# Patient Record
Sex: MALE | Race: WHITE | HISPANIC OR LATINO | URBAN - METROPOLITAN AREA
[De-identification: names, ages, dates, MRNs, and addresses within clinical notes are randomized per-mention and may not be internally consistent; named-entity substitution may affect disease eponyms.]

---

## 2019-08-08 ENCOUNTER — INPATIENT (INPATIENT)
Facility: HOSPITAL | Age: 69
LOS: 4 days | Discharge: HOME | DRG: 304 | End: 2019-08-13
Attending: INTERNAL MEDICINE | Admitting: INTERNAL MEDICINE
Payer: MEDICARE

## 2019-08-08 VITALS
SYSTOLIC BLOOD PRESSURE: 227 MMHG | RESPIRATION RATE: 16 BRPM | TEMPERATURE: 99 F | OXYGEN SATURATION: 96 % | HEART RATE: 67 BPM | DIASTOLIC BLOOD PRESSURE: 70 MMHG

## 2019-08-08 DIAGNOSIS — E78.5 HYPERLIPIDEMIA, UNSPECIFIED: ICD-10-CM

## 2019-08-08 DIAGNOSIS — M54.5 LOW BACK PAIN: ICD-10-CM

## 2019-08-08 DIAGNOSIS — I25.10 ATHEROSCLEROTIC HEART DISEASE OF NATIVE CORONARY ARTERY WITHOUT ANGINA PECTORIS: ICD-10-CM

## 2019-08-08 DIAGNOSIS — N18.9 CHRONIC KIDNEY DISEASE, UNSPECIFIED: ICD-10-CM

## 2019-08-08 DIAGNOSIS — I87.2 VENOUS INSUFFICIENCY (CHRONIC) (PERIPHERAL): ICD-10-CM

## 2019-08-08 DIAGNOSIS — I16.0 HYPERTENSIVE URGENCY: ICD-10-CM

## 2019-08-08 DIAGNOSIS — F03.90 UNSPECIFIED DEMENTIA WITHOUT BEHAVIORAL DISTURBANCE: ICD-10-CM

## 2019-08-08 DIAGNOSIS — N18.6 END STAGE RENAL DISEASE: ICD-10-CM

## 2019-08-08 DIAGNOSIS — E11.9 TYPE 2 DIABETES MELLITUS WITHOUT COMPLICATIONS: ICD-10-CM

## 2019-08-08 DIAGNOSIS — Z91.89 OTHER SPECIFIED PERSONAL RISK FACTORS, NOT ELSEWHERE CLASSIFIED: ICD-10-CM

## 2019-08-08 DIAGNOSIS — M54.9 DORSALGIA, UNSPECIFIED: ICD-10-CM

## 2019-08-08 DIAGNOSIS — I16.1 HYPERTENSIVE EMERGENCY: ICD-10-CM

## 2019-08-08 DIAGNOSIS — G62.9 POLYNEUROPATHY, UNSPECIFIED: ICD-10-CM

## 2019-08-08 LAB
ALBUMIN SERPL ELPH-MCNC: 5 G/DL — SIGNIFICANT CHANGE UP (ref 3.3–5)
ALP SERPL-CCNC: 164 U/L — HIGH (ref 40–120)
ALT FLD-CCNC: 24 U/L — SIGNIFICANT CHANGE UP (ref 10–45)
ANION GAP SERPL CALC-SCNC: 15 MMOL/L — SIGNIFICANT CHANGE UP (ref 5–17)
APTT BLD: 37.3 SEC — HIGH (ref 27.5–36.3)
AST SERPL-CCNC: 33 U/L — SIGNIFICANT CHANGE UP (ref 10–40)
BASOPHILS # BLD AUTO: 0.03 K/UL — SIGNIFICANT CHANGE UP (ref 0–0.2)
BASOPHILS NFR BLD AUTO: 0.6 % — SIGNIFICANT CHANGE UP (ref 0–2)
BILIRUB SERPL-MCNC: 0.4 MG/DL — SIGNIFICANT CHANGE UP (ref 0.2–1.2)
BUN SERPL-MCNC: 31 MG/DL — HIGH (ref 7–23)
CALCIUM SERPL-MCNC: 10.3 MG/DL — SIGNIFICANT CHANGE UP (ref 8.4–10.5)
CHLORIDE SERPL-SCNC: 97 MMOL/L — SIGNIFICANT CHANGE UP (ref 96–108)
CK MB CFR SERPL CALC: 4.7 NG/ML — SIGNIFICANT CHANGE UP (ref 0–6.7)
CK SERPL-CCNC: 67 U/L — SIGNIFICANT CHANGE UP (ref 30–200)
CO2 SERPL-SCNC: 31 MMOL/L — SIGNIFICANT CHANGE UP (ref 22–31)
CREAT SERPL-MCNC: 3.61 MG/DL — HIGH (ref 0.5–1.3)
EOSINOPHIL # BLD AUTO: 0.12 K/UL — SIGNIFICANT CHANGE UP (ref 0–0.5)
EOSINOPHIL NFR BLD AUTO: 2.4 % — SIGNIFICANT CHANGE UP (ref 0–6)
GLUCOSE SERPL-MCNC: 362 MG/DL — HIGH (ref 70–99)
HBV SURFACE AB SER-ACNC: REACTIVE — SIGNIFICANT CHANGE UP
HBV SURFACE AG SER-ACNC: SIGNIFICANT CHANGE UP
HCT VFR BLD CALC: 35.4 % — LOW (ref 39–50)
HCV AB S/CO SERPL IA: 2.83 S/CO — SIGNIFICANT CHANGE UP
HCV AB SERPL-IMP: ABNORMAL
HGB BLD-MCNC: 11.4 G/DL — LOW (ref 13–17)
IMM GRANULOCYTES NFR BLD AUTO: 0.4 % — SIGNIFICANT CHANGE UP (ref 0–1.5)
INR BLD: 1.04 — SIGNIFICANT CHANGE UP (ref 0.88–1.16)
LYMPHOCYTES # BLD AUTO: 0.79 K/UL — LOW (ref 1–3.3)
LYMPHOCYTES # BLD AUTO: 15.8 % — SIGNIFICANT CHANGE UP (ref 13–44)
MCHC RBC-ENTMCNC: 28.9 PG — SIGNIFICANT CHANGE UP (ref 27–34)
MCHC RBC-ENTMCNC: 32.2 GM/DL — SIGNIFICANT CHANGE UP (ref 32–36)
MCV RBC AUTO: 89.8 FL — SIGNIFICANT CHANGE UP (ref 80–100)
MONOCYTES # BLD AUTO: 0.59 K/UL — SIGNIFICANT CHANGE UP (ref 0–0.9)
MONOCYTES NFR BLD AUTO: 11.8 % — SIGNIFICANT CHANGE UP (ref 2–14)
NEUTROPHILS # BLD AUTO: 3.45 K/UL — SIGNIFICANT CHANGE UP (ref 1.8–7.4)
NEUTROPHILS NFR BLD AUTO: 69 % — SIGNIFICANT CHANGE UP (ref 43–77)
NRBC # BLD: 0 /100 WBCS — SIGNIFICANT CHANGE UP (ref 0–0)
PLATELET # BLD AUTO: 141 K/UL — LOW (ref 150–400)
POTASSIUM SERPL-MCNC: 4 MMOL/L — SIGNIFICANT CHANGE UP (ref 3.5–5.3)
POTASSIUM SERPL-SCNC: 4 MMOL/L — SIGNIFICANT CHANGE UP (ref 3.5–5.3)
PROT SERPL-MCNC: 8.1 G/DL — SIGNIFICANT CHANGE UP (ref 6–8.3)
PROTHROM AB SERPL-ACNC: 11.8 SEC — SIGNIFICANT CHANGE UP (ref 10–12.9)
RBC # BLD: 3.94 M/UL — LOW (ref 4.2–5.8)
RBC # FLD: 14.4 % — SIGNIFICANT CHANGE UP (ref 10.3–14.5)
SODIUM SERPL-SCNC: 143 MMOL/L — SIGNIFICANT CHANGE UP (ref 135–145)
TROPONIN T SERPL-MCNC: 0.18 NG/ML — CRITICAL HIGH (ref 0–0.01)
TROPONIN T SERPL-MCNC: 0.18 NG/ML — CRITICAL HIGH (ref 0–0.01)
WBC # BLD: 5 K/UL — SIGNIFICANT CHANGE UP (ref 3.8–10.5)
WBC # FLD AUTO: 5 K/UL — SIGNIFICANT CHANGE UP (ref 3.8–10.5)

## 2019-08-08 PROCEDURE — 71045 X-RAY EXAM CHEST 1 VIEW: CPT | Mod: 26

## 2019-08-08 PROCEDURE — 99291 CRITICAL CARE FIRST HOUR: CPT

## 2019-08-08 PROCEDURE — 74174 CTA ABD&PLVS W/CONTRAST: CPT | Mod: 26

## 2019-08-08 PROCEDURE — 93010 ELECTROCARDIOGRAM REPORT: CPT

## 2019-08-08 PROCEDURE — 71275 CT ANGIOGRAPHY CHEST: CPT | Mod: 26

## 2019-08-08 PROCEDURE — 99222 1ST HOSP IP/OBS MODERATE 55: CPT | Mod: GC

## 2019-08-08 PROCEDURE — 99223 1ST HOSP IP/OBS HIGH 75: CPT | Mod: GC

## 2019-08-08 PROCEDURE — 70450 CT HEAD/BRAIN W/O DYE: CPT | Mod: 26

## 2019-08-08 RX ORDER — LABETALOL HCL 100 MG
300 TABLET ORAL ONCE
Refills: 0 | Status: COMPLETED | OUTPATIENT
Start: 2019-08-08 | End: 2019-08-08

## 2019-08-08 RX ORDER — DEXTROSE 50 % IN WATER 50 %
25 SYRINGE (ML) INTRAVENOUS ONCE
Refills: 0 | Status: DISCONTINUED | OUTPATIENT
Start: 2019-08-08 | End: 2019-08-13

## 2019-08-08 RX ORDER — NITROGLYCERIN 6.5 MG
5 CAPSULE, EXTENDED RELEASE ORAL
Qty: 50 | Refills: 0 | Status: DISCONTINUED | OUTPATIENT
Start: 2019-08-08 | End: 2019-08-08

## 2019-08-08 RX ORDER — INSULIN LISPRO 100/ML
VIAL (ML) SUBCUTANEOUS
Refills: 0 | Status: DISCONTINUED | OUTPATIENT
Start: 2019-08-08 | End: 2019-08-09

## 2019-08-08 RX ORDER — HYDRALAZINE HCL 50 MG
10 TABLET ORAL ONCE
Refills: 0 | Status: DISCONTINUED | OUTPATIENT
Start: 2019-08-08 | End: 2019-08-08

## 2019-08-08 RX ORDER — HYDRALAZINE HCL 50 MG
10 TABLET ORAL ONCE
Refills: 0 | Status: COMPLETED | OUTPATIENT
Start: 2019-08-08 | End: 2019-08-08

## 2019-08-08 RX ORDER — CLOPIDOGREL BISULFATE 75 MG/1
75 TABLET, FILM COATED ORAL DAILY
Refills: 0 | Status: DISCONTINUED | OUTPATIENT
Start: 2019-08-08 | End: 2019-08-13

## 2019-08-08 RX ORDER — HYDRALAZINE HCL 50 MG
10 TABLET ORAL
Refills: 0 | Status: DISCONTINUED | OUTPATIENT
Start: 2019-08-08 | End: 2019-08-08

## 2019-08-08 RX ORDER — MORPHINE SULFATE 50 MG/1
4 CAPSULE, EXTENDED RELEASE ORAL ONCE
Refills: 0 | Status: DISCONTINUED | OUTPATIENT
Start: 2019-08-08 | End: 2019-08-08

## 2019-08-08 RX ORDER — LABETALOL HCL 100 MG
200 TABLET ORAL ONCE
Refills: 0 | Status: COMPLETED | OUTPATIENT
Start: 2019-08-08 | End: 2019-08-08

## 2019-08-08 RX ORDER — AMLODIPINE BESYLATE 2.5 MG/1
10 TABLET ORAL ONCE
Refills: 0 | Status: COMPLETED | OUTPATIENT
Start: 2019-08-08 | End: 2019-08-08

## 2019-08-08 RX ORDER — MORPHINE SULFATE 50 MG/1
2 CAPSULE, EXTENDED RELEASE ORAL ONCE
Refills: 0 | Status: DISCONTINUED | OUTPATIENT
Start: 2019-08-08 | End: 2019-08-08

## 2019-08-08 RX ORDER — HYDRALAZINE HCL 50 MG
75 TABLET ORAL THREE TIMES A DAY
Refills: 0 | Status: DISCONTINUED | OUTPATIENT
Start: 2019-08-08 | End: 2019-08-09

## 2019-08-08 RX ORDER — GLUCAGON INJECTION, SOLUTION 0.5 MG/.1ML
1 INJECTION, SOLUTION SUBCUTANEOUS ONCE
Refills: 0 | Status: DISCONTINUED | OUTPATIENT
Start: 2019-08-08 | End: 2019-08-13

## 2019-08-08 RX ORDER — DEXTROSE 50 % IN WATER 50 %
12.5 SYRINGE (ML) INTRAVENOUS ONCE
Refills: 0 | Status: DISCONTINUED | OUTPATIENT
Start: 2019-08-08 | End: 2019-08-13

## 2019-08-08 RX ORDER — ASPIRIN/CALCIUM CARB/MAGNESIUM 324 MG
81 TABLET ORAL DAILY
Refills: 0 | Status: DISCONTINUED | OUTPATIENT
Start: 2019-08-08 | End: 2019-08-13

## 2019-08-08 RX ORDER — GABAPENTIN 400 MG/1
100 CAPSULE ORAL DAILY
Refills: 0 | Status: DISCONTINUED | OUTPATIENT
Start: 2019-08-08 | End: 2019-08-12

## 2019-08-08 RX ORDER — ISOSORBIDE MONONITRATE 60 MG/1
30 TABLET, EXTENDED RELEASE ORAL ONCE
Refills: 0 | Status: COMPLETED | OUTPATIENT
Start: 2019-08-08 | End: 2019-08-08

## 2019-08-08 RX ORDER — ENOXAPARIN SODIUM 100 MG/ML
40 INJECTION SUBCUTANEOUS AT BEDTIME
Refills: 0 | Status: DISCONTINUED | OUTPATIENT
Start: 2019-08-08 | End: 2019-08-09

## 2019-08-08 RX ORDER — LEVOTHYROXINE SODIUM 125 MCG
50 TABLET ORAL DAILY
Refills: 0 | Status: DISCONTINUED | OUTPATIENT
Start: 2019-08-08 | End: 2019-08-13

## 2019-08-08 RX ORDER — ISOSORBIDE MONONITRATE 60 MG/1
30 TABLET, EXTENDED RELEASE ORAL DAILY
Refills: 0 | Status: DISCONTINUED | OUTPATIENT
Start: 2019-08-09 | End: 2019-08-10

## 2019-08-08 RX ORDER — HYDRALAZINE HCL 50 MG
100 TABLET ORAL ONCE
Refills: 0 | Status: COMPLETED | OUTPATIENT
Start: 2019-08-08 | End: 2019-08-08

## 2019-08-08 RX ORDER — LABETALOL HCL 100 MG
500 TABLET ORAL THREE TIMES A DAY
Refills: 0 | Status: DISCONTINUED | OUTPATIENT
Start: 2019-08-08 | End: 2019-08-08

## 2019-08-08 RX ORDER — LOSARTAN POTASSIUM 100 MG/1
100 TABLET, FILM COATED ORAL EVERY 24 HOURS
Refills: 0 | Status: DISCONTINUED | OUTPATIENT
Start: 2019-08-09 | End: 2019-08-13

## 2019-08-08 RX ORDER — DEXTROSE 50 % IN WATER 50 %
15 SYRINGE (ML) INTRAVENOUS ONCE
Refills: 0 | Status: DISCONTINUED | OUTPATIENT
Start: 2019-08-08 | End: 2019-08-13

## 2019-08-08 RX ORDER — HYDROMORPHONE HYDROCHLORIDE 2 MG/ML
2 INJECTION INTRAMUSCULAR; INTRAVENOUS; SUBCUTANEOUS ONCE
Refills: 0 | Status: DISCONTINUED | OUTPATIENT
Start: 2019-08-08 | End: 2019-08-08

## 2019-08-08 RX ORDER — SODIUM CHLORIDE 9 MG/ML
1000 INJECTION, SOLUTION INTRAVENOUS
Refills: 0 | Status: DISCONTINUED | OUTPATIENT
Start: 2019-08-08 | End: 2019-08-13

## 2019-08-08 RX ORDER — LOSARTAN POTASSIUM 100 MG/1
100 TABLET, FILM COATED ORAL DAILY
Refills: 0 | Status: DISCONTINUED | OUTPATIENT
Start: 2019-08-08 | End: 2019-08-08

## 2019-08-08 RX ORDER — NIFEDIPINE 30 MG
30 TABLET, EXTENDED RELEASE 24 HR ORAL ONCE
Refills: 0 | Status: COMPLETED | OUTPATIENT
Start: 2019-08-08 | End: 2019-08-08

## 2019-08-08 RX ORDER — SEVELAMER CARBONATE 2400 MG/1
800 POWDER, FOR SUSPENSION ORAL
Refills: 0 | Status: DISCONTINUED | OUTPATIENT
Start: 2019-08-08 | End: 2019-08-13

## 2019-08-08 RX ADMIN — Medication 300 MILLIGRAM(S): at 17:42

## 2019-08-08 RX ADMIN — MORPHINE SULFATE 4 MILLIGRAM(S): 50 CAPSULE, EXTENDED RELEASE ORAL at 10:51

## 2019-08-08 RX ADMIN — MORPHINE SULFATE 2 MILLIGRAM(S): 50 CAPSULE, EXTENDED RELEASE ORAL at 21:57

## 2019-08-08 RX ADMIN — Medication 1.5 MICROGRAM(S)/MIN: at 19:57

## 2019-08-08 RX ADMIN — Medication 10 MILLIGRAM(S): at 11:45

## 2019-08-08 RX ADMIN — Medication 10 MILLIGRAM(S): at 11:30

## 2019-08-08 RX ADMIN — Medication 0.1 MILLIGRAM(S): at 17:42

## 2019-08-08 RX ADMIN — AMLODIPINE BESYLATE 10 MILLIGRAM(S): 2.5 TABLET ORAL at 19:17

## 2019-08-08 RX ADMIN — HYDROMORPHONE HYDROCHLORIDE 2 MILLIGRAM(S): 2 INJECTION INTRAMUSCULAR; INTRAVENOUS; SUBCUTANEOUS at 14:08

## 2019-08-08 RX ADMIN — MORPHINE SULFATE 4 MILLIGRAM(S): 50 CAPSULE, EXTENDED RELEASE ORAL at 16:16

## 2019-08-08 RX ADMIN — MORPHINE SULFATE 4 MILLIGRAM(S): 50 CAPSULE, EXTENDED RELEASE ORAL at 10:36

## 2019-08-08 RX ADMIN — Medication 200 MILLIGRAM(S): at 17:41

## 2019-08-08 RX ADMIN — MORPHINE SULFATE 4 MILLIGRAM(S): 50 CAPSULE, EXTENDED RELEASE ORAL at 10:33

## 2019-08-08 RX ADMIN — ISOSORBIDE MONONITRATE 30 MILLIGRAM(S): 60 TABLET, EXTENDED RELEASE ORAL at 19:17

## 2019-08-08 RX ADMIN — Medication 10 MILLIGRAM(S): at 11:20

## 2019-08-08 RX ADMIN — MORPHINE SULFATE 4 MILLIGRAM(S): 50 CAPSULE, EXTENDED RELEASE ORAL at 10:18

## 2019-08-08 RX ADMIN — Medication 30 MILLIGRAM(S): at 12:51

## 2019-08-08 RX ADMIN — ISOSORBIDE MONONITRATE 30 MILLIGRAM(S): 60 TABLET, EXTENDED RELEASE ORAL at 17:42

## 2019-08-08 RX ADMIN — Medication 10 MILLIGRAM(S): at 10:51

## 2019-08-08 RX ADMIN — Medication 2: at 21:17

## 2019-08-08 RX ADMIN — MORPHINE SULFATE 2 MILLIGRAM(S): 50 CAPSULE, EXTENDED RELEASE ORAL at 21:20

## 2019-08-08 RX ADMIN — Medication 100 MILLIGRAM(S): at 17:42

## 2019-08-08 RX ADMIN — LOSARTAN POTASSIUM 100 MILLIGRAM(S): 100 TABLET, FILM COATED ORAL at 19:18

## 2019-08-08 NOTE — H&P ADULT - PROBLEM SELECTOR PLAN 1
Home medications given (labetalol 500 mg, hydralazine 100 mg, Imdur 60 mg, and clonidine 0.1 mg, amlodipine 10 mg, and losartan 100 mg).   -220's currently, per patient this is his baseline  Nitro drip started, to be titrated up in CCU Home medications given (labetalol 500 mg, hydralazine 100 mg, Imdur 60 mg, and clonidine 0.1 mg, amlodipine 10 mg, and losartan 100 mg).   -220's currently, per patient this is his baseline  Patient chest pain free, trops trending down, likely elevated d/t ESRD   Nitro drip started, to be titrated up in CCU

## 2019-08-08 NOTE — ED PROVIDER NOTE - CARE PLAN
Principal Discharge DX:	Hypertensive emergency  Secondary Diagnosis:	NSTEMI (non-ST elevated myocardial infarction)  Secondary Diagnosis:	ESRD (end stage renal disease) on dialysis  Secondary Diagnosis:	Back pain, unspecified back location, unspecified back pain laterality, unspecified chronicity

## 2019-08-08 NOTE — H&P ADULT - ASSESSMENT
69 y/o M  POOR/UNRELIABLE HISTORIAN with PMHx of HTN, HLD, CAD, BRYNN (s/p stent), IDDM (with peripheral nueropathy), ESRD on dialysis (tues/thurs/sat), hypothryoidism, ?CVA (legally blind in left eye, small vision in R eye - question of stroke or retinal artery occlusion - patient is s/p carotid artery stent), PAD (s/p bilateral stents) who presented to [facility] c/o 8/10 lower back pain, posterior neck pain, and BL worsening lower extremity numbness/pain following 3/4ths of his dialysis, for which he called an ambulance. Patient now being accepted to CCU.

## 2019-08-08 NOTE — H&P ADULT - PROBLEM SELECTOR PLAN 7
Patient only oriented x 1  Per assisted living facility, this is normal for him, he is occasionally confused   CT 7/31 shows chronic CVA   CT head without contrast ordered

## 2019-08-08 NOTE — PROGRESS NOTE ADULT - PROBLEM SELECTOR PLAN 5
Pt has chronic venous wounds in bilateral legs  - Wound care consult ordered Pt with history of type II DM on home lantus 100u at home.   - Continue to monitor FSG  - ISS  - F/u AM A1C

## 2019-08-08 NOTE — ED PROVIDER NOTE - PHYSICAL EXAMINATION
CONST: nontoxic NAD speaking in full sentences  HEAD: atraumatic  EYES: conjunctivae clear  ENT: mmm  NECK: supple/FROM, nttp, no jvd  CARD: rrr no murmurs  CHEST: ctab no r/r/w, no stridor/retractions/tripoding  ABD: soft, nd, nttp, no rebound/gaurding  BACK: no midline ttp, no cvat, atraumatic, no skin chnages  EXT: FROM, symmetric distal pulses intact  SKIN: warm, dry, no rash, no pedal edema, cap refill <2sec  NEURO: a+ox3, no facial asymmetry, no aphasia/dysphasia, no dysarthria, PERRL, EOMI, CN II-XII intact, ftn wnl, neg pronator, 5/5 strength x4, gross sensation intact x4, baseline gait w/ cane

## 2019-08-08 NOTE — PROGRESS NOTE ADULT - SUBJECTIVE AND OBJECTIVE BOX
Patient was seen and evaluated on dialysis.   HR: 56   BP: 119/58  Wt(kg): --  08-08    143  |  97  |  31<H>  ----------------------------<  362<H>  4.0   |  31  |  3.61<H>    Ca    10.3      08 Aug 2019 08:58    TPro  8.1  /  Alb  5.0  /  TBili  0.4  /  DBili  x   /  AST  33  /  ALT  24  /  AlkPhos  164<H>  08-08    Continue dialysis:   Dialyzer:   180    QB: 400  K bath: 3 k  Goal UF:    2   L   over      210         min  Patient is tolerating the procedure well.

## 2019-08-08 NOTE — H&P ADULT - PROBLEM SELECTOR PLAN 3
Immediate hemodialysis on arrival to unit  Positive troponin, trending down, patient chest pain free, likely secondary to ESRD

## 2019-08-08 NOTE — H&P ADULT - NSHPLABSRESULTS_GEN_ALL_CORE
11.4   5.00  )-----------( 141      ( 08 Aug 2019 08:58 )             35.4   08-08    143  |  97  |  31<H>  ----------------------------<  362<H>  4.0   |  31  |  3.61<H>    Ca    10.3      08 Aug 2019 08:58    TPro  8.1  /  Alb  5.0  /  TBili  0.4  /  DBili  x   /  AST  33  /  ALT  24  /  AlkPhos  164<H>  08-08    CARDIAC MARKERS ( 08 Aug 2019 12:12 )  x     / 0.18 ng/mL / x     / x     / x      CARDIAC MARKERS ( 08 Aug 2019 08:58 )  x     / 0.22 ng/mL / 94 U/L / x     / 6.6 ng/mL

## 2019-08-08 NOTE — PROGRESS NOTE ADULT - PROBLEM SELECTOR PLAN 7
1) PCP Contacted on Admission: (Y/N) --> Name & Phone #:  2) Date of Contact with PCP:  3) PCP Contacted at Discharge: (Y/N)  4) Summary of Handoff Given to PCP:   5) Post-Discharge Appointment Date and Location: Continue atorvastatin 40mg daily    #PMH Hypothyroidism   C/w home synthroid 50micrograms qd

## 2019-08-08 NOTE — PROGRESS NOTE ADULT - PROBLEM SELECTOR PROBLEM 3
Low back pain, unspecified back pain laterality, unspecified chronicity, unspecified whether sciatica present ESRD (end stage renal disease) on dialysis

## 2019-08-08 NOTE — CONSULT NOTE ADULT - ATTENDING COMMENTS
Case reviewed with Chief Resident    Agree with the findings and statements as documented above    No intervention needed    Recommend aspirin and statin    Also obtain carotid duplex to rule out carotid stenosis
will do further HD today to help BP control  to get head CT  to restart clonidine as may component rebound   enalaprilat PRN

## 2019-08-08 NOTE — H&P ADULT - MENTAL STATUS
Patient orientated to himself. Thinks it is 8/19/19 (today is 8/8/19) and that we are in [facility].

## 2019-08-08 NOTE — ED PROVIDER NOTE - PROGRESS NOTE DETAILS
given severe acute upper back pain radiating to legs, extremity paresthesia x4, and uncontrolled elevated bp, high suspicion for aortic dissection. pt s/p 3 of 4h HD this AM. will arrange for post-HD. currently unable to get a hold of dr pires. s/p morphine total 8 and hydral 10 prn for bp control. per , unable to get a hold of pcp at this time. reportedly no renal doctor. htn emergency w/ nstemi (no ekg changes, no active cp, trop 0.2) and bnp 13165d. no e/o pulm edema, dissection, pe. unable to control bp after hydralazine 40 total. will start esmolol gtt. ccu paged. htn emergency w/ nstemi (no ekg changes, no active cp, trop 0.2) and bnp [id number]. no e/o pulm edema, dissection, pe. minimally able to control bp after hydralazine 40 total. ccu paged. renal consulted. will do HD. per ccu fellow, no indication for ccu at this time. tera for tele. per tera maddox to admit per ccu fellow, no indication for ccu at this time. no gtt. reccs for dc hydral prn and instead do nifedipine 30 po. [place] for tele. per tera maddox to admit vascular consulted. will see pt

## 2019-08-08 NOTE — PROGRESS NOTE ADULT - PROBLEM SELECTOR PLAN 1
Pt presented to ED with /70 with headache, palpitations, SOB  ED BNP 18k, troponin 0.22 -> 0.18 -> 0.18  CXR with pulmonary vascular congestion, mild pulmonary edema  CT head negative for acute intracranial hemorrhage  Received hydralazine 10 IVP x4, hydralazine 100 PO x1, norvasc 10 x1, clonidine 0.1 x1, imdur 30 x2, labetalol 600, losartan 100, procardia 30 x1, and nitro drip started at 8 pm  BP since admission to CCU 150s/50s  Started HD  - Titrate nitro drip to goal systolic BP >160s (max 25% reduction in BP in first 24 hrs)  - Holding PO BP meds  - Continue to monitor BP Pt presented to ED with /70 symptomatic with headache, palpitations, SOB which were found to be elevated during HD session. ED BNP 18k, troponin 0.22 -> 0.18 -> 0.18 likely from heart strain.   - Received hydralazine 10 IVP x4, hydralazine 100 PO x1, norvasc 10 x1, clonidine 0.1 x1, imdur 30 x2, labetalol 600, losartan 100, procardia 30 x1, and nitro drip started at 8 pm  - CXR with pulmonary vascular congestion, mild pulmonary edema  - CT head negative for acute intracranial hemorrhage  - Titrate nitro drip to goal systolic BP >160s (max 25% reduction in BP in first 24 hrs)  - Holding PO BP meds to be slowly added in the AM (Clonidine 0.1TID, Hydralazine 75 TID, Labetalol 300TID, Losartan 100qd, Norvasc 10qd, Imdur 90qd )   - Continue to monitor BP and telemetry monitoring

## 2019-08-08 NOTE — PROGRESS NOTE ADULT - PROBLEM SELECTOR PLAN 3
8/10 low back and neck pain that started at 6 am on 8/08  Associated numbness in saddle region and b/l legs  Received morphine 4mg IVP x3, dilaudid 2mg IVP x1 in ED, additional morphine 2mg IVP after arrival to CCU  Negative straight leg raise  Denies incontinence of urine or stool  Numbness and pain likely 2/2 diabetic neuropathy  - Consider MRI and neurosurgery consult if symptoms worsen acutely  - Continue gabapentin 100mg PO daily Pt has ESRD on dialysis (Tues/Thurs/Sat) w/ [facility].  Presented to [facility] ED via ambulance from dialysis session receiving 3/4.   - S/p bedside HD  - f/u AM BMPqd   - Avoid nephrotoxic medications  - Renally dose medications  - Nephrology on board   - C/w Sevalemer 800 TID home dose

## 2019-08-08 NOTE — PROGRESS NOTE ADULT - PROBLEM SELECTOR PROBLEM 4
Type II diabetes mellitus Low back pain, unspecified back pain laterality, unspecified chronicity, unspecified whether sciatica present

## 2019-08-08 NOTE — CONSULT NOTE ADULT - SUBJECTIVE AND OBJECTIVE BOX
Patient is a 68y old  Male who presents with a chief complaint of     HPI:  67 y/o M legally blind, PMHx of HTN, HLD, CAD, BRYNN s/p stent, IDDM, ESRD on dialysis who presented to [facility] c/o lower back pain and BL lower extremity numbness following 3/4ths of his dialysis.     - has always had some numbness from peripheral neuropathy however, it was worse today   - normal walks with a walker   - has lower extremity weakness as well   - denied any chest pain but has some shortness of breath   - No palpitations or other cardiac symptomatology   - normally BP at home is around 220s systolic  - never misses dialysis  - does not remember his BP meds (says he takes a lot of meds)     In the ED he was hypertensive. Systolics in 220s. He received a total of 40 IV hydralazine which reduced his BP to 170s/70s. His EKG showed normal sinus rhythm and LVH. He is going to be ordered for an ECHO. CXR without any widened mediastinum and no congestion. 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 He has severe calcific and non-calcific CAD as well.     Allergies    No Known Allergies    Intolerances        MEDICATIONS  (STANDING):    MEDICATIONS  (PRN):  hydrALAZINE Injectable 10 milliGRAM(s) IV Push every 2 hours PRN sbp >140      REVIEW OF SYSTEMS:  12 point ROS negative except for that stated in the HPI    PHYSICAL EXAM:  Vitals past 24 Hours: T(C): 36.8 (08-08-19 @ 10:17), Max: 37.2 (08-08-19 @ 08:19)  HR: 64 (08-08-19 @ 12:52) (63 - 70)  BP: 184/93 (08-08-19 @ 12:52) (158/71 - 227/70)  RR: 18 (08-08-19 @ 12:52) (16 - 18)  SpO2: 95% (08-08-19 @ 12:52) (94% - 97%)	    Daily     Daily     GEN: Alert and awake, no acute distress  HEENT: Moist mucous membranes  Neck: No JVD  Cardiovascular: Regular rate and rhythm, +S1 S2. No murmurs, rubs, or gallops appreciated  Respiratory: Lungs clear to auscultation bilaterally  Skin: Dry ulcerative appearing wound on lower extremities bilaterally along BL shin   Neurologic: Non-focal, alert and oriented x3. disconjugate gaze (right eye blind 2/2 to previous stroke)   Extremities: No clubbing, cyanosis or edema. Warm, well-perfused extremities. warm lower extrem. erythematous skin, some weeping wounds, no edema     I&O's Detail        LABS:                        11.4   5.00  )-----------( 141      ( 08 Aug 2019 08:58 )             35.4     08-08    143  |  97  |  31<H>  ----------------------------<  362<H>  4.0   |  31  |  3.61<H>    Ca    10.3      08 Aug 2019 08:58    TPro  8.1  /  Alb  5.0  /  TBili  0.4  /  DBili  x   /  AST  33  /  ALT  24  /  AlkPhos  164<H>  08-08    CARDIAC MARKERS ( 08 Aug 2019 12:12 )  x     / 0.18 ng/mL / x     / x     / x      CARDIAC MARKERS ( 08 Aug 2019 08:58 )  x     / 0.22 ng/mL / 94 U/L / x     / 6.6 ng/mL      PT/INR - ( 08 Aug 2019 08:58 )   PT: 11.8 sec;   INR: 1.04          PTT - ( 08 Aug 2019 08:58 )  PTT:37.3 sec    I&O's Summary      CARDIAC DIAGNOSTIC TESTING:    ECG: sinus rhythm, meets LVH criteria     TELEMETRY: normal sinus     ECHO: pending     RADIOLOGY & ADDITIONAL STUDIES:   CXR - normal   CT - see above     ASSESSMENT/PLAN:  67 y/o M legally blind, PMHx of HTN, HLD, CAD, BRYNN s/p stent, IDDM, ESRD on dialysis who presented to [facility] c/o lower back pain and BL lower extremity numbness following 3/4ths of his dialysis. Found to be hypertensive in the ED after 3/4ths of dialysis.     Hypertensive urgency:   - please contact his pharamacy to get a list of his home medications, resume home meds   - s/p nifedipine XL 30 PO, to continue uptitrating as needed   - does not require esmolol drip, requested to discontinue   - needs urgent dialysis for better BP control, renal consulted     CAD   - does not have active signs of ischemia based on symptoms and EKG   - trops are positive 2/2 to ESRD (have downtrended)     Back pain and lower extremity edema   - likely 2/2 to diabetic peripheral neurpathy or neuropathy 2/2 to severe vascular disease   - recommend vascular consult for further input on infra renal aortic ectasia   - if vascular findings non-concerning also recommend considering MRI to rule out any neurologic emergencies to explain sudden worsening of lower extrem parasthesia vs. NS consult.     Discussed with CCU attending Dr. Marshall telephonically.   Please re-consult if BP is uptrending or if there is any other clinical changes in his status.     Rhonda Porras MD  CCU fellow

## 2019-08-08 NOTE — ED ADULT NURSE NOTE - OBJECTIVE STATEMENT
Pt reports dialysis T/TH/Sat, has fistula to the left upper arm. Pt started to feel lower back pain, bilateral leg pain, bilateral arm pain. Pt also reports numbness tingling to bilateral legs. Pt has redness, no swelling to bilateral LE. Pt reports he fell 3 days ago, denies loc, denies taking blood thinners. Pt denies chest pain, sob, dizziness.

## 2019-08-08 NOTE — H&P ADULT - HISTORY OF PRESENT ILLNESS
67 y/o M  POOR/UNRELIABLE HISTORIAN with PMHx of HTN, HLD, CAD, BRYNN (s/p stent), IDDM (with peripheral nueropathy), ESRD on dialysis (tues/thurs/sat), hypothryoidism, ?CVA (legally blind in left eye, small vision in R eye - question of stroke or retinal artery occlusion - patient is s/p carotid artery stent), PAD (s/p bilateral stents) who presented to [facility] c/o 8/10 lower back pain, posterior neck pain, and BL worsening lower extremity numbness/pain following 3/4ths of his dialysis, for which he called an ambulance. He has associated h/a, SOB, diaphoresis, palpitations, orthopnea, and vertigo. His weakness is at baseline. He denies chest pain, syncope, lower extremity edema (has several open wounds of lower extremities, likely secondary to venous insufficiency), and nausea/vomitting. In the ED he was hypertensive to systolics in 220s. He received a total of 40 IV hydralazine which reduced his BP to 170s/70s. His EKG showed normal sinus rhythm and LVH. Pertinent labs include troponin 0.22 then 0.15 and BMP 18,338. He received 8 mg of IV morphine, followed by 2 mg of IV dilaudid, which helped his headache and back pain. 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 He has severe calcific and non-calcific CAD as well. Patient states that his blood pressure is usually 220-240 systolic at home. On provider arrival to ED, /88, patient in pain but able to hold conversation, only oriented to person. He does not know his medication list - pharmacy was called, which informed us that he lives at [facility] who informed us that he was discharged from [facility] last night (8/7/19). Paperwork requested. 69 y/o M  POOR/UNRELIABLE HISTORIAN with PMHx of HTN, HLD, CAD, BRYNN (s/p stent), IDDM (with peripheral nueropathy), ESRD on dialysis (tues/thurs/sat), hypothryoidism, ?CVA (legally blind in left eye, small vision in R eye - question of stroke or retinal artery occlusion - patient is s/p carotid artery stent), PAD (s/p bilateral stents) who presented to [facility] c/o 8/10 lower back pain, posterior neck pain, and BL worsening lower extremity numbness/pain following 3/4ths of his dialysis, for which he called an ambulance. He has associated h/a, SOB, diaphoresis, palpitations, orthopnea, and vertigo. His weakness is at baseline. He denies chest pain, syncope, lower extremity edema (has several open wounds of lower extremities, likely secondary to venous insufficiency), and nausea/vomitting. In the ED he was hypertensive to systolics in 220s. He received a total of 40 IV hydralazine which reduced his BP to 170s/70s. His EKG showed normal sinus rhythm and LVH. Pertinent labs include troponin 0.22 then 0.18 and BMP 18,338. He received 8 mg of IV morphine, followed by 2 mg of IV dilaudid, which helped his headache and back pain. 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 He has severe calcific and non-calcific CAD as well. Patient states that his blood pressure is usually 220-240 systolic at home. On provider arrival to ED, /88, patient in pain but able to hold conversation, only oriented to person. He does not know his medication list - pharmacy was called, which informed us that he lives at [facility] who informed us that he was discharged from [facility] last night (8/7/19). Paperwork requested. [place] informed us that he was given a packet of medications to take at dialysis, which included amlodipine 10 mg, hydralizine 75 mg, Imdur 30 mg, labetalol 500 mg, plavix 75 mg, levothyroxine 50 mcg, aspirin 81 mg, and gabapentin 100 mg. It is unclear if he took these medications, so TID medications and those not given to patient were ordered, as before admission to [facility] patient was on Imdur 60 mg daily and hydralizine 100 mg TID (labetalol 500 mg, hydralazine 100 mg, Imdur 30 mg, and clonidine 0.1 mg) without much effect. The rest of his home regimen was then ordered, amlodipine 10 mg, Imdur 30 mg, and losartan 100 mg. Patient now being accepted to CCU.     OF NOTE: prior to [facility] admission, patient was on Sevelamer 800 mg TID, lyrica 200 mg BID, colace, torsemide 100 mg qd, nifedipine 60 mg PO qd, and tizanidine 2 mg TID.    CT head 7/30/19: No acute changes. Mild generalized parenchymal volume loss and mild to moderate chronic microvascular ischemic white matter disease. Chronic lacunar infarcts of bilateral basal ganglia.     CT cervical spine 7/31/19: Multilevel degenerative changes with associated central canal stenosis and neuroforaminal encroachment Effacement of the cored at c2-3, c4-5, c5-6, and c6-7. 69 y/o M  POOR/UNRELIABLE HISTORIAN with PMHx of HTN, HLD, CAD, BRYNN (s/p stent), IDDM (with peripheral nueropathy), ESRD on dialysis (tues/thurs/sat), hypothryoidism, ?CVA (legally blind in left eye, small vision in R eye - question of stroke or retinal artery occlusion - patient is s/p carotid artery stent), PAD (s/p bilateral stents) who presented to [facility] c/o 8/10 lower back pain, posterior neck pain, and BL worsening lower extremity numbness/pain following 3/4ths of his dialysis, for which he called an ambulance. He has associated h/a, SOB, diaphoresis, palpitations, orthopnea, and vertigo. His weakness is at baseline. He denies chest pain, syncope, lower extremity edema (has several open wounds of lower extremities, likely secondary to venous insufficiency), and nausea/vomitting. In the ED he was hypertensive to systolics in 220s. He received a total of 40 IV hydralazine which reduced his BP to 170s/70s. His EKG showed normal sinus rhythm and LVH. Pertinent labs include troponin 0.22 then 0.18 and BMP 18,338. He received 8 mg of IV morphine, followed by 2 mg of IV dilaudid, which helped his headache and back pain. 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 He has severe calcific and non-calcific CAD as well. Patient states that his blood pressure is usually 220-240 systolic at home. On provider arrival to ED, /88, patient in pain but able to hold conversation, only oriented to person. He does not know his medication list - pharmacy was called, which informed us that he lives at [facility] who informed us that he was discharged from [facility] last night (8/7/19). Paperwork requested. [place] informed us that he was given a packet of medications to take at dialysis, which included amlodipine 10 mg, hydralizine 75 mg, Imdur 30 mg, labetalol 500 mg, plavix 75 mg, levothyroxine 50 mcg, aspirin 81 mg, and gabapentin 100 mg. It is unclear if he took these medications, so TID medications and those not given to patient were ordered, as before admission to [facility] patient was on Imdur 60 mg daily and hydralizine 100 mg TID (labetalol 500 mg, hydralazine 100 mg, Imdur 30 mg, and clonidine 0.1 mg) without much effect. The rest of his home regimen was then ordered, amlodipine 10 mg, Imdur 30 mg, and losartan 100 mg. Patient now being accepted to CCU.     OF NOTE: prior to [facility] admission, patient was on Sevelamer 800 mg TID, lyrica 200 mg BID, colace, torsemide 100 mg qd, nifedipine 60 mg PO qd, and tizanidine 2 mg TID.    OF NOTE: per [facility] discharge paperwork, patient was given levaquin for ?PNA v Atelectasis. Discharge paperwork shows he was discharged on cefpodoxime 200 mg qd. Assisted living did not endorse this.      CT head 7/30/19: No acute changes. Mild generalized parenchymal volume loss and mild to moderate chronic microvascular ischemic white matter disease. Chronic lacunar infarcts of bilateral basal ganglia.     CT cervical spine 7/31/19: Multilevel degenerative changes with associated central canal stenosis and neuroforaminal encroachment Effacement of the cored at c2-3, c4-5, c5-6, and c6-7.

## 2019-08-08 NOTE — PROGRESS NOTE ADULT - ASSESSMENT
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 Now admitted to CCU on nitro drip for management of hypertensive emergency and HD. 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 Now admitted to CCU on nitro drip for management of hypertensive emergency and HD. 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 Now admitted to CCU on nitro drip for management of hypertensive Urgency and HD. 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 Now admitted to CCU on nitro drip for management of hypertensive Urgency and HD.    Neuro  # Low back pain  Pt c/o 8/10 low back and neck pain that started at 6 am on 8/8 , w/ associated numbness in saddle region and b/l legs (usually chronic but endorses worsening today.)   - Received morphine 4mg IVP x3, dilaudid 2mg IVP x1 in ED, additional morphine 2mg IVP after arrival to CCU  - Negative straight leg raise  - Denies incontinence of urine or stool  - Numbness and pain likely 2/2 diabetic neuropathy less likely cauda equina in the setting of no bowel, urine retention or saddle anesthesia  - Consider MRI and neurosurgery consult if symptoms worsen acutely  - Continue gabapentin 100mg PO daily    Cardiovascular  # Hypertensive urgency  Pt presented to ED with /70 symptomatic with headache, palpitations, SOB which were found to be elevated during HD session. ED BNP 18k, troponin 0.22 -> 0.18 -> 0.18 likely from heart strain.   - Received hydralazine 10 IVP x4, hydralazine 100 PO x1, norvasc 10 x1, clonidine 0.1 x1, imdur 30 x2, labetalol 600, losartan 100, procardia 30 x1, and nitro drip started at 8 pm  - CXR with pulmonary vascular congestion, mild pulmonary edema  - CT head negative for acute intracranial hemorrhage  - Titrate nitro drip to goal systolic BP >160s (max 25% reduction in BP in first 24 hrs)  - Holding PO BP meds to be slowly added in the AM (Clonidine 0.1TID, Hydralazine 75 TID, Labetalol 300TID, Losartan 100qd, Norvasc 10qd, Imdur 90qd )   - Continue to monitor BP and telemetry monitoring    # CAD  PMH of CAD, on home ASA, Plavix 75qd, likely severe vasculopath in the setting of known PAD (s/p b/l stents) as well.   - Obtain collaterals about prior CAD hx (endorses no cardiac surgical history)   - C/w home ASA, Plavix 75qd, and lipitor 40qd    # Chronic venous insufficiency  Pt has chronic venous wounds in bilateral legs likely in the setting of both arterial and venous insufficiency.   - Wound care consult ordered    # Hyperlipidemia  - Continue atorvastatin 40mg daily    Renal  # ESRD   Pt has ESRD on dialysis (Tues/Thurs/Sat) w/ LUE AVF.  Presented to [facility] ED via ambulance from dialysis session receiving 3/4.   - S/p bedside HD  - f/u AM BMPqd   - Avoid nephrotoxic medications  - Renally dose medications  - Nephrology on board   - C/w Sevalemer 800 TID home dose    Pulmonary  # Pulmonary edema  Pt reports some mild shortness of breath  CXR shows pulmonary vascular congestion and pulmonary edema  - S/p HD with 1.8L removed  - Continue to monitor respiratory status    GI  No active issues.    Endocrine  # Type II Diabetes Mellitus  Pt with history of type II DM on home lantus 100u at home.   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 Now admitted to CCU on nitro drip for management of hypertensive Urgency and HD.    Neuro  # Low back pain  Pt c/o 8/10 low back and neck pain that started at 6 am on 8/8 , w/ associated numbness in saddle region and b/l legs (usually chronic but endorses worsening today.)   - Received morphine 4mg IVP x3, dilaudid 2mg IVP x1 in ED, additional morphine 2mg IVP after arrival to CCU  - Negative straight leg raise  - Denies incontinence of urine or stool  - Numbness and pain likely 2/2 diabetic neuropathy less likely cauda equina in the setting of no bowel, urine retention or saddle anesthesia  - Consider MRI and neurosurgery consult if symptoms worsen acutely  - Continue gabapentin 100mg PO daily    Cardiovascular  # Hypertensive urgency  Pt presented to ED with /70 symptomatic with headache, palpitations, SOB which were found to be elevated during HD session. ED BNP 18k, troponin 0.22 -> 0.18 -> 0.18 likely from heart strain. (EKG NSR, NL Axis, LVH w/ strain, no ST changes )  - Received hydralazine 10 IVP x4, hydralazine 100 PO x1, norvasc 10 x1, clonidine 0.1 x1, imdur 30 x2, labetalol 600, losartan 100, procardia 30 x1, and nitro drip started at 8 pm  - CXR with pulmonary vascular congestion, mild pulmonary edema  - CT head negative for acute intracranial hemorrhage  - Titrate nitro drip to goal systolic BP >160s (max 25% reduction in BP in first 24 hrs)  - Holding PO BP meds to be slowly added in the AM (Clonidine 0.1TID, Hydralazine 75 TID, Labetalol 300TID, Losartan 100qd, Norvasc 10qd, Imdur 90qd )   - Continue to monitor BP and telemetry monitoring    # CAD  PMH of CAD, on home ASA, Plavix 75qd, likely severe vasculopath in the setting of known PAD (s/p b/l stents) as well.   - EKG NSR, NL Axis, LVH w/ strain, no ST changes   - Obtain collaterals about prior CAD hx (endorses no cardiac surgical history)   - C/w home ASA, Plavix 75qd, and lipitor 40qd    # Chronic venous insufficiency  Pt has chronic venous wounds in bilateral legs likely in the setting of both arterial and venous insufficiency.   - Wound care consult ordered    # Hyperlipidemia  - Continue atorvastatin 40mg daily    Renal  # ESRD   Pt has ESRD on dialysis (Tues/Thurs/Sat) w/ LUE AVF.  Presented to [facility] ED via ambulance from dialysis session receiving 3/4.   - S/p bedside HD  - f/u AM BMPqd   - Avoid nephrotoxic medications  - Renally dose medications  - Nephrology on board   - C/w Sevalemer 800 TID home dose    Pulmonary  # Pulmonary edema  Pt reports some mild shortness of breath  CXR shows pulmonary vascular congestion and pulmonary edema  - S/p HD with 1.8L removed  - Continue to monitor respiratory status    GI  No active issues.    Endocrine  # Type II Diabetes Mellitus  Pt with history of type II DM on home lantus 100u at home.   - Continue to monitor FSG  - ISS  - F/u AM A1C    #Hypothyroidism  - C/w home synthroid 50micrograms qd    Heme/onc  # Anemia  Hgb 11.4  - f/u AM CBC    F: None  E: Replete PRN  N: DASH/TLC diet    DVT Prophylaxis: pt refused lovenox    Dispo: CCU

## 2019-08-08 NOTE — PROGRESS NOTE ADULT - PROBLEM SELECTOR PLAN 2
Pt has ESRD on dialysis (Tues/Thurs/Sat)  Presented to [facility] ED via ambulance from dialysis session  Currently receiving bedside HD  - f/u AM BMP PMH of CAD, on home ASA, Plavix 75qd, likely severe vasculopath in the setting of known PAD (s/p b/l stents) as well.   - Obtain collaterals about prior CAD hx (endorses no cardiac surgical history)   - C/w home ASA, Plavix 75qd, and lipitor 40qd

## 2019-08-08 NOTE — ED PROVIDER NOTE - CLINICAL SUMMARY MEDICAL DECISION MAKING FREE TEXT BOX
afebrile. hypertensive. no hypoxia. initially high suspicion for aortic dissection given risk factors and sx complaint. no e/o dissection vs pe vs aaa on ct c/a/p. likely htn emergency w/ elevated trop/bnp although no e/o stemi or acute pulm edema on exam/cxr. cardiology consulted. no indication for ccu admission at this time. incidentally found to have vascular ectasias and occluded KJ on ct. vascular c/f poss etiology of back pain. post-HD arranged. will admit.

## 2019-08-08 NOTE — CONSULT NOTE ADULT - SUBJECTIVE AND OBJECTIVE BOX
Vascular Attending:  Dr. Reardon      HPI: 67 y/o M legally blind, PMHx of HTN, HLD, CAD, BRYNN s/p stent, IDDM, ESRD on dialysis who presented to [facility] c/o lower back pain and BL lower extremity numbness following 3/4ths of his dialysis.   In the ED he was hypertensive. Systolics in 220s. He received a total of 40 IV hydralazine which reduced his BP to 170s/70s. His EKG showed normal sinus rhythm and LVH. He is going to be ordered for an ECHO. CXR without any widened mediastinum and no congestion. 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 He has severe calcific and non-calcific CAD as well.     Vascular surgery consulted for the CTA findings and LE pain.  Patient reports pain that is shooting and tension like all over his back radiating down both legs along with some numbness in his feet. Also c/o pain behind the eyes, head and neck. CT showed no dissection but diffuse arteriosclerotic disease.   Allergic/Immunologic:	      Allergies  No Known Allergies      T(C): 36.8 (08 Aug 2019 16:16), Max: 37.2 (08 Aug 2019 08:19)  T(F): 98.2 (08 Aug 2019 16:16), Max: 99 (08 Aug 2019 08:19)  HR: 66 (08 Aug 2019 16:16) (63 - 70)  BP: 193/77 (08 Aug 2019 16:16) (158/71 - 227/70)  BP(mean): --  RR: 18 (08 Aug 2019 16:16) (16 - 18)  SpO2: 90% (08 Aug 2019 16:16) (90% - 97%)      PHYSICAL EXAM:    Constitutional: alert and awake, NAD    Respiratory: no respiratory distress    Cardiovascular: RRR    Extremities: warm, well perfused, some hyperpigmentation bilat, left shin ulceration clean with good granulation tissue. Calfs soft and non tender, no edema. 2+DP/PT pulses.      LABS:                        11.4   5.00  )-----------( 141      ( 08 Aug 2019 08:58 )             35.4     08-08    143  |  97  |  31<H>  ----------------------------<  362<H>  4.0   |  31  |  3.61<H>    Ca    10.3      08 Aug 2019 08:58    TPro  8.1  /  Alb  5.0  /  TBili  0.4  /  DBili  x   /  AST  33  /  ALT  24  /  AlkPhos  164<H>  08-08    PT/INR - ( 08 Aug 2019 08:58 )   PT: 11.8 sec;   INR: 1.04          PTT - ( 08 Aug 2019 08:58 )  PTT:37.3 sec      < from: CT Angio Abdomen and Pelvis w/ IV Cont (08.08.19 @ 11:23) >  EXAM:  CT ANGIO ABD PELV (W)AW IC                          PROCEDURE DATE:  08/08/2019        INTERPRETATION:  CTA (CT ANGIOGRAPHY) of the CHEST, ABDOMEN and PELVIS     INDICATION: Chest pain. Concern for aortic dissection.    TECHNIQUE: CT scan of the chest was performed prior to the administration   of intravenous contrast. CTA (CT ANGIOGRAPHY) of the chest, abdomen and   pelvis was then performed using intravenous contrast. Multiplanar images   of the chest, abdomen and pelvis were reconstructed on an independent   work-station. Image postprocessing including production of axial, coronal   and sagittal multiplanar reformatted images and coronal and sagittal   maximum intensity projections (MIPs).     PRIOR STUDIES: None.    FINDINGS:  No aortic dissection is seen.  No aortic aneurysm is evident.   Ectasia of the infrarenal aorta. Severe calcified and noncalcified plaque   throughout the aorta, greater in the infrarenal aorta. Additional areas   of soft plaque are seen in the iliac vessels. Occlusion of the proximal   KJ. 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    Dilated pulmonary arteries with the main pulmonary artery measuring 3.6   cm which can be seen with pulmonary hypertension. No pulmonary embolism   is visualized.    The heart is enlarged. No pericardial effusion is seen. Severe coronary   artery disease. Mild aortic valve calcifications.    No mediastinal, hilar or axillary lymphadenopathy is seen.    Evaluation of pulmonary parenchyma demonstrates no consolidation or mass.   Small bleb/bullae in the posterior right lower lobe.  No pleural   effusions are seen.    The liver is mildly enlarged. No radiopaquestones are seen in the   gallbladder.  There is mild diffuse dilation of the main pancreatic duct   measuring 5 mm. 1.2 cm cystic lesion within the pancreatic body.   Borderline splenomegaly measuring up to 14 cm in craniocaudal diameter.   Nodular thickening in the right adrenal gland. No hydronephrosis or   nephrolithiasis is demonstrated.     No lymphadenopathy in abdomen or pelvis. No ascites is seen.     Evaluation of the bowel is limited without oral contrast. Within that   limitation, these images demonstrate no significant abnormalities.     Images of the pelvic organs are within normal limits.    Evaluation of the osseous structures demonstrates severe degenerative   changes of the spine. Dextrocurvature of the lumbar spine.    Evaluation of the soft tissues shows mild bilateral gynecomastia. There   is a small fat-containing right inguinal hernia.    IMPRESSION:  1. No dissection.    2. Severe calcified and noncalcified atherosclerotic aortic plaques.    3. 1.2 cm cystic lesion in the pancreas. Follow-up pancreas protocol CT   or MRI in 2 years is advised.

## 2019-08-08 NOTE — H&P ADULT - ATTENDING COMMENTS
See PA note written above, for details. I reviewed the PA documentation.  I reviewed vitals, labs, medications, cardiac studies and additional imaging.  I agree with the PA's findings and plans as written above with the following additions/amendments:  Hypertensive Emergency  Patient failed initial attempts at blood pressure control with oral medication  Patient initiated in nitro gtt and stepped up to CCU for management of BP, HD, and vascular evaluation  ZAHIRA Billy.  Cardiology Attending

## 2019-08-08 NOTE — ED ADULT NURSE REASSESSMENT NOTE - NS ED NURSE REASSESS COMMENT FT1
CCU cx at bedside, per card and MD Cevallos to administer procardia XR 30 PO, pt to be admitted to card and for HD today. Pt still c/o pain, ccu and MD Sullivan aware. Pt noted to have multiple dried ulcers to bilateral LEs, brady aware. Pt pending admission, Will continue to monitor.

## 2019-08-08 NOTE — CONSULT NOTE ADULT - SUBJECTIVE AND OBJECTIVE BOX
Patient is a 67 yo Male who presents with a chief complaint of Back pain and lower extremity numbness (08 Aug 2019 13:09)    Nephrology consult placed in regards to ESRD on HD.    HPI:  69 y/o M legally blind, PMHx of HTN, HLD, CAD, BRYNN s/p stent, IDDM, ESRD on dialysis who presented to [facility] c/o lower back pain and BL lower extremity numbness following 3/4ths of his dialysis.   In the ED he was hypertensive. Systolics in 220s. He received a total of 40 IV hydralazine which reduced his BP to 170s/70s. His EKG showed normal sinus rhythm and LVH. He is going to be ordered for an ECHO. CXR without any widened mediastinum and no congestion. 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 He has severe calcific and non-calcific CAD as well.      Allergies    No Known Allergies    Intolerances      SOCIAL HISTORY:  denies    MEDICATIONS  (STANDING):  cloNIDine 0.1 milliGRAM(s) Oral once  hydrALAZINE 100 milliGRAM(s) Oral once  isosorbide   mononitrate ER Tablet (IMDUR) 30 milliGRAM(s) Oral once  labetalol 200 milliGRAM(s) Oral once  labetalol 300 milliGRAM(s) Oral once    MEDICATIONS  (PRN):      Vital Signs Last 24 Hrs  T(C): 36.8 (08 Aug 2019 16:16), Max: 37.2 (08 Aug 2019 08:19)  T(F): 98.2 (08 Aug 2019 16:16), Max: 99 (08 Aug 2019 08:19)  HR: 66 (08 Aug 2019 16:16) (63 - 70)  BP: 193/77 (08 Aug 2019 16:16) (158/71 - 227/70)  BP(mean): --  RR: 18 (08 Aug 2019 16:16) (16 - 18)  SpO2: 90% (08 Aug 2019 16:16) (90% - 97%)    REVIEW OF SYSTEMS:  Gen: No fever  CVS: No chest pain  Resp: No shortness of breath  Abd: No nausea/ No vomiting/No abdominal pain  CNS: +headache      PHYSICAL EXAM:  GENERAL: well-developed, well nourished, alert, no acute distress at present  NECK: supple, + JVD  CHEST/LUNG: Clear to auscultation bilaterally  HEART: normal S1S2, RRR  ABDOMEN: Soft, Nontender, +BS, No flank tenderness  EXTREMITIES: LE bilateral chronic skin changes   Neurology: AAOx3, no focal neurological deficit  SKIN: No rashes  ACCESS: LUE AVF, good thrill and bruit appreciated      CAPILLARY BLOOD GLUCOSE      POCT Blood Glucose.: 228 mg/dL (08 Aug 2019 17:23)      I&O's Summary        LABS:                                                   08-08-19 @ 08:58    143  |  97  |  31<H>  ----------------------------<  362<H>  4.0   |  31  |  3.61<H>    Ca    10.3      08 Aug 2019 08:58    TPro  8.1  /  Alb  5.0  /  TBili  0.4  /  DBili  x   /  AST  33  /  ALT  24  /  AlkPhos  164<H>  08-08                          11.4   5.00  )-----------( 141      ( 08 Aug 2019 08:58 )             35.4     CBC Full  -  ( 08 Aug 2019 08:58 )  WBC Count : 5.00 K/uL  Hemoglobin : 11.4 g/dL  Hematocrit : 35.4 %  Platelet Count - Automated : 141 K/uL  Mean Cell Volume : 89.8 fl        PT/INR - ( 08 Aug 2019 08:58 )   PT: 11.8 sec;   INR: 1.04          PTT - ( 08 Aug 2019 08:58 )  PTT:37.3 sec  CARDIAC MARKERS ( 08 Aug 2019 12:12 )  x     / 0.18 ng/mL / x     / x     / x      CARDIAC MARKERS ( 08 Aug 2019 08:58 )  x     / 0.22 ng/mL / 94 U/L / x     / 6.6 ng/mL          RADIOLOGY & ADDITIONAL TESTS:      < from: CT Angio Abdomen and Pelvis w/ IV Cont (08.08.19 @ 11:23) >  FINDINGS:  No aortic dissection is seen.  No aortic aneurysm is evident.   Ectasia of the infrarenal aorta. Severe calcified and noncalcified plaque   throughout the aorta, greater in the infrarenal aorta. Additional areas   of soft plaque are seen in the iliac vessels. Occlusion of the proximal   KJ. 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    Dilated pulmonary arteries with the main pulmonary artery measuring 3.6   cm which can be seen with pulmonary hypertension. No pulmonary embolism   is visualized.    The heart is enlarged. No pericardial effusion is seen. Severe coronary   artery disease. Mild aortic valve calcifications.    No mediastinal, hilar or axillary lymphadenopathy is seen.    Evaluation of pulmonary parenchyma demonstrates no consolidation or mass.   Small bleb/bullae in the posterior right lower lobe.  No pleural   effusions are seen.    The liver is mildly enlarged. No radiopaquestones are seen in the   gallbladder.  There is mild diffuse dilation of the main pancreatic duct   measuring 5 mm. 1.2 cm cystic lesion within the pancreatic body.   Borderline splenomegaly measuring up to 14 cm in craniocaudal diameter.   Nodular thickening in the right adrenal gland. No hydronephrosis or   nephrolithiasis is demonstrated.     No lymphadenopathy in abdomen or pelvis. No ascites is seen.     Evaluation of the bowel is limited without oral contrast. Within that   limitation, these images demonstrate no significant abnormalities.     Images of the pelvic organs are within normal limits.    Evaluation of the osseous structures demonstrates severe degenerative   changes of the spine. Dextrocurvature of the lumbar spine.    Evaluation of the soft tissues shows mild bilateral gynecomastia. There   is a small fat-containing right inguinal hernia.    IMPRESSION:  1. No dissection.    2. Severe calcified and noncalcified atherosclerotic aortic plaques.    3. 1.2 cm cystic lesion in the pancreas. Follow-up pancreas protocol CT   or MRI in 2 years is advised.      < end of copied text >        < from: Xray Chest 1 View- PORTABLE-Urgent (08.08.19 @ 10:25) >  FINDINGS AND   IMPRESSION:    Support Devices: None.  Lungs/Airways: No consolidation or pleural effusion. No pneumothorax.  Cardomediastinal: Cardiomediastinal silluette. Within normal limits.   Atherosclerotic calcifications in the aortic knob.  Bones and soft tissues: Normal    < end of copied text > Patient is a 69 yo Male who presents with a chief complaint of Back pain and lower extremity numbness (08 Aug 2019 13:09)    Nephrology consult placed in regards to ESRD on HD.    HPI:  69 y/o M legally blind, PMHx of HTN, HLD, CAD, BRYNN s/p stent, IDDM, ESRD on dialysis who presented to [facility] c/o lower back pain and BL lower extremity numbness following 3/4ths of his dialysis.   In the ED he was hypertensive. Systolics in 220s. He received a total of 40 IV hydralazine which reduced his BP to 170s/70s. His EKG showed normal sinus rhythm and LVH. He is going to be ordered for an ECHO. CXR without any widened mediastinum and no congestion. 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 He has severe calcific and non-calcific CAD as well.  c.o headaches, neck pain, back and leg pain still     Allergies    No Known Allergies    Intolerances      SOCIAL HISTORY:  denies     MEDICATIONS  (STANDING):  cloNIDine 0.1 milliGRAM(s) Oral once  hydrALAZINE 100 milliGRAM(s) Oral once  isosorbide   mononitrate ER Tablet (IMDUR) 30 milliGRAM(s) Oral once  labetalol 200 milliGRAM(s) Oral once  labetalol 300 milliGRAM(s) Oral once    MEDICATIONS  (PRN):      Vital Signs Last 24 Hrs  T(C): 36.8 (08 Aug 2019 16:16), Max: 37.2 (08 Aug 2019 08:19)  T(F): 98.2 (08 Aug 2019 16:16), Max: 99 (08 Aug 2019 08:19)  HR: 66 (08 Aug 2019 16:16) (63 - 70)  BP: 193/77 (08 Aug 2019 16:16) (158/71 - 227/70)  BP(mean): --  RR: 18 (08 Aug 2019 16:16) (16 - 18)  SpO2: 90% (08 Aug 2019 16:16) (90% - 97%)    REVIEW OF SYSTEMS:  Gen: No fever  CVS: No chest pain  Resp: No shortness of breath  Abd: No nausea/ No vomiting/No abdominal pain  CNS: +headache  ext- leg pains  back pain       PHYSICAL EXAM:  GENERAL: well-developed, well nourished, alert, no acute distress at present  NECK: supple, + JVD  CHEST/LUNG: Clear to auscultation bilaterally  HEART: normal S1S2, RRR  ABDOMEN: Soft, Nontender, +BS, No flank tenderness  EXTREMITIES: LE bilateral chronic skin changes   Neurology: AAOx3, no focal neurological deficit  SKIN: No rashes  ACCESS: LUE AVF, good thrill and bruit appreciated      CAPILLARY BLOOD GLUCOSE      POCT Blood Glucose.: 228 mg/dL (08 Aug 2019 17:23)      I&O's Summary        LABS:                                                   08-08-19 @ 08:58    143  |  97  |  31<H>  ----------------------------<  362<H>  4.0   |  31  |  3.61<H>    Ca    10.3      08 Aug 2019 08:58    TPro  8.1  /  Alb  5.0  /  TBili  0.4  /  DBili  x   /  AST  33  /  ALT  24  /  AlkPhos  164<H>  08-08                          11.4   5.00  )-----------( 141      ( 08 Aug 2019 08:58 )             35.4     CBC Full  -  ( 08 Aug 2019 08:58 )  WBC Count : 5.00 K/uL  Hemoglobin : 11.4 g/dL  Hematocrit : 35.4 %  Platelet Count - Automated : 141 K/uL  Mean Cell Volume : 89.8 fl        PT/INR - ( 08 Aug 2019 08:58 )   PT: 11.8 sec;   INR: 1.04          PTT - ( 08 Aug 2019 08:58 )  PTT:37.3 sec  CARDIAC MARKERS ( 08 Aug 2019 12:12 )  x     / 0.18 ng/mL / x     / x     / x      CARDIAC MARKERS ( 08 Aug 2019 08:58 )  x     / 0.22 ng/mL / 94 U/L / x     / 6.6 ng/mL          RADIOLOGY & ADDITIONAL TESTS:      < from: CT Angio Abdomen and Pelvis w/ IV Cont (08.08.19 @ 11:23) >  FINDINGS:  No aortic dissection is seen.  No aortic aneurysm is evident.   Ectasia of the infrarenal aorta. Severe calcified and noncalcified plaque   throughout the aorta, greater in the infrarenal aorta. Additional areas   of soft plaque are seen in the iliac vessels. Occlusion of the proximal   KJ. 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    Dilated pulmonary arteries with the main pulmonary artery measuring 3.6   cm which can be seen with pulmonary hypertension. No pulmonary embolism   is visualized.    The heart is enlarged. No pericardial effusion is seen. Severe coronary   artery disease. Mild aortic valve calcifications.    No mediastinal, hilar or axillary lymphadenopathy is seen.    Evaluation of pulmonary parenchyma demonstrates no consolidation or mass.   Small bleb/bullae in the posterior right lower lobe.  No pleural   effusions are seen.    The liver is mildly enlarged. No radiopaquestones are seen in the   gallbladder.  There is mild diffuse dilation of the main pancreatic duct   measuring 5 mm. 1.2 cm cystic lesion within the pancreatic body.   Borderline splenomegaly measuring up to 14 cm in craniocaudal diameter.   Nodular thickening in the right adrenal gland. No hydronephrosis or   nephrolithiasis is demonstrated.     No lymphadenopathy in abdomen or pelvis. No ascites is seen.     Evaluation of the bowel is limited without oral contrast. Within that   limitation, these images demonstrate no significant abnormalities.     Images of the pelvic organs are within normal limits.    Evaluation of the osseous structures demonstrates severe degenerative   changes of the spine. Dextrocurvature of the lumbar spine.    Evaluation of the soft tissues shows mild bilateral gynecomastia. There   is a small fat-containing right inguinal hernia.    IMPRESSION:  1. No dissection.    2. Severe calcified and noncalcified atherosclerotic aortic plaques.    3. 1.2 cm cystic lesion in the pancreas. Follow-up pancreas protocol CT   or MRI in 2 years is advised.      < end of copied text >        < from: Xray Chest 1 View- PORTABLE-Urgent (08.08.19 @ 10:25) >  FINDINGS AND   IMPRESSION:    Support Devices: None.  Lungs/Airways: No consolidation or pleural effusion. No pneumothorax.  Cardomediastinal: Cardiomediastinal silluette. Within normal limits.   Atherosclerotic calcifications in the aortic knob.  Bones and soft tissues: Normal    < end of copied text >

## 2019-08-08 NOTE — PROGRESS NOTE ADULT - SUBJECTIVE AND OBJECTIVE BOX
HPI:      In ED:  - Vitals T 99F, HR 67, /70, RR 16, O2 sat 96%   - Labs significant for hgb 11.4, trop 0.22 -> 0.18 -> 0.18, BNP 18k, BUN/Cr 31/3.61, D dimer 377, PTT 37, INR 1.04  - Imaging: CXR pulmonary vascular congestion and mild pulmonary edema, CT head with chronic ischemic change but no acute intracranial hemorrhage, CT C/A/P no aortic dissection, 1.2cm cystic lesion seen in pancreas  - Pt given hydralazine 10 IVP x4, hydralazine 100 PO x1, norvasc 10 x1, clonidine 0.1 x1, imdur 30 x2, labetalol 600, losartan 100, procardia 30 x1, nitro drip started at 8 pm, morphine 4 IVP x3, dilaudid 2 IVP x1.    VITAL SIGNS:  Vital Signs Last 24 Hrs  T(C): 36.8 (08 Aug 2019 16:16), Max: 37.2 (08 Aug 2019 08:19)  T(F): 98.2 (08 Aug 2019 16:16), Max: 99 (08 Aug 2019 08:19)  HR: 66 (08 Aug 2019 20:00) (63 - 70)  BP: 189/83 (08 Aug 2019 20:00) (158/71 - 227/70)  BP(mean): --  RR: 18 (08 Aug 2019 20:00) (16 - 18)  SpO2: 99% (08 Aug 2019 20:00) (90% - 99%)    PHYSICAL EXAM:    General: WDWN  HEENT: NC/AT; PERRL, anicteric sclera; MMM  Neck: supple  Cardiovascular: +S1/S2, RRR  Respiratory: CTA B/L; no W/R/R  Gastrointestinal: soft, NT/ND; +BSx4  Extremities: WWP; no edema, clubbing or cyanosis  Vascular: 2+ radial, DP/PT pulses B/L  Neurological: AAOx3; no focal deficits    MEDICATIONS:  MEDICATIONS  (STANDING):  dextrose 5%. 1000 milliLiter(s) (50 mL/Hr) IV Continuous <Continuous>  dextrose 50% Injectable 12.5 Gram(s) IV Push once  dextrose 50% Injectable 25 Gram(s) IV Push once  dextrose 50% Injectable 25 Gram(s) IV Push once  insulin lispro (HumaLOG) corrective regimen sliding scale   SubCutaneous Before meals and at bedtime  losartan 100 milliGRAM(s) Oral daily  nitroglycerin  Infusion 5 MICROgram(s)/Min (1.5 mL/Hr) IV Continuous <Continuous>    MEDICATIONS  (PRN):  dextrose 40% Gel 15 Gram(s) Oral once PRN Blood Glucose LESS THAN 70 milliGRAM(s)/deciliter  glucagon  Injectable 1 milliGRAM(s) IntraMuscular once PRN Glucose LESS THAN 70 milligrams/deciliter      ALLERGIES:  Allergies    No Known Allergies    Intolerances        LABS:                        11.4   5.00  )-----------( 141      ( 08 Aug 2019 08:58 )             35.4     08-08    143  |  97  |  31<H>  ----------------------------<  362<H>  4.0   |  31  |  3.61<H>    Ca    10.3      08 Aug 2019 08:58    TPro  8.1  /  Alb  5.0  /  TBili  0.4  /  DBili  x   /  AST  33  /  ALT  24  /  AlkPhos  164<H>  08-08    PT/INR - ( 08 Aug 2019 08:58 )   PT: 11.8 sec;   INR: 1.04          PTT - ( 08 Aug 2019 08:58 )  PTT:37.3 sec    CAPILLARY BLOOD GLUCOSE      POCT Blood Glucose.: 228 mg/dL (08 Aug 2019 17:23)      RADIOLOGY & ADDITIONAL TESTS: Reviewed. 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 Pt went to dialysis this AM and approximately 30 minutes in, had elevation in BP with headache and palpitations, mild SOB. Pt denied chest pain. Pt also developed 8/10 pain in his low back and posterior neck with numbness in his pelvis and bilateral legs, present at baseline but worse acutely. Pt called an ambulance and was brought to [facility] ED before hemodialysis session was complete. Of note, pt was recently admitted at [facility] from 7/30-8/02 for lower extremity pain and weakness from diabetic neuropathy with possible PNA on CXR (treated with levaquin and discharged on cefpodoxime 200mg), subsequently discharged to [facility] in the [place].    Currently patient endorses continued 8/10 sharp and burning pain in his lower back and neck, mild shortness of breath, and symmetric numbness in his pelvis and bilateral legs.    In ED:  - Vitals T 99F, HR 67, /70, RR 16, O2 sat 96%   - Labs significant for hgb 11.4, trop 0.22 -> 0.18 -> 0.18, BNP 18k, BUN/Cr 31/3.61, D dimer 377, PTT 37, INR 1.04  - Imaging: CXR pulmonary vascular congestion and mild pulmonary edema, CT head with chronic ischemic change but no acute intracranial hemorrhage, CT C/A/P no aortic dissection, 1.2cm cystic lesion seen in pancreas  - Pt given hydralazine 10 IVP x4, hydralazine 100 PO x1, norvasc 10 x1, clonidine 0.1 x1, imdur 30 x2, labetalol 600, losartan 100, procardia 30 x1, nitro drip started at 8 pm, morphine 4 IVP x3, dilaudid 2 IVP x1.    VITAL SIGNS:  Vital Signs Last 24 Hrs  T(C): 36.8 (08 Aug 2019 16:16), Max: 37.2 (08 Aug 2019 08:19)  T(F): 98.2 (08 Aug 2019 16:16), Max: 99 (08 Aug 2019 08:19)  HR: 66 (08 Aug 2019 20:00) (63 - 70)  BP: 189/83 (08 Aug 2019 20:00) (158/71 - 227/70)  BP(mean): --  RR: 18 (08 Aug 2019 20:00) (16 - 18)  SpO2: 99% (08 Aug 2019 20:00) (90% - 99%)    PHYSICAL EXAM:    General: NAD, lying in bed  HEENT: NC/AT; PERRL, anicteric sclera; MMM  Cardiovascular: +S1/S2, RRR  Respiratory: Scattered rhonchi, left basilar crackles  Gastrointestinal: distended, BSx4  Extremities: WWP; no edema, chronic venous changes with open wounds  Vascular: 2+ DP/PT pulses B/L  Neurological: AAOx3; no focal deficits    MEDICATIONS:  MEDICATIONS  (STANDING):  dextrose 5%. 1000 milliLiter(s) (50 mL/Hr) IV Continuous <Continuous>  dextrose 50% Injectable 12.5 Gram(s) IV Push once  dextrose 50% Injectable 25 Gram(s) IV Push once  dextrose 50% Injectable 25 Gram(s) IV Push once  insulin lispro (HumaLOG) corrective regimen sliding scale   SubCutaneous Before meals and at bedtime  losartan 100 milliGRAM(s) Oral daily  nitroglycerin  Infusion 5 MICROgram(s)/Min (1.5 mL/Hr) IV Continuous <Continuous>    MEDICATIONS  (PRN):  dextrose 40% Gel 15 Gram(s) Oral once PRN Blood Glucose LESS THAN 70 milliGRAM(s)/deciliter  glucagon  Injectable 1 milliGRAM(s) IntraMuscular once PRN Glucose LESS THAN 70 milligrams/deciliter      ALLERGIES:  Allergies    No Known Allergies    Intolerances        LABS:                        11.4   5.00  )-----------( 141      ( 08 Aug 2019 08:58 )             35.4     08-08    143  |  97  |  31<H>  ----------------------------<  362<H>  4.0   |  31  |  3.61<H>    Ca    10.3      08 Aug 2019 08:58    TPro  8.1  /  Alb  5.0  /  TBili  0.4  /  DBili  x   /  AST  33  /  ALT  24  /  AlkPhos  164<H>  08-08    PT/INR - ( 08 Aug 2019 08:58 )   PT: 11.8 sec;   INR: 1.04          PTT - ( 08 Aug 2019 08:58 )  PTT:37.3 sec    CAPILLARY BLOOD GLUCOSE      POCT Blood Glucose.: 228 mg/dL (08 Aug 2019 17:23)      RADIOLOGY & ADDITIONAL TESTS: Reviewed. 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 Pt went to dialysis this AM and approximately 30 minutes in, had elevation in [id number] with associated headache and palpitations, mild SOB. Pt denied chest pain. Pt also developed 8/10 pain in his low back and posterior neck with numbness in his pelvis and bilateral legs, present at baseline but worse acutely. Pt called an ambulance and was brought to [facility] ED before hemodialysis session was complete. Of note, pt was recently admitted at [facility] from 7/30-8/02 for lower extremity pain and weakness from diabetic neuropathy with possible PNA on CXR (treated with levaquin and discharged on cefpodoxime 200mg), subsequently discharged to [facility] in the [place].    Currently patient endorses continued 8/10 sharp and burning pain in his lower back and neck, mild shortness of breath, and symmetric numbness in his pelvis and bilateral legs.    In ED:  - Vitals T 99F, HR 67, /70, RR 16, O2 sat 96% RA   - Labs significant for hgb 11.4, trop 0.22 -> 0.18 -> 0.18, BNP 18k, BUN/Cr 31/3.61, D dimer 377, PTT 37, INR 1.04  - Imaging: CXR pulmonary vascular congestion and mild pulmonary edema, CT head with chronic ischemic change but no acute intracranial hemorrhage, CT C/A/P no aortic dissection, 1.2cm cystic lesion seen in pancreas  - Pt given hydralazine 10 IVP x4, hydralazine 100 PO x1, norvasc 10 x1, clonidine 0.1 x1, imdur 30 x2, labetalol 600, losartan 100, procardia 30 x1, nitro drip started at 8 pm, morphine 4 IVP x3, dilaudid 2 IVP x1.    VITAL SIGNS:  Vital Signs Last 24 Hrs  T(C): 36.8 (08 Aug 2019 16:16), Max: 37.2 (08 Aug 2019 08:19)  T(F): 98.2 (08 Aug 2019 16:16), Max: 99 (08 Aug 2019 08:19)  HR: 66 (08 Aug 2019 20:00) (63 - 70)  BP: 189/83 (08 Aug 2019 20:00) (158/71 - 227/70)  BP(mean): --  RR: 18 (08 Aug 2019 20:00) (16 - 18)  SpO2: 99% (08 Aug 2019 20:00) (90% - 99%)    PHYSICAL EXAM:  General:  male NAD, lying in bed  HEENT: NC/AT; PERRL, anicteric sclera; MMM  Cardiovascular: +S1/S2, RRR systolic murmur II/VI at LSB/[place]   Respiratory: Scattered rhonchi, left basilar crackles  Gastrointestinal: distended, NT, BSx4  Extremities: WWP; no edema, chronic venous changes/erythema with open wounds b/l ; LUE fistula c/d/i  Vascular: 1+ DP/PT pulses B/L  Neurological: AAOx3; no focal deficits, endorsed baseline symmetrical decreased weakness from groin down to toes     MEDICATIONS:  MEDICATIONS  (STANDING):  dextrose 5%. 1000 milliLiter(s) (50 mL/Hr) IV Continuous <Continuous>  dextrose 50% Injectable 12.5 Gram(s) IV Push once  dextrose 50% Injectable 25 Gram(s) IV Push once  dextrose 50% Injectable 25 Gram(s) IV Push once  insulin lispro (HumaLOG) corrective regimen sliding scale   SubCutaneous Before meals and at bedtime  losartan 100 milliGRAM(s) Oral daily  nitroglycerin  Infusion 5 MICROgram(s)/Min (1.5 mL/Hr) IV Continuous <Continuous>    MEDICATIONS  (PRN):  dextrose 40% Gel 15 Gram(s) Oral once PRN Blood Glucose LESS THAN 70 milliGRAM(s)/deciliter  glucagon  Injectable 1 milliGRAM(s) IntraMuscular once PRN Glucose LESS THAN 70 milligrams/deciliter      ALLERGIES:  Allergies    No Known Allergies    Intolerances        LABS:                        11.4   5.00  )-----------( 141      ( 08 Aug 2019 08:58 )             35.4     08-08    143  |  97  |  31<H>  ----------------------------<  362<H>  4.0   |  31  |  3.61<H>    Ca    10.3      08 Aug 2019 08:58    TPro  8.1  /  Alb  5.0  /  TBili  0.4  /  DBili  x   /  AST  33  /  ALT  24  /  AlkPhos  164<H>  08-08    PT/INR - ( 08 Aug 2019 08:58 )   PT: 11.8 sec;   INR: 1.04          PTT - ( 08 Aug 2019 08:58 )  PTT:37.3 sec    CAPILLARY BLOOD GLUCOSE      POCT Blood Glucose.: 228 mg/dL (08 Aug 2019 17:23)      RADIOLOGY & ADDITIONAL TESTS: Reviewed.

## 2019-08-08 NOTE — ED PROVIDER NOTE - PMH
Anemia of renal disease    CAD (coronary artery disease)    End-stage renal disease (ESRD)    H/O renal artery stenosis  s/p L renal stent  Hyperlipidemia    Hypertension    Hypothyroidism    Low back pain    Peripheral artery disease  s/p bilateral stents  Peripheral neuropathy    Retinal artery occlusion    Type II diabetes mellitus

## 2019-08-08 NOTE — ED ADULT NURSE REASSESSMENT NOTE - NS ED NURSE REASSESS COMMENT FT1
Took over care of pt from Octavia WHITING. Pt transported to and from CTA on continuous cardiac monitor with RN at bedside. On return to 20 monitor pt reconnected to continuous cardiac monitor, pulse ox, and q5min BP readings. Pt endorses improvement of pain, states "it's getting better little by little." Pt still HTN, MD Sullivan aware, speaking with pharmacy at this time for drip for BP control. Pt pending CCU cx, vs otherwise stable. Will continue to monitor.

## 2019-08-08 NOTE — ED ADULT NURSE NOTE - CHPI ED NUR SYMPTOMS NEG
no neck tenderness/no fatigue/no bladder dysfunction/no bowel dysfunction/no constipation/no motor function loss/no anorexia

## 2019-08-08 NOTE — H&P ADULT - EXTREMITIES COMMENTS
No pedal edema, bilateral legs discolored (ecchymosis/stasis) with several open wounds and dry, flanking skin.

## 2019-08-08 NOTE — ED ADULT NURSE REASSESSMENT NOTE - NS ED NURSE REASSESS COMMENT FT1
Re-assumed care of pt from [facility] for step up to CCU. Per [facility] JOHANNE Mesa, inpt team states "start him on a nitro drip and I guess we'll titrate in the CCU." NG drip hung as ordered, no parameters in order to titrate at this time. Pt with clean ccu bed, unable to give report at this time due to change of shift. Pt now on 4L NC with O2 sat 100%, BP continuous q15min.

## 2019-08-08 NOTE — ED ADULT TRIAGE NOTE - CHIEF COMPLAINT QUOTE
"I was at dialysis and my back and legs started to hurt."  Pt denies CP.  Pt A&Ox3.  ECG in progress.

## 2019-08-08 NOTE — ED PROVIDER NOTE - SECONDARY DIAGNOSIS.
NSTEMI (non-ST elevated myocardial infarction) ESRD (end stage renal disease) on dialysis Back pain, unspecified back location, unspecified back pain laterality, unspecified chronicity

## 2019-08-08 NOTE — ED PROVIDER NOTE - OBJECTIVE STATEMENT
68M esrd on hd (TRSa) via LUE fistula, cad, htn, niddm, hypothyroid, hld, c/o acute mid thoracic back pain radiating to legs w/ new extremity paresthesia while sitting for HD, hour 3 of 4. no fever/chills, no cp/sob, no n/v, no trauma.     confirmed w/ per  (11:14am)

## 2019-08-08 NOTE — H&P ADULT - NSICDXPASTMEDICALHX_GEN_ALL_CORE_FT
PAST MEDICAL HISTORY:  Anemia of renal disease     CAD (coronary artery disease)     End-stage renal disease (ESRD)     H/O renal artery stenosis s/p L renal stent    Hyperlipidemia     Hypertension     Hypothyroidism     Low back pain     Peripheral artery disease s/p bilateral stents    Peripheral neuropathy     Retinal artery occlusion     Type II diabetes mellitus

## 2019-08-08 NOTE — PATIENT PROFILE ADULT - VISION (WITH CORRECTIVE LENSES IF THE PATIENT USUALLY WEARS THEM):
Partially impaired: cannot see medication labels or newsprint, but can see obstacles in path, and the surrounding layout; can count fingers at arm's length/R eye legally blind

## 2019-08-08 NOTE — PROGRESS NOTE ADULT - PROBLEM SELECTOR PLAN 6
Continue atorvastatin 40mg daily Pt has chronic venous wounds in bilateral legs likely in the setting of both arterial and venous insufficiency.   - Wound care consult ordered

## 2019-08-09 DIAGNOSIS — Z29.9 ENCOUNTER FOR PROPHYLACTIC MEASURES, UNSPECIFIED: ICD-10-CM

## 2019-08-09 DIAGNOSIS — E03.9 HYPOTHYROIDISM, UNSPECIFIED: ICD-10-CM

## 2019-08-09 DIAGNOSIS — I25.10 ATHEROSCLEROTIC HEART DISEASE OF NATIVE CORONARY ARTERY WITHOUT ANGINA PECTORIS: ICD-10-CM

## 2019-08-09 DIAGNOSIS — E11.69 TYPE 2 DIABETES MELLITUS WITH OTHER SPECIFIED COMPLICATION: ICD-10-CM

## 2019-08-09 LAB
ANION GAP SERPL CALC-SCNC: 15 MMOL/L — SIGNIFICANT CHANGE UP (ref 5–17)
APTT BLD: 33.9 SEC — SIGNIFICANT CHANGE UP (ref 27.5–36.3)
BUN SERPL-MCNC: 24 MG/DL — HIGH (ref 7–23)
CALCIUM SERPL-MCNC: 9.9 MG/DL — SIGNIFICANT CHANGE UP (ref 8.4–10.5)
CHLORIDE SERPL-SCNC: 100 MMOL/L — SIGNIFICANT CHANGE UP (ref 96–108)
CO2 SERPL-SCNC: 28 MMOL/L — SIGNIFICANT CHANGE UP (ref 22–31)
CREAT SERPL-MCNC: 3.4 MG/DL — HIGH (ref 0.5–1.3)
GLUCOSE SERPL-MCNC: 135 MG/DL — HIGH (ref 70–99)
HBA1C BLD-MCNC: 7.2 % — HIGH (ref 4–5.6)
HBV CORE AB SER-ACNC: REACTIVE
HCT VFR BLD CALC: 31.4 % — LOW (ref 39–50)
HGB BLD-MCNC: 9.9 G/DL — LOW (ref 13–17)
INR BLD: 1.13 — SIGNIFICANT CHANGE UP (ref 0.88–1.16)
MCHC RBC-ENTMCNC: 28.5 PG — SIGNIFICANT CHANGE UP (ref 27–34)
MCHC RBC-ENTMCNC: 31.5 GM/DL — LOW (ref 32–36)
MCV RBC AUTO: 90.5 FL — SIGNIFICANT CHANGE UP (ref 80–100)
NRBC # BLD: 0 /100 WBCS — SIGNIFICANT CHANGE UP (ref 0–0)
PLATELET # BLD AUTO: 128 K/UL — LOW (ref 150–400)
POTASSIUM SERPL-MCNC: 4.3 MMOL/L — SIGNIFICANT CHANGE UP (ref 3.5–5.3)
POTASSIUM SERPL-SCNC: 4.3 MMOL/L — SIGNIFICANT CHANGE UP (ref 3.5–5.3)
PROTHROM AB SERPL-ACNC: 12.8 SEC — SIGNIFICANT CHANGE UP (ref 10–12.9)
RBC # BLD: 3.47 M/UL — LOW (ref 4.2–5.8)
RBC # FLD: 14.3 % — SIGNIFICANT CHANGE UP (ref 10.3–14.5)
SODIUM SERPL-SCNC: 143 MMOL/L — SIGNIFICANT CHANGE UP (ref 135–145)
WBC # BLD: 5.09 K/UL — SIGNIFICANT CHANGE UP (ref 3.8–10.5)
WBC # FLD AUTO: 5.09 K/UL — SIGNIFICANT CHANGE UP (ref 3.8–10.5)

## 2019-08-09 PROCEDURE — 93010 ELECTROCARDIOGRAM REPORT: CPT

## 2019-08-09 PROCEDURE — 93976 VASCULAR STUDY: CPT | Mod: 26

## 2019-08-09 PROCEDURE — 71045 X-RAY EXAM CHEST 1 VIEW: CPT | Mod: 26

## 2019-08-09 PROCEDURE — 93880 EXTRACRANIAL BILAT STUDY: CPT | Mod: 26

## 2019-08-09 PROCEDURE — 76775 US EXAM ABDO BACK WALL LIM: CPT | Mod: 26,59

## 2019-08-09 PROCEDURE — 99232 SBSQ HOSP IP/OBS MODERATE 35: CPT | Mod: GC

## 2019-08-09 PROCEDURE — 93306 TTE W/DOPPLER COMPLETE: CPT | Mod: 26

## 2019-08-09 PROCEDURE — 99291 CRITICAL CARE FIRST HOUR: CPT

## 2019-08-09 RX ORDER — CHLORHEXIDINE GLUCONATE 213 G/1000ML
1 SOLUTION TOPICAL
Refills: 0 | Status: DISCONTINUED | OUTPATIENT
Start: 2019-08-09 | End: 2019-08-13

## 2019-08-09 RX ORDER — AMLODIPINE BESYLATE 2.5 MG/1
10 TABLET ORAL DAILY
Refills: 0 | Status: DISCONTINUED | OUTPATIENT
Start: 2019-08-10 | End: 2019-08-13

## 2019-08-09 RX ORDER — INSULIN LISPRO 100/ML
VIAL (ML) SUBCUTANEOUS
Refills: 0 | Status: DISCONTINUED | OUTPATIENT
Start: 2019-08-09 | End: 2019-08-13

## 2019-08-09 RX ORDER — HYDRALAZINE HCL 50 MG
100 TABLET ORAL EVERY 8 HOURS
Refills: 0 | Status: DISCONTINUED | OUTPATIENT
Start: 2019-08-09 | End: 2019-08-13

## 2019-08-09 RX ORDER — MORPHINE SULFATE 50 MG/1
2 CAPSULE, EXTENDED RELEASE ORAL EVERY 6 HOURS
Refills: 0 | Status: DISCONTINUED | OUTPATIENT
Start: 2019-08-09 | End: 2019-08-13

## 2019-08-09 RX ORDER — CARVEDILOL PHOSPHATE 80 MG/1
6.25 CAPSULE, EXTENDED RELEASE ORAL EVERY 12 HOURS
Refills: 0 | Status: DISCONTINUED | OUTPATIENT
Start: 2019-08-09 | End: 2019-08-09

## 2019-08-09 RX ORDER — ATORVASTATIN CALCIUM 80 MG/1
40 TABLET, FILM COATED ORAL AT BEDTIME
Refills: 0 | Status: DISCONTINUED | OUTPATIENT
Start: 2019-08-09 | End: 2019-08-13

## 2019-08-09 RX ORDER — NIFEDIPINE 30 MG
30 TABLET, EXTENDED RELEASE 24 HR ORAL DAILY
Refills: 0 | Status: DISCONTINUED | OUTPATIENT
Start: 2019-08-09 | End: 2019-08-09

## 2019-08-09 RX ORDER — CARVEDILOL PHOSPHATE 80 MG/1
12.5 CAPSULE, EXTENDED RELEASE ORAL EVERY 12 HOURS
Refills: 0 | Status: DISCONTINUED | OUTPATIENT
Start: 2019-08-09 | End: 2019-08-11

## 2019-08-09 RX ORDER — CARVEDILOL PHOSPHATE 80 MG/1
12.5 CAPSULE, EXTENDED RELEASE ORAL EVERY 12 HOURS
Refills: 0 | Status: DISCONTINUED | OUTPATIENT
Start: 2019-08-09 | End: 2019-08-09

## 2019-08-09 RX ORDER — OXYCODONE AND ACETAMINOPHEN 5; 325 MG/1; MG/1
1 TABLET ORAL EVERY 6 HOURS
Refills: 0 | Status: DISCONTINUED | OUTPATIENT
Start: 2019-08-09 | End: 2019-08-13

## 2019-08-09 RX ORDER — CARVEDILOL PHOSPHATE 80 MG/1
6.25 CAPSULE, EXTENDED RELEASE ORAL ONCE
Refills: 0 | Status: COMPLETED | OUTPATIENT
Start: 2019-08-09 | End: 2019-08-09

## 2019-08-09 RX ADMIN — Medication 0.1 MILLIGRAM(S): at 07:25

## 2019-08-09 RX ADMIN — OXYCODONE AND ACETAMINOPHEN 1 TABLET(S): 5; 325 TABLET ORAL at 18:22

## 2019-08-09 RX ADMIN — Medication 0.1 MILLIGRAM(S): at 21:50

## 2019-08-09 RX ADMIN — LOSARTAN POTASSIUM 100 MILLIGRAM(S): 100 TABLET, FILM COATED ORAL at 09:16

## 2019-08-09 RX ADMIN — MORPHINE SULFATE 2 MILLIGRAM(S): 50 CAPSULE, EXTENDED RELEASE ORAL at 17:03

## 2019-08-09 RX ADMIN — CLOPIDOGREL BISULFATE 75 MILLIGRAM(S): 75 TABLET, FILM COATED ORAL at 11:46

## 2019-08-09 RX ADMIN — CARVEDILOL PHOSPHATE 6.25 MILLIGRAM(S): 80 CAPSULE, EXTENDED RELEASE ORAL at 11:46

## 2019-08-09 RX ADMIN — CARVEDILOL PHOSPHATE 12.5 MILLIGRAM(S): 80 CAPSULE, EXTENDED RELEASE ORAL at 21:50

## 2019-08-09 RX ADMIN — OXYCODONE AND ACETAMINOPHEN 1 TABLET(S): 5; 325 TABLET ORAL at 19:20

## 2019-08-09 RX ADMIN — Medication 50 MICROGRAM(S): at 05:43

## 2019-08-09 RX ADMIN — Medication 2: at 16:38

## 2019-08-09 RX ADMIN — SEVELAMER CARBONATE 800 MILLIGRAM(S): 2400 POWDER, FOR SUSPENSION ORAL at 07:20

## 2019-08-09 RX ADMIN — MORPHINE SULFATE 2 MILLIGRAM(S): 50 CAPSULE, EXTENDED RELEASE ORAL at 10:17

## 2019-08-09 RX ADMIN — MORPHINE SULFATE 2 MILLIGRAM(S): 50 CAPSULE, EXTENDED RELEASE ORAL at 22:05

## 2019-08-09 RX ADMIN — CARVEDILOL PHOSPHATE 6.25 MILLIGRAM(S): 80 CAPSULE, EXTENDED RELEASE ORAL at 14:02

## 2019-08-09 RX ADMIN — ATORVASTATIN CALCIUM 40 MILLIGRAM(S): 80 TABLET, FILM COATED ORAL at 21:50

## 2019-08-09 RX ADMIN — Medication 100 MILLIGRAM(S): at 14:03

## 2019-08-09 RX ADMIN — OXYCODONE AND ACETAMINOPHEN 1 TABLET(S): 5; 325 TABLET ORAL at 11:58

## 2019-08-09 RX ADMIN — Medication 30 MILLIGRAM(S): at 09:13

## 2019-08-09 RX ADMIN — Medication 81 MILLIGRAM(S): at 11:46

## 2019-08-09 RX ADMIN — Medication 4: at 21:49

## 2019-08-09 RX ADMIN — OXYCODONE AND ACETAMINOPHEN 1 TABLET(S): 5; 325 TABLET ORAL at 12:44

## 2019-08-09 RX ADMIN — GABAPENTIN 100 MILLIGRAM(S): 400 CAPSULE ORAL at 11:46

## 2019-08-09 RX ADMIN — Medication 0.1 MILLIGRAM(S): at 14:03

## 2019-08-09 RX ADMIN — MORPHINE SULFATE 2 MILLIGRAM(S): 50 CAPSULE, EXTENDED RELEASE ORAL at 16:34

## 2019-08-09 RX ADMIN — Medication 6: at 11:45

## 2019-08-09 RX ADMIN — SEVELAMER CARBONATE 800 MILLIGRAM(S): 2400 POWDER, FOR SUSPENSION ORAL at 11:47

## 2019-08-09 RX ADMIN — SEVELAMER CARBONATE 800 MILLIGRAM(S): 2400 POWDER, FOR SUSPENSION ORAL at 16:41

## 2019-08-09 RX ADMIN — Medication 100 MILLIGRAM(S): at 21:50

## 2019-08-09 RX ADMIN — ISOSORBIDE MONONITRATE 30 MILLIGRAM(S): 60 TABLET, EXTENDED RELEASE ORAL at 12:43

## 2019-08-09 RX ADMIN — MORPHINE SULFATE 2 MILLIGRAM(S): 50 CAPSULE, EXTENDED RELEASE ORAL at 10:35

## 2019-08-09 RX ADMIN — CHLORHEXIDINE GLUCONATE 1 APPLICATION(S): 213 SOLUTION TOPICAL at 09:17

## 2019-08-09 RX ADMIN — MORPHINE SULFATE 2 MILLIGRAM(S): 50 CAPSULE, EXTENDED RELEASE ORAL at 21:49

## 2019-08-09 NOTE — PROGRESS NOTE ADULT - SUBJECTIVE AND OBJECTIVE BOX
SUBJECTIVE:     Pending carotid duplex.    aspirin enteric coated 81 milliGRAM(s) Oral daily  carvedilol 12.5 milliGRAM(s) Oral every 12 hours  cloNIDine 0.1 milliGRAM(s) Oral three times a day  clopidogrel Tablet 75 milliGRAM(s) Oral daily  hydrALAZINE 100 milliGRAM(s) Oral every 8 hours  isosorbide   mononitrate ER Tablet (IMDUR) 30 milliGRAM(s) Oral daily  losartan 100 milliGRAM(s) Oral every 24 hours      Vital Signs Last 24 Hrs  T(C): 37.2 (09 Aug 2019 16:39), Max: 37.2 (09 Aug 2019 16:39)  T(F): 98.9 (09 Aug 2019 16:39), Max: 98.9 (09 Aug 2019 16:39)  HR: 72 (09 Aug 2019 16:00) (56 - 80)  BP: 157/56 (09 Aug 2019 16:00) (108/52 - 226/88)  BP(mean): 88 (09 Aug 2019 16:00) (68 - 158)  RR: 17 (09 Aug 2019 16:00) (9 - 32)  SpO2: 95% (09 Aug 2019 16:00) (92% - 100%)  I&O's Detail    08 Aug 2019 07:01  -  09 Aug 2019 07:00  --------------------------------------------------------  IN:    nitroglycerin  Infusion: 22.5 mL  Total IN: 22.5 mL    OUT:    Other: 1800 mL    Voided: 200 mL  Total OUT: 2000 mL    Total NET: -1977.5 mL      09 Aug 2019 07:01  -  09 Aug 2019 16:59  --------------------------------------------------------  IN:    Oral Fluid: 380 mL  Total IN: 380 mL    OUT:    Voided: 150 mL  Total OUT: 150 mL    Total NET: 230 mL          General: NAD, resting comfortably in bed  C/V: NSR  Pulm: Nonlabored breathing, no respiratory distress  Abd: nondistended, soft, nontender.  Extrem: warm, well-perfused, 2+ DP/PT pulses.    s      LABS:                        9.9    5.09  )-----------( 128      ( 09 Aug 2019 05:05 )             31.4     08-09    143  |  100  |  24<H>  ----------------------------<  135<H>  4.3   |  28  |  3.40<H>    Ca    9.9      09 Aug 2019 05:05    TPro  8.1  /  Alb  5.0  /  TBili  0.4  /  DBili  x   /  AST  33  /  ALT  24  /  AlkPhos  164<H>  08-08    PT/INR - ( 09 Aug 2019 05:05 )   PT: 12.8 sec;   INR: 1.13          PTT - ( 09 Aug 2019 05:05 )  PTT:33.9 sec

## 2019-08-09 NOTE — PHYSICAL THERAPY INITIAL EVALUATION ADULT - IMPAIRMENTS FOUND, PT EVAL
gait, locomotion, and balance/integumentary integrity/muscle strength/sensory integrity/aerobic capacity/endurance

## 2019-08-09 NOTE — PROGRESS NOTE ADULT - PROBLEM SELECTOR PLAN 3
Pt has ESRD on dialysis (Tues/Thurs/Sat) w/ LUE AVF.  Presented to [facility] ED via ambulance from dialysis session 3/4s complete.   - S/p bedside HD 8/8 with removal of additional 1.8L fluid, nephro consulted and recommending against further HD today  - To go for HD tomorrow 8/10  - Avoid nephrotoxic medications  - Renally dose medications  - Nephrology continuing to follow, recs appreciated  - C/w Sevalemer 800 TID home dose

## 2019-08-09 NOTE — PHYSICAL THERAPY INITIAL EVALUATION ADULT - ADDITIONAL COMMENTS
ambulating with rollator, no home health aid, has assist into transport vehicle and transport for dialysis

## 2019-08-09 NOTE — PROGRESS NOTE ADULT - PROBLEM SELECTOR PLAN 2
Patient with PMH chronic low back pain, acutely worse over the past two days. 8/10 in severity with associated saddle paresthesia, radiating to b/l LE with weakness. Per patient, he had MRI spine done as outpatient 3 months ago which demonstrated cervical and lumbar stenosis with nerve impingement.   - Received morphine 4mg IVP x3, dilaudid 2mg IVP x1 in ED, additional morphine 2mg IVP after arrival to CCU  - Switched to home medication Percocet q6 PRN PO  - Negative straight leg raise  - Denies incontinence of urine or stool  - Continue home Gabapentin 100mg PO daily  - Neuro consulted, recs appreciated

## 2019-08-09 NOTE — PROGRESS NOTE ADULT - ASSESSMENT
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 Now admitted to CCU on nitro drip for management of hypertensive Urgency and HD.    Neuro  # Low back pain  Pt c/o 8/10 low back and neck pain that started at 6 am on 8/8 , w/ associated numbness in saddle region and b/l legs (usually chronic but endorses worsening today.)   - Received morphine 4mg IVP x3, dilaudid 2mg IVP x1 in ED, additional morphine 2mg IVP after arrival to CCU  - Negative straight leg raise  - Denies incontinence of urine or stool  - Numbness and pain likely 2/2 diabetic neuropathy less likely cauda equina in the setting of no bowel, urine retention or saddle anesthesia  - Consider MRI and neurosurgery consult if symptoms worsen acutely  - Continue gabapentin 100mg PO daily    Cardiovascular  # Hypertensive urgency  Pt presented to ED with /70 symptomatic with headache, palpitations, SOB which were found to be elevated during HD session. ED BNP 18k, troponin 0.22 -> 0.18 -> 0.18 likely from heart strain. (EKG NSR, NL Axis, LVH w/ strain, no ST changes )  - Received hydralazine 10 IVP x4, hydralazine 100 PO x1, norvasc 10 x1, clonidine 0.1 x1, imdur 30 x2, labetalol 600, losartan 100, procardia 30 x1, and nitro drip started at 8 pm  - CXR with pulmonary vascular congestion, mild pulmonary edema  - CT head negative for acute intracranial hemorrhage  - Titrate nitro drip to goal systolic BP >160s (max 25% reduction in BP in first 24 hrs)  - Holding PO BP meds to be slowly added in the AM (Clonidine 0.1TID, Hydralazine 75 TID, Labetalol 300TID, Losartan 100qd, Norvasc 10qd, Imdur 90qd )   - Continue to monitor BP and telemetry monitoring    # CAD  PMH of CAD, on home ASA, Plavix 75qd, likely severe vasculopath in the setting of known PAD (s/p b/l stents) as well.   - EKG NSR, NL Axis, LVH w/ strain, no ST changes   - Obtain collaterals about prior CAD hx (endorses no cardiac surgical history)   - C/w home ASA, Plavix 75qd, and lipitor 40qd    # Chronic venous insufficiency  Pt has chronic venous wounds in bilateral legs likely in the setting of both arterial and venous insufficiency.   - Wound care consult ordered    # Hyperlipidemia  - Continue atorvastatin 40mg daily    Renal  # ESRD   Pt has ESRD on dialysis (Tues/Thurs/Sat) w/ LUE AVF.  Presented to [facility] ED via ambulance from dialysis session receiving 3/4.   - S/p bedside HD  - f/u AM BMPqd   - Avoid nephrotoxic medications  - Renally dose medications  - Nephrology on board   - C/w Sevalemer 800 TID home dose    Pulmonary  # Pulmonary edema  Pt reports some mild shortness of breath  CXR shows pulmonary vascular congestion and pulmonary edema  - S/p HD with 1.8L removed  - Continue to monitor respiratory status    GI  No active issues.    Endocrine  # Type II Diabetes Mellitus  Pt with history of type II DM on home lantus 100u at home.   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 Admitted to CCU for management of hypertensive Urgency and HD.    CARDIO  # Hypertensive urgency  Pt presented with /70, w/ associated HA, palpitations, SOB which were found to be elevated during HD session. BNP elevated to18k, troponin 0.22 -> 0.18 -> 0.18 likely from heart strain. At presentation, EKG NSR, NL Axis, LVH w/ strain, no ST changes. CT head negative for acute intracranial hemorrhage.  - Received Hydralazine, Norvasc, Clonidine, Imdur, Labetalol, Losartan, Procardia in the ED   - Nitro drip in combination w/ HD overnight, SBP drop to 100s, Nitro drip stopped 11PM  - CXR with pulmonary vascular congestion, mild pulmonary edema  - Repeat EKG this morning demonstrating HR 72, NSR, LVH, nonspecific T wave changes  - Denying HA, vision changes, N/V at the present time  - C/w Amlodipine 10mg PO, Coreg 12.5mg q12 PO, Hydralazine 100mg q8 PO, Imdur 20mg PO, Cozaar 100mg PO, Clonidine 0.1mg TID PO  - Continue to monitor BP  - -180s today    # CAD  PMH of CAD, on home ASA, Plavix 75qd, likely severe vasculopath in the setting of known PAD (s/p b/l stents) as well.   -  EKG this morning demonstrating HR 72, NSR, LVH, nonspecific T wave changes  - Obtain collaterals about prior CAD hx (PCP at [facility])   - C/w home ASA 81mg, Plavix 75qd PO, and Lipitor 40qd    # Chronic venous insufficiency  Patient with PMH chronic venous stasis in b/l LEs, with ulcerations of b/l shins.   - Wound care consult ordered    # Hyperlipidemia  - Continue Atorvastatin 40mg daily    NEURO  # Low back pain  Patient with PMH chronic low back pain, acutely worse over the past two days. 8/10 in severity with associated saddle paresthesia, radiating to b/l LE with weakness. Per patient, he had MRI spine done as outpatient 3 months ago which demonstrated cervical and lumbar stenosis with nerve impingement.   - Received morphine 4mg IVP x3, dilaudid 2mg IVP x1 in ED, additional morphine 2mg IVP after arrival to CCU  - Switched to home medication Percocet q6 PRN PO  - Negative straight leg raise  - Denies incontinence of urine or stool  - Continue home Gabapentin 100mg PO daily  - Neuro consulted, recs appreciated      RENAL  # ESRD   Pt has ESRD on dialysis (Tues/Thurs/Sat) w/ LUE AVF.  Presented to [facility] ED via ambulance from dialysis session receiving 3/4. HD on admission with removal of additional 1.8L fluid.  - S/p bedside HD  - f/u AM BMPqd   - Avoid nephrotoxic medications  - Renally dose medications  - Nephrology on board   - C/w Sevalemer 800 TID home dose    PULMONARY  # Pulmonary edema  Pt reports some mild shortness of breath  CXR shows pulmonary vascular congestion and pulmonary edema  - S/p HD with 1.8L removed  - Continue to monitor respiratory status    GI  No active issues.    ENDOCRINE  # Type II Diabetes Mellitus  Pt with history of type II DM on home lantus 100u at home.   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 Admitted to CCU for management of hypertensive Urgency and HD.    CARDIO  # Hypertensive urgency  Pt presented with /70, w/ associated HA, palpitations, SOB which were found to be elevated during HD session. BNP elevated to18k, troponin 0.22 -> 0.18 -> 0.18 likely from heart strain. At presentation, EKG NSR, NL Axis, LVH w/ strain, no ST changes. CT head negative for acute intracranial hemorrhage.  - Received Hydralazine, Norvasc, Clonidine, Imdur, Labetalol, Losartan, Procardia in the ED   - Nitro drip in combination w/ HD overnight, SBP drop to 100s, Nitro drip stopped 11PM  - CXR with pulmonary vascular congestion, mild pulmonary edema  - Repeat EKG this morning demonstrating HR 72, NSR, LVH, nonspecific T wave changes  - Denying HA, vision changes, N/V at the present time  - C/w Amlodipine 10mg PO, Coreg 12.5mg q12 PO, Hydralazine 100mg q8 PO, Imdur 20mg PO, Cozaar 100mg PO, Clonidine 0.1mg TID PO  - Continue to monitor BP  - -180s today    # CAD  PMH of CAD, on home ASA, Plavix 75qd, likely severe vasculopath in the setting of known PAD (s/p b/l stents) as well.   -  EKG this morning demonstrating HR 72, NSR, LVH, nonspecific T wave changes  - Obtain collaterals about prior CAD hx (PCP at [facility])   - C/w home ASA 81mg, Plavix 75qd PO, and Lipitor 40qd    # Chronic venous insufficiency  Patient with PMH chronic venous stasis in b/l LEs, with ulcerations of b/l shins.   - Wound care consult ordered    # Hyperlipidemia  - Continue Atorvastatin 40mg daily    NEURO  # Low back pain  Patient with PMH chronic low back pain, acutely worse over the past two days. 8/10 in severity with associated saddle paresthesia, radiating to b/l LE with weakness. Per patient, he had MRI spine done as outpatient 3 months ago which demonstrated cervical and lumbar stenosis with nerve impingement.   - Received morphine 4mg IVP x3, dilaudid 2mg IVP x1 in ED, additional morphine 2mg IVP after arrival to CCU  - Switched to home medication Percocet q6 PRN PO  - Negative straight leg raise  - Denies incontinence of urine or stool  - Continue home Gabapentin 100mg PO daily  - Neuro consulted, recs appreciated      RENAL  # ESRD   Pt has ESRD on dialysis (Tues/Thurs/Sat) w/ LUE AVF.  Presented to [facility] ED via ambulance from dialysis session receiving 3/4.   - S/p bedside HD 8/8 with removal of additional 1.8L fluid, nephro consulted and recommending against further HD today  - To go for HD tomorrow  - Avoid nephrotoxic medications  - Renally dose medications  - Nephrology continuing to follow, recs appreciated  - C/w Sevalemer 800 TID home dose    PULMONARY  # Pulmonary edema  Patient endorsing SOB on admission, with CXR demonstrating pulmonary vascular congestion and mild pulmonary edema.  - S/p HD with 1.8L removed  - Reporting complete resolution of SOB today, denies cough; Lungs CTA b/l   - Continue to monitor respiratory status    GI  No active issues.    ENDOCRINE  # Type II Diabetes Mellitus  Patient with PMH DM2 on home Lantus 100U.  - Continue to monitor FSG, finger sticks 136, 227, 228  - ISS  - HA1C 7.2 on admission    #Hypothyroidism  - C/w home Synthroid 50mcg qd    HEME/ONC  # Anemia  Hgb 11.4 on presentation, down to 9.9 today. No signs of bleeding; patient denying melena, hematochezia, lightheadedness, palpitations.  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 Admitted to CCU for management of hypertensive Urgency and HD.    CARDIO  # Hypertensive urgency  Patient with PMH of HTN, HLD, CAD, BRYNN s/p stent, ESRD on dialysis presented with /70, w/ associated HA, palpitations, SOB which were found to be elevated during HD session. BNP elevated to18k, troponin 0.22 -> 0.18 -> 0.18 likely from heart strain. At presentation, EKG NSR, NL Axis, LVH w/ strain, no ST changes. CT head negative for acute intracranial hemorrhage.  - Received Hydralazine, Norvasc, Clonidine, Imdur, Labetalol, Losartan, Procardia in the ED   - Nitro drip in combination w/ HD overnight, SBP drop to 100s, Nitro drip stopped 11PM  - CXR with pulmonary vascular congestion, mild pulmonary edema  - CT Angio Abd/Pelvis demonstrated duplicated right renal arteries with patent stent in the superior R renal artery  - F/u abd/pelvis US for r/o renal artery stent occlusion  - Repeat EKG this morning demonstrating HR 72, NSR, LVH, nonspecific T wave changes  - Denying HA, vision changes, N/V at the present time  - C/w Amlodipine 10mg PO, Coreg 12.5mg q12 PO, Hydralazine 100mg q8 PO, Imdur 20mg PO, Cozaar 100mg PO, Clonidine 0.1mg TID PO  - Continue to monitor BP  - -180s today  - F/u doppler b/l carotid arteries for r/o carotid stenosis    # CAD  PMH of CAD, on home ASA, Plavix 75qd, likely severe vasculopath in the setting of known PAD (s/p b/l stents) as well.   -  EKG this morning demonstrating HR 72, NSR, LVH, nonspecific T wave changes  - Obtain collaterals about prior CAD hx (PCP at [facility])   - C/w home ASA 81mg, Plavix 75qd PO, and Lipitor 40qd    # Chronic venous insufficiency  Patient with PMH chronic venous stasis in b/l LEs, with ulcerations of b/l shins.   - Wound care consult ordered    # Hyperlipidemia  - Continue Atorvastatin 40mg daily    NEURO  # Low back pain  Patient with PMH chronic low back pain, acutely worse over the past two days. 8/10 in severity with associated saddle paresthesia, radiating to b/l LE with weakness. Per patient, he had MRI spine done as outpatient 3 months ago which demonstrated cervical and lumbar stenosis with nerve impingement.   - Received morphine 4mg IVP x3, dilaudid 2mg IVP x1 in ED, additional morphine 2mg IVP after arrival to CCU  - Switched to home medication Percocet q6 PRN PO  - Negative straight leg raise  - Denies incontinence of urine or stool  - Continue home Gabapentin 100mg PO daily  - Neuro consulted, recs appreciated      RENAL  # ESRD   Pt has ESRD on dialysis (Tues/Thurs/Sat) w/ LUE AVF.  Presented to [facility] ED via ambulance from dialysis session receiving 3/4.   - S/p bedside HD 8/8 with removal of additional 1.8L fluid, nephro consulted and recommending against further HD today  - To go for HD tomorrow  - Avoid nephrotoxic medications  - Renally dose medications  - Nephrology continuing to follow, recs appreciated  - C/w Sevalemer 800 TID home dose    PULMONARY  # Pulmonary edema  Patient endorsing SOB on admission, with CXR demonstrating pulmonary vascular congestion and mild pulmonary edema.  - S/p HD with 1.8L removed  - Reporting complete resolution of SOB today, denies cough; Lungs CTA b/l   - Continue to monitor respiratory status    GI  No active issues.    ENDOCRINE  # Type II Diabetes Mellitus  Patient with PMH DM2 on home Lantus 100U.  - Continue to monitor FSG, finger sticks 136, 227, 228  - ISS  - HA1C 7.2 on admission    #Hypothyroidism  - C/w home Synthroid 50mcg qd    HEME/ONC  # Anemia  Hgb 11.4 on presentation, down to 9.9 today. No signs of bleeding; patient denying melena, hematochezia, lightheadedness, palpitations.  - Daily CBC    F: None  E: Replete PRN  N: DASH/TLC diet    DVT Prophylaxis: pt refused Lovenox this morning 8/9    Dispo: CCU

## 2019-08-09 NOTE — PROGRESS NOTE ADULT - PROBLEM SELECTOR PLAN 4
PMH of CAD, on home ASA, Plavix 75qd, likely severe vasculopath in the setting of known PAD (s/p b/l stents) as well.   -  EKG this morning demonstrating HR 72, NSR, LVH, nonspecific T wave changes  - Obtain collaterals about prior CAD hx (PCP at [facility])   - C/w home ASA 81mg, Plavix 75qd PO, and Lipitor 40qd    # Hyperlipidemia  - Continue Atorvastatin 40mg daily

## 2019-08-09 NOTE — PROGRESS NOTE ADULT - ASSESSMENT
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 Admitted to CCU for management of hypertensive Urgency and HD.

## 2019-08-09 NOTE — PROGRESS NOTE ADULT - SUBJECTIVE AND OBJECTIVE BOX
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 Also complaining of acute worsening of posterior neck pain, low back pain radiating to b/l LE extremities, with saddle paresthesia and symmetric weakness of b/l LEs. In the ED, /70, BNP 18k, troponin 0.22 -> 0.18 -> 0.18 likely from heart strain. (EKG NSR, LVH w/ strain, no ST changes). In the ED, patient received Hydralazine, Norvasc, Clonidine, Imdur, Labetalol, Losartan, Procardia, and Nitro drip started at 8 pm (stopped 11pm for SBP to 100s). CXR with pulmonary vascular congestion, mild pulmonary edema, patient continuing to deny SOB, cough. HD on admission with removal of additional 1.8L fluid.  Received Morphine and Dilaudid, with improvement in LE pain. Continuing home Gabapentin, ASA, Plavix, and Lipitor. TTE demonstrating moderate LVH, moderate aortic stenosis, severe pulmonary hypertension, PASP is 72.1 mmHg. Wound care consult placed for b/l LE ulcerations 2/2 chronic venous insufficiency. Neuro consulted for saddle paresthesia and b/l LE pain/weakness, awaiting recs.      OVERNIGHT EVENTS: SBP elevated to 200s overnight. Patient was dialyzed 8/8 with removal of additional 1.8L and Nitro drip continued with improvement of SBP to 150s--decreased Nitro gtt to maintain SBP goal >160. Patient continued to complain of back and b/l LE pain, received 2g morphine IVP. HD stopped 15 min early due to SBP to 100s and d/c'd Nitro gtt at 11pm. Increase in SBP to 150-160s.     SUBJECTIVE / INTERVAL HPI: Patient seen and examined at bedside.       Sitting comfortably bedside in NAD. Continuing to endorse posterior neck and lower back pain radiating to b/l LEs worse today than at presentation. Requesting additional Morphine for pain. Denies CP, SOB, cough, vision changes, peripheral edema, N/V/F/C. Hemodynamically stable for stepdown today to 5Lach.     MEDICATIONS  (STANDING):  aspirin enteric coated 81 milliGRAM(s) Oral daily  atorvastatin 40 milliGRAM(s) Oral at bedtime  carvedilol 12.5 milliGRAM(s) Oral every 12 hours  chlorhexidine 2% Cloths 1 Application(s) Topical <User Schedule>  cloNIDine 0.1 milliGRAM(s) Oral three times a day  clopidogrel Tablet 75 milliGRAM(s) Oral daily  dextrose 5%. 1000 milliLiter(s) (50 mL/Hr) IV Continuous <Continuous>  dextrose 50% Injectable 12.5 Gram(s) IV Push once  dextrose 50% Injectable 25 Gram(s) IV Push once  dextrose 50% Injectable 25 Gram(s) IV Push once  gabapentin 100 milliGRAM(s) Oral daily  hydrALAZINE 100 milliGRAM(s) Oral every 8 hours  insulin lispro (HumaLOG) corrective regimen sliding scale   SubCutaneous Before meals and at bedtime  isosorbide   mononitrate ER Tablet (IMDUR) 30 milliGRAM(s) Oral daily  levothyroxine 50 MICROGram(s) Oral daily  losartan 100 milliGRAM(s) Oral every 24 hours  sevelamer carbonate 800 milliGRAM(s) Oral three times a day with meals    MEDICATIONS  (PRN):  dextrose 40% Gel 15 Gram(s) Oral once PRN Blood Glucose LESS THAN 70 milliGRAM(s)/deciliter  glucagon  Injectable 1 milliGRAM(s) IntraMuscular once PRN Glucose LESS THAN 70 milligrams/deciliter  morphine  - Injectable 2 milliGRAM(s) IV Push every 6 hours PRN Severe Pain (7 - 10)  oxyCODONE    5 mG/acetaminophen 325 mG 1 Tablet(s) Oral every 6 hours PRN Moderate Pain (4 - 6)    Allergies  No Known Allergies or Intolerances      Vital Signs Last 24 Hrs  T(C): 37.2 (09 Aug 2019 16:39), Max: 37.2 (09 Aug 2019 16:39)  T(F): 98.9 (09 Aug 2019 16:39), Max: 98.9 (09 Aug 2019 16:39)  HR: 68 (09 Aug 2019 18:09) (56 - 80)  BP: 167/59 (09 Aug 2019 18:09) (108/52 - 226/88)  BP(mean): 91 (09 Aug 2019 18:09) (68 - 158)  RR: 16 (09 Aug 2019 18:09) (9 - 32)  SpO2: 97% (09 Aug 2019 18:09) (92% - 100%)    PHYSICAL EXAM:  General: WDWN; sitting comfortably bedside, primary complaint of pain and generally non-compliant with physical exam  HEENT: NC/AT; PERRL, anicteric sclera; MMM  Neck: supple  Cardiovascular: +S1/S2; RRR; blowing systolic murmur II/VI best heard at LSB  Respiratory: CTA B/L; no W/R/R  Gastrointestinal: soft, distended, diffuse TTP; +BSx4  Extremities: WWP; no edema, clubbing or cyanosis; ulcerations of b/l LEs 2/2 chronic venous insufficiency, L wound dressed, R wound overlying shin is non-purulent with minimal erythema  Vascular: 2+ radial, DP/PT pulses B/L  Neurological: AAOx3; strength 2-3/5 in b/l LEs, strength 4/5 in b/l UEs, diminished sensation in b/l LEs--difficult to assess pattern and symmetry due to patient non-compliance with exam      LABS:             9.9    5.09  )-----------( 128      ( 09 Aug 2019 05:05 )             31.4     08-09  143  |  100  |  24<H>  ----------------------------<  135<H>  4.3   |  28  |  3.40<H>    Ca    9.9      09 Aug 2019 05:05  TPro  8.1  /  Alb  5.0  /  TBili  0.4  /  DBili  x   /  AST  33  /  ALT  24  /  AlkPhos  164<H>  08-08    PT/INR - ( 09 Aug 2019 05:05 )   PT: 12.8 sec;   INR: 1.13     PTT - ( 09 Aug 2019 05:05 )  PTT:33.9 sec    CAPILLARY BLOOD GLUCOSE  POCT Blood Glucose.: 259 mg/dL (09 Aug 2019 11:36)      RADIOLOGY & ADDITIONAL TESTS: Reviewed.    < from: 12 Lead ECG (08.09.19 @ 03:50) >  Ventricular Rate 64 BPM  Atrial Rate 64 BPM  P-R Interval 156 ms  QRS Duration 100 ms  Q-T Interval 480 ms  QTC Calculation(Bezet) 495 ms  P Axis 52 degrees  R Axis -7 degrees  T Axis 86 degrees  Diagnosis Line Normal sinus rhythm  Left ventricular hypertrophy  T wave abnormality, consider lateral ischemia  < end of copied text >    < from: Xray Chest 1 View- PORTABLE-Routine (08.09.19 @ 05:08) >  FINDINGS AND   IMPRESSION:  Support Devices:  Unchanged  Lungs/Airways: No significant interval change of hazy opacity in both   lungs, with prominent vascular markings suggesting mild pulmonary edema.   No pleural effusion. No pneumothorax.  Cardomediastinal: Stable cardiomediastinal silluette.   Bones and soft tissues: Within expected range.  < end of copied text >    < from: Echocardiogram (08.09.19 @ 12:21) >  CONCLUSIONS:   1. Moderate symmetric left ventricular hypertrophy.   2. Normal right ventricular size and systolic function.   3. Normal atria.   4. Moderate aortic stenosis.   5. Severe pulmonary hypertension, PASP is 72.1 mmHg.   6. No pericardial effusion.  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 Also complaining of acute worsening of posterior neck pain, low back pain radiating to b/l LE extremities, with saddle paresthesia and symmetric weakness of b/l LEs. In the ED, /70, BNP 18k, troponin 0.22 -> 0.18 -> 0.18 likely from heart strain. (EKG NSR, LVH w/ strain, no ST changes). In the ED, patient received Hydralazine, Norvasc, Clonidine, Imdur, Labetalol, Losartan, Procardia, and Nitro drip started at 8 pm (stopped 11pm for SBP to 100s). CXR with pulmonary vascular congestion, mild pulmonary edema, patient continuing to deny SOB, cough. HD on admission with removal of additional 1.8L fluid.  Received Morphine and Dilaudid, with improvement in LE pain. Continuing home Gabapentin, ASA, Plavix, and Lipitor. TTE demonstrating moderate LVH, moderate aortic stenosis, severe pulmonary hypertension, PASP is 72.1 mmHg. Wound care consult placed for b/l LE ulcerations 2/2 chronic venous insufficiency. Neuro consulted for saddle paresthesia and b/l LE pain/weakness, awaiting recs.      OVERNIGHT EVENTS: SBP elevated to 200s overnight. Patient was dialyzed 8/8 with removal of additional 1.8L and Nitro drip continued with improvement of SBP to 150s--decreased Nitro gtt to maintain SBP goal >160. Patient continued to complain of back and b/l LE pain, received 2g morphine IVP. HD stopped 15 min early due to SBP to 100s and d/c'd Nitro gtt at 11pm. Increase in SBP to 150-160s.     SUBJECTIVE / INTERVAL HPI: Patient seen and examined at bedside. Continuing to endorse posterior neck and lower back pain radiating to b/l LEs worse today than at presentation, 10/10. Also c/o frontal bilateral headache, slight SOB, some abd pain. Denies fever, CP, constipation diarrhea, dysuria.     MEDICATIONS  (STANDING):  aspirin enteric coated 81 milliGRAM(s) Oral daily  atorvastatin 40 milliGRAM(s) Oral at bedtime  carvedilol 12.5 milliGRAM(s) Oral every 12 hours  chlorhexidine 2% Cloths 1 Application(s) Topical <User Schedule>  cloNIDine 0.1 milliGRAM(s) Oral three times a day  clopidogrel Tablet 75 milliGRAM(s) Oral daily  dextrose 5%. 1000 milliLiter(s) (50 mL/Hr) IV Continuous <Continuous>  dextrose 50% Injectable 12.5 Gram(s) IV Push once  dextrose 50% Injectable 25 Gram(s) IV Push once  dextrose 50% Injectable 25 Gram(s) IV Push once  gabapentin 100 milliGRAM(s) Oral daily  hydrALAZINE 100 milliGRAM(s) Oral every 8 hours  insulin lispro (HumaLOG) corrective regimen sliding scale   SubCutaneous Before meals and at bedtime  isosorbide   mononitrate ER Tablet (IMDUR) 30 milliGRAM(s) Oral daily  levothyroxine 50 MICROGram(s) Oral daily  losartan 100 milliGRAM(s) Oral every 24 hours  sevelamer carbonate 800 milliGRAM(s) Oral three times a day with meals    MEDICATIONS  (PRN):  dextrose 40% Gel 15 Gram(s) Oral once PRN Blood Glucose LESS THAN 70 milliGRAM(s)/deciliter  glucagon  Injectable 1 milliGRAM(s) IntraMuscular once PRN Glucose LESS THAN 70 milligrams/deciliter  morphine  - Injectable 2 milliGRAM(s) IV Push every 6 hours PRN Severe Pain (7 - 10)  oxyCODONE    5 mG/acetaminophen 325 mG 1 Tablet(s) Oral every 6 hours PRN Moderate Pain (4 - 6)    Allergies  No Known Allergies or Intolerances      Vital Signs Last 24 Hrs  T(C): 37.2 (09 Aug 2019 16:39), Max: 37.2 (09 Aug 2019 16:39)  T(F): 98.9 (09 Aug 2019 16:39), Max: 98.9 (09 Aug 2019 16:39)  HR: 68 (09 Aug 2019 18:09) (56 - 80)  BP: 167/59 (09 Aug 2019 18:09) (108/52 - 226/88)  BP(mean): 91 (09 Aug 2019 18:09) (68 - 158)  RR: 16 (09 Aug 2019 18:09) (9 - 32)  SpO2: 97% (09 Aug 2019 18:09) (92% - 100%)    PHYSICAL EXAM:  General: Older man, cachectic, laying in bed, NAD  HEENT: NC/AT; anicteric sclera; MMM  Neck: supple, no JVD  Cardiovascular: +S1/S2; RRR; no murmurs appreciated  Respiratory: CTA B/L; no W/R/R  Gastrointestinal: soft, mild/mod distention, diffuse mild TTP; +BSx4  Extremities: WWP; no edema, clubbing or cyanosis; ulcerations of b/l LEs 2/2 chronic venous insufficiency, L wound dressed, R wound overlying shin is non-purulent with minimal erythema  Vascular: 2+ radial, DP/PT pulses B/L  Neurological: AAOx3; strength 2/5 in b/l LEs, strength 4/5 in b/l UEs, sensation intact in b/l feet      LABS:             9.9    5.09  )-----------( 128      ( 09 Aug 2019 05:05 )             31.4     08-09  143  |  100  |  24<H>  ----------------------------<  135<H>  4.3   |  28  |  3.40<H>    Ca    9.9      09 Aug 2019 05:05  TPro  8.1  /  Alb  5.0  /  TBili  0.4  /  DBili  x   /  AST  33  /  ALT  24  /  AlkPhos  164<H>  08-08    PT/INR - ( 09 Aug 2019 05:05 )   PT: 12.8 sec;   INR: 1.13     PTT - ( 09 Aug 2019 05:05 )  PTT:33.9 sec    CAPILLARY BLOOD GLUCOSE  POCT Blood Glucose.: 259 mg/dL (09 Aug 2019 11:36)      RADIOLOGY & ADDITIONAL TESTS: Reviewed.    < from: 12 Lead ECG (08.09.19 @ 03:50) >  Ventricular Rate 64 BPM  Atrial Rate 64 BPM  P-R Interval 156 ms  QRS Duration 100 ms  Q-T Interval 480 ms  QTC Calculation(Bezet) 495 ms  P Axis 52 degrees  R Axis -7 degrees  T Axis 86 degrees  Diagnosis Line Normal sinus rhythm  Left ventricular hypertrophy  T wave abnormality, consider lateral ischemia  < end of copied text >    < from: Xray Chest 1 View- PORTABLE-Routine (08.09.19 @ 05:08) >  FINDINGS AND   IMPRESSION:  Support Devices:  Unchanged  Lungs/Airways: No significant interval change of hazy opacity in both   lungs, with prominent vascular markings suggesting mild pulmonary edema.   No pleural effusion. No pneumothorax.  Cardomediastinal: Stable cardiomediastinal silluette.   Bones and soft tissues: Within expected range.  < end of copied text >    < from: Echocardiogram (08.09.19 @ 12:21) >  CONCLUSIONS:   1. Moderate symmetric left ventricular hypertrophy.   2. Normal right ventricular size and systolic function.   3. Normal atria.   4. Moderate aortic stenosis.   5. Severe pulmonary hypertension, PASP is 72.1 mmHg.   6. No pericardial effusion.  < end of copied text >

## 2019-08-09 NOTE — PROGRESS NOTE ADULT - SUBJECTIVE AND OBJECTIVE BOX
Patient seen and evaluated at bedside.   pt is c/o neck and back pain.  denies headache, dizziness, blurry vision, n/v, sob, chest pain.  last HD on 8/8 with UF 1.8 L.    Meds:    aspirin enteric coated 81 daily  carvedilol 6.25 once  carvedilol 12.5 every 12 hours  chlorhexidine 2% Cloths 1 <User Schedule>  cloNIDine 0.1 three times a day  clopidogrel Tablet 75 daily  dextrose 40% Gel 15 once PRN  dextrose 5%. 1000 <Continuous>  dextrose 50% Injectable 12.5 once  dextrose 50% Injectable 25 once  dextrose 50% Injectable 25 once  gabapentin 100 daily  glucagon  Injectable 1 once PRN  hydrALAZINE 100 every 8 hours  insulin lispro (HumaLOG) corrective regimen sliding scale  Before meals and at bedtime  isosorbide   mononitrate ER Tablet (IMDUR) 30 daily  levothyroxine 50 daily  losartan 100 every 24 hours  morphine  - Injectable 2 every 6 hours PRN  oxyCODONE    5 mG/acetaminophen 325 mG 1 every 6 hours PRN  sevelamer carbonate 800 three times a day with meals      T(C): , Max: 36.8 (08-08-19 @ 16:16)  T(F): , Max: 98.2 (08-08-19 @ 16:16)  HR: 62 (08-09-19 @ 11:00)  BP: 175/60 (08-09-19 @ 11:00)  BP(mean): 90 (08-09-19 @ 11:00)  RR: 14 (08-09-19 @ 11:00)  SpO2: 95% (08-09-19 @ 11:00)  Wt(kg): --    08-08 @ 07:01  -  08-09 @ 07:00  --------------------------------------------------------  IN: 22.5 mL / OUT: 2000 mL / NET: -1977.5 mL    08-09 @ 07:01  -  08-09 @ 12:26  --------------------------------------------------------  IN: 200 mL / OUT: 100 mL / NET: 100 mL      Height (cm): 175.3 (08-09 @ 09:15)  Weight (kg): 68.039 (08-09 @ 09:15)  BMI (kg/m2): 22.1 (08-09 @ 09:15)  BSA (m2): 1.83 (08-09 @ 09:15)    Review of Systems:  CONSTITUTIONAL: No fever or chills, No fatigue or tiredness  RESPIRATORY: No shortness of breath, cough, hemoptysis  CARDIOVASCULAR: No chest pain or shortness of breath  GASTROINTESTINAL: No abdominal or flank pain, No nausea or vomiting, No diarrhea  GENITOURINARY: No dysuria or urinary burning, No difficulty passing urine, No hematuria  NEUROLOGICAL: No headaches or blurred vision  SKIN: No skin rashes   MUSCULOSKELETAL: neck and back pain      PHYSICAL EXAM:  GENERAL: well-developed, well nourished, alert, no acute distress at present  NECK: supple, No JVD  CHEST/LUNG: Clear to auscultation bilaterally  HEART: normal S1S2, RRR  ABDOMEN: Soft, Nontender, +BS, No flank tenderness bilateral  EXTREMITIES: No clubbing, cyanosis, or edema   NEUROLOGY: AAO x3, no focal neurological deficit  ACCESS: LUE AVF, good thrill and bruit appreciated      LABS:                        9.9    5.09  )-----------( 128      ( 09 Aug 2019 05:05 )             31.4     08-09    143  |  100  |  24<H>  ----------------------------<  135<H>  4.3   |  28  |  3.40<H>    Ca    9.9      09 Aug 2019 05:05    TPro  8.1  /  Alb  5.0  /  TBili  0.4  /  DBili  x   /  AST  33  /  ALT  24  /  AlkPhos  164<H>  08-08    Hemoglobin A1C, Whole Blood: 7.2 % <H> [4.0 - 5.6] (08-09 @ 05:05)  Hepatitis B Surface Antibody: Reactive (08-08 @ 21:40)    PT/INR - ( 09 Aug 2019 05:05 )   PT: 12.8 sec;   INR: 1.13          PTT - ( 09 Aug 2019 05:05 )  PTT:33.9 sec          RADIOLOGY & ADDITIONAL STUDIES:      < from: CT Head No Cont (08.08.19 @ 19:45) >    INTERPRETATION:  Axial images were obtained from the skull base to the   cranial vertex without intravenous contrast.  Soft tissue and bone   algorithm images were evaluated.    Clinical information: Headache. Hypertensive emergency.    The ventricles, sulci and cisterns are normal in caliber for patient's   age.  There is no evidence of intracranial hemorrhage, mass or acute   infarction. There are patchy areas of low attenuation in the cerebral   white matter, nonspecific but most commonly reflective of chronic   ischemic microangiopathy in a patient of this age. Remote appearing   lacunar infarcts are identified in the bilateral basal ganglia and   thalami. There is no lesion or fracture of the skull or skull base.    IMPRESSION:    Chronic appearing ischemic change without acute intracranial hemorrhage.    < end of copied text >

## 2019-08-09 NOTE — CONSULT NOTE ADULT - ASSESSMENT
67 y/o M legally blind, PMHx of HTN, HLD, CAD, BRYNN s/p stent, IDDM, ESRD on dialysis who presented to [facility] c/o lower back pain and BL lower extremity numbness following 3/4ths of his dialysis, admitted with hypertensive urgency.  Aortic dissection/ PE were ruled out.    # ESRD on HD TTS via LUE AVF  - volume status and electrolytes noted  - getting HD today with UF for better BP control  - obtain flow sheets from outpatient unit  - daily weights  - renal diet  - strict I/O    # HTN  - Resume home meds  - UF with HD    # Renal bone disease  - phos/pth/vit D as outpatient    # Anemia  - Hb 11.4 g/dl  - send iron panel  - transfusion as per primary team
67yo M with generalized arteriosclerotic disease and back/leg pain, palpable pulses no evidence of critical limb ischemia, foot numbness most likely due to diabetic neuropathy    -No vascular intervention indicated at this time  -Plan as per primary team      Patient seen with chief resident on call
68M, unreliable historian with PMH HTN, HLD, CAD, BRYNN (s/p stent), IDDM complicated by peripheral neuropathy, ESRD on HD, hypothyroidism, CVA (legally blind in right eye and partially in left eye), PAD with bilateral stents, presenting with worsening LBP and neck pain and worsening LE numbness during dialysis, currently admitted to CCU for hypertensive emergency.     Recommendations:

## 2019-08-09 NOTE — PHYSICAL THERAPY INITIAL EVALUATION ADULT - PERTINENT HX OF CURRENT PROBLEM, REHAB EVAL
68 year old male presented to [facility] c/o 8/10 lower back pain, posterior neck pain, and BL worsening lower extremity numbness/pain following 3/4ths of his dialysis, for which he called an ambulance.  His weakness is at baseline. He denies chest pain, syncope, lower extremity edema (has several open wounds of lower extremities, likely secondary to venous insufficiency), and nausea/vomitting. In the ED he was hypertensive to systolics in 220s.

## 2019-08-09 NOTE — PROGRESS NOTE ADULT - ASSESSMENT
67 yo M with generalized arteriosclerotic disease and back/leg pain, palpable pulses no evidence of critical limb ischemia, foot numbness most likely due to diabetic neuropathy.    - Lower extremities are well-perfused.  - Clinically, patient does not warrant any acute intervention.  - Pending carotid duplex.  - Plan discussed with chief resident. 67 yo M with generalized arteriosclerotic disease and back/leg pain, palpable pulses no evidence of critical limb ischemia, foot numbness most likely due to diabetic neuropathy.    - Lower extremities are well-perfused.  - Clinically, patient does not warrant any acute intervention.  - Pending carotid duplex.  - Plan discussed with chief resident.    ADDENDEUM:  - Carotid Duplex shows L carotid stent and normal velocities in R ICA  - No acute vascular surgery intervention  - Vascular surgery signing off at this time

## 2019-08-09 NOTE — PROGRESS NOTE ADULT - PROBLEM SELECTOR PROBLEM 6
Type 2 diabetes mellitus with other specified complication, unspecified whether long term insulin use

## 2019-08-09 NOTE — PROGRESS NOTE ADULT - PROBLEM SELECTOR PLAN 5
Hgb 11.4 on presentation, down to 9.9 today. No signs of bleeding; patient denying melena, hematochezia, lightheadedness, palpitations.  - Daily CBC

## 2019-08-09 NOTE — PROGRESS NOTE ADULT - ASSESSMENT
67 y/o M legally blind, PMHx of HTN, HLD, CAD, BRYNN s/p stent, IDDM, ESRD on dialysis who presented to [facility] c/o lower back pain and BL lower extremity numbness following 3/4ths of his dialysis, admitted with hypertensive urgency.  Aortic dissection/ PE were ruled out.    # ESRD on HD TTS via LUE AVF  - last HD on 8/8 with UF 1.8 L  - volume status and electrolytes noted, acceptable  - defer HD today  - anticipate HD on 8/10 as per schedule  - daily weights  - renal diet  - strict I/O    # HTN  - uncontrolled, could be due to pain  - provide adequate pain management  - resume home meds   - enalaprilat prn  - UF with HD    # Renal bone disease  - phos/pth/vit D as outpatient  - continue sevelamer    # Anemia, normocytic  - Hb 9.9 from 11.4 g/dl  - send iron panel, TSH  - transfusion as per primary team

## 2019-08-09 NOTE — PROGRESS NOTE ADULT - PROBLEM SELECTOR PLAN 6
Patient with PMH DM2 on home Lantus 100U.  - Continue to monitor FSG, finger sticks 136, 227, 228  - ISS  - HA1C 7.2 on admission

## 2019-08-09 NOTE — PHYSICAL THERAPY INITIAL EVALUATION ADULT - GENERAL OBSERVATIONS, REHAB EVAL
patient received seated at edge of bed with no acute distress. +EKG +heplock patient received seated at edge of bed with no acute distress. +EKG +heplock left LE dressing clean/dry/intact

## 2019-08-09 NOTE — CONSULT NOTE ADULT - SUBJECTIVE AND OBJECTIVE BOX
NEUROLOGY INITIAL CONSULT NOTE    CHIEF COMPLAINT:      HPI:  67 y/o M  POOR/UNRELIABLE HISTORIAN with PMHx of HTN, HLD, CAD, BRYNN (s/p stent), IDDM (with peripheral nueropathy), ESRD on dialysis (tues/thurs/sat), hypothryoidism, ?CVA (legally blind in left eye, small vision in R eye - question of stroke or retinal artery occlusion - patient is s/p carotid artery stent), PAD (s/p bilateral stents) who presented to [facility] c/o 8/10 lower back pain, posterior neck pain, and BL worsening lower extremity numbness/pain following 3/4ths of his dialysis, for which he called an ambulance. He has associated h/a, SOB, diaphoresis, palpitations, orthopnea, and vertigo. His weakness is at baseline. He denies chest pain, syncope, lower extremity edema (has several open wounds of lower extremities, likely secondary to venous insufficiency), and nausea/vomitting. In the ED he was hypertensive to systolics in 220s. He received a total of 40 IV hydralazine which reduced his BP to 170s/70s. His EKG showed normal sinus rhythm and LVH. Pertinent labs include troponin 0.22 then 0.18 and BMP 18,338. He received 8 mg of IV morphine, followed by 2 mg of IV dilaudid, which helped his headache and back pain. 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 He has severe calcific and non-calcific CAD as well. Patient states that his blood pressure is usually 220-240 systolic at home. On provider arrival to ED, /88, patient in pain but able to hold conversation, only oriented to person. He does not know his medication list - pharmacy was called, which informed us that he lives at [facility] living who informed us that he was discharged from [facility] last night (8/7/19). Paperwork requested. [place] informed us that he was given a packet of medications to take at dialysis, which included amlodipine 10 mg, hydralizine 75 mg, Imdur 30 mg, labetalol 500 mg, plavix 75 mg, levothyroxine 50 mcg, aspirin 81 mg, and gabapentin 100 mg. It is unclear if he took these medications, so TID medications and those not given to patient were ordered, as before admission to [facility] patient was on Imdur 60 mg daily and hydralizine 100 mg TID (labetalol 500 mg, hydralazine 100 mg, Imdur 30 mg, and clonidine 0.1 mg) without much effect. The rest of his home regimen was then ordered, amlodipine 10 mg, Imdur 30 mg, and losartan 100 mg. Patient now being accepted to CCU.     OF NOTE: prior to [facility] admission, patient was on Sevelamer 800 mg TID, lyrica 200 mg BID, colace, torsemide 100 mg qd, nifedipine 60 mg PO qd, and tizanidine 2 mg TID.  OF NOTE: per [facility] discharge paperwork, patient was given levaquin for ?PNA v Atelectasis. Discharge paperwork shows he was discharged on cefpodoxime 200 mg qd. Assisted living did not endorse this.   CT head 7/30/19: No acute changes. Mild generalized parenchymal volume loss and mild to moderate chronic microvascular ischemic white matter disease. Chronic lacunar infarcts of bilateral basal ganglia.  CT cervical spine 7/31/19: Multilevel degenerative changes with associated central canal stenosis and neuroforaminal encroachment Effacement of the cored at c2-3, c4-5, c5-6, and c6-7. (08 Aug 2019 17:56)    INTERVAL HISTORY: Neurology was consulted for worsening low back pain and BLE weakness. Patient states he has been having sharp pain in neck and lower back for weeks. States the back pain radiates to both legs. He has known "pinched nerves" in both cervical and lumbar spine. Reportedly saw a spine surgeon and was not offered surgery. He also reports numbness in legs and feet, as well as numbness in the inner thighs. Reported falling upon getting up out of bed a few days ago. Of note, he also notes having peripheral neuropathy from long standing diabetes. He has been receiving morphine and percocet PRN for his pain. Denies any bladder or bowel incontinence. Review of systems otherwise negative. He was seen by Physical therapy today.       PAST MEDICAL & SURGICAL HISTORY:  Peripheral neuropathy  Peripheral artery disease: s/p bilateral stents  Anemia of renal disease  End-stage renal disease (ESRD)  Type II diabetes mellitus  Retinal artery occlusion  Hypothyroidism  Low back pain  CAD (coronary artery disease)  H/O renal artery stenosis: s/p L renal stent  Hyperlipidemia  Hypertension  No significant past surgical history      REVIEW OF SYSTEMS:  As per HPI, otherwise negative for Constitutional, Eyes, Ears/Nose/Mouth/Throat, Neck, Cardiovascular, Respiratory, Gastrointestinal, Genitourinary, Skin, Endocrine, Musculoskeletal, Psychiatric, and Hematologic/Lymphatic.    MEDICATIONS  (STANDING):  aspirin enteric coated 81 milliGRAM(s) Oral daily  carvedilol 12.5 milliGRAM(s) Oral every 12 hours  chlorhexidine 2% Cloths 1 Application(s) Topical <User Schedule>  cloNIDine 0.1 milliGRAM(s) Oral three times a day  clopidogrel Tablet 75 milliGRAM(s) Oral daily  dextrose 5%. 1000 milliLiter(s) (50 mL/Hr) IV Continuous <Continuous>  dextrose 50% Injectable 12.5 Gram(s) IV Push once  dextrose 50% Injectable 25 Gram(s) IV Push once  dextrose 50% Injectable 25 Gram(s) IV Push once  gabapentin 100 milliGRAM(s) Oral daily  hydrALAZINE 100 milliGRAM(s) Oral every 8 hours  insulin lispro (HumaLOG) corrective regimen sliding scale   SubCutaneous Before meals and at bedtime  isosorbide   mononitrate ER Tablet (IMDUR) 30 milliGRAM(s) Oral daily  levothyroxine 50 MICROGram(s) Oral daily  losartan 100 milliGRAM(s) Oral every 24 hours  sevelamer carbonate 800 milliGRAM(s) Oral three times a day with meals    MEDICATIONS  (PRN):  dextrose 40% Gel 15 Gram(s) Oral once PRN Blood Glucose LESS THAN 70 milliGRAM(s)/deciliter  glucagon  Injectable 1 milliGRAM(s) IntraMuscular once PRN Glucose LESS THAN 70 milligrams/deciliter  morphine  - Injectable 2 milliGRAM(s) IV Push every 6 hours PRN Severe Pain (7 - 10)  oxyCODONE    5 mG/acetaminophen 325 mG 1 Tablet(s) Oral every 6 hours PRN Moderate Pain (4 - 6)      Allergies    No Known Allergies    Intolerances        FAMILY HISTORY:  FH: stroke: father at 66, mother at 85      SOCIAL HISTORY:  Living Situation:  Occupation:  Tobacco:  Alcohol:   Drug use:      VITAL SIGNS:  Vital Signs Last 24 Hrs  T(C): 36.7 (09 Aug 2019 10:02), Max: 36.8 (08 Aug 2019 16:16)  T(F): 98 (09 Aug 2019 10:02), Max: 98.2 (08 Aug 2019 16:16)  HR: 62 (09 Aug 2019 14:00) (56 - 80)  BP: 173/60 (09 Aug 2019 14:00) (108/52 - 226/88)  BP(mean): 104 (09 Aug 2019 14:00) (68 - 158)  RR: 19 (09 Aug 2019 14:00) (9 - 32)  SpO2: 94% (09 Aug 2019 14:00) (90% - 100%)    PHYSICAL EXAMINATION:  Constitutional: WDWN; NAD  Eyes: Conjunctiva and sclera clear.  Cardiovascular: Regular rate and rhythm; S1 and S2 Normal; No murmurs, gallops or rubs.  Neurologic:  - Mental Status:  Drowsy, but easily arousable, oriented x3, intermittently falling asleep  speech is fluent with intact naming, repetition, and comprehension;   - Cranial Nerves II-XII:    II:  Blind in R eye, partially blind in L eye. R eye not reactive to light.   III, IV, VI:  Extraocular movements are intact without nystagmus.  V:  Facial sensation is intact in the V1-V3 distribution bilaterally.  VII:  R sided facial droop.   VIII:  Hearing is intact to finger rub.  IX, X:  Uvula is midline and soft palate rises symmetrically  XI:  Head turning and shoulder shrug are intact. Effort limited  XII:  tongue midline  - Motor:    Shoulder abduction (4/5) bilaterally. Elbow flexion/extension (3/5) BLE  finger abduction (3+/5) bilaterally.  4/5 bilaterally  hip flexion (2/5) RLE, LLE, does not keep leg elevated against gravity  hip extension - does not consistently cooperate for neurologic exam  knee flexion/extension: limited secondary to pain  plantar flexion/dorsiflexion: 4/5 bilaterally     - Reflexes:  Unable to appreciate bicep/tricep or brachioradialis reflex. 3+ at patellar, absent reflexes at ankles. Downgoing Babinski's  - Sensory:  Intact to light touch to throughout, reported numbness in the inner thighs (L2/L3) distribution and in soles of foot, intact to pin prick in L4/L5/S1, intact to vibration, and joint-position sense throughout. Exam severely limited secondary to pain and effort.   - Coordination:  DId not assess Finger-nose-finger or heel-knee-shin intact without dysmetria.    - Gait: Deferred.     LABS:                        9.9    5.09  )-----------( 128      ( 09 Aug 2019 05:05 )             31.4     08-09    143  |  100  |  24<H>  ----------------------------<  135<H>  4.3   |  28  |  3.40<H>    Ca    9.9      09 Aug 2019 05:05    TPro  8.1  /  Alb  5.0  /  TBili  0.4  /  DBili  x   /  AST  33  /  ALT  24  /  AlkPhos  164<H>  08-08    PT/INR - ( 09 Aug 2019 05:05 )   PT: 12.8 sec;   INR: 1.13          PTT - ( 09 Aug 2019 05:05 )  PTT:33.9 sec      RADIOLOGY & ADDITIONAL STUDIES:      IMPRESSION & RECOMMENDATIONS: NEUROLOGY INITIAL CONSULT NOTE    CHIEF COMPLAINT:      HPI:  69 y/o M  POOR/UNRELIABLE HISTORIAN with PMHx of HTN, HLD, CAD, BRYNN (s/p stent), IDDM (with peripheral nueropathy), ESRD on dialysis (tues/thurs/sat), hypothryoidism, ?CVA (legally blind in left eye, small vision in R eye - question of stroke or retinal artery occlusion - patient is s/p carotid artery stent), PAD (s/p bilateral stents) who presented to [facility] c/o 8/10 lower back pain, posterior neck pain, and BL worsening lower extremity numbness/pain following 3/4ths of his dialysis, for which he called an ambulance. He has associated h/a, SOB, diaphoresis, palpitations, orthopnea, and vertigo. His weakness is at baseline. He denies chest pain, syncope, lower extremity edema (has several open wounds of lower extremities, likely secondary to venous insufficiency), and nausea/vomitting. In the ED he was hypertensive to systolics in 220s. He received a total of 40 IV hydralazine which reduced his BP to 170s/70s. His EKG showed normal sinus rhythm and LVH. Pertinent labs include troponin 0.22 then 0.18 and BMP 18,338. He received 8 mg of IV morphine, followed by 2 mg of IV dilaudid, which helped his headache and back pain. 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 He has severe calcific and non-calcific CAD as well. Patient states that his blood pressure is usually 220-240 systolic at home. On provider arrival to ED, /88, patient in pain but able to hold conversation, only oriented to person. He does not know his medication list - pharmacy was called, which informed us that he lives at [facility] living who informed us that he was discharged from [facility] last night (8/7/19). Paperwork requested. [place] informed us that he was given a packet of medications to take at dialysis, which included amlodipine 10 mg, hydralizine 75 mg, Imdur 30 mg, labetalol 500 mg, plavix 75 mg, levothyroxine 50 mcg, aspirin 81 mg, and gabapentin 100 mg. It is unclear if he took these medications, so TID medications and those not given to patient were ordered, as before admission to [facility] patient was on Imdur 60 mg daily and hydralizine 100 mg TID (labetalol 500 mg, hydralazine 100 mg, Imdur 30 mg, and clonidine 0.1 mg) without much effect. The rest of his home regimen was then ordered, amlodipine 10 mg, Imdur 30 mg, and losartan 100 mg. Patient now being accepted to CCU.     OF NOTE: prior to [facility] admission, patient was on Sevelamer 800 mg TID, lyrica 200 mg BID, colace, torsemide 100 mg qd, nifedipine 60 mg PO qd, and tizanidine 2 mg TID.  OF NOTE: per [facility] discharge paperwork, patient was given levaquin for ?PNA v Atelectasis. Discharge paperwork shows he was discharged on cefpodoxime 200 mg qd. Assisted living did not endorse this.   CT head 7/30/19: No acute changes. Mild generalized parenchymal volume loss and mild to moderate chronic microvascular ischemic white matter disease. Chronic lacunar infarcts of bilateral basal ganglia.  CT cervical spine 7/31/19: Multilevel degenerative changes with associated central canal stenosis and neuroforaminal encroachment Effacement of the cored at c2-3, c4-5, c5-6, and c6-7. (08 Aug 2019 17:56)    INTERVAL HISTORY: Neurology was consulted for worsening low back pain and BLE weakness. At baseline, he has numbness from longstanding peripheral neuropathy and walks with a walker. Patient states he has been having sharp pain in neck and lower back for weeks. Describes the pain as sharp and severe, and radiating down both legs. He has known "pinched nerves" in both cervical and lumbar spine. Reportedly saw a spine surgeon and was not offered surgery. He also reports numbness in legs and feet, as well as numbness in the inner thighs. Reported falling upon getting up out of bed a few days ago. Of note, he also notes having peripheral neuropathy from long standing diabetes. He has been receiving morphine and percocet PRN for his pain. Denies any bladder or bowel incontinence. Review of systems otherwise negative. He was seen by Physical therapy today, may be need home PT vs/ [facility]. Pt also seen by vascular surgery, patient without any evidence of limb ischemia and did not recommend any vascular intervention.     PAST MEDICAL & SURGICAL HISTORY:  Peripheral neuropathy  Peripheral artery disease: s/p bilateral stents  Anemia of renal disease  End-stage renal disease (ESRD)  Type II diabetes mellitus  Retinal artery occlusion  Hypothyroidism  Low back pain  CAD (coronary artery disease)  H/O renal artery stenosis: s/p L renal stent  Hyperlipidemia  Hypertension  No significant past surgical history      REVIEW OF SYSTEMS:  As per HPI, otherwise negative for Constitutional, Eyes, Ears/Nose/Mouth/Throat, Neck, Cardiovascular, Respiratory, Gastrointestinal, Genitourinary, Skin, Endocrine, Musculoskeletal, Psychiatric, and Hematologic/Lymphatic.    MEDICATIONS  (STANDING):  aspirin enteric coated 81 milliGRAM(s) Oral daily  carvedilol 12.5 milliGRAM(s) Oral every 12 hours  chlorhexidine 2% Cloths 1 Application(s) Topical <User Schedule>  cloNIDine 0.1 milliGRAM(s) Oral three times a day  clopidogrel Tablet 75 milliGRAM(s) Oral daily  dextrose 5%. 1000 milliLiter(s) (50 mL/Hr) IV Continuous <Continuous>  dextrose 50% Injectable 12.5 Gram(s) IV Push once  dextrose 50% Injectable 25 Gram(s) IV Push once  dextrose 50% Injectable 25 Gram(s) IV Push once  gabapentin 100 milliGRAM(s) Oral daily  hydrALAZINE 100 milliGRAM(s) Oral every 8 hours  insulin lispro (HumaLOG) corrective regimen sliding scale   SubCutaneous Before meals and at bedtime  isosorbide   mononitrate ER Tablet (IMDUR) 30 milliGRAM(s) Oral daily  levothyroxine 50 MICROGram(s) Oral daily  losartan 100 milliGRAM(s) Oral every 24 hours  sevelamer carbonate 800 milliGRAM(s) Oral three times a day with meals    MEDICATIONS  (PRN):  dextrose 40% Gel 15 Gram(s) Oral once PRN Blood Glucose LESS THAN 70 milliGRAM(s)/deciliter  glucagon  Injectable 1 milliGRAM(s) IntraMuscular once PRN Glucose LESS THAN 70 milligrams/deciliter  morphine  - Injectable 2 milliGRAM(s) IV Push every 6 hours PRN Severe Pain (7 - 10)  oxyCODONE    5 mG/acetaminophen 325 mG 1 Tablet(s) Oral every 6 hours PRN Moderate Pain (4 - 6)      Allergies  No Known Allergies  Intolerances    FAMILY HISTORY:  FH: stroke: father at 66, mother at 85      SOCIAL HISTORY:  Living Situation:  Occupation:  Tobacco:  Alcohol:   Drug use:      VITAL SIGNS:  Vital Signs Last 24 Hrs  T(C): 36.7 (09 Aug 2019 10:02), Max: 36.8 (08 Aug 2019 16:16)  T(F): 98 (09 Aug 2019 10:02), Max: 98.2 (08 Aug 2019 16:16)  HR: 62 (09 Aug 2019 14:00) (56 - 80)  BP: 173/60 (09 Aug 2019 14:00) (108/52 - 226/88)  BP(mean): 104 (09 Aug 2019 14:00) (68 - 158)  RR: 19 (09 Aug 2019 14:00) (9 - 32)  SpO2: 94% (09 Aug 2019 14:00) (90% - 100%)    PHYSICAL EXAMINATION:  Constitutional: WDWN; NAD  Eyes: Conjunctiva and sclera clear.  Cardiovascular: Regular rate and rhythm; S1 and S2 Normal; No murmurs, gallops or rubs.  Skin: warm, dry however noted to have weeping wounds, bandage in LLE with dry and intact.   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 R eye not reactive to light.   III, IV, VI:  Strabismus noted, no nystagmus with extraocular eye movements.    V:  Facial sensation is intact in the V1-V3 distribution bilaterally.  VII:  R sided facial droop.   VIII:  Hearing is intact to finger rub.  IX, X:  Uvula is midline and soft palate rises symmetrically  XI:  Head turning and shoulder shrug are intact. Effort limited  XII:  tongue midline  - Motor:    Shoulder abduction (4/5) bilaterally. Elbow flexion/extension (3/5) BLE  finger abduction (3+/5) bilaterally.  4/5 bilaterally  hip flexion (2/5) RLE, LLE, does not keep leg elevated against gravity  hip extension - does not consistently cooperate for neurologic exam  knee flexion/extension: limited secondary to pain  plantar flexion/dorsiflexion: 4/5 bilaterally     - Reflexes:  Unable to appreciate bicep/tricep or brachioradialis reflex. 3+ at patellar, absent reflexes at ankles. Downgoing Babinski's  - Sensory:  Intact to light touch to throughout, reported numbness in the inner thighs (L2/L3) distribution and in soles of foot, intact to pin prick in L4/L5/S1, intact to vibration, and joint-position sense throughout. Exam severely limited secondary to pain and effort.   - Coordination:  DId not assess Finger-nose-finger or heel-knee-shin intact without dysmetria.    - Gait: Deferred.   Vascular: 2+ DP pulses, LUE AV fistula with palpable thrill     LABS:                        9.9    5.09  )-----------( 128      ( 09 Aug 2019 05:05 )             31.4     08-09    143  |  100  |  24<H>  ----------------------------<  135<H>  4.3   |  28  |  3.40<H>    Ca    9.9      09 Aug 2019 05:05    TPro  8.1  /  Alb  5.0  /  TBili  0.4  /  DBili  x   /  AST  33  /  ALT  24  /  AlkPhos  164<H>  08-08    PT/INR - ( 09 Aug 2019 05:05 )   PT: 12.8 sec;   INR: 1.13          PTT - ( 09 Aug 2019 05:05 )  PTT:33.9 sec      RADIOLOGY & ADDITIONAL STUDIES:      IMPRESSION & RECOMMENDATIONS: NEUROLOGY INITIAL CONSULT NOTE    CHIEF COMPLAINT:      HPI:  67 y/o M  POOR/UNRELIABLE HISTORIAN with PMHx of HTN, HLD, CAD, BRYNN (s/p stent), IDDM (with peripheral nueropathy), ESRD on dialysis (tues/thurs/sat), hypothryoidism, ?CVA (legally blind in left eye, small vision in R eye - question of stroke or retinal artery occlusion - patient is s/p carotid artery stent), PAD (s/p bilateral stents) who presented to [facility] c/o 8/10 lower back pain, posterior neck pain, and BL worsening lower extremity numbness/pain following 3/4ths of his dialysis, for which he called an ambulance. He has associated h/a, SOB, diaphoresis, palpitations, orthopnea, and vertigo. His weakness is at baseline. He denies chest pain, syncope, lower extremity edema (has several open wounds of lower extremities, likely secondary to venous insufficiency), and nausea/vomitting. In the ED he was hypertensive to systolics in 220s. He received a total of 40 IV hydralazine which reduced his BP to 170s/70s. His EKG showed normal sinus rhythm and LVH. Pertinent labs include troponin 0.22 then 0.18 and BMP 18,338. He received 8 mg of IV morphine, followed by 2 mg of IV dilaudid, which helped his headache and back pain. 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 He has severe calcific and non-calcific CAD as well. Patient states that his blood pressure is usually 220-240 systolic at home. On provider arrival to ED, /88, patient in pain but able to hold conversation, only oriented to person. He does not know his medication list - pharmacy was called, which informed us that he lives at [facility] living who informed us that he was discharged from [facility] last night (8/7/19). Paperwork requested. [place] informed us that he was given a packet of medications to take at dialysis, which included amlodipine 10 mg, hydralizine 75 mg, Imdur 30 mg, labetalol 500 mg, plavix 75 mg, levothyroxine 50 mcg, aspirin 81 mg, and gabapentin 100 mg. It is unclear if he took these medications, so TID medications and those not given to patient were ordered, as before admission to [facility] patient was on Imdur 60 mg daily and hydralizine 100 mg TID (labetalol 500 mg, hydralazine 100 mg, Imdur 30 mg, and clonidine 0.1 mg) without much effect. The rest of his home regimen was then ordered, amlodipine 10 mg, Imdur 30 mg, and losartan 100 mg. Patient now being accepted to CCU.     OF NOTE: prior to [facility] admission, patient was on Sevelamer 800 mg TID, lyrica 200 mg BID, colace, torsemide 100 mg qd, nifedipine 60 mg PO qd, and tizanidine 2 mg TID.  OF NOTE: per [facility] discharge paperwork, patient was given levaquin for ?PNA v Atelectasis. Discharge paperwork shows he was discharged on cefpodoxime 200 mg qd. Assisted living did not endorse this.   CT head 7/30/19: No acute changes. Mild generalized parenchymal volume loss and mild to moderate chronic microvascular ischemic white matter disease. Chronic lacunar infarcts of bilateral basal ganglia.  CT cervical spine 7/31/19: Multilevel degenerative changes with associated central canal stenosis and neuroforaminal encroachment Effacement of the cored at c2-3, c4-5, c5-6, and c6-7. (08 Aug 2019 17:56)    INTERVAL HISTORY: Neurology was consulted for worsening low back pain and BLE weakness. At baseline, he has numbness from longstanding peripheral neuropathy and walks with a walker. Patient states he has been having sharp pain in neck and lower back for weeks. Describes the pain as sharp and severe, and radiating down both legs. He has known "pinched nerves" in both cervical and lumbar spine. Reportedly saw a spine surgeon and was not offered surgery. He also reports numbness in legs and feet, as well as numbness in the inner thighs. Reported falling upon getting up out of bed a few days ago. Of note, he also notes having peripheral neuropathy from long standing diabetes. He has been receiving morphine and percocet PRN for his pain. Denies any bladder or bowel incontinence. Review of systems otherwise negative. He was seen by Physical therapy today, may be need home PT vs/ [facility]. Pt also seen by vascular surgery, patient without any evidence of limb ischemia and did not recommend any vascular intervention.     PAST MEDICAL & SURGICAL HISTORY:  Peripheral neuropathy  Peripheral artery disease: s/p bilateral stents  Anemia of renal disease  End-stage renal disease (ESRD)  Type II diabetes mellitus  Retinal artery occlusion  Hypothyroidism  Low back pain  CAD (coronary artery disease)  H/O renal artery stenosis: s/p L renal stent  Hyperlipidemia  Hypertension  No significant past surgical history      REVIEW OF SYSTEMS:  As per HPI, otherwise negative for Constitutional, Eyes, Ears/Nose/Mouth/Throat, Neck, Cardiovascular, Respiratory, Gastrointestinal, Genitourinary, Skin, Endocrine, Musculoskeletal, Psychiatric, and Hematologic/Lymphatic.    MEDICATIONS  (STANDING):  aspirin enteric coated 81 milliGRAM(s) Oral daily  carvedilol 12.5 milliGRAM(s) Oral every 12 hours  chlorhexidine 2% Cloths 1 Application(s) Topical <User Schedule>  cloNIDine 0.1 milliGRAM(s) Oral three times a day  clopidogrel Tablet 75 milliGRAM(s) Oral daily  dextrose 5%. 1000 milliLiter(s) (50 mL/Hr) IV Continuous <Continuous>  dextrose 50% Injectable 12.5 Gram(s) IV Push once  dextrose 50% Injectable 25 Gram(s) IV Push once  dextrose 50% Injectable 25 Gram(s) IV Push once  gabapentin 100 milliGRAM(s) Oral daily  hydrALAZINE 100 milliGRAM(s) Oral every 8 hours  insulin lispro (HumaLOG) corrective regimen sliding scale   SubCutaneous Before meals and at bedtime  isosorbide   mononitrate ER Tablet (IMDUR) 30 milliGRAM(s) Oral daily  levothyroxine 50 MICROGram(s) Oral daily  losartan 100 milliGRAM(s) Oral every 24 hours  sevelamer carbonate 800 milliGRAM(s) Oral three times a day with meals    MEDICATIONS  (PRN):  dextrose 40% Gel 15 Gram(s) Oral once PRN Blood Glucose LESS THAN 70 milliGRAM(s)/deciliter  glucagon  Injectable 1 milliGRAM(s) IntraMuscular once PRN Glucose LESS THAN 70 milligrams/deciliter  morphine  - Injectable 2 milliGRAM(s) IV Push every 6 hours PRN Severe Pain (7 - 10)  oxyCODONE    5 mG/acetaminophen 325 mG 1 Tablet(s) Oral every 6 hours PRN Moderate Pain (4 - 6)      Allergies  No Known Allergies  Intolerances    FAMILY HISTORY:  FH: stroke: father at 66, mother at 85      SOCIAL HISTORY:  Living Situation: Lives in Assisted Living Facility     VITAL SIGNS:  Vital Signs Last 24 Hrs  T(C): 36.7 (09 Aug 2019 10:02), Max: 36.8 (08 Aug 2019 16:16)  T(F): 98 (09 Aug 2019 10:02), Max: 98.2 (08 Aug 2019 16:16)  HR: 62 (09 Aug 2019 14:00) (56 - 80)  BP: 173/60 (09 Aug 2019 14:00) (108/52 - 226/88)  BP(mean): 104 (09 Aug 2019 14:00) (68 - 158)  RR: 19 (09 Aug 2019 14:00) (9 - 32)  SpO2: 94% (09 Aug 2019 14:00) (90% - 100%)    PHYSICAL EXAMINATION:  Constitutional: WDWN; NAD  Eyes: Conjunctiva and sclera clear.  Cardiovascular: RRR; S1 and S2 Normal; SALAZAR II/VI at LUSB   Skin: warm, dry however noted to have weeping wounds, bandage in LLE with dry and intact.   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 R eye not reactive to light.   III, IV, VI:  Strabismus noted, no nystagmus with extraocular eye movements.    V:  Facial sensation is intact in the V1-V3 distribution bilaterally.  VII:  R sided facial droop.   VIII:  Hearing is intact to finger rub.  IX, X:  Uvula is midline and soft palate rises symmetrically  XI:  Head turning and shoulder shrug are intact. Effort limited  XII:  tongue midline  - Motor:    Shoulder abduction (4/5) bilaterally. Elbow flexion/extension (3/5) BLE  finger abduction (3+/5) bilaterally.  4/5 bilaterally  hip flexion (2/5) RLE, LLE, does not keep leg elevated against gravity  hip extension - does not consistently cooperate for neurologic exam  knee flexion/extension: limited secondary to pain  plantar flexion/dorsiflexion: 4/5 bilaterally     - Reflexes:  Unable to appreciate bicep/tricep or brachioradialis reflex. 3+ at patellar, absent reflexes at ankles. Downgoing Babinski's  - Sensory:  Intact to light touch to throughout, reported numbness in the inner thighs (L2/L3) distribution and in soles of foot, intact to pin prick in L4/L5/S1, intact to vibration, and joint-position sense throughout. Exam severely limited secondary to pain and effort.   - Coordination:  DId not assess Finger-nose-finger or heel-knee-shin intact without dysmetria.    - Gait: Deferred.   Vascular: 2+ DP pulses, LUE AV fistula with palpable thrill     LABS:                        9.9    5.09  )-----------( 128      ( 09 Aug 2019 05:05 )             31.4     08-09    143  |  100  |  24<H>  ----------------------------<  135<H>  4.3   |  28  |  3.40<H>    Ca    9.9      09 Aug 2019 05:05    TPro  8.1  /  Alb  5.0  /  TBili  0.4  /  DBili  x   /  AST  33  /  ALT  24  /  AlkPhos  164<H>  08-08    PT/INR - ( 09 Aug 2019 05:05 )   PT: 12.8 sec;   INR: 1.13          PTT - ( 09 Aug 2019 05:05 )  PTT:33.9 sec      RADIOLOGY & ADDITIONAL STUDIES:      IMPRESSION & RECOMMENDATIONS:

## 2019-08-09 NOTE — PROGRESS NOTE ADULT - SUBJECTIVE AND OBJECTIVE BOX
OVERNIGHT EVENTS:    SUBJECTIVE / INTERVAL HPI: Patient seen and examined at bedside. Sitting comfortably bedside in    VITAL SIGNS:  Vital Signs Last 24 Hrs  T(C): 36.7 (09 Aug 2019 10:02), Max: 36.8 (08 Aug 2019 16:16)  T(F): 98 (09 Aug 2019 10:02), Max: 98.2 (08 Aug 2019 16:16)  HR: 70 (09 Aug 2019 13:00) (56 - 80)  BP: 171/58 (09 Aug 2019 13:00) (108/52 - 226/88)  BP(mean): 97 (09 Aug 2019 13:00) (68 - 158)  RR: 18 (09 Aug 2019 13:00) (9 - 32)  SpO2: 95% (09 Aug 2019 13:00) (90% - 100%)    PHYSICAL EXAM:    General: WDWN  HEENT: NC/AT; PERRL, anicteric sclera; MMM  Neck: supple  Cardiovascular: +S1/S2; RRR  Respiratory: CTA B/L; no W/R/R  Gastrointestinal: soft, NT/ND; +BSx4  Extremities: WWP; no edema, clubbing or cyanosis  Vascular: 2+ radial, DP/PT pulses B/L  Neurological: AAOx3; no focal deficits    MEDICATIONS:  MEDICATIONS  (STANDING):  aspirin enteric coated 81 milliGRAM(s) Oral daily  carvedilol 6.25 milliGRAM(s) Oral once  carvedilol 12.5 milliGRAM(s) Oral every 12 hours  chlorhexidine 2% Cloths 1 Application(s) Topical <User Schedule>  cloNIDine 0.1 milliGRAM(s) Oral three times a day  clopidogrel Tablet 75 milliGRAM(s) Oral daily  dextrose 5%. 1000 milliLiter(s) (50 mL/Hr) IV Continuous <Continuous>  dextrose 50% Injectable 12.5 Gram(s) IV Push once  dextrose 50% Injectable 25 Gram(s) IV Push once  dextrose 50% Injectable 25 Gram(s) IV Push once  gabapentin 100 milliGRAM(s) Oral daily  hydrALAZINE 100 milliGRAM(s) Oral every 8 hours  insulin lispro (HumaLOG) corrective regimen sliding scale   SubCutaneous Before meals and at bedtime  isosorbide   mononitrate ER Tablet (IMDUR) 30 milliGRAM(s) Oral daily  levothyroxine 50 MICROGram(s) Oral daily  losartan 100 milliGRAM(s) Oral every 24 hours  sevelamer carbonate 800 milliGRAM(s) Oral three times a day with meals    MEDICATIONS  (PRN):  dextrose 40% Gel 15 Gram(s) Oral once PRN Blood Glucose LESS THAN 70 milliGRAM(s)/deciliter  glucagon  Injectable 1 milliGRAM(s) IntraMuscular once PRN Glucose LESS THAN 70 milligrams/deciliter  morphine  - Injectable 2 milliGRAM(s) IV Push every 6 hours PRN Severe Pain (7 - 10)  oxyCODONE    5 mG/acetaminophen 325 mG 1 Tablet(s) Oral every 6 hours PRN Moderate Pain (4 - 6)      ALLERGIES:  Allergies    No Known Allergies    Intolerances        LABS:                        9.9    5.09  )-----------( 128      ( 09 Aug 2019 05:05 )             31.4     08-09    143  |  100  |  24<H>  ----------------------------<  135<H>  4.3   |  28  |  3.40<H>    Ca    9.9      09 Aug 2019 05:05    TPro  8.1  /  Alb  5.0  /  TBili  0.4  /  DBili  x   /  AST  33  /  ALT  24  /  AlkPhos  164<H>  08-08    PT/INR - ( 09 Aug 2019 05:05 )   PT: 12.8 sec;   INR: 1.13          PTT - ( 09 Aug 2019 05:05 )  PTT:33.9 sec    CAPILLARY BLOOD GLUCOSE      POCT Blood Glucose.: 259 mg/dL (09 Aug 2019 11:36)      RADIOLOGY & ADDITIONAL TESTS: Reviewed. OVERNIGHT EVENTS:     SUBJECTIVE / INTERVAL HPI: Patient seen and examined at bedside. Sitting comfortably bedside in NAD. Continuing to endorse posterior neck and lower back pain radiating to b/l LEs worse today than at presentation. Requesting additional Morphine for pain. Denies CP, SOB, cough, vision changes, peripheral edema, N/V/F/C. Hemodynamically stable for stepdown today to 5Lach.     VITAL SIGNS:  Vital Signs Last 24 Hrs  T(C): 36.7 (09 Aug 2019 10:02), Max: 36.8 (08 Aug 2019 16:16)  T(F): 98 (09 Aug 2019 10:02), Max: 98.2 (08 Aug 2019 16:16)  HR: 70 (09 Aug 2019 13:00) (56 - 80)  BP: 171/58 (09 Aug 2019 13:00) (108/52 - 226/88)  BP(mean): 97 (09 Aug 2019 13:00) (68 - 158)  RR: 18 (09 Aug 2019 13:00) (9 - 32)  SpO2: 95% (09 Aug 2019 13:00) (90% - 100%)    PHYSICAL EXAM:    General: WDWN  HEENT: NC/AT; PERRL, anicteric sclera; MMM  Neck: supple  Cardiovascular: +S1/S2; RRR  Respiratory: CTA B/L; no W/R/R  Gastrointestinal: soft, NT/ND; +BSx4  Extremities: WWP; no edema, clubbing or cyanosis  Vascular: 2+ radial, DP/PT pulses B/L  Neurological: AAOx3; no focal deficits    MEDICATIONS:  MEDICATIONS  (STANDING):  aspirin enteric coated 81 milliGRAM(s) Oral daily  carvedilol 6.25 milliGRAM(s) Oral once  carvedilol 12.5 milliGRAM(s) Oral every 12 hours  chlorhexidine 2% Cloths 1 Application(s) Topical <User Schedule>  cloNIDine 0.1 milliGRAM(s) Oral three times a day  clopidogrel Tablet 75 milliGRAM(s) Oral daily  dextrose 5%. 1000 milliLiter(s) (50 mL/Hr) IV Continuous <Continuous>  dextrose 50% Injectable 12.5 Gram(s) IV Push once  dextrose 50% Injectable 25 Gram(s) IV Push once  dextrose 50% Injectable 25 Gram(s) IV Push once  gabapentin 100 milliGRAM(s) Oral daily  hydrALAZINE 100 milliGRAM(s) Oral every 8 hours  insulin lispro (HumaLOG) corrective regimen sliding scale   SubCutaneous Before meals and at bedtime  isosorbide   mononitrate ER Tablet (IMDUR) 30 milliGRAM(s) Oral daily  levothyroxine 50 MICROGram(s) Oral daily  losartan 100 milliGRAM(s) Oral every 24 hours  sevelamer carbonate 800 milliGRAM(s) Oral three times a day with meals    MEDICATIONS  (PRN):  dextrose 40% Gel 15 Gram(s) Oral once PRN Blood Glucose LESS THAN 70 milliGRAM(s)/deciliter  glucagon  Injectable 1 milliGRAM(s) IntraMuscular once PRN Glucose LESS THAN 70 milligrams/deciliter  morphine  - Injectable 2 milliGRAM(s) IV Push every 6 hours PRN Severe Pain (7 - 10)  oxyCODONE    5 mG/acetaminophen 325 mG 1 Tablet(s) Oral every 6 hours PRN Moderate Pain (4 - 6)      ALLERGIES:  Allergies    No Known Allergies    Intolerances        LABS:                        9.9    5.09  )-----------( 128      ( 09 Aug 2019 05:05 )             31.4     08-09    143  |  100  |  24<H>  ----------------------------<  135<H>  4.3   |  28  |  3.40<H>    Ca    9.9      09 Aug 2019 05:05    TPro  8.1  /  Alb  5.0  /  TBili  0.4  /  DBili  x   /  AST  33  /  ALT  24  /  AlkPhos  164<H>  08-08    PT/INR - ( 09 Aug 2019 05:05 )   PT: 12.8 sec;   INR: 1.13          PTT - ( 09 Aug 2019 05:05 )  PTT:33.9 sec    CAPILLARY BLOOD GLUCOSE      POCT Blood Glucose.: 259 mg/dL (09 Aug 2019 11:36)      RADIOLOGY & ADDITIONAL TESTS: Reviewed. HOSPITAL COURSE:   69 yo M with PMH HTN, HLD, CAD, BRYNN s/p stent, DM, ESRD on dialysis (Tues/Thurs/Sat), hypothyroidism, PAD s/p bilateral stents, CVA (legally blind in left eye, limited vision in R eye) who presented from outpatient HD 8/8 for SBP to 200s (completed 2/3 of HD) with associated SOB, diaphoresis, palpitations, orthopnea and vertigo. Also complaining of acute worsening of posterior neck pain, low back pain radiating to b/l LE extremities, with saddle paresthesia and symmetric weakness of b/l LEs. Received Morphine 4mg IVP x3, Dilaudid 2mg IVP x1 in ED, additional Morphine 2mg IVP after arrival to CCU with improvement in LE pain. Negative straight leg raise. Continuing home Gabapentin 100mg PO daily      OVERNIGHT EVENTS: SBP elevated to 200s overnight. Patient was dialyzed with removal of additional 1.8L and Nitro drip continued with improvement of SBP to 150s--decreased Nitro gtt to maintain SBP goal >160. Patient continued to complain of back and b/l LE pain, received 2g morphine IVP. HD stopped 15 min early due to SBP to 100s and d/c'd Nitro gtt at 11pm. Increase in SBP to 150-160s.     SUBJECTIVE / INTERVAL HPI: Patient seen and examined at bedside. Sitting comfortably bedside in NAD. Continuing to endorse posterior neck and lower back pain radiating to b/l LEs worse today than at presentation. Requesting additional Morphine for pain. Denies CP, SOB, cough, vision changes, peripheral edema, N/V/F/C. Hemodynamically stable for stepdown today to 5Lach.     VITAL SIGNS:  Vital Signs Last 24 Hrs  T(C): 36.7 (09 Aug 2019 10:02), Max: 36.8 (08 Aug 2019 16:16)  T(F): 98 (09 Aug 2019 10:02), Max: 98.2 (08 Aug 2019 16:16)  HR: 70 (09 Aug 2019 13:00) (56 - 80)  BP: 171/58 (09 Aug 2019 13:00) (108/52 - 226/88)  BP(mean): 97 (09 Aug 2019 13:00) (68 - 158)  RR: 18 (09 Aug 2019 13:00) (9 - 32)  SpO2: 95% (09 Aug 2019 13:00) (90% - 100%)    PHYSICAL EXAM:    General: WDWN  HEENT: NC/AT; PERRL, anicteric sclera; MMM  Neck: supple  Cardiovascular: +S1/S2; RRR  Respiratory: CTA B/L; no W/R/R  Gastrointestinal: soft, NT/ND; +BSx4  Extremities: WWP; no edema, clubbing or cyanosis  Vascular: 2+ radial, DP/PT pulses B/L  Neurological: AAOx3; no focal deficits    MEDICATIONS:  MEDICATIONS  (STANDING):  aspirin enteric coated 81 milliGRAM(s) Oral daily  carvedilol 6.25 milliGRAM(s) Oral once  carvedilol 12.5 milliGRAM(s) Oral every 12 hours  chlorhexidine 2% Cloths 1 Application(s) Topical <User Schedule>  cloNIDine 0.1 milliGRAM(s) Oral three times a day  clopidogrel Tablet 75 milliGRAM(s) Oral daily  dextrose 5%. 1000 milliLiter(s) (50 mL/Hr) IV Continuous <Continuous>  dextrose 50% Injectable 12.5 Gram(s) IV Push once  dextrose 50% Injectable 25 Gram(s) IV Push once  dextrose 50% Injectable 25 Gram(s) IV Push once  gabapentin 100 milliGRAM(s) Oral daily  hydrALAZINE 100 milliGRAM(s) Oral every 8 hours  insulin lispro (HumaLOG) corrective regimen sliding scale   SubCutaneous Before meals and at bedtime  isosorbide   mononitrate ER Tablet (IMDUR) 30 milliGRAM(s) Oral daily  levothyroxine 50 MICROGram(s) Oral daily  losartan 100 milliGRAM(s) Oral every 24 hours  sevelamer carbonate 800 milliGRAM(s) Oral three times a day with meals    MEDICATIONS  (PRN):  dextrose 40% Gel 15 Gram(s) Oral once PRN Blood Glucose LESS THAN 70 milliGRAM(s)/deciliter  glucagon  Injectable 1 milliGRAM(s) IntraMuscular once PRN Glucose LESS THAN 70 milligrams/deciliter  morphine  - Injectable 2 milliGRAM(s) IV Push every 6 hours PRN Severe Pain (7 - 10)  oxyCODONE    5 mG/acetaminophen 325 mG 1 Tablet(s) Oral every 6 hours PRN Moderate Pain (4 - 6)      ALLERGIES:  Allergies    No Known Allergies    Intolerances        LABS:                        9.9    5.09  )-----------( 128      ( 09 Aug 2019 05:05 )             31.4     08-09    143  |  100  |  24<H>  ----------------------------<  135<H>  4.3   |  28  |  3.40<H>    Ca    9.9      09 Aug 2019 05:05    TPro  8.1  /  Alb  5.0  /  TBili  0.4  /  DBili  x   /  AST  33  /  ALT  24  /  AlkPhos  164<H>  08-08    PT/INR - ( 09 Aug 2019 05:05 )   PT: 12.8 sec;   INR: 1.13          PTT - ( 09 Aug 2019 05:05 )  PTT:33.9 sec    CAPILLARY BLOOD GLUCOSE      POCT Blood Glucose.: 259 mg/dL (09 Aug 2019 11:36)      RADIOLOGY & ADDITIONAL TESTS: Reviewed. INCOMPLETE    HOSPITAL COURSE:   69 yo M with PMH HTN, HLD, CAD, BRYNN s/p stent, DM, ESRD on dialysis (Tues/Thurs/Sat), hypothyroidism, PAD s/p bilateral stents, CVA (legally blind in left eye, limited vision in R eye) who presented from outpatient HD 8/8 for SBP to 200s (completed 2/3 of HD) with associated SOB, diaphoresis, palpitations, orthopnea and vertigo. Also complaining of acute worsening of posterior neck pain, low back pain radiating to b/l LE extremities, with saddle paresthesia and symmetric weakness of b/l LEs. In the ED, /70, BNP 18k, troponin 0.22 -> 0.18 -> 0.18 likely from heart strain. (EKG NSR, LVH w/ strain, no ST changes). In the ED, patient received Hydralazine, Norvasc, Clonidine, Imdur, Labetalol, Losartan, Procardia, and Nitro drip started at 8 pm (stopped 11pm for SBP to 100s). CXR with pulmonary vascular congestion, mild pulmonary edema, patient continuing to deny SOB, cough. HD on admission with removal of additional 1.8L fluid.  Received Morphine and Dilaudid, with improvement in LE pain. Continuing home Gabapentin, ASA, Plavix , and Lipitor. Wound care consult placed for b/l LE ulcerations 2/2 chronic venous insufficiency. Neuro consulted for saddle paresthesia and b/l LE pain/weakness, awaiting recs.      OVERNIGHT EVENTS: SBP elevated to 200s overnight. Patient was dialyzed with removal of additional 1.8L and Nitro drip continued with improvement of SBP to 150s--decreased Nitro gtt to maintain SBP goal >160. Patient continued to complain of back and b/l LE pain, received 2g morphine IVP. HD stopped 15 min early due to SBP to 100s and d/c'd Nitro gtt at 11pm. Increase in SBP to 150-160s.     SUBJECTIVE / INTERVAL HPI: Patient seen and examined at bedside. Sitting comfortably bedside in NAD. Continuing to endorse posterior neck and lower back pain radiating to b/l LEs worse today than at presentation. Requesting additional Morphine for pain. Denies CP, SOB, cough, vision changes, peripheral edema, N/V/F/C. Hemodynamically stable for stepdown today to 5Lach.     VITAL SIGNS:  Vital Signs Last 24 Hrs  T(C): 36.7 (09 Aug 2019 10:02), Max: 36.8 (08 Aug 2019 16:16)  T(F): 98 (09 Aug 2019 10:02), Max: 98.2 (08 Aug 2019 16:16)  HR: 70 (09 Aug 2019 13:00) (56 - 80)  BP: 171/58 (09 Aug 2019 13:00) (108/52 - 226/88)  BP(mean): 97 (09 Aug 2019 13:00) (68 - 158)  RR: 18 (09 Aug 2019 13:00) (9 - 32)  SpO2: 95% (09 Aug 2019 13:00) (90% - 100%)    PHYSICAL EXAM:    General: WDWN  HEENT: NC/AT; PERRL, anicteric sclera; MMM  Neck: supple  Cardiovascular: +S1/S2; RRR  Respiratory: CTA B/L; no W/R/R  Gastrointestinal: soft, NT/ND; +BSx4  Extremities: WWP; no edema, clubbing or cyanosis  Vascular: 2+ radial, DP/PT pulses B/L  Neurological: AAOx3; no focal deficits    MEDICATIONS:  MEDICATIONS  (STANDING):  aspirin enteric coated 81 milliGRAM(s) Oral daily  carvedilol 6.25 milliGRAM(s) Oral once  carvedilol 12.5 milliGRAM(s) Oral every 12 hours  chlorhexidine 2% Cloths 1 Application(s) Topical <User Schedule>  cloNIDine 0.1 milliGRAM(s) Oral three times a day  clopidogrel Tablet 75 milliGRAM(s) Oral daily  dextrose 5%. 1000 milliLiter(s) (50 mL/Hr) IV Continuous <Continuous>  dextrose 50% Injectable 12.5 Gram(s) IV Push once  dextrose 50% Injectable 25 Gram(s) IV Push once  dextrose 50% Injectable 25 Gram(s) IV Push once  gabapentin 100 milliGRAM(s) Oral daily  hydrALAZINE 100 milliGRAM(s) Oral every 8 hours  insulin lispro (HumaLOG) corrective regimen sliding scale   SubCutaneous Before meals and at bedtime  isosorbide   mononitrate ER Tablet (IMDUR) 30 milliGRAM(s) Oral daily  levothyroxine 50 MICROGram(s) Oral daily  losartan 100 milliGRAM(s) Oral every 24 hours  sevelamer carbonate 800 milliGRAM(s) Oral three times a day with meals    MEDICATIONS  (PRN):  dextrose 40% Gel 15 Gram(s) Oral once PRN Blood Glucose LESS THAN 70 milliGRAM(s)/deciliter  glucagon  Injectable 1 milliGRAM(s) IntraMuscular once PRN Glucose LESS THAN 70 milligrams/deciliter  morphine  - Injectable 2 milliGRAM(s) IV Push every 6 hours PRN Severe Pain (7 - 10)  oxyCODONE    5 mG/acetaminophen 325 mG 1 Tablet(s) Oral every 6 hours PRN Moderate Pain (4 - 6)      ALLERGIES:  Allergies    No Known Allergies    Intolerances        LABS:                        9.9    5.09  )-----------( 128      ( 09 Aug 2019 05:05 )             31.4     08-09    143  |  100  |  24<H>  ----------------------------<  135<H>  4.3   |  28  |  3.40<H>    Ca    9.9      09 Aug 2019 05:05    TPro  8.1  /  Alb  5.0  /  TBili  0.4  /  DBili  x   /  AST  33  /  ALT  24  /  AlkPhos  164<H>  08-08    PT/INR - ( 09 Aug 2019 05:05 )   PT: 12.8 sec;   INR: 1.13          PTT - ( 09 Aug 2019 05:05 )  PTT:33.9 sec    CAPILLARY BLOOD GLUCOSE      POCT Blood Glucose.: 259 mg/dL (09 Aug 2019 11:36)      RADIOLOGY & ADDITIONAL TESTS: Reviewed. HOSPITAL COURSE:   67 yo M with PMH HTN, HLD, CAD, BRYNN s/p stent, DM, ESRD on dialysis (Tues/Thurs/Sat), hypothyroidism, PAD s/p bilateral stents, CVA (legally blind in left eye, limited vision in R eye) who presented from outpatient HD 8/8 for SBP to 200s (completed 2/3 of HD) with associated SOB, diaphoresis, palpitations, orthopnea and vertigo. Also complaining of acute worsening of posterior neck pain, low back pain radiating to b/l LE extremities, with saddle paresthesia and symmetric weakness of b/l LEs. In the ED, /70, BNP 18k, troponin 0.22 -> 0.18 -> 0.18 likely from heart strain. (EKG NSR, LVH w/ strain, no ST changes). In the ED, patient received Hydralazine, Norvasc, Clonidine, Imdur, Labetalol, Losartan, Procardia, and Nitro drip started at 8 pm (stopped 11pm for SBP to 100s). CXR with pulmonary vascular congestion, mild pulmonary edema, patient continuing to deny SOB, cough. HD on admission with removal of additional 1.8L fluid.  Received Morphine and Dilaudid, with improvement in LE pain. Continuing home Gabapentin, ASA, Plavix , and Lipitor. Wound care consult placed for b/l LE ulcerations 2/2 chronic venous insufficiency. Neuro consulted for saddle paresthesia and b/l LE pain/weakness, awaiting recs.      OVERNIGHT EVENTS: SBP elevated to 200s overnight. Patient was dialyzed with removal of additional 1.8L and Nitro drip continued with improvement of SBP to 150s--decreased Nitro gtt to maintain SBP goal >160. Patient continued to complain of back and b/l LE pain, received 2g morphine IVP. HD stopped 15 min early due to SBP to 100s and d/c'd Nitro gtt at 11pm. Increase in SBP to 150-160s.     SUBJECTIVE / INTERVAL HPI: Patient seen and examined at bedside. Sitting comfortably bedside in NAD. Continuing to endorse posterior neck and lower back pain radiating to b/l LEs worse today than at presentation. Requesting additional Morphine for pain. Denies CP, SOB, cough, vision changes, peripheral edema, N/V/F/C. Hemodynamically stable for stepdown today to 5Lach.     VITAL SIGNS:  Vital Signs Last 24 Hrs  T(C): 36.7 (09 Aug 2019 10:02), Max: 36.8 (08 Aug 2019 16:16)  T(F): 98 (09 Aug 2019 10:02), Max: 98.2 (08 Aug 2019 16:16)  HR: 70 (09 Aug 2019 13:00) (56 - 80)  BP: 171/58 (09 Aug 2019 13:00) (108/52 - 226/88)  BP(mean): 97 (09 Aug 2019 13:00) (68 - 158)  RR: 18 (09 Aug 2019 13:00) (9 - 32)  SpO2: 95% (09 Aug 2019 13:00) (90% - 100%)    PHYSICAL EXAM:    General: WDWN; sitting comfortably bedside, primary complaint of pain and generally non-compliant with physical exam  HEENT: NC/AT; PERRL, anicteric sclera; MMM  Neck: supple  Cardiovascular: +S1/S2; RRR; blowing systolic murmur II/VI best heard at LSB  Respiratory: CTA B/L; no W/R/R  Gastrointestinal: soft, distended, diffuse TTP; +BSx4  Extremities: WWP; no edema, clubbing or cyanosis; ulcerations of b/l LEs 2/2 chronic venous insufficiency, L wound dressed, R wound overlying shin is non-purulent with minimal erythema  Vascular: 2+ radial, DP/PT pulses B/L  Neurological: AAOx3; strength 2-3/5 in b/l LEs, strength 4/5 in b/l UEs, diminished sensation in b/l LEs--difficult to assess pattern and symmetry due to patient non-compliance with exam    MEDICATIONS:  MEDICATIONS  (STANDING):  aspirin enteric coated 81 milliGRAM(s) Oral daily  carvedilol 6.25 milliGRAM(s) Oral once  carvedilol 12.5 milliGRAM(s) Oral every 12 hours  chlorhexidine 2% Cloths 1 Application(s) Topical <User Schedule>  cloNIDine 0.1 milliGRAM(s) Oral three times a day  clopidogrel Tablet 75 milliGRAM(s) Oral daily  dextrose 5%. 1000 milliLiter(s) (50 mL/Hr) IV Continuous <Continuous>  dextrose 50% Injectable 12.5 Gram(s) IV Push once  dextrose 50% Injectable 25 Gram(s) IV Push once  dextrose 50% Injectable 25 Gram(s) IV Push once  gabapentin 100 milliGRAM(s) Oral daily  hydrALAZINE 100 milliGRAM(s) Oral every 8 hours  insulin lispro (HumaLOG) corrective regimen sliding scale   SubCutaneous Before meals and at bedtime  isosorbide   mononitrate ER Tablet (IMDUR) 30 milliGRAM(s) Oral daily  levothyroxine 50 MICROGram(s) Oral daily  losartan 100 milliGRAM(s) Oral every 24 hours  sevelamer carbonate 800 milliGRAM(s) Oral three times a day with meals    MEDICATIONS  (PRN):  dextrose 40% Gel 15 Gram(s) Oral once PRN Blood Glucose LESS THAN 70 milliGRAM(s)/deciliter  glucagon  Injectable 1 milliGRAM(s) IntraMuscular once PRN Glucose LESS THAN 70 milligrams/deciliter  morphine  - Injectable 2 milliGRAM(s) IV Push every 6 hours PRN Severe Pain (7 - 10)  oxyCODONE    5 mG/acetaminophen 325 mG 1 Tablet(s) Oral every 6 hours PRN Moderate Pain (4 - 6)      ALLERGIES:  Allergies    No Known Allergies    Intolerances        LABS:                        9.9    5.09  )-----------( 128      ( 09 Aug 2019 05:05 )             31.4     08-09    143  |  100  |  24<H>  ----------------------------<  135<H>  4.3   |  28  |  3.40<H>    Ca    9.9      09 Aug 2019 05:05    TPro  8.1  /  Alb  5.0  /  TBili  0.4  /  DBili  x   /  AST  33  /  ALT  24  /  AlkPhos  164<H>  08-08    PT/INR - ( 09 Aug 2019 05:05 )   PT: 12.8 sec;   INR: 1.13          PTT - ( 09 Aug 2019 05:05 )  PTT:33.9 sec    CAPILLARY BLOOD GLUCOSE      POCT Blood Glucose.: 259 mg/dL (09 Aug 2019 11:36)      RADIOLOGY & ADDITIONAL TESTS: Reviewed. HOSPITAL COURSE:   69 yo M with PMH HTN, HLD, CAD, BRYNN s/p stent, DM, ESRD on dialysis (Tues/Thurs/Sat), hypothyroidism, PAD s/p bilateral stents, CVA (legally blind in left eye, limited vision in R eye) who presented from outpatient HD 8/8 for SBP to 200s (completed 2/3 of HD) with associated SOB, diaphoresis, palpitations, orthopnea and vertigo. Also complaining of acute worsening of posterior neck pain, low back pain radiating to b/l LE extremities, with saddle paresthesia and symmetric weakness of b/l LEs. In the ED, /70, BNP 18k, troponin 0.22 -> 0.18 -> 0.18 likely from heart strain. (EKG NSR, LVH w/ strain, no ST changes). In the ED, patient received Hydralazine, Norvasc, Clonidine, Imdur, Labetalol, Losartan, Procardia, and Nitro drip started at 8 pm (stopped 11pm for SBP to 100s). CXR with pulmonary vascular congestion, mild pulmonary edema, patient continuing to deny SOB, cough. HD on admission with removal of additional 1.8L fluid.  Received Morphine and Dilaudid, with improvement in LE pain. Continuing home Gabapentin, ASA, Plavix, and Lipitor. TTE demonstrating moderate LVH, moderate aortic stenosis, severe pulmonary hypertension, PASP is 72.1 mmHg. Wound care consult placed for b/l LE ulcerations 2/2 chronic venous insufficiency. Neuro consulted for saddle paresthesia and b/l LE pain/weakness, awaiting recs.      OVERNIGHT EVENTS: SBP elevated to 200s overnight. Patient was dialyzed with removal of additional 1.8L and Nitro drip continued with improvement of SBP to 150s--decreased Nitro gtt to maintain SBP goal >160. Patient continued to complain of back and b/l LE pain, received 2g morphine IVP. HD stopped 15 min early due to SBP to 100s and d/c'd Nitro gtt at 11pm. Increase in SBP to 150-160s.     SUBJECTIVE / INTERVAL HPI: Patient seen and examined at bedside. Sitting comfortably bedside in NAD. Continuing to endorse posterior neck and lower back pain radiating to b/l LEs worse today than at presentation. Requesting additional Morphine for pain. Denies CP, SOB, cough, vision changes, peripheral edema, N/V/F/C. Hemodynamically stable for stepdown today to 5Lach.     VITAL SIGNS:  Vital Signs Last 24 Hrs  T(C): 36.7 (09 Aug 2019 10:02), Max: 36.8 (08 Aug 2019 16:16)  T(F): 98 (09 Aug 2019 10:02), Max: 98.2 (08 Aug 2019 16:16)  HR: 70 (09 Aug 2019 13:00) (56 - 80)  BP: 171/58 (09 Aug 2019 13:00) (108/52 - 226/88)  BP(mean): 97 (09 Aug 2019 13:00) (68 - 158)  RR: 18 (09 Aug 2019 13:00) (9 - 32)  SpO2: 95% (09 Aug 2019 13:00) (90% - 100%)    PHYSICAL EXAM:    General: WDWN; sitting comfortably bedside, primary complaint of pain and generally non-compliant with physical exam  HEENT: NC/AT; PERRL, anicteric sclera; MMM  Neck: supple  Cardiovascular: +S1/S2; RRR; blowing systolic murmur II/VI best heard at LSB  Respiratory: CTA B/L; no W/R/R  Gastrointestinal: soft, distended, diffuse TTP; +BSx4  Extremities: WWP; no edema, clubbing or cyanosis; ulcerations of b/l LEs 2/2 chronic venous insufficiency, L wound dressed, R wound overlying shin is non-purulent with minimal erythema  Vascular: 2+ radial, DP/PT pulses B/L  Neurological: AAOx3; strength 2-3/5 in b/l LEs, strength 4/5 in b/l UEs, diminished sensation in b/l LEs--difficult to assess pattern and symmetry due to patient non-compliance with exam    MEDICATIONS:  MEDICATIONS  (STANDING):  aspirin enteric coated 81 milliGRAM(s) Oral daily  carvedilol 6.25 milliGRAM(s) Oral once  carvedilol 12.5 milliGRAM(s) Oral every 12 hours  chlorhexidine 2% Cloths 1 Application(s) Topical <User Schedule>  cloNIDine 0.1 milliGRAM(s) Oral three times a day  clopidogrel Tablet 75 milliGRAM(s) Oral daily  dextrose 5%. 1000 milliLiter(s) (50 mL/Hr) IV Continuous <Continuous>  dextrose 50% Injectable 12.5 Gram(s) IV Push once  dextrose 50% Injectable 25 Gram(s) IV Push once  dextrose 50% Injectable 25 Gram(s) IV Push once  gabapentin 100 milliGRAM(s) Oral daily  hydrALAZINE 100 milliGRAM(s) Oral every 8 hours  insulin lispro (HumaLOG) corrective regimen sliding scale   SubCutaneous Before meals and at bedtime  isosorbide   mononitrate ER Tablet (IMDUR) 30 milliGRAM(s) Oral daily  levothyroxine 50 MICROGram(s) Oral daily  losartan 100 milliGRAM(s) Oral every 24 hours  sevelamer carbonate 800 milliGRAM(s) Oral three times a day with meals    MEDICATIONS  (PRN):  dextrose 40% Gel 15 Gram(s) Oral once PRN Blood Glucose LESS THAN 70 milliGRAM(s)/deciliter  glucagon  Injectable 1 milliGRAM(s) IntraMuscular once PRN Glucose LESS THAN 70 milligrams/deciliter  morphine  - Injectable 2 milliGRAM(s) IV Push every 6 hours PRN Severe Pain (7 - 10)  oxyCODONE    5 mG/acetaminophen 325 mG 1 Tablet(s) Oral every 6 hours PRN Moderate Pain (4 - 6)      ALLERGIES:  Allergies    No Known Allergies    Intolerances        LABS:                        9.9    5.09  )-----------( 128      ( 09 Aug 2019 05:05 )             31.4     08-09    143  |  100  |  24<H>  ----------------------------<  135<H>  4.3   |  28  |  3.40<H>    Ca    9.9      09 Aug 2019 05:05    TPro  8.1  /  Alb  5.0  /  TBili  0.4  /  DBili  x   /  AST  33  /  ALT  24  /  AlkPhos  164<H>  08-08    PT/INR - ( 09 Aug 2019 05:05 )   PT: 12.8 sec;   INR: 1.13          PTT - ( 09 Aug 2019 05:05 )  PTT:33.9 sec    CAPILLARY BLOOD GLUCOSE      POCT Blood Glucose.: 259 mg/dL (09 Aug 2019 11:36)      RADIOLOGY & ADDITIONAL TESTS: Reviewed.

## 2019-08-09 NOTE — PROGRESS NOTE ADULT - PROBLEM SELECTOR PLAN 9
F: None  E: No repletion, pt ESRD on HD  N: DASH/TLC, renal restricted diet  DVT Prophylaxis: pt refused Lovenox this morning 8/9  GI ppx: None  Dispo: 5La, from [facility]

## 2019-08-10 ENCOUNTER — TRANSCRIPTION ENCOUNTER (OUTPATIENT)
Age: 69
End: 2019-08-10

## 2019-08-10 LAB
ANION GAP SERPL CALC-SCNC: 21 MMOL/L — HIGH (ref 5–17)
BUN SERPL-MCNC: 42 MG/DL — HIGH (ref 7–23)
CALCIUM SERPL-MCNC: 10.5 MG/DL — SIGNIFICANT CHANGE UP (ref 8.4–10.5)
CHLORIDE SERPL-SCNC: 100 MMOL/L — SIGNIFICANT CHANGE UP (ref 96–108)
CO2 SERPL-SCNC: 23 MMOL/L — SIGNIFICANT CHANGE UP (ref 22–31)
CREAT SERPL-MCNC: 5.17 MG/DL — HIGH (ref 0.5–1.3)
GLUCOSE SERPL-MCNC: 146 MG/DL — HIGH (ref 70–99)
HCT VFR BLD CALC: 37.8 % — LOW (ref 39–50)
HGB BLD-MCNC: 11.7 G/DL — LOW (ref 13–17)
MAGNESIUM SERPL-MCNC: 2.2 MG/DL — SIGNIFICANT CHANGE UP (ref 1.6–2.6)
MCHC RBC-ENTMCNC: 28.5 PG — SIGNIFICANT CHANGE UP (ref 27–34)
MCHC RBC-ENTMCNC: 31 GM/DL — LOW (ref 32–36)
MCV RBC AUTO: 92 FL — SIGNIFICANT CHANGE UP (ref 80–100)
NRBC # BLD: 0 /100 WBCS — SIGNIFICANT CHANGE UP (ref 0–0)
PLATELET # BLD AUTO: 165 K/UL — SIGNIFICANT CHANGE UP (ref 150–400)
POTASSIUM SERPL-MCNC: 4.3 MMOL/L — SIGNIFICANT CHANGE UP (ref 3.5–5.3)
POTASSIUM SERPL-SCNC: 4.3 MMOL/L — SIGNIFICANT CHANGE UP (ref 3.5–5.3)
RBC # BLD: 4.11 M/UL — LOW (ref 4.2–5.8)
RBC # FLD: 14.1 % — SIGNIFICANT CHANGE UP (ref 10.3–14.5)
SODIUM SERPL-SCNC: 144 MMOL/L — SIGNIFICANT CHANGE UP (ref 135–145)
WBC # BLD: 5.55 K/UL — SIGNIFICANT CHANGE UP (ref 3.8–10.5)
WBC # FLD AUTO: 5.55 K/UL — SIGNIFICANT CHANGE UP (ref 3.8–10.5)

## 2019-08-10 PROCEDURE — 99232 SBSQ HOSP IP/OBS MODERATE 35: CPT

## 2019-08-10 PROCEDURE — 71045 X-RAY EXAM CHEST 1 VIEW: CPT | Mod: 26

## 2019-08-10 RX ORDER — HEPARIN SODIUM 5000 [USP'U]/ML
5000 INJECTION INTRAVENOUS; SUBCUTANEOUS EVERY 8 HOURS
Refills: 0 | Status: DISCONTINUED | OUTPATIENT
Start: 2019-08-10 | End: 2019-08-10

## 2019-08-10 RX ORDER — ISOSORBIDE MONONITRATE 60 MG/1
60 TABLET, EXTENDED RELEASE ORAL DAILY
Refills: 0 | Status: DISCONTINUED | OUTPATIENT
Start: 2019-08-11 | End: 2019-08-13

## 2019-08-10 RX ORDER — ISOSORBIDE MONONITRATE 60 MG/1
30 TABLET, EXTENDED RELEASE ORAL ONCE
Refills: 0 | Status: COMPLETED | OUTPATIENT
Start: 2019-08-10 | End: 2019-08-10

## 2019-08-10 RX ADMIN — Medication 50 MICROGRAM(S): at 05:35

## 2019-08-10 RX ADMIN — SEVELAMER CARBONATE 800 MILLIGRAM(S): 2400 POWDER, FOR SUSPENSION ORAL at 16:36

## 2019-08-10 RX ADMIN — ISOSORBIDE MONONITRATE 30 MILLIGRAM(S): 60 TABLET, EXTENDED RELEASE ORAL at 21:30

## 2019-08-10 RX ADMIN — CHLORHEXIDINE GLUCONATE 1 APPLICATION(S): 213 SOLUTION TOPICAL at 05:33

## 2019-08-10 RX ADMIN — OXYCODONE AND ACETAMINOPHEN 1 TABLET(S): 5; 325 TABLET ORAL at 07:24

## 2019-08-10 RX ADMIN — Medication 81 MILLIGRAM(S): at 14:25

## 2019-08-10 RX ADMIN — SEVELAMER CARBONATE 800 MILLIGRAM(S): 2400 POWDER, FOR SUSPENSION ORAL at 14:25

## 2019-08-10 RX ADMIN — AMLODIPINE BESYLATE 10 MILLIGRAM(S): 2.5 TABLET ORAL at 05:35

## 2019-08-10 RX ADMIN — CARVEDILOL PHOSPHATE 12.5 MILLIGRAM(S): 80 CAPSULE, EXTENDED RELEASE ORAL at 14:26

## 2019-08-10 RX ADMIN — OXYCODONE AND ACETAMINOPHEN 1 TABLET(S): 5; 325 TABLET ORAL at 21:32

## 2019-08-10 RX ADMIN — Medication 2: at 14:32

## 2019-08-10 RX ADMIN — Medication 100 MILLIGRAM(S): at 21:30

## 2019-08-10 RX ADMIN — Medication 100 MILLIGRAM(S): at 05:35

## 2019-08-10 RX ADMIN — Medication 0.1 MILLIGRAM(S): at 15:04

## 2019-08-10 RX ADMIN — ISOSORBIDE MONONITRATE 30 MILLIGRAM(S): 60 TABLET, EXTENDED RELEASE ORAL at 14:25

## 2019-08-10 RX ADMIN — OXYCODONE AND ACETAMINOPHEN 1 TABLET(S): 5; 325 TABLET ORAL at 06:06

## 2019-08-10 RX ADMIN — Medication 0.1 MILLIGRAM(S): at 20:53

## 2019-08-10 RX ADMIN — MORPHINE SULFATE 2 MILLIGRAM(S): 50 CAPSULE, EXTENDED RELEASE ORAL at 15:05

## 2019-08-10 RX ADMIN — SEVELAMER CARBONATE 800 MILLIGRAM(S): 2400 POWDER, FOR SUSPENSION ORAL at 08:25

## 2019-08-10 RX ADMIN — GABAPENTIN 100 MILLIGRAM(S): 400 CAPSULE ORAL at 14:25

## 2019-08-10 RX ADMIN — OXYCODONE AND ACETAMINOPHEN 1 TABLET(S): 5; 325 TABLET ORAL at 20:56

## 2019-08-10 RX ADMIN — Medication 100 MILLIGRAM(S): at 15:04

## 2019-08-10 RX ADMIN — Medication 4: at 16:36

## 2019-08-10 RX ADMIN — MORPHINE SULFATE 2 MILLIGRAM(S): 50 CAPSULE, EXTENDED RELEASE ORAL at 15:50

## 2019-08-10 RX ADMIN — CLOPIDOGREL BISULFATE 75 MILLIGRAM(S): 75 TABLET, FILM COATED ORAL at 14:25

## 2019-08-10 RX ADMIN — Medication 0.1 MILLIGRAM(S): at 05:35

## 2019-08-10 RX ADMIN — LOSARTAN POTASSIUM 100 MILLIGRAM(S): 100 TABLET, FILM COATED ORAL at 14:25

## 2019-08-10 RX ADMIN — ATORVASTATIN CALCIUM 40 MILLIGRAM(S): 80 TABLET, FILM COATED ORAL at 21:54

## 2019-08-10 NOTE — PROGRESS NOTE ADULT - SUBJECTIVE AND OBJECTIVE BOX
CC: HYPERTENSIVE EMERGENCY;NSTEMI      INTERVAL HISTORY:  currently on HD  tolerating well  resting comfortably      ROS: No chest pain, no sob, no abd pain. No n/v/d    PAST MEDICAL & SURGICAL HISTORY:  Peripheral neuropathy  Peripheral artery disease: s/p bilateral stents  Anemia of renal disease  End-stage renal disease (ESRD)  Type II diabetes mellitus  Retinal artery occlusion  Hypothyroidism  Low back pain  CAD (coronary artery disease)  H/O renal artery stenosis: s/p L renal stent  Hyperlipidemia  Hypertension  No significant past surgical history      PHYSICAL EXAM:  T(C): 37.1 (08-10-19 @ 05:05), Max: 37.2 (08-09-19 @ 16:39)  HR: 64 (08-10-19 @ 09:01)  BP: 163/58 (08-10-19 @ 09:01) (152/69 - 182/72)  RR: 17 (08-10-19 @ 09:01)  SpO2: 96% (08-10-19 @ 09:01)  Wt(kg): --  I&O's Summary    09 Aug 2019 07:01  -  10 Aug 2019 07:00  --------------------------------------------------------  IN: 380 mL / OUT: 250 mL / NET: 130 mL    10 Aug 2019 07:01  -  10 Aug 2019 09:39  --------------------------------------------------------  IN: 400 mL / OUT: 0 mL / NET: 400 mL      Weight 68.039 (08-09 @ 09:15)  General: AAO x 3,  NAD.  HEENT: moist mucous membranes, no pallor/cyanosis.  Neck: no JVD visible.  Cardiac: S1, S2. RRR. No murmurs   Respratory: CTA b/l, no access muscle use.   Abdomen: soft. nontender. nondistended  Skin: no rashes.  Extremities: no LE edema b/l  Access: L AVF with bruit      DATA:                        11.7<L>  5.55  )-----------( 165      ( 10 Aug 2019 06:51 )             37.8<L>        144    |  100    |  42<H>  ----------------------------<  146<H>  Ca:10.5  (10 Aug 2019 06:51)  4.3     |  23     |  5.17<H>      eGFR if Non : 10 <L>  eGFR if : 12 <L>    TPro  8.1    /  Alb  5.0    /  TBili  0.4    /  DBili  x      /  AST  33     /  ALT  24     /  AlkPhos  164<H>  08 Aug 2019 08:58                    MEDICATIONS  (STANDING):  amLODIPine   Tablet 10 milliGRAM(s) Oral daily  aspirin enteric coated 81 milliGRAM(s) Oral daily  atorvastatin 40 milliGRAM(s) Oral at bedtime  carvedilol 12.5 milliGRAM(s) Oral every 12 hours  chlorhexidine 2% Cloths 1 Application(s) Topical <User Schedule>  cloNIDine 0.1 milliGRAM(s) Oral three times a day  clopidogrel Tablet 75 milliGRAM(s) Oral daily  dextrose 5%. 1000 milliLiter(s) (50 mL/Hr) IV Continuous <Continuous>  dextrose 50% Injectable 12.5 Gram(s) IV Push once  dextrose 50% Injectable 25 Gram(s) IV Push once  dextrose 50% Injectable 25 Gram(s) IV Push once  gabapentin 100 milliGRAM(s) Oral daily  hydrALAZINE 100 milliGRAM(s) Oral every 8 hours  insulin lispro (HumaLOG) corrective regimen sliding scale   SubCutaneous Before meals and at bedtime  isosorbide   mononitrate ER Tablet (IMDUR) 30 milliGRAM(s) Oral daily  levothyroxine 50 MICROGram(s) Oral daily  losartan 100 milliGRAM(s) Oral every 24 hours  sevelamer carbonate 800 milliGRAM(s) Oral three times a day with meals    MEDICATIONS  (PRN):  dextrose 40% Gel 15 Gram(s) Oral once PRN Blood Glucose LESS THAN 70 milliGRAM(s)/deciliter  glucagon  Injectable 1 milliGRAM(s) IntraMuscular once PRN Glucose LESS THAN 70 milligrams/deciliter  morphine  - Injectable 2 milliGRAM(s) IV Push every 6 hours PRN Severe Pain (7 - 10)  oxyCODONE    5 mG/acetaminophen 325 mG 1 Tablet(s) Oral every 6 hours PRN Moderate Pain (4 - 6)

## 2019-08-10 NOTE — PROGRESS NOTE ADULT - PROBLEM SELECTOR PLAN 9
F: None  E: No repletion, pt ESRD on HD  N: DASH/TLC, renal restricted diet  DVT Prophylaxis: pt refused Lovenox this morning 8/9  GI ppx: None  Dispo: 5La, from [facility]

## 2019-08-10 NOTE — PROGRESS NOTE ADULT - PROBLEM SELECTOR PLAN 4
PMH of CAD, on home ASA, Plavix 75qd, likely severe vasculopath in the setting of known PAD (s/p b/l stents) as well.   -  EKG this morning demonstrating HR 72, NSR, LVH, nonspecific T wave changes  - Obtain collaterals about prior CAD hx (PCP at [facility])   - C/w home ASA 81mg, Plavix 75qd PO, and Lipitor 40qd    # Hyperlipidemia  - Continue Atorvastatin 40mg daily

## 2019-08-10 NOTE — DISCHARGE NOTE PROVIDER - CARE PROVIDER_API CALL
[facility] [place] [address]  Phone: [phone]  Fax: (   )    -  Follow Up Time: 1-3 days

## 2019-08-10 NOTE — PROGRESS NOTE ADULT - SUBJECTIVE AND OBJECTIVE BOX
Stopped by to consult on patient for acute on chronic leg weakness and pain. However, pt at  and I am unable to see the pt. Will try to see the pt again tomorrow.    Upon review of records, it seems that pt has known spinal stenosis, both cervical and thoracic, and this would be the most likely/pressing etiology in the differential. Therefore, I have recommended to the primary team that spine surgery should be consulted as well. Agree with cervical and lumbar mris for now. Consider pain team consult.      Will see pt tomorrow.

## 2019-08-10 NOTE — PROGRESS NOTE ADULT - SUBJECTIVE AND OBJECTIVE BOX
OVERNIGHT EVENTS: No significant events.    SUBJECTIVE / INTERVAL HPI: Patient seen and examined at bedside. Continuing to endorse posterior neck and lower back pain 8-9/10 today, frontal bilateral headache and slight SOB. Denies fever, CP, palpitations.    MEDICATIONS  (STANDING):  amLODIPine   Tablet 10 milliGRAM(s) Oral daily  aspirin enteric coated 81 milliGRAM(s) Oral daily  atorvastatin 40 milliGRAM(s) Oral at bedtime  carvedilol 12.5 milliGRAM(s) Oral every 12 hours  chlorhexidine 2% Cloths 1 Application(s) Topical <User Schedule>  cloNIDine 0.1 milliGRAM(s) Oral three times a day  clopidogrel Tablet 75 milliGRAM(s) Oral daily  dextrose 5%. 1000 milliLiter(s) (50 mL/Hr) IV Continuous <Continuous>  dextrose 50% Injectable 12.5 Gram(s) IV Push once  dextrose 50% Injectable 25 Gram(s) IV Push once  dextrose 50% Injectable 25 Gram(s) IV Push once  gabapentin 100 milliGRAM(s) Oral daily  hydrALAZINE 100 milliGRAM(s) Oral every 8 hours  insulin lispro (HumaLOG) corrective regimen sliding scale   SubCutaneous Before meals and at bedtime  isosorbide   mononitrate ER Tablet (IMDUR) 30 milliGRAM(s) Oral daily  levothyroxine 50 MICROGram(s) Oral daily  losartan 100 milliGRAM(s) Oral every 24 hours  sevelamer carbonate 800 milliGRAM(s) Oral three times a day with meals    MEDICATIONS  (PRN):  dextrose 40% Gel 15 Gram(s) Oral once PRN Blood Glucose LESS THAN 70 milliGRAM(s)/deciliter  glucagon  Injectable 1 milliGRAM(s) IntraMuscular once PRN Glucose LESS THAN 70 milligrams/deciliter  morphine  - Injectable 2 milliGRAM(s) IV Push every 6 hours PRN Severe Pain (7 - 10)  oxyCODONE    5 mG/acetaminophen 325 mG 1 Tablet(s) Oral every 6 hours PRN Moderate Pain (4 - 6)    Allergies  No Known Allergies or Intolerances      Vital Signs Last 24 Hrs  T(C): 36.8 (10 Aug 2019 09:35), Max: 37.2 (09 Aug 2019 16:39)  T(F): 98.3 (10 Aug 2019 09:35), Max: 98.9 (09 Aug 2019 16:39)  HR: 66 (10 Aug 2019 09:35) (60 - 76)  BP: 175/67 (10 Aug 2019 09:35) (152/69 - 175/72)  BP(mean): 119 (10 Aug 2019 09:35) (88 - 125)  RR: 17 (10 Aug 2019 09:35) (15 - 19)  SpO2: 95% (10 Aug 2019 09:35) (94% - 97%)    PHYSICAL EXAM:  General: Older man, cachectic, flat affect, sitting on side of bed, NAD  HEENT: NC/AT; anicteric sclera; MMM  Neck: supple, no JVD  Cardiovascular: +S1/S2; RRR; grade III blowing systolic murmur heard best in the left sternal border  Respiratory: CTA B/L; no W/R/R  Gastrointestinal: soft, mild/mod distention, diffuse mild TTP; +BSx4  Extremities: WWP; no edema, clubbing or cyanosis; ulcerations of b/l LEs 2/2 chronic venous insufficiency, L wound dressed, R wound overlying shin is non-purulent with minimal erythema  Vascular: 2+ radial, DP/PT pulses B/L  Neurological: AAOx3; strength 2/5 in b/l LEs, strength 4/5 in b/l UEs, sensation intact in b/l feet      LABS:             11.7   5.55  )-----------( 165      ( 10 Aug 2019 06:51 )             37.8     08-10  144  |  100  |  42<H>  ----------------------------<  146<H>  4.3   |  23  |  5.17<H>    Ca    10.5      10 Aug 2019 06:51  Mg     2.2     08-10      RADIOLOGY & ADDITIONAL TESTS: Reviewed.    Awaiting final reads for carotid and abdominal ultrasounds.

## 2019-08-10 NOTE — PROGRESS NOTE ADULT - ASSESSMENT
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 Admitted to CCU for management of hypertensive urgency. Stepped down to telemetry for further management.

## 2019-08-10 NOTE — PROGRESS NOTE ADULT - PROBLEM SELECTOR PLAN 1
Received Hydralazine, Norvasc, Clonidine, Imdur, Labetalol, Losartan, Procardia in the ED   - Nitro drip in combination w/ HD overnight, SBP drop to 100s, Nitro drip stopped 11PM  - CXR with pulmonary vascular congestion, mild pulmonary edema  C/w Amlodipine 10mg PO, Coreg 12.5mg q12 PO, Hydralazine 100mg q8 PO, Imdur 20mg PO, Cozaar 100mg PO, Clonidine 0.1mg TID PO

## 2019-08-10 NOTE — PROGRESS NOTE ADULT - PROBLEM SELECTOR PLAN 3
Pt has ESRD on dialysis (Tues/Thurs/Sat) w/ REEMAE AVF.  Presented to [facility] ED via ambulance from dialysis session 3/4s complete.   - S/p bedside HD 8/8 with removal of additional 1.8L fluid  - To go for HD 8/10  - Avoid nephrotoxic medications  - Renally dose medications  - Nephrology continuing to follow, recs appreciated  - C/w Sevalemer 800 TID home dose

## 2019-08-10 NOTE — PROGRESS NOTE ADULT - PROBLEM SELECTOR PLAN 1
Improving. Patient with PMH of HTN, HLD, CAD, BRYNN s/p stent, ESRD on dialysis presented with /70, w/ associated HA, palpitations, SOB which were found to be elevated during HD session. BNP elevated to18k, troponin 0.22 -> 0.18 -> 0.18 likely from heart strain. At presentation, EKG NSR, NL Axis, LVH w/ strain, no ST changes. CT head negative for acute intracranial hemorrhage.  - Received Hydralazine, Norvasc, Clonidine, Imdur, Labetalol, Losartan, Procardia in the ED   - Nitro drip in combination w/ HD overnight, SBP drop to 100s, Nitro drip stopped 11PM  - CXR with pulmonary vascular congestion, mild pulmonary edema  - CT Angio Abd/Pelvis demonstrated duplicated right renal arteries with patent stent in the superior R renal artery  - F/u abd/pelvis US for r/o renal artery stent occlusion  - C/w Amlodipine 10mg PO, Coreg 12.5mg q12 PO, Hydralazine 100mg q8 PO, Imdur 20mg PO, Cozaar 100mg PO, Clonidine 0.1mg TID PO  - Continue to monitor BP  - -180s today  - F/u doppler b/l carotid arteries for r/o carotid stenosis

## 2019-08-10 NOTE — DISCHARGE NOTE PROVIDER - NSDCCPCAREPLAN_GEN_ALL_CORE_FT
PRINCIPAL DISCHARGE DIAGNOSIS  Diagnosis: Hypertensive emergency  Assessment and Plan of Treatment: Continue taking your medications to control your blood pressure. Follow up with your PMD. If you experience any new or worsening symptoms, please seek medical attention.      SECONDARY DISCHARGE DIAGNOSES  Diagnosis: Back pain, unspecified back location, unspecified back pain laterality, unspecified chronicity  Assessment and Plan of Treatment:     Diagnosis: ESRD (end stage renal disease) on dialysis  Assessment and Plan of Treatment: PRINCIPAL DISCHARGE DIAGNOSIS  Diagnosis: Hypertensive urgency  Assessment and Plan of Treatment: Continue taking your medications to control your blood pressure. Follow up with your PMD. If you experience any new or worsening symptoms, please seek medical attention.      SECONDARY DISCHARGE DIAGNOSES  Diagnosis: Back pain, unspecified back location, unspecified back pain laterality, unspecified chronicity  Assessment and Plan of Treatment:     Diagnosis: ESRD (end stage renal disease) on dialysis  Assessment and Plan of Treatment:

## 2019-08-10 NOTE — PROGRESS NOTE ADULT - ASSESSMENT
67 yo M with PMH HTN, HLD, CAD, BRYNN s/p stent, DM, ESRD on dialysis (Tues/Thurs/Sat), hypothyroidism, PAD s/p bilateral stents, CVA (legally blind in left eye, limited vision in R eye) who presented from outpatient HD 8/8 for SBP to 200s (completed 2/3 of HD) with associated SOB, diaphoresis, palpitations, orthopnea and vertigo. Also complaining of acute worsening of posterior neck pain, low back pain radiating to b/l LE extremities, with saddle paresthesia and symmetric weakness of b/l LEs.

## 2019-08-10 NOTE — DISCHARGE NOTE PROVIDER - PROVIDER TOKENS
FREE:[LAST:[Brandon],FIRST:[Morris],PHONE:[phone]],FAX:[(   )    -],ADDRESS:[[facility] [place] [address]],FOLLOWUP:[1-3 days]]

## 2019-08-10 NOTE — DISCHARGE NOTE PROVIDER - HOSPITAL COURSE
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 He was also complaining of acute worsening of posterior neck pain, low back pain radiating to b/l LE extremities, with saddle paresthesia and symmetric weakness of b/l LEs. In the ED, /70, BNP 18k, troponin 0.22 -> 0.18 -> 0.18. (EKG NSR, LVH w/ strain, no ST changes). CXR with pulmonary vascular congestion. Patient received Hydralazine, Norvasc, Clonidine, Imdur, Labetalol, Losartan, Procardia, and Nitro drip. HD on admission with removal of additional 1.8L fluid.  Received Morphine and Dilaudid, with improvement in LE pain. TTE demonstrated moderate LVH, moderate aortic stenosis, severe pulmonary hypertension, PASP is 72.1 mmHg. Neuro consulted for saddle paresthesia and b/l LE pain/weakness. Neuro said _____. His BP remained stable and was stable for discharge. 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 He was also complaining of acute worsening of posterior neck pain, low back pain radiating to b/l LE extremities, with saddle paresthesia and symmetric weakness of b/l LEs. Pt was admitted for hypertensive urgency and back pain. Patient received Hydralazine, Norvasc, Clonidine, Imdur, Labetalol, Losartan, Procardia, and Nitro drip. Pt received additional hemodialysis on admission. Received Morphine and Dilaudid for LE pain. TTE demonstrated moderate LVH, moderate aortic stenosis, severe pulmonary hypertension, PASP is 72.1 mmHg. Neuro consulted for saddle paresthesia and b/l LE pain/weakness. Gabapentin was increased to 100mg 3 times daily per Neuro recommendations. Patient was medically optimized, stable and ready for discharge. Plan of care and return precautions were discussed with the patient who verbally stated understanding.

## 2019-08-10 NOTE — PROGRESS NOTE ADULT - PROBLEM SELECTOR PLAN 10
1) PCP Contacted on Admission: (Y/N) --> Name & Phone #: [facility]  2) Date of Contact with PCP: TBD  3) PCP Contacted at Discharge: TBD  4) Summary of Handoff Given to PCP: TBD   5) Post-Discharge Appointment Date: TBD 1) PCP Contacted on Admission: (Y/N) --> Name & Phone #: [facility], [phone]  2) Date of Contact with PCP: TBD  3) PCP Contacted at Discharge: TBD  4) Summary of Handoff Given to PCP: TBD   5) Post-Discharge Appointment Date: TBD

## 2019-08-11 LAB
ANION GAP SERPL CALC-SCNC: 17 MMOL/L — SIGNIFICANT CHANGE UP (ref 5–17)
BUN SERPL-MCNC: 28 MG/DL — HIGH (ref 7–23)
CALCIUM SERPL-MCNC: 10.6 MG/DL — HIGH (ref 8.4–10.5)
CHLORIDE SERPL-SCNC: 97 MMOL/L — SIGNIFICANT CHANGE UP (ref 96–108)
CO2 SERPL-SCNC: 25 MMOL/L — SIGNIFICANT CHANGE UP (ref 22–31)
CREAT SERPL-MCNC: 4.02 MG/DL — HIGH (ref 0.5–1.3)
GLUCOSE SERPL-MCNC: 106 MG/DL — HIGH (ref 70–99)
HCT VFR BLD CALC: 36.1 % — LOW (ref 39–50)
HGB BLD-MCNC: 11.6 G/DL — LOW (ref 13–17)
MAGNESIUM SERPL-MCNC: 2.2 MG/DL — SIGNIFICANT CHANGE UP (ref 1.6–2.6)
MCHC RBC-ENTMCNC: 29.1 PG — SIGNIFICANT CHANGE UP (ref 27–34)
MCHC RBC-ENTMCNC: 32.1 GM/DL — SIGNIFICANT CHANGE UP (ref 32–36)
MCV RBC AUTO: 90.5 FL — SIGNIFICANT CHANGE UP (ref 80–100)
NRBC # BLD: 0 /100 WBCS — SIGNIFICANT CHANGE UP (ref 0–0)
PLATELET # BLD AUTO: 156 K/UL — SIGNIFICANT CHANGE UP (ref 150–400)
POTASSIUM SERPL-MCNC: 4.3 MMOL/L — SIGNIFICANT CHANGE UP (ref 3.5–5.3)
POTASSIUM SERPL-SCNC: 4.3 MMOL/L — SIGNIFICANT CHANGE UP (ref 3.5–5.3)
RBC # BLD: 3.99 M/UL — LOW (ref 4.2–5.8)
RBC # FLD: 14.3 % — SIGNIFICANT CHANGE UP (ref 10.3–14.5)
SODIUM SERPL-SCNC: 139 MMOL/L — SIGNIFICANT CHANGE UP (ref 135–145)
WBC # BLD: 5.41 K/UL — SIGNIFICANT CHANGE UP (ref 3.8–10.5)
WBC # FLD AUTO: 5.41 K/UL — SIGNIFICANT CHANGE UP (ref 3.8–10.5)

## 2019-08-11 PROCEDURE — 99223 1ST HOSP IP/OBS HIGH 75: CPT

## 2019-08-11 PROCEDURE — 99232 SBSQ HOSP IP/OBS MODERATE 35: CPT

## 2019-08-11 RX ORDER — PETROLATUM,WHITE
1 JELLY (GRAM) TOPICAL ONCE
Refills: 0 | Status: DISCONTINUED | OUTPATIENT
Start: 2019-08-11 | End: 2019-08-11

## 2019-08-11 RX ORDER — BACITRACIN ZINC 500 UNIT/G
1 OINTMENT IN PACKET (EA) TOPICAL DAILY
Refills: 0 | Status: DISCONTINUED | OUTPATIENT
Start: 2019-08-11 | End: 2019-08-13

## 2019-08-11 RX ORDER — PETROLATUM,WHITE
1 JELLY (GRAM) TOPICAL
Refills: 0 | Status: DISCONTINUED | OUTPATIENT
Start: 2019-08-11 | End: 2019-08-13

## 2019-08-11 RX ORDER — CARVEDILOL PHOSPHATE 80 MG/1
25 CAPSULE, EXTENDED RELEASE ORAL EVERY 12 HOURS
Refills: 0 | Status: DISCONTINUED | OUTPATIENT
Start: 2019-08-11 | End: 2019-08-13

## 2019-08-11 RX ORDER — HYDROCORTISONE 1 %
1 OINTMENT (GRAM) TOPICAL ONCE
Refills: 0 | Status: COMPLETED | OUTPATIENT
Start: 2019-08-11 | End: 2019-08-11

## 2019-08-11 RX ADMIN — MORPHINE SULFATE 2 MILLIGRAM(S): 50 CAPSULE, EXTENDED RELEASE ORAL at 13:26

## 2019-08-11 RX ADMIN — LOSARTAN POTASSIUM 100 MILLIGRAM(S): 100 TABLET, FILM COATED ORAL at 10:15

## 2019-08-11 RX ADMIN — Medication 100 MILLIGRAM(S): at 05:42

## 2019-08-11 RX ADMIN — CHLORHEXIDINE GLUCONATE 1 APPLICATION(S): 213 SOLUTION TOPICAL at 05:44

## 2019-08-11 RX ADMIN — Medication 1 APPLICATION(S): at 11:11

## 2019-08-11 RX ADMIN — MORPHINE SULFATE 2 MILLIGRAM(S): 50 CAPSULE, EXTENDED RELEASE ORAL at 21:50

## 2019-08-11 RX ADMIN — Medication 1 APPLICATION(S): at 17:01

## 2019-08-11 RX ADMIN — Medication 100 MILLIGRAM(S): at 21:28

## 2019-08-11 RX ADMIN — Medication 50 MICROGRAM(S): at 05:41

## 2019-08-11 RX ADMIN — AMLODIPINE BESYLATE 10 MILLIGRAM(S): 2.5 TABLET ORAL at 05:41

## 2019-08-11 RX ADMIN — Medication 1 APPLICATION(S): at 10:15

## 2019-08-11 RX ADMIN — SEVELAMER CARBONATE 800 MILLIGRAM(S): 2400 POWDER, FOR SUSPENSION ORAL at 07:34

## 2019-08-11 RX ADMIN — Medication 1.25 MILLIGRAM(S): at 17:55

## 2019-08-11 RX ADMIN — SEVELAMER CARBONATE 800 MILLIGRAM(S): 2400 POWDER, FOR SUSPENSION ORAL at 17:00

## 2019-08-11 RX ADMIN — MORPHINE SULFATE 2 MILLIGRAM(S): 50 CAPSULE, EXTENDED RELEASE ORAL at 16:00

## 2019-08-11 RX ADMIN — Medication 0.1 MILLIGRAM(S): at 07:34

## 2019-08-11 RX ADMIN — CLOPIDOGREL BISULFATE 75 MILLIGRAM(S): 75 TABLET, FILM COATED ORAL at 10:15

## 2019-08-11 RX ADMIN — CARVEDILOL PHOSPHATE 12.5 MILLIGRAM(S): 80 CAPSULE, EXTENDED RELEASE ORAL at 00:10

## 2019-08-11 RX ADMIN — Medication 2: at 21:29

## 2019-08-11 RX ADMIN — ATORVASTATIN CALCIUM 40 MILLIGRAM(S): 80 TABLET, FILM COATED ORAL at 21:28

## 2019-08-11 RX ADMIN — Medication 81 MILLIGRAM(S): at 10:15

## 2019-08-11 RX ADMIN — MORPHINE SULFATE 2 MILLIGRAM(S): 50 CAPSULE, EXTENDED RELEASE ORAL at 21:34

## 2019-08-11 RX ADMIN — Medication 1 APPLICATION(S): at 11:32

## 2019-08-11 RX ADMIN — CARVEDILOL PHOSPHATE 25 MILLIGRAM(S): 80 CAPSULE, EXTENDED RELEASE ORAL at 21:28

## 2019-08-11 RX ADMIN — Medication 0.2 MILLIGRAM(S): at 21:28

## 2019-08-11 RX ADMIN — Medication 4: at 10:54

## 2019-08-11 RX ADMIN — Medication 0.1 MILLIGRAM(S): at 13:26

## 2019-08-11 RX ADMIN — GABAPENTIN 100 MILLIGRAM(S): 400 CAPSULE ORAL at 11:32

## 2019-08-11 RX ADMIN — SEVELAMER CARBONATE 800 MILLIGRAM(S): 2400 POWDER, FOR SUSPENSION ORAL at 11:32

## 2019-08-11 RX ADMIN — CARVEDILOL PHOSPHATE 25 MILLIGRAM(S): 80 CAPSULE, EXTENDED RELEASE ORAL at 10:16

## 2019-08-11 NOTE — PROGRESS NOTE ADULT - ASSESSMENT
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

## 2019-08-11 NOTE — PROGRESS NOTE ADULT - PROBLEM SELECTOR PLAN 9
F: None  E: No repletion, pt ESRD on HD  N: DASH/TLC, renal restricted diet  DVT Prophylaxis: pt refused Lovenox this morning 8/9  GI ppx: None  Dispo: 5La, from [facility]

## 2019-08-11 NOTE — PROGRESS NOTE ADULT - PROBLEM SELECTOR PLAN 4
PMH of CAD, on home ASA, Plavix 75qd, likely severe vasculopath in the setting of known PAD (s/p b/l stents) as well.   -  EKG this morning demonstrating HR 72, NSR, LVH, nonspecific T wave changes  - Obtain collaterals about prior CAD hx (PCP at [facility])   - C/w home ASA 81mg, Plavix 75qd PO, and Lipitor 40qd    # Hyperlipidemia  - Continue Atorvastatin 40mg daily

## 2019-08-11 NOTE — PROGRESS NOTE ADULT - ASSESSMENT
69 y/o M legally blind, PMHx of HTN, HLD, CAD, BRYNN s/p stent, IDDM, ESRD on dialysis who presented to [facility] c/o lower back pain and BL lower extremity numbness following 3/4ths of his dialysis, admitted with hypertensive urgency.  Aortic dissection/ PE were ruled out.    # ESRD on HD TTS via LUE AVF  - last HD on 8/10 with UF 2.8 L  - volume status and electrolytes noted, acceptable  next HD anticipated on 8/13  renal diet      # HTN  uncontrolled  on HD with UF +  coreg 25 mg po BID + imdur 60 mg daily+ amlodipine 10 mg po daily + hydralazine 100 mg po q 8hr and losartan 100 mg       # Renal bone disease  - phos/pth/vit D as outpatient  - continue sevelamer    # Anemia, normocytic  - Hb 9.9 from 11.4 g/dl  - send iron panel, TSH  - transfusion as per primary team 67 y/o M legally blind, PMHx of HTN, HLD, CAD, BRYNN s/p stent, IDDM, ESRD on dialysis who presented to [facility] c/o lower back pain and BL lower extremity numbness following 3/4ths of his dialysis, admitted with hypertensive urgency.  Aortic dissection/ PE were ruled out.    # ESRD on HD TTS via LUE AVF  - last HD on 8/10 with UF 2.8 L  - volume status and electrolytes noted, acceptable  next HD anticipated on 8/13  renal diet      # HTN  uncontrolled despite being on several medications  on HD with UF +  coreg 25 mg po BID + imdur 60 mg daily+ amlodipine 10 mg po daily + hydralazine 100 mg po q 8hr and losartan 100 mg   will discuss with attending about adding minoxidil vs clonidine   low sodium       # Renal bone disease  calcium @ 10.6  please check phos  on renvela 800 mg po tid  will review outpt labs for pth, vit D 1.25 and 25    # Anemia, normocytic  11.6/36.1  no need for procrit

## 2019-08-11 NOTE — PROGRESS NOTE ADULT - SUBJECTIVE AND OBJECTIVE BOX
Patient is a 68y Male seen and evaluated at bedside.   pt seen and examined  no complaints      amLODIPine   Tablet 10 daily  AQUAPHOR (petrolatum Ointment) 1 two times a day PRN  aspirin enteric coated 81 daily  atorvastatin 40 at bedtime  BACItracin   Ointment 1 daily  carvedilol 25 every 12 hours  chlorhexidine 2% Cloths 1 <User Schedule>  cloNIDine 0.1 three times a day  clopidogrel Tablet 75 daily  dextrose 40% Gel 15 once PRN  dextrose 5%. 1000 <Continuous>  dextrose 50% Injectable 12.5 once  dextrose 50% Injectable 25 once  dextrose 50% Injectable 25 once  gabapentin 100 daily  glucagon  Injectable 1 once PRN  hydrALAZINE 100 every 8 hours  insulin lispro (HumaLOG) corrective regimen sliding scale  Before meals and at bedtime  isosorbide   mononitrate ER Tablet (IMDUR) 60 daily  levothyroxine 50 daily  losartan 100 every 24 hours  morphine  - Injectable 2 every 6 hours PRN  oxyCODONE    5 mG/acetaminophen 325 mG 1 every 6 hours PRN  sevelamer carbonate 800 three times a day with meals      Allergies    No Known Allergies    Intolerances        T(C): , Max: 37.2 (08-11-19 @ 09:17)  T(F): , Max: 98.9 (08-11-19 @ 09:17)  HR: 66 (08-11-19 @ 10:02)  BP: 179/66 (08-11-19 @ 10:02)  BP(mean): 106 (08-11-19 @ 05:08)  RR: 18 (08-11-19 @ 10:02)  SpO2: 95% (08-11-19 @ 10:02)  Wt(kg): --    08-10 @ 07:01  -  08-11 @ 07:00  --------------------------------------------------------  IN: 800 mL / OUT: 2800 mL / NET: -2000 mL    08-11 @ 07:01  -  08-11 @ 10:26  --------------------------------------------------------  IN: 200 mL / OUT: 0 mL / NET: 200 mL          Review of Systems:  CONSTITUTIONAL: No fever or chills, No fatigue or tiredness.  EYES: No blurred or double vision.  RESPIRATORY: No shortness of breath, cough, hemoptysis  CARDIOVASCULAR: No Chest pain or shortness of breath  GASTROINTESTINAL: NO abdominal or flank pain, No nausea or vomiting, No diarrhea  GENITOURINARY: No dysuria or urinary burning, No difficulty passing urine, No hematuria  NEUROLOGICAL: No headaches or blurred vision  SKIN: No skin rashes   MUSCULOSKELETAL: No arthralgia, Joint pain, leg edema, No muscle pains      PHYSICAL EXAM:  GENERAL: well-developed,   NECK: supple, No JVD  CHEST/LUNG: Clear to auscultation bilaterally  HEART: normal S1S2, RRR  ABDOMEN: Soft, Nontender, +BS, No flank tenderness bilateral  EXTREMITIES: No clubbing, cyanosis, or edema, LUE AVF, good thrill and bruit appreciated  NEUROLOGY: AAO x3, no focal neurological deficit  SKIN; scattered dry ulcers in LE        LABS:                        11.6   5.41  )-----------( 156      ( 11 Aug 2019 06:19 )             36.1     08-11    139  |  97  |  28<H>  ----------------------------<  106<H>  4.3   |  25  |  4.02<H>    Ca    10.6<H>      11 Aug 2019 06:19  Mg     2.2     08-11                  RADIOLOGY & ADDITIONAL STUDIES:

## 2019-08-11 NOTE — PROGRESS NOTE ADULT - PROBLEM SELECTOR PLAN 10
1) PCP Contacted on Admission: (Y/N) --> Name & Phone #: [facility], [phone]  2) Date of Contact with PCP: TBD  3) PCP Contacted at Discharge: TBD  4) Summary of Handoff Given to PCP: TBD   5) Post-Discharge Appointment Date: TBD

## 2019-08-11 NOTE — PROGRESS NOTE ADULT - SUBJECTIVE AND OBJECTIVE BOX
HPI: 68y year old Male with pmhx of cervical and lumbar spinal stenosis, HTN, HLD, CAD, BRYNN (s/p stent), IDDM (with peripheral nueropathy), ESRD on dialysis (tues/thurs/sat), hypothryoidism, ?CVA (legally blind in left eye, small vision in R eye - question of stroke or retinal artery occlusion - patient is s/p carotid artery stent), PAD (s/p bilateral stents) who presented to [facility] c/o 8/10 lower back pain, posterior neck pain, and BL worsening lower extremity numbness/pain following 3/4ths of his dialysis, for which he called an ambulance. Exam by resident upon neuro consultation on 8/9 noted pt was drowsy and falling asleep during exam, with significant hip flexion weakness and BL quadriparesis, but with poor cooperation on exam 2/2 pain.    Today, on my initial exam, pt is awake and alert, denies BB dysfunction. Reports that pain in neck, lower back, and distal LEs is improved somewhat but persistent. Describes distal LE pain as burning sensation - reports he has been on gabapentin tid in the past but unclear dose.    PMHX:  HYPERTENSIVE EMERGENCY;NSTEMI  FH: stroke  Handoff  MEWS Score  Peripheral neuropathy  Peripheral artery disease  Anemia of renal disease  End-stage renal disease (ESRD)  Type II diabetes mellitus  Retinal artery occlusion  Hypothyroidism  Low back pain  CAD (coronary artery disease)  H/O renal artery stenosis  Hyperlipidemia  Hypertension  Hypertensive emergency  Prophylactic measure  Hypothyroidism, unspecified type  Type 2 diabetes mellitus with other specified complication, unspecified whether long term insulin use  Coronary artery disease involving native heart, angina presence unspecified, unspecified vessel or lesion type  CAD (coronary artery disease)  Transition of care performed with sharing of clinical summary  Chronic venous insufficiency  Low back pain, unspecified back pain laterality, unspecified chronicity, unspecified whether sciatica present  Hypertensive urgency  Peripheral neuropathy  Dementia  Anemia of renal disease  Hyperlipidemia  Type II diabetes mellitus  ESRD (end stage renal disease) on dialysis  Back pain, unspecified back location, unspecified back pain laterality, unspecified chronicity  Hypertensive emergency  No significant past surgical history  MED EVAL  Back pain, unspecified back location, unspecified back pain laterality, unspecified chronicity  ESRD (end stage renal disease) on dialysis  NSTEMI (non-ST elevated myocardial infarction)      MEDS:  amLODIPine   Tablet 10 milliGRAM(s) Oral daily  AQUAPHOR (petrolatum Ointment) 1 Application(s) Topical two times a day PRN  aspirin enteric coated 81 milliGRAM(s) Oral daily  atorvastatin 40 milliGRAM(s) Oral at bedtime  BACItracin   Ointment 1 Application(s) Topical daily  carvedilol 25 milliGRAM(s) Oral every 12 hours  chlorhexidine 2% Cloths 1 Application(s) Topical <User Schedule>  cloNIDine 0.1 milliGRAM(s) Oral three times a day  clopidogrel Tablet 75 milliGRAM(s) Oral daily  dextrose 40% Gel 15 Gram(s) Oral once PRN  dextrose 5%. 1000 milliLiter(s) IV Continuous <Continuous>  dextrose 50% Injectable 12.5 Gram(s) IV Push once  dextrose 50% Injectable 25 Gram(s) IV Push once  dextrose 50% Injectable 25 Gram(s) IV Push once  gabapentin 100 milliGRAM(s) Oral daily  glucagon  Injectable 1 milliGRAM(s) IntraMuscular once PRN  hydrALAZINE 100 milliGRAM(s) Oral every 8 hours  insulin lispro (HumaLOG) corrective regimen sliding scale   SubCutaneous Before meals and at bedtime  isosorbide   mononitrate ER Tablet (IMDUR) 60 milliGRAM(s) Oral daily  levothyroxine 50 MICROGram(s) Oral daily  losartan 100 milliGRAM(s) Oral every 24 hours  morphine  - Injectable 2 milliGRAM(s) IV Push every 6 hours PRN  oxyCODONE    5 mG/acetaminophen 325 mG 1 Tablet(s) Oral every 6 hours PRN  sevelamer carbonate 800 milliGRAM(s) Oral three times a day with meals    SHX: nc    No Known Allergies      FHX: nc    Vitals:  T(C): 37.1 (08-11-19 @ 13:37), Max: 37.2 (08-11-19 @ 09:17)  HR: 67 (08-11-19 @ 13:19) (62 - 70)  BP: 151/61 (08-11-19 @ 13:19) (148/61 - 202/77)  RR: 17 (08-11-19 @ 13:19) (16 - 18)  SpO2: 96% (08-11-19 @ 13:19) (94% - 97%)    Exam:  alert, oriented X3, normally conversant  perrl, eomi, no nystagmus, face symmetric, tup at midline.    5/5 throughout.  no pnd.  no abnormal movements  sensation decreased in distal LEs to VBS and PP and JPS.  reflexes brisk in UEs and patellars but absent AJs. symmetric reflexes. mute toes.  coordination intact to FTN, Omari  gait deferred.    AP: 68y year old Male with cervical and lumbar spinal stenosis, severe DM w neuropathy, c/o neck pain, low back pain, and distal LE pain. Initial leg weakness likely was pain limited as current exam with good strength.    pain - distal LE pain likely neuropathic - can inc gabapentin to 100tid. neck and low back pain likely related to known degenerative spine dz - pain meds as per primary team or consider pain team consult if sufficient pain control is difficult to achieve.    cervical and lumbar stenosis  - pt awaiting cervical and lumbar MRI. if these demonstrate significant stenosis, please call spine team to comment.

## 2019-08-11 NOTE — PROGRESS NOTE ADULT - SUBJECTIVE AND OBJECTIVE BOX
CC:     OVERNIGHT EVENTS:    SUBJECTIVE / INTERVAL HPI: Patient seen and examined at bedside.     VITAL SIGNS:  Vital Signs Last 24 Hrs  T(C): 37.2 (11 Aug 2019 09:17), Max: 37.2 (11 Aug 2019 09:17)  T(F): 98.9 (11 Aug 2019 09:17), Max: 98.9 (11 Aug 2019 09:17)  HR: 70 (11 Aug 2019 05:08) (62 - 71)  BP: 150/58 (11 Aug 2019 05:08) (148/61 - 210/68)  BP(mean): 106 (11 Aug 2019 05:08) (92 - 150)  RR: 16 (11 Aug 2019 05:08) (16 - 18)  SpO2: 95% (11 Aug 2019 05:08) (71% - 97%)    PHYSICAL EXAM:    General: WDWN  HEENT: NC/AT; PERRL, anicteric sclera; MMM  Neck: supple  Cardiovascular: +S1/S2; RRR  Respiratory: CTA B/L; no W/R/R  Gastrointestinal: soft, NT/ND; +BSx4  Extremities: WWP; no edema, clubbing or cyanosis  Vascular: 2+ radial, DP/PT pulses B/L  Neurological: alert awake oriented; no focal deficits    MEDICATIONS:  MEDICATIONS  (STANDING):  amLODIPine   Tablet 10 milliGRAM(s) Oral daily  aspirin enteric coated 81 milliGRAM(s) Oral daily  atorvastatin 40 milliGRAM(s) Oral at bedtime  carvedilol 25 milliGRAM(s) Oral every 12 hours  chlorhexidine 2% Cloths 1 Application(s) Topical <User Schedule>  cloNIDine 0.1 milliGRAM(s) Oral three times a day  clopidogrel Tablet 75 milliGRAM(s) Oral daily  dextrose 5%. 1000 milliLiter(s) (50 mL/Hr) IV Continuous <Continuous>  dextrose 50% Injectable 12.5 Gram(s) IV Push once  dextrose 50% Injectable 25 Gram(s) IV Push once  dextrose 50% Injectable 25 Gram(s) IV Push once  gabapentin 100 milliGRAM(s) Oral daily  hydrALAZINE 100 milliGRAM(s) Oral every 8 hours  insulin lispro (HumaLOG) corrective regimen sliding scale   SubCutaneous Before meals and at bedtime  isosorbide   mononitrate ER Tablet (IMDUR) 60 milliGRAM(s) Oral daily  levothyroxine 50 MICROGram(s) Oral daily  losartan 100 milliGRAM(s) Oral every 24 hours  sevelamer carbonate 800 milliGRAM(s) Oral three times a day with meals    MEDICATIONS  (PRN):  AQUAPHOR (petrolatum Ointment) 1 Application(s) Topical two times a day PRN chronic venous wound  dextrose 40% Gel 15 Gram(s) Oral once PRN Blood Glucose LESS THAN 70 milliGRAM(s)/deciliter  glucagon  Injectable 1 milliGRAM(s) IntraMuscular once PRN Glucose LESS THAN 70 milligrams/deciliter  morphine  - Injectable 2 milliGRAM(s) IV Push every 6 hours PRN Severe Pain (7 - 10)  oxyCODONE    5 mG/acetaminophen 325 mG 1 Tablet(s) Oral every 6 hours PRN Moderate Pain (4 - 6)      ALLERGIES:  Allergies    No Known Allergies    Intolerances        LABS:                        11.6   5.41  )-----------( 156      ( 11 Aug 2019 06:19 )             36.1     08-11    139  |  97  |  28<H>  ----------------------------<  106<H>  4.3   |  25  |  4.02<H>    Ca    10.6<H>      11 Aug 2019 06:19  Mg     2.2     08-11          CAPILLARY BLOOD GLUCOSE      POCT Blood Glucose.: 106 mg/dL (11 Aug 2019 07:07)      RADIOLOGY & ADDITIONAL TESTS: Reviewed. OVERNIGHT EVENTS: Pt continued to be hypertensive overnight. This AM, again in 170-180 range.    SUBJECTIVE / INTERVAL HPI: Patient seen and examined at bedside. States his headache started this morning. Denies any changes with his vision, n/v.     VITAL SIGNS:  Vital Signs Last 24 Hrs  T(C): 37.2 (11 Aug 2019 09:17), Max: 37.2 (11 Aug 2019 09:17)  T(F): 98.9 (11 Aug 2019 09:17), Max: 98.9 (11 Aug 2019 09:17)  HR: 70 (11 Aug 2019 05:08) (62 - 71)  BP: 150/58 (11 Aug 2019 05:08) (148/61 - 210/68)  BP(mean): 106 (11 Aug 2019 05:08) (92 - 150)  RR: 16 (11 Aug 2019 05:08) (16 - 18)  SpO2: 95% (11 Aug 2019 05:08) (71% - 97%)    PHYSICAL EXAM:    General: elderly male, flat affect, legally blind, sitting in bed in NAD  HEENT: NC/AT;  anicteric sclera; MMM  Neck: supple  Cardiovascular: +S1/S2; RRR  Respiratory: CTA B/L; no W/R/R  Gastrointestinal: soft, NT/ND; +BSx4  Extremities: WWP; vascular changes of b/l LE from peripheral arterial disease; erythema but no edema. noninfected appearing chronic ulcers/scarred. No clubbing or cyanosis  Vascular: 2+ radial, DP/PT pulses B/L  Neurological: alert awake oriented; no focal deficits    MEDICATIONS:  MEDICATIONS  (STANDING):  amLODIPine   Tablet 10 milliGRAM(s) Oral daily  aspirin enteric coated 81 milliGRAM(s) Oral daily  atorvastatin 40 milliGRAM(s) Oral at bedtime  carvedilol 25 milliGRAM(s) Oral every 12 hours  chlorhexidine 2% Cloths 1 Application(s) Topical <User Schedule>  cloNIDine 0.1 milliGRAM(s) Oral three times a day  clopidogrel Tablet 75 milliGRAM(s) Oral daily  dextrose 5%. 1000 milliLiter(s) (50 mL/Hr) IV Continuous <Continuous>  dextrose 50% Injectable 12.5 Gram(s) IV Push once  dextrose 50% Injectable 25 Gram(s) IV Push once  dextrose 50% Injectable 25 Gram(s) IV Push once  gabapentin 100 milliGRAM(s) Oral daily  hydrALAZINE 100 milliGRAM(s) Oral every 8 hours  insulin lispro (HumaLOG) corrective regimen sliding scale   SubCutaneous Before meals and at bedtime  isosorbide   mononitrate ER Tablet (IMDUR) 60 milliGRAM(s) Oral daily  levothyroxine 50 MICROGram(s) Oral daily  losartan 100 milliGRAM(s) Oral every 24 hours  sevelamer carbonate 800 milliGRAM(s) Oral three times a day with meals    MEDICATIONS  (PRN):  AQUAPHOR (petrolatum Ointment) 1 Application(s) Topical two times a day PRN chronic venous wound  dextrose 40% Gel 15 Gram(s) Oral once PRN Blood Glucose LESS THAN 70 milliGRAM(s)/deciliter  glucagon  Injectable 1 milliGRAM(s) IntraMuscular once PRN Glucose LESS THAN 70 milligrams/deciliter  morphine  - Injectable 2 milliGRAM(s) IV Push every 6 hours PRN Severe Pain (7 - 10)  oxyCODONE    5 mG/acetaminophen 325 mG 1 Tablet(s) Oral every 6 hours PRN Moderate Pain (4 - 6)      ALLERGIES:  Allergies    No Known Allergies    Intolerances        LABS:                        11.6   5.41  )-----------( 156      ( 11 Aug 2019 06:19 )             36.1     08-11    139  |  97  |  28<H>  ----------------------------<  106<H>  4.3   |  25  |  4.02<H>    Ca    10.6<H>      11 Aug 2019 06:19  Mg     2.2     08-11          CAPILLARY BLOOD GLUCOSE      POCT Blood Glucose.: 106 mg/dL (11 Aug 2019 07:07)      RADIOLOGY & ADDITIONAL TESTS: Reviewed.

## 2019-08-11 NOTE — PROGRESS NOTE ADULT - PROBLEM SELECTOR PLAN 1
Improving. Patient with PMH of HTN, HLD, CAD, BRYNN s/p stent, ESRD on dialysis presented with /70, w/ associated HA, palpitations, SOB which were found to be elevated during HD session. BNP elevated to18k, troponin 0.22 -> 0.18 -> 0.18 likely from heart strain. At presentation, EKG NSR, NL Axis, LVH w/ strain, no ST changes. CT head negative for acute intracranial hemorrhage.  - Received Hydralazine, Norvasc, Clonidine, Imdur, Labetalol, Losartan, Procardia in the ED   - s/p Nitro drip in combination w/ HD overnight with improvement of SBP to 100s (nitro gtt discontinued at 10pm on 8/10)  - CXR with pulmonary vascular congestion, mild pulmonary edema  - CT Angio Abd/Pelvis demonstrated duplicated right renal arteries with patent stent in the superior R renal artery  - F/u abd/pelvis US for r/o renal artery stent occlusion  - C/w Amlodipine 10mg PO, Hydralazine 100mg q8 PO, Imdur 20mg PO, Cozaar 100mg PO, Clonidine 0.1mg TID PO  - increased Coreg to 25 BID PO due to uncontrolled pressures overnight and this AM  - Continue to monitor BP  - doppler b/l carotid arteries without carotid stenosis

## 2019-08-11 NOTE — PROGRESS NOTE ADULT - PROBLEM SELECTOR PLAN 3
Pt has ESRD on dialysis (Tues/Thurs/Sat) w/ REEMAE AVF.  Presented to [facility] ED via ambulance from dialysis session 3/4s complete.   - S/p bedside HD 8/8 with removal of additional 1.8L fluid  - To go for HD 8/10  - Avoid nephrotoxic medications  - Renally dose medications  - Nephrology continuing to follow, recs appreciated  - C/w Sevalemer 800 TID home dose

## 2019-08-12 LAB
ANION GAP SERPL CALC-SCNC: 15 MMOL/L — SIGNIFICANT CHANGE UP (ref 5–17)
BUN SERPL-MCNC: 39 MG/DL — HIGH (ref 7–23)
CALCIUM SERPL-MCNC: 10.2 MG/DL — SIGNIFICANT CHANGE UP (ref 8.4–10.5)
CHLORIDE SERPL-SCNC: 99 MMOL/L — SIGNIFICANT CHANGE UP (ref 96–108)
CO2 SERPL-SCNC: 27 MMOL/L — SIGNIFICANT CHANGE UP (ref 22–31)
CREAT SERPL-MCNC: 5.17 MG/DL — HIGH (ref 0.5–1.3)
GLUCOSE SERPL-MCNC: 113 MG/DL — HIGH (ref 70–99)
HCT VFR BLD CALC: 36.2 % — LOW (ref 39–50)
HGB BLD-MCNC: 11.5 G/DL — LOW (ref 13–17)
MAGNESIUM SERPL-MCNC: 2.3 MG/DL — SIGNIFICANT CHANGE UP (ref 1.6–2.6)
MCHC RBC-ENTMCNC: 28.8 PG — SIGNIFICANT CHANGE UP (ref 27–34)
MCHC RBC-ENTMCNC: 31.8 GM/DL — LOW (ref 32–36)
MCV RBC AUTO: 90.5 FL — SIGNIFICANT CHANGE UP (ref 80–100)
NRBC # BLD: 0 /100 WBCS — SIGNIFICANT CHANGE UP (ref 0–0)
PLATELET # BLD AUTO: 163 K/UL — SIGNIFICANT CHANGE UP (ref 150–400)
POTASSIUM SERPL-MCNC: 4 MMOL/L — SIGNIFICANT CHANGE UP (ref 3.5–5.3)
POTASSIUM SERPL-SCNC: 4 MMOL/L — SIGNIFICANT CHANGE UP (ref 3.5–5.3)
RBC # BLD: 4 M/UL — LOW (ref 4.2–5.8)
RBC # FLD: 13.9 % — SIGNIFICANT CHANGE UP (ref 10.3–14.5)
SODIUM SERPL-SCNC: 141 MMOL/L — SIGNIFICANT CHANGE UP (ref 135–145)
WBC # BLD: 7.22 K/UL — SIGNIFICANT CHANGE UP (ref 3.8–10.5)
WBC # FLD AUTO: 7.22 K/UL — SIGNIFICANT CHANGE UP (ref 3.8–10.5)

## 2019-08-12 PROCEDURE — 99232 SBSQ HOSP IP/OBS MODERATE 35: CPT

## 2019-08-12 PROCEDURE — 99232 SBSQ HOSP IP/OBS MODERATE 35: CPT | Mod: GC

## 2019-08-12 RX ORDER — HYDRALAZINE HCL 50 MG
1 TABLET ORAL
Qty: 0 | Refills: 0 | DISCHARGE
Start: 2019-08-12

## 2019-08-12 RX ORDER — GABAPENTIN 400 MG/1
1 CAPSULE ORAL
Qty: 0 | Refills: 0 | DISCHARGE
Start: 2019-08-12

## 2019-08-12 RX ORDER — SEVELAMER CARBONATE 2400 MG/1
1 POWDER, FOR SUSPENSION ORAL
Qty: 0 | Refills: 0 | DISCHARGE
Start: 2019-08-12

## 2019-08-12 RX ORDER — ATORVASTATIN CALCIUM 80 MG/1
1 TABLET, FILM COATED ORAL
Qty: 0 | Refills: 0 | DISCHARGE
Start: 2019-08-12

## 2019-08-12 RX ORDER — LABETALOL HCL 100 MG
500 TABLET ORAL
Qty: 0 | Refills: 0 | DISCHARGE

## 2019-08-12 RX ORDER — CYCLOBENZAPRINE HYDROCHLORIDE 10 MG/1
1 TABLET, FILM COATED ORAL
Qty: 0 | Refills: 0 | DISCHARGE

## 2019-08-12 RX ORDER — CARVEDILOL PHOSPHATE 80 MG/1
1 CAPSULE, EXTENDED RELEASE ORAL
Qty: 0 | Refills: 0 | DISCHARGE
Start: 2019-08-12

## 2019-08-12 RX ORDER — MORPHINE SULFATE 50 MG/1
4 CAPSULE, EXTENDED RELEASE ORAL ONCE
Refills: 0 | Status: DISCONTINUED | OUTPATIENT
Start: 2019-08-12 | End: 2019-08-12

## 2019-08-12 RX ORDER — GABAPENTIN 400 MG/1
1 CAPSULE ORAL
Qty: 0 | Refills: 0 | DISCHARGE

## 2019-08-12 RX ORDER — HYDRALAZINE HCL 50 MG
3 TABLET ORAL
Qty: 0 | Refills: 0 | DISCHARGE

## 2019-08-12 RX ORDER — ISOSORBIDE MONONITRATE 60 MG/1
1 TABLET, EXTENDED RELEASE ORAL
Qty: 0 | Refills: 0 | DISCHARGE
Start: 2019-08-12

## 2019-08-12 RX ORDER — ISOSORBIDE MONONITRATE 60 MG/1
1 TABLET, EXTENDED RELEASE ORAL
Qty: 0 | Refills: 0 | DISCHARGE

## 2019-08-12 RX ORDER — GABAPENTIN 400 MG/1
100 CAPSULE ORAL THREE TIMES A DAY
Refills: 0 | Status: DISCONTINUED | OUTPATIENT
Start: 2019-08-12 | End: 2019-08-13

## 2019-08-12 RX ORDER — CARVEDILOL PHOSPHATE 80 MG/1
1 CAPSULE, EXTENDED RELEASE ORAL
Qty: 60 | Refills: 0
Start: 2019-08-12 | End: 2019-09-10

## 2019-08-12 RX ADMIN — LOSARTAN POTASSIUM 100 MILLIGRAM(S): 100 TABLET, FILM COATED ORAL at 11:50

## 2019-08-12 RX ADMIN — CHLORHEXIDINE GLUCONATE 1 APPLICATION(S): 213 SOLUTION TOPICAL at 07:14

## 2019-08-12 RX ADMIN — CARVEDILOL PHOSPHATE 25 MILLIGRAM(S): 80 CAPSULE, EXTENDED RELEASE ORAL at 11:49

## 2019-08-12 RX ADMIN — ISOSORBIDE MONONITRATE 60 MILLIGRAM(S): 60 TABLET, EXTENDED RELEASE ORAL at 11:50

## 2019-08-12 RX ADMIN — MORPHINE SULFATE 2 MILLIGRAM(S): 50 CAPSULE, EXTENDED RELEASE ORAL at 21:45

## 2019-08-12 RX ADMIN — MORPHINE SULFATE 4 MILLIGRAM(S): 50 CAPSULE, EXTENDED RELEASE ORAL at 09:22

## 2019-08-12 RX ADMIN — Medication 50 MICROGRAM(S): at 05:00

## 2019-08-12 RX ADMIN — CLOPIDOGREL BISULFATE 75 MILLIGRAM(S): 75 TABLET, FILM COATED ORAL at 11:49

## 2019-08-12 RX ADMIN — Medication 100 MILLIGRAM(S): at 21:29

## 2019-08-12 RX ADMIN — CARVEDILOL PHOSPHATE 25 MILLIGRAM(S): 80 CAPSULE, EXTENDED RELEASE ORAL at 21:29

## 2019-08-12 RX ADMIN — GABAPENTIN 100 MILLIGRAM(S): 400 CAPSULE ORAL at 21:31

## 2019-08-12 RX ADMIN — MORPHINE SULFATE 2 MILLIGRAM(S): 50 CAPSULE, EXTENDED RELEASE ORAL at 21:33

## 2019-08-12 RX ADMIN — MORPHINE SULFATE 2 MILLIGRAM(S): 50 CAPSULE, EXTENDED RELEASE ORAL at 04:59

## 2019-08-12 RX ADMIN — GABAPENTIN 100 MILLIGRAM(S): 400 CAPSULE ORAL at 14:45

## 2019-08-12 RX ADMIN — Medication 0.2 MILLIGRAM(S): at 21:28

## 2019-08-12 RX ADMIN — Medication 2: at 21:29

## 2019-08-12 RX ADMIN — SEVELAMER CARBONATE 800 MILLIGRAM(S): 2400 POWDER, FOR SUSPENSION ORAL at 16:38

## 2019-08-12 RX ADMIN — Medication 1 APPLICATION(S): at 11:51

## 2019-08-12 RX ADMIN — SEVELAMER CARBONATE 800 MILLIGRAM(S): 2400 POWDER, FOR SUSPENSION ORAL at 11:51

## 2019-08-12 RX ADMIN — OXYCODONE AND ACETAMINOPHEN 1 TABLET(S): 5; 325 TABLET ORAL at 14:44

## 2019-08-12 RX ADMIN — Medication 4: at 11:51

## 2019-08-12 RX ADMIN — Medication 0.2 MILLIGRAM(S): at 14:45

## 2019-08-12 RX ADMIN — AMLODIPINE BESYLATE 10 MILLIGRAM(S): 2.5 TABLET ORAL at 05:01

## 2019-08-12 RX ADMIN — Medication 100 MILLIGRAM(S): at 05:01

## 2019-08-12 RX ADMIN — Medication 81 MILLIGRAM(S): at 11:49

## 2019-08-12 RX ADMIN — Medication 4: at 16:39

## 2019-08-12 RX ADMIN — ATORVASTATIN CALCIUM 40 MILLIGRAM(S): 80 TABLET, FILM COATED ORAL at 21:29

## 2019-08-12 RX ADMIN — OXYCODONE AND ACETAMINOPHEN 1 TABLET(S): 5; 325 TABLET ORAL at 12:03

## 2019-08-12 RX ADMIN — SEVELAMER CARBONATE 800 MILLIGRAM(S): 2400 POWDER, FOR SUSPENSION ORAL at 07:53

## 2019-08-12 RX ADMIN — MORPHINE SULFATE 4 MILLIGRAM(S): 50 CAPSULE, EXTENDED RELEASE ORAL at 11:55

## 2019-08-12 RX ADMIN — Medication 100 MILLIGRAM(S): at 14:45

## 2019-08-12 RX ADMIN — Medication 0.2 MILLIGRAM(S): at 05:01

## 2019-08-12 RX ADMIN — MORPHINE SULFATE 2 MILLIGRAM(S): 50 CAPSULE, EXTENDED RELEASE ORAL at 15:16

## 2019-08-12 NOTE — PROGRESS NOTE ADULT - SUBJECTIVE AND OBJECTIVE BOX
Patient seen and evaluated at bedside.   Remains hypertensive, BP in 201/77 - 176/69 mmHg range.  He still c/o neck and back pain, not well controlled with meds.  Pt denies headache, dizziness, blurry vision, n/v, sob, chest pain.  Last HD on 8/10 with UF 2.7 L.          Meds:    amLODIPine   Tablet 10 milliGRAM(s) Oral daily  AQUAPHOR (petrolatum Ointment) 1 Application(s) Topical two times a day PRN  aspirin enteric coated 81 milliGRAM(s) Oral daily  atorvastatin 40 milliGRAM(s) Oral at bedtime  BACItracin   Ointment 1 Application(s) Topical daily  carvedilol 25 milliGRAM(s) Oral every 12 hours  chlorhexidine 2% Cloths 1 Application(s) Topical <User Schedule>  cloNIDine 0.2 milliGRAM(s) Oral every 8 hours  clopidogrel Tablet 75 milliGRAM(s) Oral daily  dextrose 40% Gel 15 Gram(s) Oral once PRN  dextrose 5%. 1000 milliLiter(s) IV Continuous <Continuous>  dextrose 50% Injectable 12.5 Gram(s) IV Push once  dextrose 50% Injectable 25 Gram(s) IV Push once  dextrose 50% Injectable 25 Gram(s) IV Push once  gabapentin 100 milliGRAM(s) Oral three times a day  glucagon  Injectable 1 milliGRAM(s) IntraMuscular once PRN  hydrALAZINE 100 milliGRAM(s) Oral every 8 hours  insulin lispro (HumaLOG) corrective regimen sliding scale   SubCutaneous Before meals and at bedtime  isosorbide   mononitrate ER Tablet (IMDUR) 60 milliGRAM(s) Oral daily  levothyroxine 50 MICROGram(s) Oral daily  losartan 100 milliGRAM(s) Oral every 24 hours  morphine  - Injectable 2 milliGRAM(s) IV Push every 6 hours PRN  oxyCODONE    5 mG/acetaminophen 325 mG 1 Tablet(s) Oral every 6 hours PRN  sevelamer carbonate 800 milliGRAM(s) Oral three times a day with meals      T(C): 37 (08-12-19 @ 10:00), Max: 37.3 (08-12-19 @ 05:32)  HR: 60 (08-12-19 @ 10:32) (58 - 68)  BP: 193/58 (08-12-19 @ 10:32) (151/61 - 204/68)  RR: 18 (08-12-19 @ 09:08) (17 - 18)  SpO2: 96% (08-12-19 @ 09:08) (95% - 96%)    Input/Output      08-11-19 @ 07:01  -  08-12-19 @ 07:00  --------------------------------------------------------  IN: 580 mL / OUT: 600 mL / NET: -20 mL    08-12-19 @ 07:01  -  08-12-19 @ 11:10  --------------------------------------------------------  IN: 180 mL / OUT: 0 mL / NET: 180 mL        ROS:   Gen: no fever  Cardiovascular: no chest pain  Respiratory: no cough, no sputum  Gastrointestinal: no nausea, no vomiting, no change in bowel habits  Neurologic: no headache  : no urinary symptoms        PHYSICAL EXAM:  GENERAL: well-developed, well nourished, alert, no acute distress at present  NECK: supple, No JVD  CHEST/LUNG: Clear to auscultation bilaterally  HEART: normal S1S2, RRR  ABDOMEN: Soft, Nontender, +BS, No flank tenderness bilateral  EXTREMITIES: No clubbing, cyanosis, or edema   NEUROLOGY: AAO x3, no focal neurological deficit  ACCESS: LUE AVF, good thrill and bruit appreciated          LABS:                                        11.5   7.22  )-----------( 163      ( 12 Aug 2019 06:07 )             36.2       08-12    141  |  99  |  39<H>  ----------------------------<  113<H>  4.0   |  27  |  5.17<H>    Ca    10.2      12 Aug 2019 06:07  Mg     2.3     08-12                                RADIOLOGY & ADDITIONAL STUDIES:    < from: Xray Chest 1 View- PORTABLE-Routine (08.10.19 @ 06:53) >  EXAM:  XR CHEST PORTABLE ROUTINE 1V                          PROCEDURE DATE:  08/10/2019          INTERPRETATION:  Clinical History: Hypertension    Portable examination of the chest demonstrates cardiomegaly. No acute   infiltrates. Visualized osseous structures are within normal limits.   Location aortic knob.    Impression: No acute infiltrates    < end of copied text >    < from: CT Head No Cont (08.08.19 @ 19:45) >    INTERPRETATION:  Axial images were obtained from the skull base to the   cranial vertex without intravenous contrast.  Soft tissue and bone   algorithm images were evaluated.    Clinical information: Headache. Hypertensive emergency.    The ventricles, sulci and cisterns are normal in caliber for patient's   age.  There is no evidence of intracranial hemorrhage, mass or acute   infarction. There are patchy areas of low attenuation in the cerebral   white matter, nonspecific but most commonly reflective of chronic   ischemic microangiopathy in a patient of this age. Remote appearing   lacunar infarcts are identified in the bilateral basal ganglia and   thalami. There is no lesion or fracture of the skull or skull base.    IMPRESSION:    Chronic appearing ischemic change without acute intracranial hemorrhage.    < end of copied text >

## 2019-08-12 NOTE — PROGRESS NOTE ADULT - PROBLEM SELECTOR PLAN 8
Patient with PMH chronic venous stasis in b/l LEs, with ulcerations of b/l shins.   - Wound care consult ordered
1) PCP Contacted on Admission: (Y/N) --> Name & Phone #:   at Assisted living, obtain collaterals in AM   2) Date of Contact with PCP:  3) PCP Contacted at Discharge: (Y/N)  4) Summary of Handoff Given to PCP:   5) Post-Discharge Appointment Date and Location:

## 2019-08-12 NOTE — PROGRESS NOTE ADULT - PROBLEM SELECTOR PLAN 10
1) PCP Contacted on Admission: (Y/N) --> Name & Phone #: [facility], [phone]  2) Date of Contact with PCP: TBD  3) PCP Contacted at Discharge: TBD  4) Summary of Handoff Given to PCP: TBD   5) Post-Discharge Appointment Date: TBD

## 2019-08-12 NOTE — PROGRESS NOTE ADULT - PROBLEM SELECTOR PLAN 2
Patient with PMH chronic low back pain, acutely worse over the past two days. 8/10 in severity with associated saddle paresthesia, radiating to b/l LE with weakness. Per patient, he had MRI spine done as outpatient 3 months ago which demonstrated cervical and lumbar stenosis with nerve impingement.   - Received morphine 4mg IVP x3, dilaudid 2mg IVP x1 in ED, additional morphine 2mg IVP after arrival to CCU  - Switched to home medication Percocet q6 PRN PO  - Negative straight leg raise  - Denies incontinence of urine or stool  - Continue home Gabapentin 100mg PO daily  - Neuro consulted, recs appreciated Controlled, pt able to ambulate. Patient with PMH chronic low back pain, acutely worse over the past two days. Reported 8-10/10 in severity with associated saddle paresthesia, radiating to b/l LE with weakness. Per patient, he had MRI spine done as outpatient 3 months ago which demonstrated cervical and lumbar stenosis with nerve impingement.   - Received morphine 4mg IVP x3, dilaudid 2mg IVP x1 in ED, additional morphine 2mg IVP after arrival to CCU  - Switched to home medication Percocet q6 PRN PO  - Negative straight leg raise  - Denies incontinence of urine or stool  - Neuro consulted, recs appreciated  - Increased Gabapentin to 100mg PO TID

## 2019-08-12 NOTE — PROGRESS NOTE ADULT - SUBJECTIVE AND OBJECTIVE BOX
OVERNIGHT EVENTS: Pt continued to be hypertensive overnight with sBP 200s at 10pm.  This AM, again in 170-180 range.    SUBJECTIVE / INTERVAL HPI: Patient seen and examined at bedside. States his headache started this morning. Denies any changes with his vision, n/v.     VITAL SIGNS:  Vital Signs Last 24 Hrs  T(C): 37.2 (11 Aug 2019 09:17), Max: 37.2 (11 Aug 2019 09:17)  T(F): 98.9 (11 Aug 2019 09:17), Max: 98.9 (11 Aug 2019 09:17)  HR: 70 (11 Aug 2019 05:08) (62 - 71)  BP: 150/58 (11 Aug 2019 05:08) (148/61 - 210/68)  BP(mean): 106 (11 Aug 2019 05:08) (92 - 150)  RR: 16 (11 Aug 2019 05:08) (16 - 18)  SpO2: 95% (11 Aug 2019 05:08) (71% - 97%)    PHYSICAL EXAM:    General: elderly male, flat affect, legally blind, sitting in bed in NAD  HEENT: NC/AT;  anicteric sclera; MMM  Neck: supple  Cardiovascular: +S1/S2; RRR  Respiratory: CTA B/L; no W/R/R  Gastrointestinal: soft, NT/ND; +BSx4  Extremities: WWP; vascular changes of b/l LE from peripheral arterial disease; erythema but no edema. noninfected appearing chronic ulcers/scarred. No clubbing or cyanosis  Vascular: 2+ radial, DP/PT pulses B/L  Neurological: alert awake oriented; no focal deficits    MEDICATIONS:  MEDICATIONS  (STANDING):  amLODIPine   Tablet 10 milliGRAM(s) Oral daily  aspirin enteric coated 81 milliGRAM(s) Oral daily  atorvastatin 40 milliGRAM(s) Oral at bedtime  carvedilol 25 milliGRAM(s) Oral every 12 hours  chlorhexidine 2% Cloths 1 Application(s) Topical <User Schedule>  cloNIDine 0.1 milliGRAM(s) Oral three times a day  clopidogrel Tablet 75 milliGRAM(s) Oral daily  dextrose 5%. 1000 milliLiter(s) (50 mL/Hr) IV Continuous <Continuous>  dextrose 50% Injectable 12.5 Gram(s) IV Push once  dextrose 50% Injectable 25 Gram(s) IV Push once  dextrose 50% Injectable 25 Gram(s) IV Push once  gabapentin 100 milliGRAM(s) Oral daily  hydrALAZINE 100 milliGRAM(s) Oral every 8 hours  insulin lispro (HumaLOG) corrective regimen sliding scale   SubCutaneous Before meals and at bedtime  isosorbide   mononitrate ER Tablet (IMDUR) 60 milliGRAM(s) Oral daily  levothyroxine 50 MICROGram(s) Oral daily  losartan 100 milliGRAM(s) Oral every 24 hours  sevelamer carbonate 800 milliGRAM(s) Oral three times a day with meals    MEDICATIONS  (PRN):  AQUAPHOR (petrolatum Ointment) 1 Application(s) Topical two times a day PRN chronic venous wound  dextrose 40% Gel 15 Gram(s) Oral once PRN Blood Glucose LESS THAN 70 milliGRAM(s)/deciliter  glucagon  Injectable 1 milliGRAM(s) IntraMuscular once PRN Glucose LESS THAN 70 milligrams/deciliter  morphine  - Injectable 2 milliGRAM(s) IV Push every 6 hours PRN Severe Pain (7 - 10)  oxyCODONE    5 mG/acetaminophen 325 mG 1 Tablet(s) Oral every 6 hours PRN Moderate Pain (4 - 6)      ALLERGIES:  Allergies    No Known Allergies    Intolerances        LABS:                        11.6   5.41  )-----------( 156      ( 11 Aug 2019 06:19 )             36.1     08-11    139  |  97  |  28<H>  ----------------------------<  106<H>  4.3   |  25  |  4.02<H>    Ca    10.6<H>      11 Aug 2019 06:19  Mg     2.2     08-11          CAPILLARY BLOOD GLUCOSE      POCT Blood Glucose.: 106 mg/dL (11 Aug 2019 07:07)      RADIOLOGY & ADDITIONAL TESTS: Reviewed. OVERNIGHT EVENTS: Pt continued to be hypertensive overnight with sBP 200s. Given enalaprilat 1.25mg IVP x1. Increased Imdur to 60mg, coreg to 25mg BID, clonidine to 0.2mg TID.    SUBJECTIVE / INTERVAL HPI: Patient seen and examined at bedside. Pt reports persistent neck and lower back pain, and headache. Denies CP, SOB, vision changes, nausea or vomiting.    MEDICATIONS  (STANDING):  amLODIPine   Tablet 10 milliGRAM(s) Oral daily  aspirin enteric coated 81 milliGRAM(s) Oral daily  atorvastatin 40 milliGRAM(s) Oral at bedtime  BACItracin   Ointment 1 Application(s) Topical daily  carvedilol 25 milliGRAM(s) Oral every 12 hours  chlorhexidine 2% Cloths 1 Application(s) Topical <User Schedule>  cloNIDine 0.2 milliGRAM(s) Oral every 8 hours  clopidogrel Tablet 75 milliGRAM(s) Oral daily  dextrose 5%. 1000 milliLiter(s) (50 mL/Hr) IV Continuous <Continuous>  dextrose 50% Injectable 12.5 Gram(s) IV Push once  dextrose 50% Injectable 25 Gram(s) IV Push once  dextrose 50% Injectable 25 Gram(s) IV Push once  gabapentin 100 milliGRAM(s) Oral three times a day  hydrALAZINE 100 milliGRAM(s) Oral every 8 hours  insulin lispro (HumaLOG) corrective regimen sliding scale   SubCutaneous Before meals and at bedtime  isosorbide   mononitrate ER Tablet (IMDUR) 60 milliGRAM(s) Oral daily  levothyroxine 50 MICROGram(s) Oral daily  losartan 100 milliGRAM(s) Oral every 24 hours  sevelamer carbonate 800 milliGRAM(s) Oral three times a day with meals    MEDICATIONS  (PRN):  AQUAPHOR (petrolatum Ointment) 1 Application(s) Topical two times a day PRN chronic venous wound  dextrose 40% Gel 15 Gram(s) Oral once PRN Blood Glucose LESS THAN 70 milliGRAM(s)/deciliter  glucagon  Injectable 1 milliGRAM(s) IntraMuscular once PRN Glucose LESS THAN 70 milligrams/deciliter  morphine  - Injectable 2 milliGRAM(s) IV Push every 6 hours PRN Severe Pain (7 - 10)  oxyCODONE    5 mG/acetaminophen 325 mG 1 Tablet(s) Oral every 6 hours PRN Moderate Pain (4 - 6)    Allergies  No Known Allergies or Intolerances      Vital Signs Last 24 Hrs  T(C): 37 (12 Aug 2019 10:00), Max: 37.3 (12 Aug 2019 05:32)  T(F): 98.6 (12 Aug 2019 10:00), Max: 99.2 (12 Aug 2019 05:32)  HR: 60 (12 Aug 2019 10:32) (58 - 68)  BP: 193/58 (12 Aug 2019 10:32) (151/61 - 204/68)  BP(mean): 119 (12 Aug 2019 10:32) (17 - 147)  RR: 18 (12 Aug 2019 09:08) (17 - 18)  SpO2: 96% (12 Aug 2019 09:08) (95% - 96%)    PHYSICAL EXAM:  General: elderly male, flat affect, legally blind, sitting in bed in NAD  HEENT: NC/AT; anicteric sclera; positive light reflex in the R pupil, MMM  Neck: supple  Cardiovascular: +S1/S2; RRR, grade 2 systolic blowing murmur heard in the aortic valve position and left sternal border  Respiratory: CTA B/L; no W/R/R  Gastrointestinal: soft, non tender with mild distention, +BSx4  Extremities: WWP; vascular changes of b/l LE from peripheral arterial disease; erythema but no edema. Chronic ulcers bilaterally which do not appear infected. No clubbing or cyanosis.  Vascular: 2+ radial, DP/PT pulses B/L  Neurological: alert awake oriented; no gross focal deficits      LABS:             11.5   7.22  )-----------( 163      ( 12 Aug 2019 06:07 )             36.2     08-12  141  |  99  |  39<H>  ----------------------------<  113<H>  4.0   |  27  |  5.17<H>    Ca    10.2      12 Aug 2019 06:07  Mg     2.3     08-12      CAPILLARY BLOOD GLUCOSE  POCT Blood Glucose.: 202 mg/dL (12 Aug 2019 11:01)  POCT Blood Glucose.: 107 mg/dL (12 Aug 2019 07:03)  POCT Blood Glucose.: 197 mg/dL (11 Aug 2019 21:09)  POCT Blood Glucose.: 147 mg/dL (11 Aug 2019 16:10)      RADIOLOGY & ADDITIONAL TESTS: Reviewed. No new tests.

## 2019-08-12 NOTE — PROGRESS NOTE ADULT - PROBLEM SELECTOR PROBLEM 8
Chronic venous insufficiency
Transition of care performed with sharing of clinical summary

## 2019-08-12 NOTE — PROGRESS NOTE ADULT - ASSESSMENT
68M with history of cervical and lumbar spinal stenosis, diabetes with neuropathy, neurology consulted for worsening lower extremity weakness, worsening neck and low back pain.     Recommendations:   - recommend increasing Gabapentin 100 mg TID   - also recommend obtaining MRI cervical and lumbar spine, pt noted to have worsened proximal weakness in both upper and lower extremities.   - obtain records from outpatient MRI results.   - please call spine team to comment   - pain control per primary team     ** INCOMPLETE**     Plan discussed with Dr. Sarabia. 68M with history of cervical and lumbar spinal stenosis, diabetes with neuropathy, neurology consulted for worsening lower extremity weakness, worsening neck and low back pain.     Recommendations:   - recommend increasing Gabapentin 100 mg TID   - also recommend obtaining MRI cervical and lumbar spine, pt noted to have worsened proximal weakness in both upper and lower extremities.   - obtain records from outpatient MRI results.   - please call spine team to comment   - pain control per primary team     Plan discussed with Dr. Sarabia. 68M with history of cervical and lumbar spinal stenosis, diabetes with neuropathy, neurology consulted for worsening lower extremity weakness, worsening neck and low back pain.     Recommendations:   - recommend increasing Gabapentin 100 mg TID   - also recommend obtaining MRI cervical and lumbar spine, pt noted to have proximal weakness in both upper and lower extremities.   - obtain records from outpatient MRI results.   - please call spine team to comment   - pain control per primary team     Plan discussed with Dr. Sarabia.

## 2019-08-12 NOTE — PROGRESS NOTE ADULT - ASSESSMENT
69 y/o M legally blind, PMHx of HTN, HLD, CAD, BRYNN s/p stent, IDDM, ESRD on dialysis who presented to [facility] c/o lower back pain and BL lower extremity numbness following 3/4ths of his dialysis, admitted with hypertensive urgency.  Aortic dissection/ PE were ruled out.    # ESRD on HD TTS via LUE AVF  - last HD on 8/10 with UF 2.7 L as prescribed  - volume status and electrolytes noted, acceptable  - anticipate HD on 8/13 as per schedule  - daily weights  - renal diet  - strict I/O    # HTN  - uncontrolled  - carvedilol 25 mg po Q12hr  - amlodipine 10 mg po qd  - hydralazine 100 mg po Q8hr  - losartan 100 mg po qd  - resume home clonidine 0.1 mg po Q8hr  - UF with HD  - provide adequate pain management    # Renal bone disease  - phos/pth/vit D as outpatient  - continue sevelamer    # Anemia, normocytic  - Hb 11.5 g/dl, stable  - transfusion as per primary team 69 y/o M legally blind, PMHx of HTN, HLD, CAD, BRYNN s/p stent, IDDM, ESRD on dialysis who presented to [facility] c/o lower back pain and BL lower extremity numbness following 3/4ths of his dialysis, admitted with hypertensive urgency.  Aortic dissection/ PE were ruled out.    # ESRD on HD TTS via LUE AVF  - last HD on 8/10 with UF 2.7 L as prescribed  - volume status and electrolytes noted, acceptable  - anticipate HD on 8/13 as per schedule  - daily weights  - renal diet  - strict I/O    # HTN  - uncontrolled  - carvedilol 25 mg po Q12hr  - amlodipine 10 mg po qd  - hydralazine 100 mg po Q8hr  - losartan 100 mg po qd  - clonidine 0.2 mg po Q8hr  - UF with HD  - please provide adequate pain control    # Renal bone disease  - phos/pth/vit D as outpatient  - continue sevelamer    # Anemia, normocytic  - Hb 11.5 g/dl, stable  - transfusion as per primary team

## 2019-08-12 NOTE — PROGRESS NOTE ADULT - PROBLEM SELECTOR PLAN 9
F: None  E: No repletion, pt ESRD on HD  N: DASH/TLC, renal restricted diet  DVT Prophylaxis: pt refused Lovenox this morning 8/9  GI ppx: None  Dispo: 5La, from [facility] F: None  E: No repletion, pt ESRD on HD  N: DASH/TLC, renal restricted diet  DVT Prophylaxis: None  GI ppx: None  Dispo: [facility], from [facility]

## 2019-08-12 NOTE — PROGRESS NOTE ADULT - PROBLEM SELECTOR PLAN 3
Pt has ESRD on dialysis (Tues/Thurs/Sat) w/ REEMAE AVF.  Presented to [facility] ED via ambulance from dialysis session 3/4s complete.   - S/p bedside HD 8/8 with removal of additional 1.8L fluid  - To go for HD 8/10  - Avoid nephrotoxic medications  - Renally dose medications  - Nephrology continuing to follow, recs appreciated  - C/w Sevalemer 800 TID home dose Pt has ESRD on dialysis (Tues/Thurs/Sat) w/ LUE AVF.  Presented to [facility] ED via ambulance from dialysis session 3/4s complete.   - S/p bedside HD 8/8 with removal of additional 1.8L fluid  - Last session was 8/10, next HD 8/13  - Avoid nephrotoxic medications  - Renally dose medications  - Nephrology continuing to follow, recs appreciated  - C/w Sevalemer 800 TID home dose

## 2019-08-12 NOTE — PROGRESS NOTE ADULT - PROBLEM SELECTOR PROBLEM 1
Hypertensive emergency
Hypertensive urgency

## 2019-08-12 NOTE — PROGRESS NOTE ADULT - PROBLEM SELECTOR PLAN 4
PMH of CAD, on home ASA, Plavix 75qd, likely severe vasculopath in the setting of known PAD (s/p b/l stents) as well.   -  EKG this morning demonstrating HR 72, NSR, LVH, nonspecific T wave changes  - Obtain collaterals about prior CAD hx (PCP at [facility])   - C/w home ASA 81mg, Plavix 75qd PO, and Lipitor 40qd    # Hyperlipidemia  - Continue Atorvastatin 40mg daily

## 2019-08-12 NOTE — PROGRESS NOTE ADULT - SUBJECTIVE AND OBJECTIVE BOX
Neurology  Progress Note  08-12-19    Name:  Interval History:  Per primary team, pt was noted to be hypertensive, and was given BP meds (Enalaprilat 1.25mg IVx1, Imdur increased to 60mg, Coreg increased to 25mg BID, Clonidine 0.2mg TID). Today he report continued neck and lower back pain and headache. States the weakness is baseline, and that he has been ambulating with walker with Physical therapy. Review of systems otherwise negative.     Medications:  amLODIPine   Tablet 10 milliGRAM(s) Oral once  amLODIPine   Tablet 10 milliGRAM(s) Oral daily  AQUAPHOR (petrolatum Ointment) 1 Application(s) Topical two times a day PRN  aspirin enteric coated 81 milliGRAM(s) Oral daily  atorvastatin 40 milliGRAM(s) Oral at bedtime  BACItracin   Ointment 1 Application(s) Topical daily  carvedilol 25 milliGRAM(s) Oral every 12 hours  carvedilol 6.25 milliGRAM(s) Oral once  chlorhexidine 2% Cloths 1 Application(s) Topical <User Schedule>  cloNIDine 0.2 milliGRAM(s) Oral every 8 hours  cloNIDine 0.1 milliGRAM(s) Oral once  clopidogrel Tablet 75 milliGRAM(s) Oral daily  dextrose 40% Gel 15 Gram(s) Oral once PRN  dextrose 5%. 1000 milliLiter(s) IV Continuous <Continuous>  dextrose 50% Injectable 12.5 Gram(s) IV Push once  dextrose 50% Injectable 25 Gram(s) IV Push once  dextrose 50% Injectable 25 Gram(s) IV Push once  enalaprilat Injectable 1.25 milliGRAM(s) IV Push once  gabapentin 100 milliGRAM(s) Oral three times a day  glucagon  Injectable 1 milliGRAM(s) IntraMuscular once PRN  hydrALAZINE 100 milliGRAM(s) Oral every 8 hours  hydrALAZINE 100 milliGRAM(s) Oral once  hydrALAZINE Injectable 10 milliGRAM(s) IV Push Once  hydrALAZINE Injectable 10 milliGRAM(s) IV Push Once  hydrALAZINE Injectable 10 milliGRAM(s) IV Push Once  hydrALAZINE Injectable 10 milliGRAM(s) IV Push Once  hydrocortisone 1% Cream 1 Application(s) Topical once  HYDROmorphone  Injectable 2 milliGRAM(s) IV Push once  insulin lispro (HumaLOG) corrective regimen sliding scale   SubCutaneous Before meals and at bedtime  isosorbide   mononitrate ER Tablet (IMDUR) 30 milliGRAM(s) Oral once  isosorbide   mononitrate ER Tablet (IMDUR) 60 milliGRAM(s) Oral daily  isosorbide   mononitrate ER Tablet (IMDUR) 30 milliGRAM(s) Oral once  isosorbide   mononitrate ER Tablet (IMDUR) 30 milliGRAM(s) Oral once  labetalol 200 milliGRAM(s) Oral once  labetalol 300 milliGRAM(s) Oral once  levothyroxine 50 MICROGram(s) Oral daily  losartan 100 milliGRAM(s) Oral every 24 hours  morphine  - Injectable 2 milliGRAM(s) IV Push once  morphine  - Injectable 2 milliGRAM(s) IV Push every 6 hours PRN  morphine  - Injectable 4 milliGRAM(s) IV Push once  morphine  - Injectable 4 milliGRAM(s) IV Push once  morphine  - Injectable 4 milliGRAM(s) IV Push Once  morphine  - Injectable 4 milliGRAM(s) IV Push Once  NIFEdipine XL 30 milliGRAM(s) Oral Once  oxyCODONE    5 mG/acetaminophen 325 mG 1 Tablet(s) Oral every 6 hours PRN  sevelamer carbonate 800 milliGRAM(s) Oral three times a day with meals    Vitals:  T(C): 37 (08-12-19 @ 10:00), Max: 37.3 (08-12-19 @ 05:32)  HR: 60 (08-12-19 @ 10:32) (58 - 68)  BP: 193/58 (08-12-19 @ 10:32) (151/61 - 204/68)  RR: 18 (08-12-19 @ 09:08) (17 - 18)  SpO2: 96% (08-12-19 @ 09:08) (95% - 96%)    Labs:                        11.5   7.22  )-----------( 163      ( 12 Aug 2019 06:07 )             36.2     08-12    141  |  99  |  39<H>  ----------------------------<  113<H>  4.0   |  27  |  5.17<H>    Ca    10.2      12 Aug 2019 06:07  Mg     2.3     08-12      CAPILLARY BLOOD GLUCOSE      POCT Blood Glucose.: 202 mg/dL (12 Aug 2019 11:01)        PHYSICAL EXAMINATION:  General: Well-developed, well nourished, in no acute distress.  Eyes: Conjunctiva and sclera clear.  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 R eye not reactive to light.   III, IV, VI:  Strabismus noted, no nystagmus with extraocular eye movements.   V:  Facial sensation is intact in the V1-V3 distribution bilaterally.  VII:  Face is symmetric with normal eye closure and smile  VIII:  Hearing is intact to finger rub.  IX, X:  Uvula is midline and soft palate rises symmetrically  XI:  Head turning and shoulder shrug are intact.  XII:  Tongue protudes in the midline.  - Motor:    shoulder abduction (4+/5) bilaterally, elbow flexion/extension (5/5).   hip flexion (4-/5) bilaterally.   plantar    - Reflexes:  2+ and symmetric at the biceps, triceps, brachioradialis, knees, and ankles.  Plantar responses flexor.  - Sensory:  Intact throughout to vibration, and joint-position, light touch, pin prick.  - Coordination:  could not assess   - Gait:   deferred     Radiology:  CT Head No Cont:  (08 Aug 2019 19:45) Neurology  Progress Note  08-12-19    Name:  Interval History:  Per primary team, pt was noted to be hypertensive, and was given BP meds (Enalaprilat 1.25mg IVx1, Imdur increased to 60mg, Coreg increased to 25mg BID, Clonidine 0.2mg TID). Today he report continued neck and lower back pain and headache. States the weakness is baseline, and that he has been ambulating with walker with Physical therapy. Review of systems otherwise negative.     Medications:  amLODIPine   Tablet 10 milliGRAM(s) Oral once  amLODIPine   Tablet 10 milliGRAM(s) Oral daily  AQUAPHOR (petrolatum Ointment) 1 Application(s) Topical two times a day PRN  aspirin enteric coated 81 milliGRAM(s) Oral daily  atorvastatin 40 milliGRAM(s) Oral at bedtime  BACItracin   Ointment 1 Application(s) Topical daily  carvedilol 25 milliGRAM(s) Oral every 12 hours  carvedilol 6.25 milliGRAM(s) Oral once  chlorhexidine 2% Cloths 1 Application(s) Topical <User Schedule>  cloNIDine 0.2 milliGRAM(s) Oral every 8 hours  cloNIDine 0.1 milliGRAM(s) Oral once  clopidogrel Tablet 75 milliGRAM(s) Oral daily  dextrose 40% Gel 15 Gram(s) Oral once PRN  dextrose 5%. 1000 milliLiter(s) IV Continuous <Continuous>  dextrose 50% Injectable 12.5 Gram(s) IV Push once  dextrose 50% Injectable 25 Gram(s) IV Push once  dextrose 50% Injectable 25 Gram(s) IV Push once  enalaprilat Injectable 1.25 milliGRAM(s) IV Push once  gabapentin 100 milliGRAM(s) Oral three times a day  glucagon  Injectable 1 milliGRAM(s) IntraMuscular once PRN  hydrALAZINE 100 milliGRAM(s) Oral every 8 hours  hydrALAZINE 100 milliGRAM(s) Oral once  hydrALAZINE Injectable 10 milliGRAM(s) IV Push Once  hydrALAZINE Injectable 10 milliGRAM(s) IV Push Once  hydrALAZINE Injectable 10 milliGRAM(s) IV Push Once  hydrALAZINE Injectable 10 milliGRAM(s) IV Push Once  hydrocortisone 1% Cream 1 Application(s) Topical once  HYDROmorphone  Injectable 2 milliGRAM(s) IV Push once  insulin lispro (HumaLOG) corrective regimen sliding scale   SubCutaneous Before meals and at bedtime  isosorbide   mononitrate ER Tablet (IMDUR) 30 milliGRAM(s) Oral once  isosorbide   mononitrate ER Tablet (IMDUR) 60 milliGRAM(s) Oral daily  isosorbide   mononitrate ER Tablet (IMDUR) 30 milliGRAM(s) Oral once  isosorbide   mononitrate ER Tablet (IMDUR) 30 milliGRAM(s) Oral once  labetalol 200 milliGRAM(s) Oral once  labetalol 300 milliGRAM(s) Oral once  levothyroxine 50 MICROGram(s) Oral daily  losartan 100 milliGRAM(s) Oral every 24 hours  morphine  - Injectable 2 milliGRAM(s) IV Push once  morphine  - Injectable 2 milliGRAM(s) IV Push every 6 hours PRN  morphine  - Injectable 4 milliGRAM(s) IV Push once  morphine  - Injectable 4 milliGRAM(s) IV Push once  morphine  - Injectable 4 milliGRAM(s) IV Push Once  morphine  - Injectable 4 milliGRAM(s) IV Push Once  NIFEdipine XL 30 milliGRAM(s) Oral Once  oxyCODONE    5 mG/acetaminophen 325 mG 1 Tablet(s) Oral every 6 hours PRN  sevelamer carbonate 800 milliGRAM(s) Oral three times a day with meals    Vitals:  T(C): 37 (08-12-19 @ 10:00), Max: 37.3 (08-12-19 @ 05:32)  HR: 60 (08-12-19 @ 10:32) (58 - 68)  BP: 193/58 (08-12-19 @ 10:32) (151/61 - 204/68)  RR: 18 (08-12-19 @ 09:08) (17 - 18)  SpO2: 96% (08-12-19 @ 09:08) (95% - 96%)    Labs:                        11.5   7.22  )-----------( 163      ( 12 Aug 2019 06:07 )             36.2     08-12    141  |  99  |  39<H>  ----------------------------<  113<H>  4.0   |  27  |  5.17<H>    Ca    10.2      12 Aug 2019 06:07  Mg     2.3     08-12      CAPILLARY BLOOD GLUCOSE      POCT Blood Glucose.: 202 mg/dL (12 Aug 2019 11:01)    PHYSICAL EXAMINATION:  General: Well-developed, well nourished, in no acute distress.  Eyes: Conjunctiva and sclera clear.  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 R eye not reactive to light.   III, IV, VI:  Strabismus noted, no nystagmus with extraocular eye movements.   V:  Facial sensation is intact in the V1-V3 distribution bilaterally.  VII:  Face is symmetric with normal eye closure and smile  VIII:  Hearing is intact to finger rub.  IX, X:  Uvula is midline and soft palate rises symmetrically  XI:  Head turning and shoulder shrug are intact.  XII:  Tongue protudes in the midline.  - Motor:    shoulder abduction (4+/5) bilaterally, elbow flexion/extension (5/5).   hip flexion (4-/5) bilaterally.   plantar flexion (5/5) and dorsiflexion (5/5) bilaterally.   - Reflexes:  2+ and symmetric at the biceps, triceps, brachioradialis, knees, and ankles.  Plantar responses flexor.  - Sensory:  Intact throughout to vibration, and joint-position, light touch, pin prick.  - Coordination:  could not assess   - Gait:   deferred     Radiology:  CT Head No Cont:  (08 Aug 2019 19:45) Neurology  Progress Note  08-12-19    Name:  Interval History:  Per primary team, pt was noted to be hypertensive, and was given BP meds (Enalaprilat 1.25mg IVx1, Imdur increased to 60mg, Coreg increased to 25mg BID, Clonidine 0.2mg TID). Today he report continued neck and lower back pain and headache. States the weakness is baseline, and that he has been ambulating with walker with Physical therapy. Review of systems otherwise negative.     Medications:  amLODIPine   Tablet 10 milliGRAM(s) Oral once  amLODIPine   Tablet 10 milliGRAM(s) Oral daily  AQUAPHOR (petrolatum Ointment) 1 Application(s) Topical two times a day PRN  aspirin enteric coated 81 milliGRAM(s) Oral daily  atorvastatin 40 milliGRAM(s) Oral at bedtime  BACItracin   Ointment 1 Application(s) Topical daily  carvedilol 25 milliGRAM(s) Oral every 12 hours  carvedilol 6.25 milliGRAM(s) Oral once  chlorhexidine 2% Cloths 1 Application(s) Topical <User Schedule>  cloNIDine 0.2 milliGRAM(s) Oral every 8 hours  cloNIDine 0.1 milliGRAM(s) Oral once  clopidogrel Tablet 75 milliGRAM(s) Oral daily  dextrose 40% Gel 15 Gram(s) Oral once PRN  dextrose 5%. 1000 milliLiter(s) IV Continuous <Continuous>  dextrose 50% Injectable 12.5 Gram(s) IV Push once  dextrose 50% Injectable 25 Gram(s) IV Push once  dextrose 50% Injectable 25 Gram(s) IV Push once  enalaprilat Injectable 1.25 milliGRAM(s) IV Push once  gabapentin 100 milliGRAM(s) Oral three times a day  glucagon  Injectable 1 milliGRAM(s) IntraMuscular once PRN  hydrALAZINE 100 milliGRAM(s) Oral every 8 hours  hydrALAZINE 100 milliGRAM(s) Oral once  hydrALAZINE Injectable 10 milliGRAM(s) IV Push Once  hydrALAZINE Injectable 10 milliGRAM(s) IV Push Once  hydrALAZINE Injectable 10 milliGRAM(s) IV Push Once  hydrALAZINE Injectable 10 milliGRAM(s) IV Push Once  hydrocortisone 1% Cream 1 Application(s) Topical once  HYDROmorphone  Injectable 2 milliGRAM(s) IV Push once  insulin lispro (HumaLOG) corrective regimen sliding scale   SubCutaneous Before meals and at bedtime  isosorbide   mononitrate ER Tablet (IMDUR) 30 milliGRAM(s) Oral once  isosorbide   mononitrate ER Tablet (IMDUR) 60 milliGRAM(s) Oral daily  isosorbide   mononitrate ER Tablet (IMDUR) 30 milliGRAM(s) Oral once  isosorbide   mononitrate ER Tablet (IMDUR) 30 milliGRAM(s) Oral once  labetalol 200 milliGRAM(s) Oral once  labetalol 300 milliGRAM(s) Oral once  levothyroxine 50 MICROGram(s) Oral daily  losartan 100 milliGRAM(s) Oral every 24 hours  morphine  - Injectable 2 milliGRAM(s) IV Push once  morphine  - Injectable 2 milliGRAM(s) IV Push every 6 hours PRN  morphine  - Injectable 4 milliGRAM(s) IV Push once  morphine  - Injectable 4 milliGRAM(s) IV Push once  morphine  - Injectable 4 milliGRAM(s) IV Push Once  morphine  - Injectable 4 milliGRAM(s) IV Push Once  NIFEdipine XL 30 milliGRAM(s) Oral Once  oxyCODONE    5 mG/acetaminophen 325 mG 1 Tablet(s) Oral every 6 hours PRN  sevelamer carbonate 800 milliGRAM(s) Oral three times a day with meals    Vitals:  T(C): 37 (08-12-19 @ 10:00), Max: 37.3 (08-12-19 @ 05:32)  HR: 60 (08-12-19 @ 10:32) (58 - 68)  BP: 193/58 (08-12-19 @ 10:32) (151/61 - 204/68)  RR: 18 (08-12-19 @ 09:08) (17 - 18)  SpO2: 96% (08-12-19 @ 09:08) (95% - 96%)    Labs:                        11.5   7.22  )-----------( 163      ( 12 Aug 2019 06:07 )             36.2     08-12    141  |  99  |  39<H>  ----------------------------<  113<H>  4.0   |  27  |  5.17<H>    Ca    10.2      12 Aug 2019 06:07  Mg     2.3     08-12      CAPILLARY BLOOD GLUCOSE      POCT Blood Glucose.: 202 mg/dL (12 Aug 2019 11:01)    PHYSICAL EXAMINATION:  General: Well-developed, well nourished, in no acute distress.  Eyes: Conjunctiva and sclera clear.  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 R eye not reactive to light.   III, IV, VI:  Strabismus noted, no nystagmus with extraocular eye movements.   V:  Facial sensation is intact in the V1-V3 distribution bilaterally.  VII:  Face is symmetric with normal eye closure and smile  VIII:  Hearing is intact to finger rub.  IX, X:  Uvula is midline and soft palate rises symmetrically  XI:  Head turning and shoulder shrug are intact.  XII:  Tongue protudes in the midline.  - Motor:    shoulder abduction (4+/5) bilaterally, elbow flexion/extension (5/5).   hip flexion (4/5) bilaterally. plantar flexion (5/5) and dorsiflexion (5/5) bilaterally.   - Reflexes:  2+ and symmetric at the biceps, triceps, brachioradialis, knees. dim AJs BL.  Plantar responses flexor.  - Sensory:  Intact throughout to vibration, and joint-position, light touch, pin prick.  - Coordination:  could not assess   - Gait:   deferred     Radiology:  CT Head No Cont:  (08 Aug 2019 19:45)

## 2019-08-12 NOTE — PROGRESS NOTE ADULT - PROBLEM SELECTOR PLAN 1
Improving. Patient with PMH of HTN, HLD, CAD, BRYNN s/p stent, ESRD on dialysis presented with /70, w/ associated HA, palpitations, SOB which were found to be elevated during HD session. BNP elevated to18k, troponin 0.22 -> 0.18 -> 0.18 likely from heart strain. At presentation, EKG NSR, NL Axis, LVH w/ strain, no ST changes. CT head negative for acute intracranial hemorrhage.  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 Patient with PMH of HTN, HLD, CAD, BRYNN s/p stent, ESRD on dialysis presented with /70, w/ associated HA, palpitations, SOB which were found to be elevated during HD session. BNP elevated to18k, troponin 0.22 -> 0.18 -> 0.18 likely from heart strain. At presentation, EKG NSR, NL Axis, LVH w/ strain, no ST changes. CT head negative for acute intracranial hemorrhage.  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

## 2019-08-13 ENCOUNTER — TRANSCRIPTION ENCOUNTER (OUTPATIENT)
Age: 69
End: 2019-08-13

## 2019-08-13 VITALS
DIASTOLIC BLOOD PRESSURE: 66 MMHG | HEART RATE: 69 BPM | RESPIRATION RATE: 16 BRPM | SYSTOLIC BLOOD PRESSURE: 164 MMHG | OXYGEN SATURATION: 95 %

## 2019-08-13 LAB
ANION GAP SERPL CALC-SCNC: 17 MMOL/L — SIGNIFICANT CHANGE UP (ref 5–17)
BUN SERPL-MCNC: 50 MG/DL — HIGH (ref 7–23)
CALCIUM SERPL-MCNC: 10.1 MG/DL — SIGNIFICANT CHANGE UP (ref 8.4–10.5)
CHLORIDE SERPL-SCNC: 101 MMOL/L — SIGNIFICANT CHANGE UP (ref 96–108)
CO2 SERPL-SCNC: 23 MMOL/L — SIGNIFICANT CHANGE UP (ref 22–31)
CREAT SERPL-MCNC: 6 MG/DL — HIGH (ref 0.5–1.3)
GLUCOSE SERPL-MCNC: 105 MG/DL — HIGH (ref 70–99)
MAGNESIUM SERPL-MCNC: 2.2 MG/DL — SIGNIFICANT CHANGE UP (ref 1.6–2.6)
POTASSIUM SERPL-MCNC: 4.1 MMOL/L — SIGNIFICANT CHANGE UP (ref 3.5–5.3)
POTASSIUM SERPL-SCNC: 4.1 MMOL/L — SIGNIFICANT CHANGE UP (ref 3.5–5.3)
SODIUM SERPL-SCNC: 141 MMOL/L — SIGNIFICANT CHANGE UP (ref 135–145)

## 2019-08-13 PROCEDURE — 90935 HEMODIALYSIS ONE EVALUATION: CPT | Mod: GC

## 2019-08-13 PROCEDURE — 99239 HOSP IP/OBS DSCHRG MGMT >30: CPT

## 2019-08-13 RX ORDER — CYCLOBENZAPRINE HYDROCHLORIDE 10 MG/1
10 TABLET, FILM COATED ORAL THREE TIMES A DAY
Refills: 0 | Status: DISCONTINUED | OUTPATIENT
Start: 2019-08-13 | End: 2019-08-13

## 2019-08-13 RX ORDER — GABAPENTIN 400 MG/1
1 CAPSULE ORAL
Qty: 90 | Refills: 0
Start: 2019-08-13 | End: 2019-09-11

## 2019-08-13 RX ORDER — ISOSORBIDE MONONITRATE 60 MG/1
1 TABLET, EXTENDED RELEASE ORAL
Qty: 30 | Refills: 0
Start: 2019-08-13 | End: 2019-09-11

## 2019-08-13 RX ORDER — SEVELAMER CARBONATE 2400 MG/1
1 POWDER, FOR SUSPENSION ORAL
Qty: 90 | Refills: 0
Start: 2019-08-13 | End: 2019-09-11

## 2019-08-13 RX ORDER — ENOXAPARIN SODIUM 100 MG/ML
4 INJECTION SUBCUTANEOUS
Qty: 120 | Refills: 0
Start: 2019-08-13 | End: 2019-09-11

## 2019-08-13 RX ORDER — ATORVASTATIN CALCIUM 80 MG/1
1 TABLET, FILM COATED ORAL
Qty: 30 | Refills: 0
Start: 2019-08-13 | End: 2019-09-11

## 2019-08-13 RX ORDER — HYDRALAZINE HCL 50 MG
1 TABLET ORAL
Qty: 90 | Refills: 0
Start: 2019-08-13 | End: 2019-09-11

## 2019-08-13 RX ADMIN — CHLORHEXIDINE GLUCONATE 1 APPLICATION(S): 213 SOLUTION TOPICAL at 07:16

## 2019-08-13 RX ADMIN — SEVELAMER CARBONATE 800 MILLIGRAM(S): 2400 POWDER, FOR SUSPENSION ORAL at 13:54

## 2019-08-13 RX ADMIN — Medication 2: at 16:53

## 2019-08-13 RX ADMIN — Medication 0.3 MILLIGRAM(S): at 15:30

## 2019-08-13 RX ADMIN — MORPHINE SULFATE 2 MILLIGRAM(S): 50 CAPSULE, EXTENDED RELEASE ORAL at 12:25

## 2019-08-13 RX ADMIN — Medication 81 MILLIGRAM(S): at 13:54

## 2019-08-13 RX ADMIN — Medication 100 MILLIGRAM(S): at 15:30

## 2019-08-13 RX ADMIN — CLOPIDOGREL BISULFATE 75 MILLIGRAM(S): 75 TABLET, FILM COATED ORAL at 13:54

## 2019-08-13 RX ADMIN — Medication 0.2 MILLIGRAM(S): at 07:24

## 2019-08-13 RX ADMIN — Medication 50 MICROGRAM(S): at 07:25

## 2019-08-13 RX ADMIN — LOSARTAN POTASSIUM 100 MILLIGRAM(S): 100 TABLET, FILM COATED ORAL at 16:16

## 2019-08-13 RX ADMIN — CARVEDILOL PHOSPHATE 25 MILLIGRAM(S): 80 CAPSULE, EXTENDED RELEASE ORAL at 13:53

## 2019-08-13 RX ADMIN — MORPHINE SULFATE 2 MILLIGRAM(S): 50 CAPSULE, EXTENDED RELEASE ORAL at 08:20

## 2019-08-13 RX ADMIN — AMLODIPINE BESYLATE 10 MILLIGRAM(S): 2.5 TABLET ORAL at 07:24

## 2019-08-13 RX ADMIN — GABAPENTIN 100 MILLIGRAM(S): 400 CAPSULE ORAL at 07:24

## 2019-08-13 RX ADMIN — SEVELAMER CARBONATE 800 MILLIGRAM(S): 2400 POWDER, FOR SUSPENSION ORAL at 07:26

## 2019-08-13 RX ADMIN — GABAPENTIN 100 MILLIGRAM(S): 400 CAPSULE ORAL at 13:54

## 2019-08-13 RX ADMIN — Medication 100 MILLIGRAM(S): at 07:25

## 2019-08-13 RX ADMIN — ISOSORBIDE MONONITRATE 60 MILLIGRAM(S): 60 TABLET, EXTENDED RELEASE ORAL at 13:54

## 2019-08-13 NOTE — PROGRESS NOTE ADULT - SUBJECTIVE AND OBJECTIVE BOX
Patient seen and evaluated at bedside.   No acute events overnight.  No new active complains at present.  Plan for HD today.          Meds:    amLODIPine   Tablet 10 milliGRAM(s) Oral daily  AQUAPHOR (petrolatum Ointment) 1 Application(s) Topical two times a day PRN  aspirin enteric coated 81 milliGRAM(s) Oral daily  atorvastatin 40 milliGRAM(s) Oral at bedtime  BACItracin   Ointment 1 Application(s) Topical daily  carvedilol 25 milliGRAM(s) Oral every 12 hours  chlorhexidine 2% Cloths 1 Application(s) Topical <User Schedule>  cloNIDine 0.3 milliGRAM(s) Oral three times a day  clopidogrel Tablet 75 milliGRAM(s) Oral daily  dextrose 40% Gel 15 Gram(s) Oral once PRN  dextrose 5%. 1000 milliLiter(s) IV Continuous <Continuous>  dextrose 50% Injectable 12.5 Gram(s) IV Push once  dextrose 50% Injectable 25 Gram(s) IV Push once  dextrose 50% Injectable 25 Gram(s) IV Push once  gabapentin 100 milliGRAM(s) Oral three times a day  glucagon  Injectable 1 milliGRAM(s) IntraMuscular once PRN  hydrALAZINE 100 milliGRAM(s) Oral every 8 hours  insulin lispro (HumaLOG) corrective regimen sliding scale   SubCutaneous Before meals and at bedtime  isosorbide   mononitrate ER Tablet (IMDUR) 60 milliGRAM(s) Oral daily  levothyroxine 50 MICROGram(s) Oral daily  losartan 100 milliGRAM(s) Oral every 24 hours  morphine  - Injectable 2 milliGRAM(s) IV Push every 6 hours PRN  oxyCODONE    5 mG/acetaminophen 325 mG 1 Tablet(s) Oral every 6 hours PRN  sevelamer carbonate 800 milliGRAM(s) Oral three times a day with meals      T(C): 36.9 (08-13-19 @ 09:01), Max: 37 (08-13-19 @ 05:03)  HR: 64 (08-13-19 @ 09:01) (54 - 72)  BP: 179/68 (08-13-19 @ 09:01) (153/69 - 192/74)  RR: 19 (08-13-19 @ 09:01) (16 - 19)  SpO2: 93% (08-13-19 @ 09:01) (92% - 98%)    Input/Output      08-12-19 @ 07:01  -  08-13-19 @ 07:00  --------------------------------------------------------  IN: 380 mL / OUT: 175 mL / NET: 205 mL    08-13-19 @ 07:01 - 08-13-19 @ 12:11  --------------------------------------------------------  IN: 180 mL / OUT: 275 mL / NET: -95 mL        ROS:   Gen: no fever  Cardiovascular: no chest pain  Respiratory: no cough, no sputum  Gastrointestinal: no nausea, no vomiting, no change in bowel habits  Neurologic: no headache  : no urinary symptoms        PHYSICAL EXAM:  GENERAL: well-developed, well nourished, alert, no acute distress at present  NECK: supple, No JVD  CHEST/LUNG: Clear to auscultation bilaterally  HEART: normal S1S2, RRR  ABDOMEN: Soft, Nontender, +BS, No flank tenderness bilateral  EXTREMITIES: No clubbing, cyanosis, or edema   NEUROLOGY: AAO x3, no focal neurological deficit  ACCESS: LUE AVF, good thrill and bruit appreciated          LABS:                                                  11.5   7.22  )-----------( 163      ( 12 Aug 2019 06:07 )             36.2       08-13    141  |  101  |  50<H>  ----------------------------<  105<H>  4.1   |  23  |  6.00<H>    Ca    10.1      13 Aug 2019 06:10  Mg     2.2     08-13                                          RADIOLOGY & ADDITIONAL STUDIES:

## 2019-08-13 NOTE — PROGRESS NOTE ADULT - SUBJECTIVE AND OBJECTIVE BOX
Patient was seen and evaluated on dialysis.   HR: 59 (08-13-19 @ 12:45)  BP: 168/62 (08-13-19 @ 12:45)  Wt(kg): -- 67.4      08-13    141  |  101  |  50<H>  ----------------------------<  105<H>  4.1   |  23  |  6.00<H>    Ca    10.1      13 Aug 2019 06:10  Mg     2.2     08-13      Continue dialysis:   Dialyzer:   180    QB: 400  K bath: 3  Goal UF:    3.0   L   over     210          min  Patient is tolerating the procedure well.   Continue full treatment as prescribed.

## 2019-08-13 NOTE — PROGRESS NOTE ADULT - PROVIDER SPECIALTY LIST ADULT
CCU
CCU
Cardiology
Cardiology
Nephrology
Neurology
Vascular Surgery
Nephrology
Neurology
Neurology
Nephrology
CCU
Cardiology

## 2019-08-13 NOTE — PROGRESS NOTE ADULT - REASON FOR ADMISSION
Back pain and urgent hypertension

## 2019-08-13 NOTE — PROGRESS NOTE ADULT - ASSESSMENT
67 y/o M legally blind, PMHx of HTN, HLD, CAD, BRYNN s/p stent, IDDM, ESRD on dialysis who presented to [facility] c/o lower back pain and BL lower extremity numbness following 3/4ths of his dialysis, admitted with hypertensive urgency.  Aortic dissection/ PE were ruled out.    # ESRD on HD TTS via LUE AVF  - last HD on 8/10 with UF 2.7 L as prescribed  - volume status and electrolytes noted, acceptable  - anticipate HD on 8/13 as per schedule  - daily weights  - renal diet  - strict I/O    # HTN  - uncontrolled  - carvedilol 25 mg po Q12hr  - amlodipine 10 mg po qd  - hydralazine 100 mg po Q8hr  - losartan 100 mg po qd  - clonidine 0.2 mg po Q8hr was increased to 0.3 mg po Q8hr  - attempt more UF with HD  - please provide adequate pain control    # Renal bone disease  - phos/pth/vit D as outpatient  - continue sevelamer    # Anemia, normocytic  - monitor H/H  - transfusion as indicated

## 2019-08-13 NOTE — DISCHARGE NOTE NURSING/CASE MANAGEMENT/SOCIAL WORK - NSDCDPATPORTLINK_GEN_ALL_CORE
You can access the [facility] Portal, offered by [facility], by registering with the following website: [email or web address]

## 2019-08-13 NOTE — DISCHARGE NOTE NURSING/CASE MANAGEMENT/SOCIAL WORK - NSDCCRNAME_GEN_ALL_CORE_FT
[facility], [address] [place] [address], [id number], [place] [address] (resume Thursday 8/15/2019)

## 2019-08-13 NOTE — PROGRESS NOTE ADULT - ATTENDING COMMENTS
seen on HD   agree with above  tolerating rx   cont rx -- increase UF as tolerated
HD today- increase UF to try and aid BP
volume, .lytes ok  BP still high despite with UF with dialysis   agree with titration of meds but suspect largely pain mediated -- agree with spine evaluation/pain control
I was physically present for the key portions of the evaluation and managemnent (E/M) service provided.  I agree with the above history, physical, and plan which I have reviewed and edited where appropriate, with the exceptions as per my note.    Exam:  alert, oriented X3, normally conversant  perrl, eomi, no nystagmus, face symmetric, tup at midline.    5/5 throughout.  no pnd.  no abnormal movements  sensation decreased in distal LEs to VBS and PP and JPS.  reflexes brisk in UEs and patellars but absent AJs. symmetric reflexes. mute toes.  coordination intact to FTN, Omari  gait deferred.
volume,lytes ok  BP improved -- may be somewhat pain driven  suggest pain control   restart clonidine
Patient seen and examined  Complaining of back pain  With known herniated disc by MRI done 3 months ago  Pt follows up @ [facility]  Pain managment  BP better controlled on current meds  Dispo pending SW for assisted living facility
Patient seen and examined  Without pain today  BP better controlled, but still elevated. Suspect pt's baseline BP is on higher side. Coreg increased today  DC planning  SW to eval as pt lives in assisted living facility

## 2019-08-16 DIAGNOSIS — E03.9 HYPOTHYROIDISM, UNSPECIFIED: ICD-10-CM

## 2019-08-16 DIAGNOSIS — L97.229 NON-PRESSURE CHRONIC ULCER OF LEFT CALF WITH UNSPECIFIED SEVERITY: ICD-10-CM

## 2019-08-16 DIAGNOSIS — E11.22 TYPE 2 DIABETES MELLITUS WITH DIABETIC CHRONIC KIDNEY DISEASE: ICD-10-CM

## 2019-08-16 DIAGNOSIS — H54.8 LEGAL BLINDNESS, AS DEFINED IN USA: ICD-10-CM

## 2019-08-16 DIAGNOSIS — N18.6 END STAGE RENAL DISEASE: ICD-10-CM

## 2019-08-16 DIAGNOSIS — E78.5 HYPERLIPIDEMIA, UNSPECIFIED: ICD-10-CM

## 2019-08-16 DIAGNOSIS — F03.90 UNSPECIFIED DEMENTIA WITHOUT BEHAVIORAL DISTURBANCE: ICD-10-CM

## 2019-08-16 DIAGNOSIS — E11.51 TYPE 2 DIABETES MELLITUS WITH DIABETIC PERIPHERAL ANGIOPATHY WITHOUT GANGRENE: ICD-10-CM

## 2019-08-16 DIAGNOSIS — I12.0 HYPERTENSIVE CHRONIC KIDNEY DISEASE WITH STAGE 5 CHRONIC KIDNEY DISEASE OR END STAGE RENAL DISEASE: ICD-10-CM

## 2019-08-16 DIAGNOSIS — E11.40 TYPE 2 DIABETES MELLITUS WITH DIABETIC NEUROPATHY, UNSPECIFIED: ICD-10-CM

## 2019-08-16 DIAGNOSIS — L97.219 NON-PRESSURE CHRONIC ULCER OF RIGHT CALF WITH UNSPECIFIED SEVERITY: ICD-10-CM

## 2019-08-16 DIAGNOSIS — I16.1 HYPERTENSIVE EMERGENCY: ICD-10-CM

## 2019-08-16 DIAGNOSIS — I87.2 VENOUS INSUFFICIENCY (CHRONIC) (PERIPHERAL): ICD-10-CM

## 2019-08-16 DIAGNOSIS — M54.9 DORSALGIA, UNSPECIFIED: ICD-10-CM

## 2019-08-16 DIAGNOSIS — I69.398 OTHER SEQUELAE OF CEREBRAL INFARCTION: ICD-10-CM

## 2019-08-16 DIAGNOSIS — J81.1 CHRONIC PULMONARY EDEMA: ICD-10-CM

## 2019-08-16 DIAGNOSIS — M48.061 SPINAL STENOSIS, LUMBAR REGION WITHOUT NEUROGENIC CLAUDICATION: ICD-10-CM

## 2019-08-16 DIAGNOSIS — I70.1 ATHEROSCLEROSIS OF RENAL ARTERY: ICD-10-CM

## 2019-08-16 DIAGNOSIS — D63.1 ANEMIA IN CHRONIC KIDNEY DISEASE: ICD-10-CM

## 2019-08-16 DIAGNOSIS — I25.10 ATHEROSCLEROTIC HEART DISEASE OF NATIVE CORONARY ARTERY WITHOUT ANGINA PECTORIS: ICD-10-CM

## 2019-08-16 DIAGNOSIS — Z99.2 DEPENDENCE ON RENAL DIALYSIS: ICD-10-CM

## 2019-08-16 DIAGNOSIS — M48.02 SPINAL STENOSIS, CERVICAL REGION: ICD-10-CM

## 2019-10-01 PROCEDURE — 84484 ASSAY OF TROPONIN QUANT: CPT

## 2019-10-01 PROCEDURE — 86706 HEP B SURFACE ANTIBODY: CPT

## 2019-10-01 PROCEDURE — 82553 CREATINE MB FRACTION: CPT

## 2019-10-01 PROCEDURE — 97116 GAIT TRAINING THERAPY: CPT

## 2019-10-01 PROCEDURE — 83735 ASSAY OF MAGNESIUM: CPT

## 2019-10-01 PROCEDURE — 90935 HEMODIALYSIS ONE EVALUATION: CPT

## 2019-10-01 PROCEDURE — 85379 FIBRIN DEGRADATION QUANT: CPT

## 2019-10-01 PROCEDURE — 71045 X-RAY EXAM CHEST 1 VIEW: CPT

## 2019-10-01 PROCEDURE — 80053 COMPREHEN METABOLIC PANEL: CPT

## 2019-10-01 PROCEDURE — 76775 US EXAM ABDO BACK WALL LIM: CPT

## 2019-10-01 PROCEDURE — 71275 CT ANGIOGRAPHY CHEST: CPT

## 2019-10-01 PROCEDURE — 87340 HEPATITIS B SURFACE AG IA: CPT

## 2019-10-01 PROCEDURE — 83036 HEMOGLOBIN GLYCOSYLATED A1C: CPT

## 2019-10-01 PROCEDURE — 82962 GLUCOSE BLOOD TEST: CPT

## 2019-10-01 PROCEDURE — 85730 THROMBOPLASTIN TIME PARTIAL: CPT

## 2019-10-01 PROCEDURE — 97164 PT RE-EVAL EST PLAN CARE: CPT

## 2019-10-01 PROCEDURE — 86803 HEPATITIS C AB TEST: CPT

## 2019-10-01 PROCEDURE — 99291 CRITICAL CARE FIRST HOUR: CPT | Mod: 25

## 2019-10-01 PROCEDURE — 86704 HEP B CORE ANTIBODY TOTAL: CPT

## 2019-10-01 PROCEDURE — 82550 ASSAY OF CK (CPK): CPT

## 2019-10-01 PROCEDURE — 93880 EXTRACRANIAL BILAT STUDY: CPT

## 2019-10-01 PROCEDURE — 70450 CT HEAD/BRAIN W/O DYE: CPT

## 2019-10-01 PROCEDURE — 85027 COMPLETE CBC AUTOMATED: CPT

## 2019-10-01 PROCEDURE — 96375 TX/PRO/DX INJ NEW DRUG ADDON: CPT | Mod: XU

## 2019-10-01 PROCEDURE — 85025 COMPLETE CBC W/AUTO DIFF WBC: CPT

## 2019-10-01 PROCEDURE — 93976 VASCULAR STUDY: CPT

## 2019-10-01 PROCEDURE — 87521 HEPATITIS C PROBE&RVRS TRNSC: CPT

## 2019-10-01 PROCEDURE — 96374 THER/PROPH/DIAG INJ IV PUSH: CPT | Mod: XU

## 2019-10-01 PROCEDURE — 36415 COLL VENOUS BLD VENIPUNCTURE: CPT

## 2019-10-01 PROCEDURE — 74174 CTA ABD&PLVS W/CONTRAST: CPT

## 2019-10-01 PROCEDURE — 83880 ASSAY OF NATRIURETIC PEPTIDE: CPT

## 2019-10-01 PROCEDURE — 96376 TX/PRO/DX INJ SAME DRUG ADON: CPT | Mod: XU

## 2019-10-01 PROCEDURE — 93306 TTE W/DOPPLER COMPLETE: CPT

## 2019-10-01 PROCEDURE — 80048 BASIC METABOLIC PNL TOTAL CA: CPT

## 2019-10-01 PROCEDURE — 93005 ELECTROCARDIOGRAM TRACING: CPT

## 2019-10-01 PROCEDURE — 85610 PROTHROMBIN TIME: CPT

## 2020-01-11 ENCOUNTER — INPATIENT (INPATIENT)
Facility: HOSPITAL | Age: 70
LOS: 23 days | Discharge: NURSING, REHAB OR LONG-TERM CARE | DRG: 304 | End: 2020-02-04
Attending: INTERNAL MEDICINE | Admitting: INTERNAL MEDICINE
Payer: MEDICARE

## 2020-01-11 VITALS
DIASTOLIC BLOOD PRESSURE: 78 MMHG | SYSTOLIC BLOOD PRESSURE: 209 MMHG | RESPIRATION RATE: 24 BRPM | OXYGEN SATURATION: 100 % | TEMPERATURE: 99 F | HEART RATE: 66 BPM

## 2020-01-11 PROBLEM — N18.9 CHRONIC KIDNEY DISEASE, UNSPECIFIED: Chronic | Status: ACTIVE | Noted: 2019-08-08

## 2020-01-11 PROBLEM — E11.9 TYPE 2 DIABETES MELLITUS WITHOUT COMPLICATIONS: Chronic | Status: ACTIVE | Noted: 2019-08-08

## 2020-01-11 PROBLEM — G62.9 POLYNEUROPATHY, UNSPECIFIED: Chronic | Status: ACTIVE | Noted: 2019-08-08

## 2020-01-11 PROBLEM — I25.10 ATHEROSCLEROTIC HEART DISEASE OF NATIVE CORONARY ARTERY WITHOUT ANGINA PECTORIS: Chronic | Status: ACTIVE | Noted: 2019-08-08

## 2020-01-11 PROBLEM — I10 ESSENTIAL (PRIMARY) HYPERTENSION: Chronic | Status: ACTIVE | Noted: 2019-08-08

## 2020-01-11 PROBLEM — Z86.79 PERSONAL HISTORY OF OTHER DISEASES OF THE CIRCULATORY SYSTEM: Chronic | Status: ACTIVE | Noted: 2019-08-08

## 2020-01-11 PROBLEM — N18.6 END STAGE RENAL DISEASE: Chronic | Status: ACTIVE | Noted: 2019-08-08

## 2020-01-11 PROBLEM — M54.5 LOW BACK PAIN: Chronic | Status: ACTIVE | Noted: 2019-08-08

## 2020-01-11 PROBLEM — H34.9 UNSPECIFIED RETINAL VASCULAR OCCLUSION: Chronic | Status: ACTIVE | Noted: 2019-08-08

## 2020-01-11 PROBLEM — I73.9 PERIPHERAL VASCULAR DISEASE, UNSPECIFIED: Chronic | Status: ACTIVE | Noted: 2019-08-08

## 2020-01-11 PROBLEM — E03.9 HYPOTHYROIDISM, UNSPECIFIED: Chronic | Status: ACTIVE | Noted: 2019-08-08

## 2020-01-11 PROBLEM — E78.5 HYPERLIPIDEMIA, UNSPECIFIED: Chronic | Status: ACTIVE | Noted: 2019-08-08

## 2020-01-11 LAB
ALBUMIN SERPL ELPH-MCNC: 4.2 G/DL — SIGNIFICANT CHANGE UP (ref 3.3–5)
ALP SERPL-CCNC: 142 U/L — HIGH (ref 40–120)
ALT FLD-CCNC: 14 U/L — SIGNIFICANT CHANGE UP (ref 10–45)
ANION GAP SERPL CALC-SCNC: 18 MMOL/L — HIGH (ref 5–17)
APTT BLD: 31.7 SEC — SIGNIFICANT CHANGE UP (ref 27.5–36.3)
AST SERPL-CCNC: 26 U/L — SIGNIFICANT CHANGE UP (ref 10–40)
BASE EXCESS BLDA CALC-SCNC: 3.2 MMOL/L — HIGH (ref -2–3)
BASE EXCESS BLDV CALC-SCNC: 3.5 MMOL/L — SIGNIFICANT CHANGE UP
BASOPHILS # BLD AUTO: 0.02 K/UL — SIGNIFICANT CHANGE UP (ref 0–0.2)
BASOPHILS NFR BLD AUTO: 0.3 % — SIGNIFICANT CHANGE UP (ref 0–2)
BILIRUB SERPL-MCNC: 0.4 MG/DL — SIGNIFICANT CHANGE UP (ref 0.2–1.2)
BUN SERPL-MCNC: 40 MG/DL — HIGH (ref 7–23)
CALCIUM SERPL-MCNC: 10 MG/DL — SIGNIFICANT CHANGE UP (ref 8.4–10.5)
CHLORIDE SERPL-SCNC: 96 MMOL/L — SIGNIFICANT CHANGE UP (ref 96–108)
CO2 SERPL-SCNC: 25 MMOL/L — SIGNIFICANT CHANGE UP (ref 22–31)
CREAT SERPL-MCNC: 4.46 MG/DL — HIGH (ref 0.5–1.3)
EOSINOPHIL # BLD AUTO: 0.06 K/UL — SIGNIFICANT CHANGE UP (ref 0–0.5)
EOSINOPHIL NFR BLD AUTO: 0.9 % — SIGNIFICANT CHANGE UP (ref 0–6)
GLUCOSE SERPL-MCNC: 192 MG/DL — HIGH (ref 70–99)
HCO3 BLDA-SCNC: 28 MMOL/L — SIGNIFICANT CHANGE UP (ref 21–28)
HCO3 BLDV-SCNC: 30 MMOL/L — HIGH (ref 20–27)
HCT VFR BLD CALC: 33.5 % — LOW (ref 39–50)
HGB BLD-MCNC: 10.6 G/DL — LOW (ref 13–17)
IMM GRANULOCYTES NFR BLD AUTO: 0.3 % — SIGNIFICANT CHANGE UP (ref 0–1.5)
INR BLD: 1.08 — SIGNIFICANT CHANGE UP (ref 0.88–1.16)
LYMPHOCYTES # BLD AUTO: 0.75 K/UL — LOW (ref 1–3.3)
LYMPHOCYTES # BLD AUTO: 11.1 % — LOW (ref 13–44)
MAGNESIUM SERPL-MCNC: 2 MG/DL — SIGNIFICANT CHANGE UP (ref 1.6–2.6)
MCHC RBC-ENTMCNC: 29.7 PG — SIGNIFICANT CHANGE UP (ref 27–34)
MCHC RBC-ENTMCNC: 31.6 GM/DL — LOW (ref 32–36)
MCV RBC AUTO: 93.8 FL — SIGNIFICANT CHANGE UP (ref 80–100)
MONOCYTES # BLD AUTO: 0.62 K/UL — SIGNIFICANT CHANGE UP (ref 0–0.9)
MONOCYTES NFR BLD AUTO: 9.2 % — SIGNIFICANT CHANGE UP (ref 2–14)
NEUTROPHILS # BLD AUTO: 5.29 K/UL — SIGNIFICANT CHANGE UP (ref 1.8–7.4)
NEUTROPHILS NFR BLD AUTO: 78.2 % — HIGH (ref 43–77)
NRBC # BLD: 0 /100 WBCS — SIGNIFICANT CHANGE UP (ref 0–0)
PCO2 BLDA: 45 MMHG — SIGNIFICANT CHANGE UP (ref 35–48)
PCO2 BLDV: 55 MMHG — HIGH (ref 41–51)
PH BLDA: 7.42 — SIGNIFICANT CHANGE UP (ref 7.35–7.45)
PH BLDV: 7.36 — SIGNIFICANT CHANGE UP (ref 7.32–7.43)
PHOSPHATE SERPL-MCNC: 4.9 MG/DL — HIGH (ref 2.5–4.5)
PLATELET # BLD AUTO: 92 K/UL — LOW (ref 150–400)
PO2 BLDA: 99 MMHG — SIGNIFICANT CHANGE UP (ref 83–108)
PO2 BLDV: 37 MMHG — SIGNIFICANT CHANGE UP
POTASSIUM SERPL-MCNC: 4.5 MMOL/L — SIGNIFICANT CHANGE UP (ref 3.5–5.3)
POTASSIUM SERPL-SCNC: 4.5 MMOL/L — SIGNIFICANT CHANGE UP (ref 3.5–5.3)
PROT SERPL-MCNC: 6.7 G/DL — SIGNIFICANT CHANGE UP (ref 6–8.3)
PROTHROM AB SERPL-ACNC: 12.4 SEC — SIGNIFICANT CHANGE UP (ref 10–12.9)
RBC # BLD: 3.57 M/UL — LOW (ref 4.2–5.8)
RBC # FLD: 13.6 % — SIGNIFICANT CHANGE UP (ref 10.3–14.5)
SAO2 % BLDA: 98 % — SIGNIFICANT CHANGE UP (ref 95–100)
SAO2 % BLDV: 62 % — SIGNIFICANT CHANGE UP
SODIUM SERPL-SCNC: 139 MMOL/L — SIGNIFICANT CHANGE UP (ref 135–145)
TROPONIN T SERPL-MCNC: 0.19 NG/ML — CRITICAL HIGH (ref 0–0.01)
TROPONIN T SERPL-MCNC: 0.22 NG/ML — CRITICAL HIGH (ref 0–0.01)
TROPONIN T SERPL-MCNC: 0.24 NG/ML — CRITICAL HIGH (ref 0–0.01)
WBC # BLD: 6.76 K/UL — SIGNIFICANT CHANGE UP (ref 3.8–10.5)
WBC # FLD AUTO: 6.76 K/UL — SIGNIFICANT CHANGE UP (ref 3.8–10.5)

## 2020-01-11 PROCEDURE — 99291 CRITICAL CARE FIRST HOUR: CPT

## 2020-01-11 PROCEDURE — 71045 X-RAY EXAM CHEST 1 VIEW: CPT | Mod: 26

## 2020-01-11 PROCEDURE — 99285 EMERGENCY DEPT VISIT HI MDM: CPT

## 2020-01-11 PROCEDURE — 70450 CT HEAD/BRAIN W/O DYE: CPT | Mod: 26

## 2020-01-11 PROCEDURE — 99222 1ST HOSP IP/OBS MODERATE 55: CPT | Mod: GC

## 2020-01-11 RX ORDER — HYDRALAZINE HCL 50 MG
10 TABLET ORAL ONCE
Refills: 0 | Status: COMPLETED | OUTPATIENT
Start: 2020-01-11 | End: 2020-01-11

## 2020-01-11 RX ORDER — HEPARIN SODIUM 5000 [USP'U]/ML
5000 INJECTION INTRAVENOUS; SUBCUTANEOUS EVERY 8 HOURS
Refills: 0 | Status: DISCONTINUED | OUTPATIENT
Start: 2020-01-11 | End: 2020-01-28

## 2020-01-11 RX ORDER — MORPHINE SULFATE 50 MG/1
4 CAPSULE, EXTENDED RELEASE ORAL ONCE
Refills: 0 | Status: DISCONTINUED | OUTPATIENT
Start: 2020-01-11 | End: 2020-01-11

## 2020-01-11 RX ORDER — HYDRALAZINE HCL 50 MG
100 TABLET ORAL EVERY 8 HOURS
Refills: 0 | Status: DISCONTINUED | OUTPATIENT
Start: 2020-01-11 | End: 2020-01-16

## 2020-01-11 RX ORDER — ATORVASTATIN CALCIUM 80 MG/1
40 TABLET, FILM COATED ORAL AT BEDTIME
Refills: 0 | Status: DISCONTINUED | OUTPATIENT
Start: 2020-01-11 | End: 2020-02-04

## 2020-01-11 RX ORDER — GABAPENTIN 400 MG/1
100 CAPSULE ORAL THREE TIMES A DAY
Refills: 0 | Status: DISCONTINUED | OUTPATIENT
Start: 2020-01-11 | End: 2020-01-11

## 2020-01-11 RX ORDER — OXYCODONE AND ACETAMINOPHEN 5; 325 MG/1; MG/1
1 TABLET ORAL ONCE
Refills: 0 | Status: DISCONTINUED | OUTPATIENT
Start: 2020-01-11 | End: 2020-01-11

## 2020-01-11 RX ORDER — CLOPIDOGREL BISULFATE 75 MG/1
75 TABLET, FILM COATED ORAL DAILY
Refills: 0 | Status: DISCONTINUED | OUTPATIENT
Start: 2020-01-11 | End: 2020-02-04

## 2020-01-11 RX ORDER — ASPIRIN/CALCIUM CARB/MAGNESIUM 324 MG
81 TABLET ORAL EVERY 24 HOURS
Refills: 0 | Status: DISCONTINUED | OUTPATIENT
Start: 2020-01-11 | End: 2020-02-04

## 2020-01-11 RX ORDER — AMLODIPINE BESYLATE 2.5 MG/1
10 TABLET ORAL DAILY
Refills: 0 | Status: DISCONTINUED | OUTPATIENT
Start: 2020-01-11 | End: 2020-01-11

## 2020-01-11 RX ORDER — MECLIZINE HCL 12.5 MG
25 TABLET ORAL ONCE
Refills: 0 | Status: COMPLETED | OUTPATIENT
Start: 2020-01-11 | End: 2020-01-11

## 2020-01-11 RX ORDER — HYDRALAZINE HCL 50 MG
100 TABLET ORAL ONCE
Refills: 0 | Status: COMPLETED | OUTPATIENT
Start: 2020-01-11 | End: 2020-01-11

## 2020-01-11 RX ORDER — INSULIN LISPRO 100/ML
VIAL (ML) SUBCUTANEOUS
Refills: 0 | Status: DISCONTINUED | OUTPATIENT
Start: 2020-01-11 | End: 2020-02-04

## 2020-01-11 RX ORDER — NICARDIPINE HYDROCHLORIDE 30 MG/1
5 CAPSULE, EXTENDED RELEASE ORAL
Qty: 40 | Refills: 0 | Status: DISCONTINUED | OUTPATIENT
Start: 2020-01-11 | End: 2020-01-12

## 2020-01-11 RX ORDER — SEVELAMER CARBONATE 2400 MG/1
800 POWDER, FOR SUSPENSION ORAL
Refills: 0 | Status: DISCONTINUED | OUTPATIENT
Start: 2020-01-11 | End: 2020-02-04

## 2020-01-11 RX ORDER — DEXTROSE 50 % IN WATER 50 %
25 SYRINGE (ML) INTRAVENOUS ONCE
Refills: 0 | Status: DISCONTINUED | OUTPATIENT
Start: 2020-01-11 | End: 2020-02-04

## 2020-01-11 RX ORDER — NICARDIPINE HYDROCHLORIDE 30 MG/1
5 CAPSULE, EXTENDED RELEASE ORAL
Qty: 40 | Refills: 0 | Status: DISCONTINUED | OUTPATIENT
Start: 2020-01-11 | End: 2020-01-11

## 2020-01-11 RX ORDER — LEVOTHYROXINE SODIUM 125 MCG
50 TABLET ORAL DAILY
Refills: 0 | Status: DISCONTINUED | OUTPATIENT
Start: 2020-01-11 | End: 2020-02-04

## 2020-01-11 RX ORDER — GLUCAGON INJECTION, SOLUTION 0.5 MG/.1ML
1 INJECTION, SOLUTION SUBCUTANEOUS ONCE
Refills: 0 | Status: DISCONTINUED | OUTPATIENT
Start: 2020-01-11 | End: 2020-02-04

## 2020-01-11 RX ORDER — DEXTROSE 50 % IN WATER 50 %
15 SYRINGE (ML) INTRAVENOUS ONCE
Refills: 0 | Status: DISCONTINUED | OUTPATIENT
Start: 2020-01-11 | End: 2020-02-04

## 2020-01-11 RX ORDER — LOSARTAN POTASSIUM 100 MG/1
100 TABLET, FILM COATED ORAL EVERY 24 HOURS
Refills: 0 | Status: DISCONTINUED | OUTPATIENT
Start: 2020-01-11 | End: 2020-01-16

## 2020-01-11 RX ORDER — CARVEDILOL PHOSPHATE 80 MG/1
25 CAPSULE, EXTENDED RELEASE ORAL EVERY 12 HOURS
Refills: 0 | Status: DISCONTINUED | OUTPATIENT
Start: 2020-01-11 | End: 2020-01-11

## 2020-01-11 RX ORDER — DEXTROSE 50 % IN WATER 50 %
12.5 SYRINGE (ML) INTRAVENOUS ONCE
Refills: 0 | Status: DISCONTINUED | OUTPATIENT
Start: 2020-01-11 | End: 2020-02-04

## 2020-01-11 RX ORDER — CHLORHEXIDINE GLUCONATE 213 G/1000ML
1 SOLUTION TOPICAL
Refills: 0 | Status: DISCONTINUED | OUTPATIENT
Start: 2020-01-11 | End: 2020-02-04

## 2020-01-11 RX ORDER — ISOSORBIDE MONONITRATE 60 MG/1
60 TABLET, EXTENDED RELEASE ORAL EVERY 24 HOURS
Refills: 0 | Status: DISCONTINUED | OUTPATIENT
Start: 2020-01-11 | End: 2020-01-13

## 2020-01-11 RX ORDER — SODIUM CHLORIDE 9 MG/ML
1000 INJECTION, SOLUTION INTRAVENOUS
Refills: 0 | Status: DISCONTINUED | OUTPATIENT
Start: 2020-01-11 | End: 2020-02-04

## 2020-01-11 RX ADMIN — Medication 10 MILLIGRAM(S): at 15:30

## 2020-01-11 RX ADMIN — HEPARIN SODIUM 5000 UNIT(S): 5000 INJECTION INTRAVENOUS; SUBCUTANEOUS at 18:37

## 2020-01-11 RX ADMIN — ATORVASTATIN CALCIUM 40 MILLIGRAM(S): 80 TABLET, FILM COATED ORAL at 22:02

## 2020-01-11 RX ADMIN — OXYCODONE AND ACETAMINOPHEN 1 TABLET(S): 5; 325 TABLET ORAL at 09:53

## 2020-01-11 RX ADMIN — NICARDIPINE HYDROCHLORIDE 25 MG/HR: 30 CAPSULE, EXTENDED RELEASE ORAL at 17:48

## 2020-01-11 RX ADMIN — Medication 25 MILLIGRAM(S): at 08:26

## 2020-01-11 RX ADMIN — Medication 100 MILLIGRAM(S): at 07:50

## 2020-01-11 RX ADMIN — Medication 0.3 MILLIGRAM(S): at 07:50

## 2020-01-11 RX ADMIN — Medication 2: at 17:47

## 2020-01-11 RX ADMIN — OXYCODONE AND ACETAMINOPHEN 1 TABLET(S): 5; 325 TABLET ORAL at 23:29

## 2020-01-11 RX ADMIN — OXYCODONE AND ACETAMINOPHEN 1 TABLET(S): 5; 325 TABLET ORAL at 22:29

## 2020-01-11 NOTE — H&P ADULT - NSHPLABSRESULTS_GEN_ALL_CORE
LABS:                         10.6   6.76  )-----------( 92       ( 11 Jan 2020 06:31 )             33.5     01-11    139  |  96  |  40<H>  ----------------------------<  192<H>  4.5   |  25  |  4.46<H>    Ca    10.0      11 Jan 2020 06:31  Phos  4.9     01-11  Mg     2.0     01-11    TPro  6.7  /  Alb  4.2  /  TBili  0.4  /  DBili  x   /  AST  26  /  ALT  14  /  AlkPhos  142<H>  01-11    PT/INR - ( 11 Jan 2020 06:31 )   PT: 12.4 sec;   INR: 1.08          PTT - ( 11 Jan 2020 06:31 )  PTT:31.7 sec    CARDIAC MARKERS ( 11 Jan 2020 10:39 )  x     / 0.24 ng/mL / x     / x     / x      CARDIAC MARKERS ( 11 Jan 2020 06:31 )  x     / 0.19 ng/mL / x     / x     / x          RADIOLOGY, EKG & ADDITIONAL TESTS:  < from: CT Head No Cont (01.11.20 @ 08:42) >    FINDINGS: The CT examination demonstrates the ventricles, cisternal spaces, and cortical sulci to be stable and within normal limits for the patient's age. There is no midline shift or extra axial collections. The gray white differentiation appears within normal limits.  Patchy periventricular lucency is noted, suggestive of microangiopathic ischemic disease. There are again lacunar infarcts in the basal ganglia bilaterally. There is no intracranial hemorrhage or acute transcortical infarct. External carotid artery branch calcification, as seen in patients on dialysis.    The bony windows demonstrates no fractures. The visualized paranasal sinuses are within normal limits. The mastoid air cells are well aerated.    IMPRESSION: No intracranial hemorrhage, acute transcortical infarct, or calvarial fracture. No significant interval change since 8/8/2019.

## 2020-01-11 NOTE — CONSULT NOTE ADULT - ASSESSMENT
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 for syncopal episode and found to have elevated blood pressure. Admitted to CCU for emergent HD and managment of hypertensive emergency.    # ESRD on HD TTS via LUE AVF  - last HD was stopped after syncopal episode at outpatient   - volume status and electrolytes acceptable at this time  - will defer HD for now  - stop gabapentin for now in setting of AMS  - bun 40 - AMS unlikely renal related  - patient received ~ 45 minutes of HD today  - daily weights  - renal diet  - strict I/O  - will assess daily for HD requirements    # Renal bone disease  - continue renvela 800 mg po tid      # Hypertensive emergency  - got 30 min of HD at outpatient unit today  - BP on arrival 209/78.   - Received in ED: Clonidine 0.3mg x1, hydralazine 100mg x1, meclizine 25mg x1, percocet 5mg x1  - 15 minutes into dialysis in CCU, approx 500cc off, when he became unresponsive. Dialysis was held. Followed by episode of ladarius down to HR of 30's and repeat BP systolic 90's. Rapid response was called. Heart rate spontaneously started to increase without medications or management. Patient became more arousable and was able to answer some questions, but was still lethargic  - A-line placed for better blood pressure management on 1/11.  - c/w imdur 60mg q24h  - c/w hydralazine 100mg q8h  - c/w losartan 100mg q24h  - started nicardipine infusion at 5mg/hr titrate to systolic BP goal of 185.        # Questionable History of CVA  - CT Brain showed microangiopathic ischemic disease. There are again lacunar infarcts in the basal ganglia bilaterally. There is no intracranial hemorrhage or acute transcortical infarct. External carotid artery branch calcifications, as seen in patients on dialysis.   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  - neuro checks  - Avoid sedating medications

## 2020-01-11 NOTE — H&P ADULT - ASSESSMENT
68 y/o M who is a POOR/UNRELIABLE HISTORIAN with PMHx of ESRD on dialysis via AVF (tues/thurs/sat), HTN, HLD, CAD, BRYNN (s/p stent), IDDM (with peripheral nueropathy), ESRD on dialysis (tues/thurs/sat), hypothryoidism, ?CVA (blind in right eye, little vision) - patient is s/p carotid artery stent), PAD (s/p bilateral stents) who was BIBEMS to [facility] for syncopal episode and found to have elevated blood pressure. Admitted to CCU for emergent HD and managment of hypertensive emergency.    Cardio  # Hypertensive emergency  - BP on arrival 209/78. Patient admitted in august with similar picture of hypertensive crises, back pain, requiring HD  - Received in ED: Clonidine 0.3mg x1, hydralazine 100mg x1, meclizine 25mg x1, percocet 5mg x1      CCU Event Note after arrival to unit: Patient was 15 minutes into dialysis, approx 500cc off, when he became unresponsive. Dialysis was held. On telemetry he began to ladarius down to HR of 30's and repeat BP systolic 90's. Rapid response was called. Blood glucose ~160. Heart rate spontaneously started to increase without medications or management. Patient became more arousable and was able to answer some questions, but was still lethargic. Stroke code was called for slight right sided facial droop. CT Brain showed microangiopathic ischemic disease. There are again lacunar infarcts in the basal ganglia bilaterally. There is no intracranial hemorrhage or acute transcortical infarct. External carotid artery branch calcifications, as seen in patients on dialysis. A-line placed for better blood pressure management. 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 Admitted to CCU for emergent HD and managment of hypertensive emergency.    Cardio  # Hypertensive emergency  - BP on arrival 209/78. Patient admitted in august with similar picture of hypertensive crises, back pain, requiring HD  - Received in ED: Clonidine 0.3mg x1, hydralazine 100mg x1, meclizine 25mg x1, percocet 5mg x1  - 15 minutes into dialysis in CCU, approx 500cc off, when he became unresponsive. Dialysis was held.   - Episode of ladarius down to HR of 30's and repeat BP systolic 90's. Rapid response was called. Heart rate spontaneously started to increase without medications or management. Patient became more arousable and was able to answer some questions, but was still lethargic  - A-line placed for better blood pressure management on 1/11.  - c/w imdur 60mg q24h  - c/w hydralazine 100mg q8h  - c/w losartan 100mg q24h  - started nicardipine infusion at 5mg/hr titrate to systolic BP goal of 185.    # Hx of CAD, PAD (with stents), BRYNN (with stents), carotid artery stenosis (with stents)  - c/w ASA, plavix, atorvastatin  - f/u on CAD collateral and stent placements    # Aortic Stenosis  - Grade 3 systolic murmur  - ECHO 8/19: Moderate symmetric left ventricular hypertrophy. Normal right ventricular size and systolic function. Moderate aortic stenosis.    Renal  # ESRD on HD TTS via LUE AVF  - last HD on 1/10 was stopped after syncopal episode outpatient   - volume status and electrolytes acceptable at this time per nephro  - HD per nephrology recs  - Renvela 800mg TID  - daily weights  - renal diet  - strict I/O    Neuro  # Questionable History of CVA  - CT Brain showed microangiopathic ischemic disease. There are again lacunar infarcts in the basal ganglia bilaterally. There is no intracranial hemorrhage or acute transcortical infarct. External carotid artery branch calcifications, as seen in patients on dialysis.   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  - neuro checks  - Avoid sedating medications     Pulm  # Elevated pulmonary artery pressure   ECHO 8/19: Severe pulmonary hypertension, PASP is 72.1 mmHg.      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 for syncopal episode and found to have elevated blood pressure. Admitted to CCU for emergent HD and managment of hypertensive emergency.    Cardio  # Hypertensive emergency  - BP on arrival 209/78. Patient admitted in august with similar picture of hypertensive crises, back pain, requiring HD  - Received in ED: Clonidine 0.3mg x1, hydralazine 100mg x1, meclizine 25mg x1, percocet 5mg x1  - 15 minutes into dialysis in CCU, approx 500cc off, when he became unresponsive. Dialysis was held. Followed by episode of ladarius down to HR of 30's and repeat BP systolic 90's. Rapid response was called. Heart rate spontaneously started to increase without medications or management. Patient became more arousable and was able to answer some questions, but was still lethargic  - A-line placed for better blood pressure management on 1/11.  - c/w imdur 60mg q24h  - c/w hydralazine 100mg q8h  - c/w losartan 100mg q24h  - started nicardipine infusion at 5mg/hr titrate to systolic BP goal of 185.    # Hx of CAD, PAD (with stents), BRYNN (with stents), carotid artery stenosis (with stents)  - c/w ASA, plavix, atorvastatin  - f/u on CAD collateral and stent placements    # Aortic Stenosis  - Grade 3 systolic murmur  - ECHO 8/19: Moderate symmetric left ventricular hypertrophy. Normal right ventricular size and systolic function. Moderate aortic stenosis.    Renal  # ESRD on HD TTS via LUE AVF  - last HD on 1/10 was stopped after syncopal episode outpatient   - volume status and electrolytes acceptable at this time per nephro  - HD per nephrology recs  - Renvela 800mg TID  - daily weights  - renal diet  - strict I/O    Neuro  # Questionable History of CVA  - CT Brain showed microangiopathic ischemic disease. There are again lacunar infarcts in the basal ganglia bilaterally. There is no intracranial hemorrhage or acute transcortical infarct. External carotid artery branch calcifications, as seen in patients on dialysis.   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  - neuro checks  - Avoid sedating medications     Pulm  # Elevated pulmonary artery pressure   ECHO 8/19: Severe pulmonary hypertension, PASP is 72.1 mmHg.      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 Admitted to CCU for emergent HD and managment of hypertensive emergency.    Cardio  # Hypertensive emergency  - BP on arrival 209/78. Patient admitted in august with similar picture of hypertensive crises, back pain, requiring HD  - Received in ED: Clonidine 0.3mg x1, hydralazine 100mg x1, meclizine 25mg x1, percocet 5mg x1  - 15 minutes into dialysis in CCU, approx 500cc off, when he became unresponsive. Dialysis was held. Followed by episode of ladarius down to HR of 30's and repeat BP systolic 90's. Rapid response was called. Heart rate spontaneously started to increase without medications or management. Patient became more arousable and was able to answer some questions, but was still lethargic  - A-line placed for better blood pressure management on 1/11.  - c/w imdur 60mg q24h  - c/w hydralazine 100mg q8h  - c/w losartan 100mg q24h  - started nicardipine infusion at 5mg/hr titrate to systolic BP goal of 185.    # Hx of CAD, PAD (with stents), BRYNN (with stents), carotid artery stenosis (with stents)  - c/w ASA, plavix, atorvastatin  - f/u lipid profile, TSH  - f/u on CAD collateral and stent placements    # Aortic Stenosis  - Grade 3 systolic murmur  - ECHO 8/19: Moderate symmetric left ventricular hypertrophy. Normal right ventricular size and systolic function. Moderate aortic stenosis.    Renal  # ESRD on HD TTS via LUE AVF  - last HD on 1/10 was stopped after syncopal episode outpatient   - volume status and electrolytes acceptable at this time per nephro  - HD per nephrology recs  - Renvela 800mg TID  - daily weights  - renal diet  - strict I/O    Neuro  # Questionable History of CVA  - CT Brain showed microangiopathic ischemic disease. There are again lacunar infarcts in the basal ganglia bilaterally. There is no intracranial hemorrhage or acute transcortical infarct. External carotid artery branch calcifications, as seen in patients on dialysis.   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  - neuro checks  - Avoid sedating medications     Pulm  # Elevated pulmonary artery pressure   ECHO 8/19: Severe pulmonary hypertension, PASP is 72.1 mmHg.      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 Admitted to CCU for emergent HD and managment of hypertensive emergency.    Cardio  # Hypertensive emergency  - BP on arrival 209/78. Patient admitted in august with similar picture of hypertensive crises, back pain, requiring HD  - Received in ED: Clonidine 0.3mg x1, hydralazine 100mg x1, meclizine 25mg x1, percocet 5mg x1  - 15 minutes into dialysis in CCU, approx 500cc off, when he became unresponsive. Dialysis was held. Followed by episode of ladarius down to HR of 30's and repeat BP systolic 90's. Rapid response was called. Heart rate spontaneously started to increase without medications or management. Patient became more arousable and was able to answer some questions, but was still lethargic  - A-line placed for better blood pressure management on 1/11.  - c/w imdur 60mg q24h  - c/w hydralazine 100mg q8h  - c/w losartan 100mg q24h  - started nicardipine infusion at 5mg/hr titrate to systolic BP goal of 185.    # Hx of CAD, PAD (with stents), BRYNN (with stents), carotid artery stenosis (with stents)  - c/w ASA, plavix, atorvastatin  - f/u lipid profile, TSH  - f/u on CAD collateral and stent placements    # Aortic Stenosis  - Grade 3 systolic murmur  - ECHO 8/19: Moderate symmetric left ventricular hypertrophy. Normal right ventricular size and systolic function. Moderate aortic stenosis.    Renal  # ESRD on HD TTS via LUE AVF  - last HD on 1/10 was stopped after syncopal episode outpatient   - volume status and electrolytes acceptable at this time per nephro  - HD per nephrology recs  - Renvela 800mg TID  - daily weights  - renal diet  - strict I/O    Neuro  # Questionable History of CVA  - CT Brain showed microangiopathic ischemic disease. There are again lacunar infarcts in the basal ganglia bilaterally. There is no intracranial hemorrhage or acute transcortical infarct. External carotid artery branch calcifications, as seen in patients on dialysis.   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  - neuro checks  - Avoid sedating medications     Pulm  # Elevated pulmonary artery pressure   ECHO 8/19: Severe pulmonary hypertension, PASP is 72.1 mmHg.    Endo  # Hypothyroidism  - c/w home synthroid 50mcg daily    # IDDM  - A1c 8/19: 7.2%  - c/w moderate ISS    Prophylaxis  F: None  E: Replete per nephro recs  N: DASH/TLC  GI: None  DVT; SQH 5000 U q8h    DISPO: CCU  CODE: Full

## 2020-01-11 NOTE — ED PROVIDER NOTE - CLINICAL SUMMARY MEDICAL DECISION MAKING FREE TEXT BOX
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  Pt hypertensive in /98, given his home meds, meclizine with resolution of dizziness, BP now 188/84.  Labs show trop 0.19 (baseline), K+ 4.5, no EKG changes, normal CXR and exam.  Will repeat troponin, if not elevating, will plan for outpatient dialysis today.

## 2020-01-11 NOTE — ED ADULT TRIAGE NOTE - ARRIVAL INFO ADDITIONAL COMMENTS
Patient reported to have been under scheduled dialysis therapy this morning. Patient was reported to have been given food while on dialysis therapy. Patient reported to have been found unresponsive by dialysis staff. Upon arrival of EMS, patient was reported to have regurgitated food post attempted Heimlich maneuver by clinical staff in which the patient was reported to have immediately awoken. Patient reported to have completed 1/2 hour of dialysis therapy. Patient noted to be A/O x 3 with EMS

## 2020-01-11 NOTE — H&P ADULT - NSHPPHYSICALEXAM_GEN_ALL_CORE
.  VITAL SIGNS:  T(C): 36.7 (01-11-20 @ 16:34), Max: 37.1 (01-11-20 @ 05:56)  T(F): 98.1 (01-11-20 @ 16:34), Max: 98.7 (01-11-20 @ 05:56)  HR: 64 (01-11-20 @ 16:34) (56 - 66)  BP: 202/93 (01-11-20 @ 16:02) (188/84 - 218/88)  BP(mean): 152 (01-11-20 @ 16:02) (101 - 152)  RR: 11 (01-11-20 @ 16:34) (11 - 24)  SpO2: 100% (01-11-20 @ 16:34) (97% - 100%)  Wt(kg): --    PHYSICAL EXAM:    Constitutional: WDWN resting comfortably in bed; NAD  Head: NC/AT  Eyes: Right pupil non-reactive to light, left pupil sluggishly reactive to light, EOMI, anicteric sclera  ENT: no nasal discharge; uvula midline, no oropharyngeal erythema or exudates; MMM  Neck: supple; no JVD or thyromegaly  Respiratory: CTA B/L; no W/R/R, no retractions  Cardiac: +S1/S2; RRR; grade 3 systolic blowing murmur heard in the aortic valve position and left sternal border  Gastrointestinal: soft, NT/ND; no rebound or guarding; +BSx4  Extremities: WWP, no clubbing or cyanosis; no peripheral edema  Vascular: Left arm AVF with bruit  Dermatologic: chronic skin changes in bilateral lower legs. Small ulcer on left thumb, scattered healing scabs on left hand.  Lymphatic: no submandibular or cervical LAD  Neurologic: AAOx3; visual field defect in right eye, slight facial droop in right face, unknown baseline. Motor 5/5 and sensation intact.

## 2020-01-11 NOTE — H&P ADULT - ATTENDING COMMENTS
Critical Care Attestation    I have administered 60 minutes of critical care to the patient excluding procedures.    Pt is a 70 y/o man c ESRD on HD, CAD s/p RCA PCI, HTN, HLP, IDDM c peripheral neuropathy, CVA (blind in rt eye), PVD s/p carotid stent brought bc syncope and hypertensive emergency.    afeb, /108, with the initiation of dialysis, pt went vagal HR to 30s with SBP 80s from 200s.  HD halted and allen placed, cardene gtt started    afeb, HR 74, /74, 16-19    Hgb 10.6  Plt 101  Cr 5.18  Bun 45  A1C 6.3  Prisca 0.19 --> 0.22    CXR: mildly improving  ECG: st @ 101bpm c prob LVH    - unclear why pt vagal with HD  - cont to attempt HD  - restart clonidine, norvasc, coreg to control bp  - cont to f/u  HR  - restart neurontin for peripheral neuropathy, pt with foot pain with strong pulses

## 2020-01-11 NOTE — CONSULT NOTE ADULT - SUBJECTIVE AND OBJECTIVE BOX
Patient is a 69y old  Male who presents with a chief complaint of syncope (11 Jan 2020 17:41)    Renal consult placed for ESRD patient due for HD today      Mr. 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 for syncopal episode and found to have elevated blood pressure. patient was at dialysis yesterday with a report of syncopal episode with possible aspiration of food. Patient states that yesterday he was complaining of feeling dizzy. Report of possible syncope and aspiration while eating food during dialysis. Patient currently admits to pain in the neck and right hip that radiates down his right leg as well as some mild shortness of breath. States dizziness has improved. Denies headache, nausea, vomiting, chest pain, or abdominal pain. Cannot state if he started new medications, but endorsed no changes since previous admission. Patient was arousable and answering questions, but would nod off to sleep at times    ED Vitals: T 98.6 P66 /78 RR24 O2 100%  ED Course: Clonidine 0.3mg x1, hydralizine 100mg x1, meclizine 25mg x1, percocet 5mg x1. Admitted to CCU for emergent HD and management of hypertensive urgency.    CCU Event Note after arrival to unit: Patient was 15 minutes into dialysis, approx 500cc off, when he became unresponsive. Dialysis was held. On telemetry he began to ladarius down to HR of 30's and repeat BP systolic 90's. Rapid response was called. Blood glucose ~160. Heart rate spontaneously started to increase without medications or management. Patient became more arousable and was able to answer some questions, but was still lethargic. Stroke code was called for slight right sided facial droop. CT Brain showed microangiopathic ischemic disease. There are again lacunar infarcts in the basal ganglia bilaterally. There is no intracranial hemorrhage or acute transcortical infarct. External carotid artery branch calcifications, as seen in patients on dialysis. A-line placed for better blood pressure management. (11 Jan 2020 13:46)     Preliminary CT head is negative.      PAST MEDICAL & SURGICAL HISTORY:  Peripheral neuropathy  Peripheral artery disease: s/p bilateral stents  Anemia of renal disease  End-stage renal disease (ESRD)  Type II diabetes mellitus  Retinal artery occlusion  Hypothyroidism  Low back pain  CAD (coronary artery disease)  H/O renal artery stenosis: s/p L renal stent  Hyperlipidemia  Hypertension  No significant past surgical history    Home Medications:   * Patient Currently Takes Medications as of 13-Aug-2019 15:05 documented in Structured Notes  · 	hydrALAZINE 100 mg oral tablet: 1 tab(s) orally every 8 hours  · 	Renvela 800 mg oral tablet: 1 tab(s) orally 3 times a day (with meals)  · 	atorvastatin 40 mg oral tablet: 1 tab(s) orally once a day (at bedtime)  · 	gabapentin 100 mg oral capsule: 1 cap(s) orally 3 times a day  · 	isosorbide mononitrate 60 mg oral tablet, extended release: 1 tab(s) orally once a day  · 	Lantus Solostar Pen 100 units/mL subcutaneous solution: 4 unit(s) subcutaneous once a day (at bedtime)   · 	cloNIDine 0.3 mg oral tablet: 1 tab(s) orally 3 times a day  · 	Coreg 25 mg oral tablet: 1 tab(s) orally every 12 hours  · 	amLODIPine 10 mg oral tablet: 1 tab(s) orally once a day  · 	losartan 100 mg oral tablet: 1 tab(s) orally once a day  · 	Plavix 75 mg oral tablet: 1 tab(s) orally once a day  · 	Aspirin Enteric Coated 81 mg oral delayed release tablet: 1 tab(s) orally once a day  · 	Flexeril 5 mg oral tablet: 1 tab(s) orally once a day  · 	levothyroxine 50 mcg (0.05 mg) oral tablet: 1 tab(s) orally once a day      Allergies    No Known Allergies    Intolerances        FAMILY HISTORY:  FH: stroke: father at 66, mother at 85      SOCIAL HISTORY:      MEDICATIONS  (STANDING):  aspirin  chewable 81 milliGRAM(s) Oral every 24 hours  atorvastatin 40 milliGRAM(s) Oral at bedtime  chlorhexidine 2% Cloths 1 Application(s) Topical <User Schedule>  clopidogrel Tablet 75 milliGRAM(s) Oral daily  dextrose 5%. 1000 milliLiter(s) (50 mL/Hr) IV Continuous <Continuous>  dextrose 50% Injectable 12.5 Gram(s) IV Push once  dextrose 50% Injectable 25 Gram(s) IV Push once  dextrose 50% Injectable 25 Gram(s) IV Push once  heparin  Injectable 5000 Unit(s) SubCutaneous every 8 hours  hydrALAZINE 100 milliGRAM(s) Oral every 8 hours  insulin lispro (HumaLOG) corrective regimen sliding scale   SubCutaneous Before meals and at bedtime  isosorbide   mononitrate ER Tablet (IMDUR) 60 milliGRAM(s) Oral every 24 hours  levothyroxine 50 MICROGram(s) Oral daily  losartan 100 milliGRAM(s) Oral every 24 hours  niCARdipine Infusion 5 mG/Hr (25 mL/Hr) IV Continuous <Continuous>  sevelamer carbonate 800 milliGRAM(s) Oral three times a day with meals    MEDICATIONS  (PRN):  dextrose 40% Gel 15 Gram(s) Oral once PRN Blood Glucose LESS THAN 70 milliGRAM(s)/deciliter  glucagon  Injectable 1 milliGRAM(s) IntraMuscular once PRN Glucose LESS THAN 70 milligrams/deciliter      Vital Signs Last 24 Hrs  T(C): 36.7 (11 Jan 2020 16:34), Max: 37.1 (11 Jan 2020 05:56)  T(F): 98.1 (11 Jan 2020 16:34), Max: 98.7 (11 Jan 2020 05:56)  HR: 64 (11 Jan 2020 17:30) (56 - 66)  BP: 202/93 (11 Jan 2020 16:02) (188/84 - 218/88)  BP(mean): 152 (11 Jan 2020 16:02) (101 - 152)  RR: 12 (11 Jan 2020 17:30) (11 - 24)  SpO2: 100% (11 Jan 2020 17:30) (97% - 100%)    REVIEW OF SYSTEMS:  Gen: No fever  CVS: No chest pain  Resp: No shortness of breath  Abd: No nausea/ no vomiting/No abdominal pain  CNS: No headache      PHYSICAL EXAM:  gen: WDWN resting comfortably in bed; NAD  Head: NC/AT  Eyes: Right pupil non-reactive to light, left pupil sluggishly reactive to light, EOMI, anicteric sclera  Neck: supple; no JVD or thyromegaly  Respiratory: CTA B/L; no W/R/R, no retractions  Cardiac: +S1/S2; RRR; grade 3 systolic blowing murmur heard in the aortic valve position and left sternal border  Gastrointestinal: soft, NT/ND; no rebound or guarding; +BSx4  Extremities: WWP, no clubbing or cyanosis; no peripheral edema  Dermatologic: chronic skin changes in bilateral lower legs. Small ulcer on left thumb, scattered healing scabs on left hand.  Lymphatic: no submandibular or cervical LAD  Neurologic: AAOx3; visual field defect in right eye, slight facial droop in right face, unknown baseline. Motor 5/5 and sensation intact.      access:  Left arm AVF with bruit      CAPILLARY BLOOD GLUCOSE  163 (11 Jan 2020 15:45)      POCT Blood Glucose.: 175 mg/dL (11 Jan 2020 17:33)  POCT Blood Glucose.: 162 mg/dL (11 Jan 2020 14:54)  POCT Blood Glucose.: 179 mg/dL (11 Jan 2020 12:29)      I&O's Summary        LABS:                            10.6   6.76  )-----------( 92       ( 11 Jan 2020 06:31 )             33.5       PT/INR - ( 11 Jan 2020 06:31 )   PT: 12.4 sec;   INR: 1.08          PTT - ( 11 Jan 2020 06:31 )  PTT:31.7 sec  CARDIAC MARKERS ( 11 Jan 2020 17:21 )  x     / 0.22 ng/mL / x     / x     / x      CARDIAC MARKERS ( 11 Jan 2020 10:39 )  x     / 0.24 ng/mL / x     / x     / x      CARDIAC MARKERS ( 11 Jan 2020 06:31 )  x     / 0.19 ng/mL / x     / x     / x              RADIOLOGY & ADDITIONAL TESTS:    	RADIOLOGY, EKG & ADDITIONAL TESTS:  	< from: CT Head No Cont (01.11.20 @ 08:42) >    	FINDINGS: The CT examination demonstrates the ventricles, cisternal spaces, and cortical sulci to be stable and within normal limits for the patient's age. There is no midline shift or extra axial collections. The gray white differentiation appears within normal limits.  Patchy periventricular lucency is noted, suggestive of microangiopathic ischemic disease. There are again lacunar infarcts in the basal ganglia bilaterally. There is no intracranial hemorrhage or acute transcortical infarct. External carotid artery branch calcification, as seen in patients on dialysis.    	The bony windows demonstrates no fractures. The visualized paranasal sinuses are within normal limits. The mastoid air cells are well aerated.    	IMPRESSION: No intracranial hemorrhage, acute transcortical infarct, or calvarial fracture. No significant interval change since 8/8/2019.

## 2020-01-11 NOTE — H&P ADULT - HISTORY OF PRESENT ILLNESS
Mr. 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 for syncopal episode and found to have elevated blood pressure. patient was at dialysis yesterday with a report of syncopal episode with possible aspiration of food. Patient states that yesterday he was complaining of feeling dizzy Mr. 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 for syncopal episode and found to have elevated blood pressure. patient was at dialysis yesterday with a report of syncopal episode with possible aspiration of food. Patient states that yesterday he was complaining of feeling dizzy. Report of possible syncope and aspiration while eating food during dialysis. Patient currently admits to pain in the neck and right hip that radiates down his right leg as well as some mild shortness of breath. States dizziness has improved. Denies headache, nausea, vomiting, chest pain, or abdominal pain. Cannot state if he started new medications, but endorsed no changes since previous admission. Patient was letharg    ED Vitals: T 98.6 P66 /78 RR24 O2 100%  ED Course: Clonidine 0.3mg x1, hydralizine 100mg x1, meclizine 25mg x1, percocet 5mg x1. Admitted to CCU for emergent HD and management of hypertensive urgency.    CCU Event Note: Mr. 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 for syncopal episode and found to have elevated blood pressure. patient was at dialysis yesterday with a report of syncopal episode with possible aspiration of food. Patient states that yesterday he was complaining of feeling dizzy. Report of possible syncope and aspiration while eating food during dialysis. Patient currently admits to pain in the neck and right hip that radiates down his right leg as well as some mild shortness of breath. States dizziness has improved. Denies headache, nausea, vomiting, chest pain, or abdominal pain. Cannot state if he started new medications, but endorsed no changes since previous admission. Patient was arousable and answering questions, but would nod off to sleep at times    ED Vitals: T 98.6 P66 /78 RR24 O2 100%  ED Course: Clonidine 0.3mg x1, hydralizine 100mg x1, meclizine 25mg x1, percocet 5mg x1. Admitted to CCU for emergent HD and management of hypertensive urgency.    CCU Event Note: Patient was 15 minutes into dialysis, approx 500cc off, when he became unresponsive. On telemetry he began to ladarius down to HR of 30's. Mr. 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 patient was at dialysis yesterday with a report of syncopal episode with possible aspiration of food. Patient states that yesterday he was complaining of feeling dizzy. Report of possible syncope and aspiration while eating food during dialysis. Patient currently admits to pain in the neck and right hip that radiates down his right leg as well as some mild shortness of breath. States dizziness has improved. Denies headache, nausea, vomiting, chest pain, or abdominal pain. Cannot state if he started new medications, but endorsed no changes since previous admission. Patient was arousable and answering questions, but would nod off to sleep at times    ED Vitals: T 98.6 P66 /78 RR24 O2 100%  ED Course: Clonidine 0.3mg x1, hydralizine 100mg x1, meclizine 25mg x1, percocet 5mg x1. Admitted to CCU for emergent HD and management of hypertensive urgency.    CCU Event Note after arrival to unit: Patient was 15 minutes into dialysis, approx 500cc off, when he became unresponsive. Dialysis was held. On telemetry he began to ladarius down to HR of 30's and repeat BP systolic 90's. Rapid response was called. Blood glucose ~160. Heart rate spontaneously started to increase without medications or management. Patient became more arousable and was able to answer some questions, but was still lethargic. Stroke code was called for slight right sided facial droop. CT Brain showed microangiopathic ischemic disease. There are again lacunar infarcts in the basal ganglia bilaterally. There is no intracranial hemorrhage or acute transcortical infarct. External carotid artery branch calcifications, as seen in patients on dialysis. A-line placed for better blood pressure management. Mr. 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 patient was at dialysis this morning with a reported of syncopal episode with possible aspiration of food. Patient states that yesterday he was complaining of feeling dizzy. Report of possible syncope and aspiration while eating food during dialysis. Patient currently admits to pain in the neck and right hip that radiates down his right leg as well as some mild shortness of breath. States dizziness has improved. Denies headache, nausea, vomiting, chest pain, or abdominal pain. Cannot state if he started new medications, but endorsed no changes since previous admission. Patient was arousable and answering questions, but would nod off to sleep at times    ED Vitals: T 98.6 P66 /78 RR24 O2 100%  ED Course: Clonidine 0.3mg x1, hydralizine 100mg x1, meclizine 25mg x1, percocet 5mg x1. Admitted to CCU for emergent HD and management of hypertensive urgency.    CCU Event Note after arrival to unit: Patient was 15 minutes into dialysis, approx 500cc off, when he became unresponsive. Dialysis was held. On telemetry he began to ladarius down to HR of 30's and repeat BP systolic 90's. Rapid response was called. Blood glucose ~160. Heart rate spontaneously started to increase without medications or management. Patient became more arousable and was able to answer some questions, but was still lethargic. Stroke code was called for slight right sided facial droop. CT Brain showed microangiopathic ischemic disease. There are again lacunar infarcts in the basal ganglia bilaterally. There is no intracranial hemorrhage or acute transcortical infarct. External carotid artery branch calcifications, as seen in patients on dialysis. A-line placed for better blood pressure management.

## 2020-01-11 NOTE — PROCEDURE NOTE - NSPROCDETAILS_GEN_ALL_CORE
location identified, draped/prepped, sterile technique used, needle inserted/introduced/positive blood return obtained via catheter/connected to a pressurized flush line/hemostasis with direct pressure, dressing applied/sutured in place/all materials/supplies accounted for at end of procedure

## 2020-01-11 NOTE — CONSULT NOTE ADULT - ASSESSMENT
Stroke code called for patient suddenly becoming hypertensive and lethargic. On arrival patient is closer to baseline mental status though still fatigued, slightly slow to respond, and somnolent. Exam is negative for any focal deficits. NIHSS is 1 for R nasolabial fold flattening. Preliminary CT head is negative. We will follow up on final read. At this time acute stroke appears unlikely, would not treat.    Localization: NA  Last known normal: NA  tPA: Not Given   Continue close monitoring for neurologic deterioration

## 2020-01-11 NOTE — CONSULT NOTE ADULT - SUBJECTIVE AND OBJECTIVE BOX
HPI:  Mr. 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 patient was at dialysis yesterday with a report of syncopal episode with possible aspiration of food. Patient states that yesterday he was complaining of feeling dizzy. Report of possible syncope and aspiration while eating food during dialysis. Patient currently admits to pain in the neck and right hip that radiates down his right leg as well as some mild shortness of breath. States dizziness has improved. Denies headache, nausea, vomiting, chest pain, or abdominal pain. Cannot state if he started new medications, but endorsed no changes since previous admission. Patient was arousable and answering questions, but would nod off to sleep at times    ED Vitals: T 98.6 P66 /78 RR24 O2 100%  ED Course: Clonidine 0.3mg x1, hydralizine 100mg x1, meclizine 25mg x1, percocet 5mg x1. Admitted to CCU for emergent HD and management of hypertensive urgency.    CCU Event Note after arrival to unit: Patient was 15 minutes into dialysis, approx 500cc off, when he became unresponsive. Dialysis was held. On telemetry he began to ladarius down to HR of 30's and repeat BP systolic 90's. Rapid response was called. Blood glucose ~160. Heart rate spontaneously started to increase without medications or management. Patient became more arousable and was able to answer some questions, but was still lethargic. Stroke code was called for slight right sided facial droop. CT Brain showed microangiopathic ischemic disease. There are again lacunar infarcts in the basal ganglia bilaterally. There is no intracranial hemorrhage or acute transcortical infarct. External carotid artery branch calcifications, as seen in patients on dialysis. A-line placed for better blood pressure management.    INTERVAL HISTORY:  Stroke code called for above event in CCU. On arrival patient's mental status improved, though facial droop still present. Patient denies any symptoms other than generalized fatigue. Elevation in BP to sBP to 210s noted.     VITAL SIGNS:  Vital Signs Last 24 Hrs  T(C): 36.7 (11 Jan 2020 16:34), Max: 37.1 (11 Jan 2020 05:56)  T(F): 98.1 (11 Jan 2020 16:34), Max: 98.7 (11 Jan 2020 05:56)  HR: 64 (11 Jan 2020 16:34) (56 - 66)  BP: 202/93 (11 Jan 2020 16:02) (188/84 - 218/88)  BP(mean): 152 (11 Jan 2020 16:02) (101 - 152)  RR: 11 (11 Jan 2020 16:34) (11 - 24)  SpO2: 100% (11 Jan 2020 16:34) (97% - 100%)    REVIEW OF SYSTEMS:  CONSTITUTIONAL: Weakness. No fevers or chills.   EYES/ENT: No visual changes;  No vertigo or throat pain   NECK: No pain or stiffness  RESPIRATORY: No cough, wheezing, hemoptysis; No shortness of breath  CARDIOVASCULAR: No chest pain or palpitations  GASTROINTESTINAL: No abdominal or epigastric pain. No nausea, vomiting, or hematemesis; No diarrhea or constipation. No melena or hematochezia.  GENITOURINARY: No dysuria, frequency or hematuria  NEUROLOGICAL: No numbness or weakness  SKIN: No itching, burning, rashes, or lesions   All other review of systems is negative unless indicated above.    PHYSICAL EXAM:  Neurologic:  - Mental Status:  AAOx3; speech is fluent   - Cranial Nerves II-XII:    II:  PERRLA; visual fields are full to confrontation  III, IV, VI:  EOMI, no nystagmus  V:  facial sensation is intact in the V1-V3 distribution bilaterally.  VII:  Slight R nasolabial fold flattening. Otherwise face is symmetric.   VIII:  hearing is intact to finger rub  IX, X:  uvula is midline and soft palate rises symmetrically  XI:  head turning and shoulder shrug are intact bilaterally  XII:  tongue protrudes in the midline  - Motor:  strength is 5/5 throughout  - Sensory:  intact to light touch  - Coordination:  no dysmetria  - Gait:  Untested    MEDICATIONS:  MEDICATIONS  (STANDING):  aspirin  chewable 81 milliGRAM(s) Oral every 24 hours  atorvastatin 40 milliGRAM(s) Oral at bedtime  chlorhexidine 2% Cloths 1 Application(s) Topical <User Schedule>  clopidogrel Tablet 75 milliGRAM(s) Oral daily  dextrose 5%. 1000 milliLiter(s) (50 mL/Hr) IV Continuous <Continuous>  dextrose 50% Injectable 12.5 Gram(s) IV Push once  dextrose 50% Injectable 25 Gram(s) IV Push once  dextrose 50% Injectable 25 Gram(s) IV Push once  heparin  Injectable 5000 Unit(s) SubCutaneous every 8 hours  hydrALAZINE 100 milliGRAM(s) Oral every 8 hours  insulin lispro (HumaLOG) corrective regimen sliding scale   SubCutaneous Before meals and at bedtime  isosorbide   mononitrate ER Tablet (IMDUR) 60 milliGRAM(s) Oral every 24 hours  levothyroxine 50 MICROGram(s) Oral daily  losartan 100 milliGRAM(s) Oral every 24 hours  niCARdipine Infusion 5 mG/Hr (25 mL/Hr) IV Continuous <Continuous>  sevelamer carbonate 800 milliGRAM(s) Oral three times a day with meals    MEDICATIONS  (PRN):  dextrose 40% Gel 15 Gram(s) Oral once PRN Blood Glucose LESS THAN 70 milliGRAM(s)/deciliter  glucagon  Injectable 1 milliGRAM(s) IntraMuscular once PRN Glucose LESS THAN 70 milligrams/deciliter      ALLERGIES:  Allergies    No Known Allergies    Intolerances        LABS:                        10.6   6.76  )-----------( 92       ( 11 Jan 2020 06:31 )             33.5     01-11    139  |  96  |  40<H>  ----------------------------<  192<H>  4.5   |  25  |  4.46<H>    Ca    10.0      11 Jan 2020 06:31  Phos  4.9     01-11  Mg     2.0     01-11    TPro  6.7  /  Alb  4.2  /  TBili  0.4  /  DBili  x   /  AST  26  /  ALT  14  /  AlkPhos  142<H>  01-11    PT/INR - ( 11 Jan 2020 06:31 )   PT: 12.4 sec;   INR: 1.08          PTT - ( 11 Jan 2020 06:31 )  PTT:31.7 sec    CAPILLARY BLOOD GLUCOSE  163 (11 Jan 2020 15:45)      POCT Blood Glucose.: 175 mg/dL (11 Jan 2020 17:33)      RADIOLOGY & ADDITIONAL TESTS: Reviewed.  CTH noncontrast with no acute abnormalities.

## 2020-01-11 NOTE — ED PROVIDER NOTE - OBJECTIVE STATEMENT
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  Pt stating he doesn't remember what happened, he does report feeling dizzy prior to dialysis, still reports dizziness (room spinning and lightheadedness) after EMS woke pt up.  Per EMS, pt was given food at dialysis, his eyes were closed and was not responding to staff who called EMS, the Heimlich maneuver was performed and food was expelled and pt woke up.  Pt denies fever, chills, CP, SOB, all other ROS negative.

## 2020-01-11 NOTE — ED PROVIDER NOTE - ATTENDING CONTRIBUTION TO CARE
I discussed the plan of care of the patient directly with the PA and examined the patient while in the Emergency Department. I agree with the HPI and PE as documented by the PA.  Pt has h/o htn, hld, cad, maria t s/p stent, pad s/p stents, dm, esrd on hd (tu/th/sat), who presents s/p syncopal episode while at dialysis and received partial dialysis at the time. + dizziness. As per ems, pt was given food to eat, then "fell asleep", and had heimlich maneuver performed with expulsion of food and woke up. Pt is hypertensive in ed, vs otherwise stable. PE unremarkable. + s1, s2, rrr. Lungs cta b/l. Abd soft, nt/nd. AVF lue. Neuro with subj decreased sensation to legs 2/2 neuropathy. Trop elev to 0.19, however similar to prior values. K stable. Will give oral antihypertensives, obtain ct head, and re-assess. I discussed the plan of care of the patient directly with the PA and examined the patient while in the Emergency Department. I agree with the HPI and PE as documented by the PA.  Pt has h/o htn, hld, cad, maria t s/p stent, pad s/p stents, dm, esrd on hd (tu/th/sat), who presents s/p syncopal episode while at dialysis and received partial dialysis at the time. + dizziness. As per ems, pt was given food to eat, then "fell asleep", and had heimlich maneuver performed with expulsion of food and pt woke up. Pt is hypertensive in ed, vs otherwise stable. + s1, s2, rrr. Lungs cta b/l. Abd soft, nt/nd. AVF lue, + bruit/ thrill. Subj decreased sensation to b/l legs 2/2 neuropathy. Pt is somnolent but easily arousable to verbal and tactile stimuli. Trop elev to 0.19, however similar to prior values. K stable. Will give oral antihypertensives, obtain ct head, and re-assess.

## 2020-01-12 LAB
ANION GAP SERPL CALC-SCNC: 19 MMOL/L — HIGH (ref 5–17)
BUN SERPL-MCNC: 45 MG/DL — HIGH (ref 7–23)
CALCIUM SERPL-MCNC: 10.8 MG/DL — HIGH (ref 8.4–10.5)
CHLORIDE SERPL-SCNC: 100 MMOL/L — SIGNIFICANT CHANGE UP (ref 96–108)
CO2 SERPL-SCNC: 23 MMOL/L — SIGNIFICANT CHANGE UP (ref 22–31)
CREAT SERPL-MCNC: 5.18 MG/DL — HIGH (ref 0.5–1.3)
GLUCOSE SERPL-MCNC: 85 MG/DL — SIGNIFICANT CHANGE UP (ref 70–99)
HBA1C BLD-MCNC: 6.3 % — HIGH (ref 4–5.6)
HCT VFR BLD CALC: 35.2 % — LOW (ref 39–50)
HGB BLD-MCNC: 10.6 G/DL — LOW (ref 13–17)
MAGNESIUM SERPL-MCNC: 2 MG/DL — SIGNIFICANT CHANGE UP (ref 1.6–2.6)
MCHC RBC-ENTMCNC: 28.5 PG — SIGNIFICANT CHANGE UP (ref 27–34)
MCHC RBC-ENTMCNC: 30.1 GM/DL — LOW (ref 32–36)
MCV RBC AUTO: 94.6 FL — SIGNIFICANT CHANGE UP (ref 80–100)
NRBC # BLD: 0 /100 WBCS — SIGNIFICANT CHANGE UP (ref 0–0)
PHOSPHATE SERPL-MCNC: 4.9 MG/DL — HIGH (ref 2.5–4.5)
PLATELET # BLD AUTO: 101 K/UL — LOW (ref 150–400)
POTASSIUM SERPL-MCNC: 4.4 MMOL/L — SIGNIFICANT CHANGE UP (ref 3.5–5.3)
POTASSIUM SERPL-SCNC: 4.4 MMOL/L — SIGNIFICANT CHANGE UP (ref 3.5–5.3)
RBC # BLD: 3.72 M/UL — LOW (ref 4.2–5.8)
RBC # FLD: 14 % — SIGNIFICANT CHANGE UP (ref 10.3–14.5)
SODIUM SERPL-SCNC: 142 MMOL/L — SIGNIFICANT CHANGE UP (ref 135–145)
TSH SERPL-MCNC: 1.62 UIU/ML — SIGNIFICANT CHANGE UP (ref 0.35–4.94)
WBC # BLD: 5.42 K/UL — SIGNIFICANT CHANGE UP (ref 3.8–10.5)
WBC # FLD AUTO: 5.42 K/UL — SIGNIFICANT CHANGE UP (ref 3.8–10.5)

## 2020-01-12 PROCEDURE — 71045 X-RAY EXAM CHEST 1 VIEW: CPT | Mod: 26

## 2020-01-12 PROCEDURE — 99232 SBSQ HOSP IP/OBS MODERATE 35: CPT | Mod: GC

## 2020-01-12 PROCEDURE — 99291 CRITICAL CARE FIRST HOUR: CPT

## 2020-01-12 RX ORDER — AMLODIPINE BESYLATE 2.5 MG/1
10 TABLET ORAL DAILY
Refills: 0 | Status: DISCONTINUED | OUTPATIENT
Start: 2020-01-12 | End: 2020-01-14

## 2020-01-12 RX ORDER — CARVEDILOL PHOSPHATE 80 MG/1
12.5 CAPSULE, EXTENDED RELEASE ORAL EVERY 12 HOURS
Refills: 0 | Status: DISCONTINUED | OUTPATIENT
Start: 2020-01-12 | End: 2020-01-12

## 2020-01-12 RX ORDER — CARVEDILOL PHOSPHATE 80 MG/1
25 CAPSULE, EXTENDED RELEASE ORAL EVERY 12 HOURS
Refills: 0 | Status: DISCONTINUED | OUTPATIENT
Start: 2020-01-12 | End: 2020-01-16

## 2020-01-12 RX ORDER — INSULIN GLARGINE 100 [IU]/ML
4 INJECTION, SOLUTION SUBCUTANEOUS AT BEDTIME
Refills: 0 | Status: DISCONTINUED | OUTPATIENT
Start: 2020-01-12 | End: 2020-01-13

## 2020-01-12 RX ORDER — CYCLOBENZAPRINE HYDROCHLORIDE 10 MG/1
5 TABLET, FILM COATED ORAL EVERY 24 HOURS
Refills: 0 | Status: DISCONTINUED | OUTPATIENT
Start: 2020-01-12 | End: 2020-01-16

## 2020-01-12 RX ORDER — CARVEDILOL PHOSPHATE 80 MG/1
25 CAPSULE, EXTENDED RELEASE ORAL EVERY 12 HOURS
Refills: 0 | Status: DISCONTINUED | OUTPATIENT
Start: 2020-01-12 | End: 2020-01-12

## 2020-01-12 RX ORDER — GABAPENTIN 400 MG/1
100 CAPSULE ORAL EVERY 8 HOURS
Refills: 0 | Status: DISCONTINUED | OUTPATIENT
Start: 2020-01-12 | End: 2020-01-16

## 2020-01-12 RX ORDER — TRAMADOL HYDROCHLORIDE 50 MG/1
25 TABLET ORAL DAILY
Refills: 0 | Status: DISCONTINUED | OUTPATIENT
Start: 2020-01-12 | End: 2020-01-13

## 2020-01-12 RX ORDER — ACETAMINOPHEN 500 MG
650 TABLET ORAL EVERY 6 HOURS
Refills: 0 | Status: DISCONTINUED | OUTPATIENT
Start: 2020-01-12 | End: 2020-01-14

## 2020-01-12 RX ORDER — LIDOCAINE 4 G/100G
1 CREAM TOPICAL ONCE
Refills: 0 | Status: COMPLETED | OUTPATIENT
Start: 2020-01-12 | End: 2020-01-12

## 2020-01-12 RX ADMIN — CARVEDILOL PHOSPHATE 12.5 MILLIGRAM(S): 80 CAPSULE, EXTENDED RELEASE ORAL at 11:09

## 2020-01-12 RX ADMIN — Medication 650 MILLIGRAM(S): at 11:33

## 2020-01-12 RX ADMIN — Medication 6: at 21:21

## 2020-01-12 RX ADMIN — Medication 650 MILLIGRAM(S): at 20:00

## 2020-01-12 RX ADMIN — CLOPIDOGREL BISULFATE 75 MILLIGRAM(S): 75 TABLET, FILM COATED ORAL at 11:09

## 2020-01-12 RX ADMIN — Medication 100 MILLIGRAM(S): at 21:21

## 2020-01-12 RX ADMIN — Medication 50 MICROGRAM(S): at 05:45

## 2020-01-12 RX ADMIN — SEVELAMER CARBONATE 800 MILLIGRAM(S): 2400 POWDER, FOR SUSPENSION ORAL at 11:43

## 2020-01-12 RX ADMIN — ISOSORBIDE MONONITRATE 60 MILLIGRAM(S): 60 TABLET, EXTENDED RELEASE ORAL at 11:37

## 2020-01-12 RX ADMIN — Medication 4: at 16:25

## 2020-01-12 RX ADMIN — LIDOCAINE 1 PATCH: 4 CREAM TOPICAL at 07:13

## 2020-01-12 RX ADMIN — GABAPENTIN 100 MILLIGRAM(S): 400 CAPSULE ORAL at 11:09

## 2020-01-12 RX ADMIN — Medication 650 MILLIGRAM(S): at 02:26

## 2020-01-12 RX ADMIN — CHLORHEXIDINE GLUCONATE 1 APPLICATION(S): 213 SOLUTION TOPICAL at 05:45

## 2020-01-12 RX ADMIN — SEVELAMER CARBONATE 800 MILLIGRAM(S): 2400 POWDER, FOR SUSPENSION ORAL at 07:50

## 2020-01-12 RX ADMIN — Medication 0.3 MILLIGRAM(S): at 15:08

## 2020-01-12 RX ADMIN — Medication 0.3 MILLIGRAM(S): at 07:51

## 2020-01-12 RX ADMIN — Medication 100 MILLIGRAM(S): at 05:45

## 2020-01-12 RX ADMIN — INSULIN GLARGINE 4 UNIT(S): 100 INJECTION, SOLUTION SUBCUTANEOUS at 23:16

## 2020-01-12 RX ADMIN — LIDOCAINE 1 PATCH: 4 CREAM TOPICAL at 14:30

## 2020-01-12 RX ADMIN — CYCLOBENZAPRINE HYDROCHLORIDE 5 MILLIGRAM(S): 10 TABLET, FILM COATED ORAL at 07:18

## 2020-01-12 RX ADMIN — LOSARTAN POTASSIUM 100 MILLIGRAM(S): 100 TABLET, FILM COATED ORAL at 11:36

## 2020-01-12 RX ADMIN — GABAPENTIN 100 MILLIGRAM(S): 400 CAPSULE ORAL at 19:13

## 2020-01-12 RX ADMIN — Medication 650 MILLIGRAM(S): at 12:00

## 2020-01-12 RX ADMIN — TRAMADOL HYDROCHLORIDE 25 MILLIGRAM(S): 50 TABLET ORAL at 14:22

## 2020-01-12 RX ADMIN — Medication 100 MILLIGRAM(S): at 14:18

## 2020-01-12 RX ADMIN — LIDOCAINE 1 PATCH: 4 CREAM TOPICAL at 02:35

## 2020-01-12 RX ADMIN — Medication 0.3 MILLIGRAM(S): at 21:20

## 2020-01-12 RX ADMIN — Medication 650 MILLIGRAM(S): at 03:26

## 2020-01-12 RX ADMIN — TRAMADOL HYDROCHLORIDE 25 MILLIGRAM(S): 50 TABLET ORAL at 14:53

## 2020-01-12 RX ADMIN — ATORVASTATIN CALCIUM 40 MILLIGRAM(S): 80 TABLET, FILM COATED ORAL at 21:20

## 2020-01-12 RX ADMIN — Medication 650 MILLIGRAM(S): at 19:11

## 2020-01-12 RX ADMIN — AMLODIPINE BESYLATE 10 MILLIGRAM(S): 2.5 TABLET ORAL at 07:50

## 2020-01-12 RX ADMIN — Medication 81 MILLIGRAM(S): at 11:11

## 2020-01-12 RX ADMIN — SEVELAMER CARBONATE 800 MILLIGRAM(S): 2400 POWDER, FOR SUSPENSION ORAL at 16:26

## 2020-01-12 RX ADMIN — Medication 2: at 11:43

## 2020-01-12 NOTE — PROGRESS NOTE ADULT - SUBJECTIVE AND OBJECTIVE BOX
INTERVAL HPI/OVERNIGHT EVENTS: BP within goal of systolic <185. Cardene drip dc'd this am. Overnight, pt complained of L leg pain and neck pain for which he received 1 percocet, 650 Tylenol an lidocaine patch    SUBJECTIVE: Patient seen and examined at bedside. Continues to complain of leg pain. Denies HA, dizziness, CP, dyspnea, abd pain, N/V/D/C    VITAL SIGNS:  ICU Vital Signs Last 24 Hrs  T(C): 36.6 (12 Jan 2020 09:00), Max: 37.8 (12 Jan 2020 01:03)  T(F): 97.8 (12 Jan 2020 09:00), Max: 100 (12 Jan 2020 01:03)  HR: 70 (12 Jan 2020 10:00) (30 - 76)  BP: 202/93 (11 Jan 2020 16:02) (95/55 - 212/64)  BP(mean): 152 (11 Jan 2020 16:02) (101 - 170)  ABP: 176/48 (12 Jan 2020 10:00) (156/50 - 234/70)  ABP(mean): 92 (12 Jan 2020 10:00) (82 - 130)  RR: 12 (12 Jan 2020 10:00) (5 - 32)  SpO2: 95% (12 Jan 2020 10:00) (94% - 100%)        01-11 @ 07:01  -  01-12 @ 07:00  --------------------------------------------------------  IN: 124 mL / OUT: 0 mL / NET: 124 mL    01-12 @ 07:01 - 01-12 @ 11:18  --------------------------------------------------------  IN: 212.5 mL / OUT: 0 mL / NET: 212.5 mL      CAPILLARY BLOOD GLUCOSE  163 (11 Jan 2020 15:45)      POCT Blood Glucose.: 82 mg/dL (12 Jan 2020 07:16)      PHYSICAL EXAM:    Constitutional: WDWN resting comfortably in bed; NAD  Head: NC/AT  Eyes: Right pupil non-reactive to light, left pupil sluggishly reactive to light, EOMI, anicteric sclera  Neck: supple; no JVD or thyromegaly  Respiratory: CTA B/L; no W/R/R, no retractions  Cardiac: +S1/S2; RRR; grade 3 early-midsystolic crescendo-decrescendo murmur heard loudest in the R upper sternal border with radiation to carotids c/w aortic stenosis  Gastrointestinal: soft, NT/ND; no rebound or guarding; +BSx4  Extremities: WWP, no clubbing or cyanosis; no peripheral edema  Dermatologic: chronic skin changes in bilateral lower legs. Small ulcer on left thumb, scattered healing scabs on left hand.  Lymphatic: no submandibular or cervical LAD  Neurologic: AAOx3; visual field defect in right eye, slight facial droop in right face, unknown baseline. Motor 5/5 and sensation intact.    MEDICATIONS:  MEDICATIONS  (STANDING):  amLODIPine   Tablet 10 milliGRAM(s) Oral daily  aspirin  chewable 81 milliGRAM(s) Oral every 24 hours  atorvastatin 40 milliGRAM(s) Oral at bedtime  carvedilol 12.5 milliGRAM(s) Oral every 12 hours  chlorhexidine 2% Cloths 1 Application(s) Topical <User Schedule>  cloNIDine 0.3 milliGRAM(s) Oral every 8 hours  clopidogrel Tablet 75 milliGRAM(s) Oral daily  cyclobenzaprine 5 milliGRAM(s) Oral every 24 hours  dextrose 5%. 1000 milliLiter(s) (50 mL/Hr) IV Continuous <Continuous>  dextrose 50% Injectable 12.5 Gram(s) IV Push once  dextrose 50% Injectable 25 Gram(s) IV Push once  dextrose 50% Injectable 25 Gram(s) IV Push once  gabapentin 100 milliGRAM(s) Oral every 8 hours  heparin  Injectable 5000 Unit(s) SubCutaneous every 8 hours  hydrALAZINE 100 milliGRAM(s) Oral every 8 hours  insulin lispro (HumaLOG) corrective regimen sliding scale   SubCutaneous Before meals and at bedtime  isosorbide   mononitrate ER Tablet (IMDUR) 60 milliGRAM(s) Oral every 24 hours  levothyroxine 50 MICROGram(s) Oral daily  losartan 100 milliGRAM(s) Oral every 24 hours  sevelamer carbonate 800 milliGRAM(s) Oral three times a day with meals    MEDICATIONS  (PRN):  acetaminophen   Tablet .. 650 milliGRAM(s) Oral every 6 hours PRN Moderate Pain (4 - 6)  dextrose 40% Gel 15 Gram(s) Oral once PRN Blood Glucose LESS THAN 70 milliGRAM(s)/deciliter  glucagon  Injectable 1 milliGRAM(s) IntraMuscular once PRN Glucose LESS THAN 70 milligrams/deciliter      ALLERGIES:  Allergies    No Known Allergies    Intolerances        LABS:                        10.6   5.42  )-----------( 101      ( 12 Jan 2020 05:49 )             35.2     01-12    142  |  100  |  45<H>  ----------------------------<  85  4.4   |  23  |  5.18<H>    Ca    10.8<H>      12 Jan 2020 05:49  Phos  4.9     01-12  Mg     2.0     01-12    TPro  6.7  /  Alb  4.2  /  TBili  0.4  /  DBili  x   /  AST  26  /  ALT  14  /  AlkPhos  142<H>  01-11    PT/INR - ( 11 Jan 2020 06:31 )   PT: 12.4 sec;   INR: 1.08          PTT - ( 11 Jan 2020 06:31 )  PTT:31.7 sec      RADIOLOGY & ADDITIONAL TESTS: Reviewed.

## 2020-01-12 NOTE — PROGRESS NOTE ADULT - SUBJECTIVE AND OBJECTIVE BOX
Patient is a 69y Male seen and evaluated at bedside.   No new complaints.  Investigation for acute CVA negative thus far.      Meds:    acetaminophen   Tablet .. 650 every 6 hours PRN  amLODIPine   Tablet 10 daily  aspirin  chewable 81 every 24 hours  atorvastatin 40 at bedtime  carvedilol 12.5 every 12 hours  chlorhexidine 2% Cloths 1 <User Schedule>  cloNIDine 0.3 every 8 hours  clopidogrel Tablet 75 daily  cyclobenzaprine 5 every 24 hours  dextrose 40% Gel 15 once PRN  dextrose 5%. 1000 <Continuous>  dextrose 50% Injectable 12.5 once  dextrose 50% Injectable 25 once  dextrose 50% Injectable 25 once  gabapentin 100 every 8 hours  glucagon  Injectable 1 once PRN  heparin  Injectable 5000 every 8 hours  hydrALAZINE 100 every 8 hours  insulin lispro (HumaLOG) corrective regimen sliding scale  Before meals and at bedtime  isosorbide   mononitrate ER Tablet (IMDUR) 60 every 24 hours  levothyroxine 50 daily  losartan 100 every 24 hours  sevelamer carbonate 800 three times a day with meals  traMADol 25 daily PRN      Allergies    No Known Allergies    Intolerances        T(C): , Max: 37.8 (01-12-20 @ 01:03)  T(F): , Max: 100 (01-12-20 @ 01:03)  HR: 70 (01-12-20 @ 17:00)  BP: 192/72 (01-12-20 @ 11:00)  BP(mean): 116 (01-12-20 @ 11:00)  RR: 23 (01-12-20 @ 17:00)  SpO2: 96% (01-12-20 @ 17:00)  Wt(kg): --    01-11 @ 07:01 - 01-12 @ 07:00  --------------------------------------------------------  IN: 124 mL / OUT: 0 mL / NET: 124 mL    01-12 @ 07:01 - 01-12 @ 17:31  --------------------------------------------------------  IN: 462.5 mL / OUT: 450 mL / NET: 12.5 mL          Review of Systems:  CONSTITUTIONAL: No fever or chills, No fatigue or tiredness.  EYES: No blurred or double vision.  RESPIRATORY: No shortness of breath, cough, hemoptysis  CARDIOVASCULAR: No Chest pain or shortness of breath  GASTROINTESTINAL: NO abdominal or flank pain, No nausea or vomiting, No diarrhea  GENITOURINARY: No dysuria or urinary burning, No difficulty passing urine, No hematuria  NEUROLOGICAL: No headaches or blurred vision  SKIN: No skin rashes   MUSCULOSKELETAL: No arthralgia, Joint pain, leg edema, No muscle pains        PHYSICAL EXAM:  gen: WDWN resting comfortably in bed; NAD  Head: NC/AT  Eyes: Right pupil non-reactive to light, left pupil sluggishly reactive to light, EOMI, anicteric sclera  Neck: supple; no JVD or thyromegaly  Respiratory: CTA B/L; no W/R/R, no retractions  Cardiac: +S1/S2; RRR; grade 3 systolic blowing murmur heard in the aortic valve position and left sternal border  Gastrointestinal: soft, NT/ND; no rebound or guarding; +BSx4  Extremities: WWP, no clubbing or cyanosis; no peripheral edema  Dermatologic: chronic skin changes in bilateral lower legs. Small ulcer on left thumb, scattered healing scabs on left hand.  Lymphatic: no submandibular or cervical LAD  Neurologic: AAOx3; visual field defect in right eye, slight facial droop in right face, unknown baseline. Motor 5/5 and sensation intact.      access:  Left arm AVF with bruit          LABS:                        10.6   5.42  )-----------( 101      ( 12 Jan 2020 05:49 )             35.2     01-12    142  |  100  |  45<H>  ----------------------------<  85  4.4   |  23  |  5.18<H>    Ca    10.8<H>      12 Jan 2020 05:49  Phos  4.9     01-12  Mg     2.0     01-12    TPro  6.7  /  Alb  4.2  /  TBili  0.4  /  DBili  x   /  AST  26  /  ALT  14  /  AlkPhos  142<H>  01-11    Hemoglobin A1C, Whole Blood: 6.3 % <H> [4.0 - 5.6] (01-12 @ 05:49)    PT/INR - ( 11 Jan 2020 06:31 )   PT: 12.4 sec;   INR: 1.08          PTT - ( 11 Jan 2020 06:31 )  PTT:31.7 sec          RADIOLOGY & ADDITIONAL STUDIES:

## 2020-01-12 NOTE — PROGRESS NOTE ADULT - ASSESSMENT
70 y/o M who is a POOR/UNRELIABLE HISTORIAN with PMHx of ESRD on dialysis via AVF (tues/thurs/sat), HTN, HLD, CAD, BRYNN (s/p stent), IDDM (with peripheral nueropathy), ESRD on dialysis (tues/thurs/sat), hypothryoidism, ?CVA (blind in right eye, little vision) - patient is s/p carotid artery stent), PAD (s/p bilateral stents) who was BIBEMS to [facility] for syncopal episode and found to have elevated blood pressure. Admitted to CCU for emergent HD and managment of hypertensive emergency, now off nicardipine drip and back on most of his home medications    Cardio  # Hypertensive emergency  - BP on arrival 209/78. Patient admitted in august with similar picture of hypertensive crises, back pain, requiring HD  - Received in ED: Clonidine 0.3mg x1, hydralazine 100mg x1, meclizine 25mg x1, percocet 5mg x1  - 15 minutes into dialysis in CCU, approx 500cc off, when he became unresponsive. Dialysis was held. Followed by episode of ladarius down to HR of 30's and repeat BP systolic 90's. Rapid response was called. Heart rate spontaneously started to increase without medications or management. Patient became more arousable and was able to answer some questions, but was still lethargic  - A-line placed for better blood pressure management on 1/11.  - c/w imdur 60mg q24h  - c/w hydralazine 100mg q8h  - c/w losartan 100mg q24h  - nicardipine infusion now dc'd  - coreg 12.5mg q12h started  - clonidine 0.3mg q8h started    # Hx of CAD, PAD (with stents), BRYNN (with stents), carotid artery stenosis (with stents)  - c/w ASA, plavix, atorvastatin  - f/u lipid profile, TSH  - f/u on CAD collateral and stent placements    # Aortic Stenosis  - Grade 3 systolic murmur  - ECHO 8/19: Moderate symmetric left ventricular hypertrophy. Normal right ventricular size and systolic function. Moderate aortic stenosis.    Renal  # ESRD on HD TTS via LUE AVF  - last HD on 1/10 was stopped after syncopal episode outpatient   - volume status and electrolytes acceptable at this time per nephro  - HD per nephrology recs  - Renvela 800mg TID  - daily weights  - renal diet  - strict I/O    Neuro  # Questionable History of CVA  - CT Brain showed microangiopathic ischemic disease. There are again lacunar infarcts in the basal ganglia bilaterally. There is no intracranial hemorrhage or acute transcortical infarct. External carotid artery branch calcifications, as seen in patients on dialysis.   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  - neuro checks  - Avoid sedating medications     Pulm  # Elevated pulmonary artery pressure   ECHO 8/19: Severe pulmonary hypertension, PASP is 72.1 mmHg.    Endo  # Hypothyroidism  - c/w home synthroid 50mcg daily    # IDDM  - A1c 8/19: 7.2%  - c/w moderate ISS    Prophylaxis  F: None  E: Replete per nephro recs  N: DASH/TLC  GI: None  DVT; SQH 5000 U q8h    DISPO: CCU  CODE: Full

## 2020-01-12 NOTE — PROGRESS NOTE ADULT - ATTENDING COMMENTS
I independently performed the key portions of the evaluation and management service provided. I agree with the above history, physical, and plan which I have reviewed and edited where appropriate. I find ESRD, possible syncope at HD. Continue HD. Neuro and cardiac eval.  See full note. (Patient seen earlier in day.) .

## 2020-01-12 NOTE — PROGRESS NOTE ADULT - ASSESSMENT
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 Admitted to CCU for emergent HD and managment of hypertensive emergency.    # ESRD on HD TTS via LUE AVF  - last HD was stopped after syncopal episode at outpatient   - volume status and electrolytes acceptable at this time  - will defer HD for now  - stop gabapentin for now in setting of AMS  - bun 40 - AMS unlikely renal related  - patient received ~ 45 minutes of HD on 1/11  - daily weights  - renal diet  - strict I/O  - hd on 1/13    # Renal bone disease  - continue renvela 800 mg po tid      # Hypertensive emergency - resolved  - BP now in 160s  - UF with HD  - Resume home meds        # CVA ruled out  - CT Brain showed microangiopathic ischemic disease. There are again lacunar infarcts in the basal ganglia bilaterally. There is no intracranial hemorrhage or acute transcortical infarct. External carotid artery branch calcifications, as seen in patients on dialysis.   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  - neuro checks  - Avoid sedating medications

## 2020-01-13 DIAGNOSIS — N18.6 END STAGE RENAL DISEASE: ICD-10-CM

## 2020-01-13 LAB
ANION GAP SERPL CALC-SCNC: 17 MMOL/L — SIGNIFICANT CHANGE UP (ref 5–17)
BUN SERPL-MCNC: 59 MG/DL — HIGH (ref 7–23)
CALCIUM SERPL-MCNC: 10.2 MG/DL — SIGNIFICANT CHANGE UP (ref 8.4–10.5)
CHLORIDE SERPL-SCNC: 102 MMOL/L — SIGNIFICANT CHANGE UP (ref 96–108)
CO2 SERPL-SCNC: 24 MMOL/L — SIGNIFICANT CHANGE UP (ref 22–31)
CREAT SERPL-MCNC: 5.66 MG/DL — HIGH (ref 0.5–1.3)
GLUCOSE SERPL-MCNC: 44 MG/DL — CRITICAL LOW (ref 70–99)
HCT VFR BLD CALC: 35.4 % — LOW (ref 39–50)
HGB BLD-MCNC: 10.9 G/DL — LOW (ref 13–17)
MAGNESIUM SERPL-MCNC: 2.2 MG/DL — SIGNIFICANT CHANGE UP (ref 1.6–2.6)
MCHC RBC-ENTMCNC: 29 PG — SIGNIFICANT CHANGE UP (ref 27–34)
MCHC RBC-ENTMCNC: 30.8 GM/DL — LOW (ref 32–36)
MCV RBC AUTO: 94.1 FL — SIGNIFICANT CHANGE UP (ref 80–100)
NRBC # BLD: 0 /100 WBCS — SIGNIFICANT CHANGE UP (ref 0–0)
PHOSPHATE SERPL-MCNC: 5.2 MG/DL — HIGH (ref 2.5–4.5)
PLATELET # BLD AUTO: 125 K/UL — LOW (ref 150–400)
POTASSIUM SERPL-MCNC: 4.8 MMOL/L — SIGNIFICANT CHANGE UP (ref 3.5–5.3)
POTASSIUM SERPL-SCNC: 4.8 MMOL/L — SIGNIFICANT CHANGE UP (ref 3.5–5.3)
RBC # BLD: 3.76 M/UL — LOW (ref 4.2–5.8)
RBC # FLD: 14.1 % — SIGNIFICANT CHANGE UP (ref 10.3–14.5)
SODIUM SERPL-SCNC: 143 MMOL/L — SIGNIFICANT CHANGE UP (ref 135–145)
WBC # BLD: 6.72 K/UL — SIGNIFICANT CHANGE UP (ref 3.8–10.5)
WBC # FLD AUTO: 6.72 K/UL — SIGNIFICANT CHANGE UP (ref 3.8–10.5)

## 2020-01-13 PROCEDURE — 93010 ELECTROCARDIOGRAM REPORT: CPT

## 2020-01-13 PROCEDURE — 71045 X-RAY EXAM CHEST 1 VIEW: CPT | Mod: 26

## 2020-01-13 PROCEDURE — 93306 TTE W/DOPPLER COMPLETE: CPT | Mod: 26

## 2020-01-13 PROCEDURE — 99291 CRITICAL CARE FIRST HOUR: CPT

## 2020-01-13 PROCEDURE — 90935 HEMODIALYSIS ONE EVALUATION: CPT | Mod: GC

## 2020-01-13 RX ORDER — BACITRACIN ZINC 500 UNIT/G
1 OINTMENT IN PACKET (EA) TOPICAL ONCE
Refills: 0 | Status: COMPLETED | OUTPATIENT
Start: 2020-01-13 | End: 2020-01-14

## 2020-01-13 RX ORDER — ISOSORBIDE MONONITRATE 60 MG/1
90 TABLET, EXTENDED RELEASE ORAL EVERY 24 HOURS
Refills: 0 | Status: DISCONTINUED | OUTPATIENT
Start: 2020-01-14 | End: 2020-01-16

## 2020-01-13 RX ORDER — TRAMADOL HYDROCHLORIDE 50 MG/1
25 TABLET ORAL EVERY 12 HOURS
Refills: 0 | Status: DISCONTINUED | OUTPATIENT
Start: 2020-01-13 | End: 2020-01-14

## 2020-01-13 RX ORDER — DEXTROSE 50 % IN WATER 50 %
50 SYRINGE (ML) INTRAVENOUS ONCE
Refills: 0 | Status: COMPLETED | OUTPATIENT
Start: 2020-01-13 | End: 2020-01-13

## 2020-01-13 RX ADMIN — ISOSORBIDE MONONITRATE 60 MILLIGRAM(S): 60 TABLET, EXTENDED RELEASE ORAL at 13:39

## 2020-01-13 RX ADMIN — SEVELAMER CARBONATE 800 MILLIGRAM(S): 2400 POWDER, FOR SUSPENSION ORAL at 13:12

## 2020-01-13 RX ADMIN — TRAMADOL HYDROCHLORIDE 25 MILLIGRAM(S): 50 TABLET ORAL at 01:00

## 2020-01-13 RX ADMIN — TRAMADOL HYDROCHLORIDE 25 MILLIGRAM(S): 50 TABLET ORAL at 22:15

## 2020-01-13 RX ADMIN — CLOPIDOGREL BISULFATE 75 MILLIGRAM(S): 75 TABLET, FILM COATED ORAL at 11:34

## 2020-01-13 RX ADMIN — TRAMADOL HYDROCHLORIDE 25 MILLIGRAM(S): 50 TABLET ORAL at 21:15

## 2020-01-13 RX ADMIN — Medication 81 MILLIGRAM(S): at 11:35

## 2020-01-13 RX ADMIN — SEVELAMER CARBONATE 800 MILLIGRAM(S): 2400 POWDER, FOR SUSPENSION ORAL at 18:14

## 2020-01-13 RX ADMIN — Medication 100 MILLIGRAM(S): at 21:15

## 2020-01-13 RX ADMIN — Medication 650 MILLIGRAM(S): at 20:15

## 2020-01-13 RX ADMIN — GABAPENTIN 100 MILLIGRAM(S): 400 CAPSULE ORAL at 13:11

## 2020-01-13 RX ADMIN — CARVEDILOL PHOSPHATE 25 MILLIGRAM(S): 80 CAPSULE, EXTENDED RELEASE ORAL at 21:16

## 2020-01-13 RX ADMIN — AMLODIPINE BESYLATE 10 MILLIGRAM(S): 2.5 TABLET ORAL at 06:00

## 2020-01-13 RX ADMIN — Medication 0.3 MILLIGRAM(S): at 22:29

## 2020-01-13 RX ADMIN — LOSARTAN POTASSIUM 100 MILLIGRAM(S): 100 TABLET, FILM COATED ORAL at 13:39

## 2020-01-13 RX ADMIN — Medication 650 MILLIGRAM(S): at 14:30

## 2020-01-13 RX ADMIN — Medication 650 MILLIGRAM(S): at 07:08

## 2020-01-13 RX ADMIN — CHLORHEXIDINE GLUCONATE 1 APPLICATION(S): 213 SOLUTION TOPICAL at 06:00

## 2020-01-13 RX ADMIN — Medication 100 MILLIGRAM(S): at 13:12

## 2020-01-13 RX ADMIN — Medication 4: at 11:34

## 2020-01-13 RX ADMIN — TRAMADOL HYDROCHLORIDE 25 MILLIGRAM(S): 50 TABLET ORAL at 00:00

## 2020-01-13 RX ADMIN — CYCLOBENZAPRINE HYDROCHLORIDE 5 MILLIGRAM(S): 10 TABLET, FILM COATED ORAL at 09:07

## 2020-01-13 RX ADMIN — Medication 0.3 MILLIGRAM(S): at 13:11

## 2020-01-13 RX ADMIN — GABAPENTIN 100 MILLIGRAM(S): 400 CAPSULE ORAL at 21:15

## 2020-01-13 RX ADMIN — SEVELAMER CARBONATE 800 MILLIGRAM(S): 2400 POWDER, FOR SUSPENSION ORAL at 09:07

## 2020-01-13 RX ADMIN — Medication 650 MILLIGRAM(S): at 06:08

## 2020-01-13 RX ADMIN — Medication 650 MILLIGRAM(S): at 13:42

## 2020-01-13 RX ADMIN — Medication 100 MILLIGRAM(S): at 06:00

## 2020-01-13 RX ADMIN — Medication 0.3 MILLIGRAM(S): at 06:00

## 2020-01-13 RX ADMIN — Medication 4: at 21:16

## 2020-01-13 RX ADMIN — Medication 50 MILLILITER(S): at 07:02

## 2020-01-13 RX ADMIN — GABAPENTIN 100 MILLIGRAM(S): 400 CAPSULE ORAL at 06:00

## 2020-01-13 RX ADMIN — ATORVASTATIN CALCIUM 40 MILLIGRAM(S): 80 TABLET, FILM COATED ORAL at 21:16

## 2020-01-13 RX ADMIN — Medication 50 MICROGRAM(S): at 06:00

## 2020-01-13 RX ADMIN — Medication 650 MILLIGRAM(S): at 19:44

## 2020-01-13 NOTE — PROGRESS NOTE ADULT - SUBJECTIVE AND OBJECTIVE BOX
Patient was seen and evaluated on dialysis.   HR: 63 (01-13-20 @ 16:30)  BP: 174/59  Wt(kg): 61.2  01-13    143  |  102  |  59<H>  ----------------------------<  44<LL>  4.8   |  24  |  5.66<H>    Ca    10.2      13 Jan 2020 05:54  Phos  5.2     01-13  Mg     2.2     01-13      Continue dialysis:   Dialyzer:   180    QB: 400  K bath: 2  Goal UF:   2.5     L   over      180         min  Patient is tolerating the procedure well.   Continue full treatment as prescribed.

## 2020-01-13 NOTE — PROGRESS NOTE ADULT - ATTENDING COMMENTS
Please see housestaff note for full details.  I have reviewed the case, examined the patient and agree with plan.  69 M HTN DM ESRD PVD carotid stent BRYNN stent with uncontrolled HTN and syncope.   On BP still in the 200s. HR 50-60s    More recently -80s.  RR 16  Coreg held due to bradycardia.    NAD  RRR SALAZAR 3/6 RUSB  CTA anteriorly  NT soft + BS    K 4.8   Cr  5.0    HG 10  plts 125    1. Continue BP control.  2. HD today.  3. Repeat echo to reassess AS.  4. Continue ASA and plavix for PVD and carotid stent.

## 2020-01-13 NOTE — PROGRESS NOTE ADULT - PROBLEM SELECTOR PLAN 1
# ESRD on HD TTS via LUE AVF  last HD was stopped after syncopal episode at outpatient 1/11  volume status and electrolytes acceptable at this time however bp remained uncontrolled   - will plan for additional HD session with UF today to help optimizing bp along with PO meds, would stop coreg given bradycardia   - back on schedule tmr for HD  - continue renvela 800 mg po tid w/meals   - if ? stroke/CVA currently past 24-48 hours, goal bp systolic 140-160 mmhg  Will follow

## 2020-01-13 NOTE — PROGRESS NOTE ADULT - SUBJECTIVE AND OBJECTIVE BOX
INTERVAL HPI/OVERNIGHT EVENTS: Sytolic BP ranged from 180s-200s on his current medication regimen. Coreg was held because his HR was in the 60s. Pt restarted on Tramadol for his persistent leg pain. Pt's blood sugars were in the 250s so he was started on his home Lantus dose of 4U qhs, however, this morning his BG was at 57 so he was given 1 amp of dextrose.    SUBJECTIVE: Patient seen and examined at bedside.    VITAL SIGNS:  ICU Vital Signs Last 24 Hrs  T(C): 36.5 (13 Jan 2020 06:00), Max: 37.3 (12 Jan 2020 17:00)  T(F): 97.7 (13 Jan 2020 06:00), Max: 99.1 (12 Jan 2020 17:00)  HR: 58 (13 Jan 2020 07:00) (56 - 76)  BP: 192/72 (12 Jan 2020 11:00) (192/72 - 192/72)  BP(mean): 116 (12 Jan 2020 11:00) (116 - 116)  ABP: 144/42 (13 Jan 2020 07:00) (144/42 - 212/64)  ABP(mean): 74 (13 Jan 2020 07:00) (74 - 118)  RR: 13 (13 Jan 2020 07:00) (10 - 25)  SpO2: 98% (13 Jan 2020 07:00) (94% - 100%)        01-12 @ 07:01  -  01-13 @ 07:00  --------------------------------------------------------  IN: 1012.5 mL / OUT: 450 mL / NET: 562.5 mL      CAPILLARY BLOOD GLUCOSE  163 (11 Jan 2020 15:45)      POCT Blood Glucose.: 150 mg/dL (13 Jan 2020 07:04)      PHYSICAL EXAM:    Constitutional: NAD  HEENT: NC/AT; PERRL, anicteric sclera; MMM  Neck: supple, no JVD  Cardiovascular: +S1/S2, RRR  Respiratory: CTA B/L, no W/R/R  Gastrointestinal: abdomen soft, NT/ND; no rebound or guarding; +BSx4  Genitourinary: no suprapubic tenderness or fullness  Extremities: WWP; no LE edema; no clubbing or cyanosis  Vascular: 2+ radial, DP/PT and femoral pulses B/L  Dermatologic: normal color and turgor; no visible rashes  Neurological:     MEDICATIONS:  MEDICATIONS  (STANDING):  amLODIPine   Tablet 10 milliGRAM(s) Oral daily  aspirin  chewable 81 milliGRAM(s) Oral every 24 hours  atorvastatin 40 milliGRAM(s) Oral at bedtime  carvedilol 25 milliGRAM(s) Oral every 12 hours  chlorhexidine 2% Cloths 1 Application(s) Topical <User Schedule>  cloNIDine 0.3 milliGRAM(s) Oral every 8 hours  clopidogrel Tablet 75 milliGRAM(s) Oral daily  cyclobenzaprine 5 milliGRAM(s) Oral every 24 hours  dextrose 5%. 1000 milliLiter(s) (50 mL/Hr) IV Continuous <Continuous>  dextrose 50% Injectable 12.5 Gram(s) IV Push once  dextrose 50% Injectable 25 Gram(s) IV Push once  dextrose 50% Injectable 25 Gram(s) IV Push once  gabapentin 100 milliGRAM(s) Oral every 8 hours  heparin  Injectable 5000 Unit(s) SubCutaneous every 8 hours  hydrALAZINE 100 milliGRAM(s) Oral every 8 hours  insulin lispro (HumaLOG) corrective regimen sliding scale   SubCutaneous Before meals and at bedtime  isosorbide   mononitrate ER Tablet (IMDUR) 60 milliGRAM(s) Oral every 24 hours  levothyroxine 50 MICROGram(s) Oral daily  losartan 100 milliGRAM(s) Oral every 24 hours  sevelamer carbonate 800 milliGRAM(s) Oral three times a day with meals    MEDICATIONS  (PRN):  acetaminophen   Tablet .. 650 milliGRAM(s) Oral every 6 hours PRN Moderate Pain (4 - 6)  dextrose 40% Gel 15 Gram(s) Oral once PRN Blood Glucose LESS THAN 70 milliGRAM(s)/deciliter  glucagon  Injectable 1 milliGRAM(s) IntraMuscular once PRN Glucose LESS THAN 70 milligrams/deciliter  traMADol 25 milliGRAM(s) Oral daily PRN Severe Pain (7 - 10)      ALLERGIES:  Allergies    No Known Allergies    Intolerances        LABS:                        10.9   6.72  )-----------( 125      ( 13 Jan 2020 05:54 )             35.4     01-13    143  |  102  |  59<H>  ----------------------------<  44<LL>  4.8   |  24  |  5.66<H>    Ca    10.2      13 Jan 2020 05:54  Phos  5.2     01-13  Mg     2.2     01-13            RADIOLOGY & ADDITIONAL TESTS: Reviewed. INTERVAL HPI/OVERNIGHT EVENTS: Sytolic BP ranged from 180s-200s on his current medication regimen. Coreg was held because his HR was in the 60s. Pt restarted on Tramadol for his persistent leg pain. Pt's blood sugars were in the 250s so he was started on his home Lantus dose of 4U qhs, however, this morning his BG was at 57 so he was given 1 amp of dextrose.    SUBJECTIVE: Patient seen and examined at bedside. Continues to complain of R leg pain. Denies HA, CP, dyspnea, abdominal pain, dysuria, N/V/D/C    VITAL SIGNS:  ICU Vital Signs Last 24 Hrs  T(C): 36.5 (13 Jan 2020 06:00), Max: 37.3 (12 Jan 2020 17:00)  T(F): 97.7 (13 Jan 2020 06:00), Max: 99.1 (12 Jan 2020 17:00)  HR: 58 (13 Jan 2020 07:00) (56 - 76)  BP: 192/72 (12 Jan 2020 11:00) (192/72 - 192/72)  BP(mean): 116 (12 Jan 2020 11:00) (116 - 116)  ABP: 144/42 (13 Jan 2020 07:00) (144/42 - 212/64)  ABP(mean): 74 (13 Jan 2020 07:00) (74 - 118)  RR: 13 (13 Jan 2020 07:00) (10 - 25)  SpO2: 98% (13 Jan 2020 07:00) (94% - 100%)        01-12 @ 07:01  -  01-13 @ 07:00  --------------------------------------------------------  IN: 1012.5 mL / OUT: 450 mL / NET: 562.5 mL      CAPILLARY BLOOD GLUCOSE  163 (11 Jan 2020 15:45)      POCT Blood Glucose.: 150 mg/dL (13 Jan 2020 07:04)      PHYSICAL EXAM:    Constitutional: WDWN resting comfortably in bed; NAD, more somnolent compared to yesterday, but still rousable and cooperative w exam  Head: NC/AT  Eyes: Right pupil non-reactive to light, left pupil sluggishly reactive to light, EOMI, anicteric sclera  Neck: supple; no JVD or thyromegaly  Respiratory: CTA B/L; no W/R/R, no retractions  Cardiac: +S1/S2; RRR; grade 3 early-midsystolic crescendo-decrescendo murmur heard loudest in the R upper sternal border with radiation to carotids c/w aortic stenosis  Gastrointestinal: soft, NT/ND; no rebound or guarding; +BSx4  Extremities: WWP, no clubbing or cyanosis; no peripheral edema, 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 Small ulcer on left thumb, scattered healing scabs on left hand.  Lymphatic: no submandibular or cervical LAD  Neurologic: AAOx3; visual field defect in right eye, slight facial droop in right face, unknown baseline. Motor 5/5 and sensation intact.    MEDICATIONS:  MEDICATIONS  (STANDING):  amLODIPine   Tablet 10 milliGRAM(s) Oral daily  aspirin  chewable 81 milliGRAM(s) Oral every 24 hours  atorvastatin 40 milliGRAM(s) Oral at bedtime  carvedilol 25 milliGRAM(s) Oral every 12 hours  chlorhexidine 2% Cloths 1 Application(s) Topical <User Schedule>  cloNIDine 0.3 milliGRAM(s) Oral every 8 hours  clopidogrel Tablet 75 milliGRAM(s) Oral daily  cyclobenzaprine 5 milliGRAM(s) Oral every 24 hours  dextrose 5%. 1000 milliLiter(s) (50 mL/Hr) IV Continuous <Continuous>  dextrose 50% Injectable 12.5 Gram(s) IV Push once  dextrose 50% Injectable 25 Gram(s) IV Push once  dextrose 50% Injectable 25 Gram(s) IV Push once  gabapentin 100 milliGRAM(s) Oral every 8 hours  heparin  Injectable 5000 Unit(s) SubCutaneous every 8 hours  hydrALAZINE 100 milliGRAM(s) Oral every 8 hours  insulin lispro (HumaLOG) corrective regimen sliding scale   SubCutaneous Before meals and at bedtime  isosorbide   mononitrate ER Tablet (IMDUR) 60 milliGRAM(s) Oral every 24 hours  levothyroxine 50 MICROGram(s) Oral daily  losartan 100 milliGRAM(s) Oral every 24 hours  sevelamer carbonate 800 milliGRAM(s) Oral three times a day with meals    MEDICATIONS  (PRN):  acetaminophen   Tablet .. 650 milliGRAM(s) Oral every 6 hours PRN Moderate Pain (4 - 6)  dextrose 40% Gel 15 Gram(s) Oral once PRN Blood Glucose LESS THAN 70 milliGRAM(s)/deciliter  glucagon  Injectable 1 milliGRAM(s) IntraMuscular once PRN Glucose LESS THAN 70 milligrams/deciliter  traMADol 25 milliGRAM(s) Oral daily PRN Severe Pain (7 - 10)      ALLERGIES:  Allergies    No Known Allergies    Intolerances        LABS:                        10.9   6.72  )-----------( 125      ( 13 Jan 2020 05:54 )             35.4     01-13    143  |  102  |  59<H>  ----------------------------<  44<LL>  4.8   |  24  |  5.66<H>    Ca    10.2      13 Jan 2020 05:54  Phos  5.2     01-13  Mg     2.2     01-13            RADIOLOGY & ADDITIONAL TESTS: Reviewed.

## 2020-01-13 NOTE — PROGRESS NOTE ADULT - ATTENDING COMMENTS
I independently performed the key portions of the evaluation and management service provided. I agree with the above history, physical, and plan which I have reviewed and edited where appropriate. I find seen on dialysis. Cont dialysis. See full note. (Patient seen earlier in day.)

## 2020-01-13 NOTE — PROGRESS NOTE ADULT - ASSESSMENT
70 y/o M who is a POOR/UNRELIABLE HISTORIAN with PMHx of ESRD on dialysis via AVF (tues/thurs/sat), HTN, HLD, CAD, BRYNN (s/p stent), IDDM (with peripheral nueropathy), ESRD on dialysis (tues/thurs/sat), hypothryoidism, ?CVA (blind in right eye, little vision) - patient is s/p carotid artery stent), PAD (s/p bilateral stents) who was BIBEMS to [facility] for syncopal episode and found to have elevated blood pressure. Admitted to CCU for emergent HD and managment of hypertensive emergency, now off nicardipine drip and back on most of his home medications. Blood pressures have been stable, pending HD today.    Cardio  # Hypertensive emergency  - BP on arrival 209/78. Patient admitted in august with similar picture of hypertensive crises, back pain, requiring HD  - Received in ED: Clonidine 0.3mg x1, hydralazine 100mg x1, meclizine 25mg x1, percocet 5mg x1  - 15 minutes into dialysis in CCU, approx 500cc off, when he became unresponsive. Dialysis was held. Followed by episode of ladarius down to HR of 30's and repeat BP systolic 90's. Rapid response was called. Heart rate spontaneously started to increase without medications or management. Patient became more arousable and was able to answer some questions, but was still lethargic  - A-line placed for better blood pressure management on 1/11.  - c/w imdur 60mg q24h  - c/w hydralazine 100mg q8h  - c/w losartan 100mg q24h  - nicardipine infusion now dc'd  - coreg 12.5mg q12h started  - clonidine 0.3mg q8h started    # Hx of CAD, PAD (with stents), BRYNN (with stents), carotid artery stenosis (with stents)  - c/w ASA, plavix, atorvastatin  - f/u lipid profile, TSH  - f/u on CAD collateral and stent placements    # Aortic Stenosis  - Grade 3 systolic murmur  - ECHO 8/19: Moderate symmetric left ventricular hypertrophy. Normal right ventricular size and systolic function. Moderate aortic stenosis.    Renal  # ESRD on HD TTS via LUE AVF  - last HD on 1/10 was stopped after syncopal episode outpatient   - volume status and electrolytes acceptable at this time per nephro  - HD per nephrology recs  - Renvela 800mg TID  - daily weights  - renal diet  - strict I/O    Neuro  # Questionable History of CVA  - CT Brain showed microangiopathic ischemic disease. There are again lacunar infarcts in the basal ganglia bilaterally. There is no intracranial hemorrhage or acute transcortical infarct. External carotid artery branch calcifications, as seen in patients on dialysis.   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  - neuro checks  - Avoid sedating medications     Pulm  # Elevated pulmonary artery pressure   ECHO 8/19: Severe pulmonary hypertension, PASP is 72.1 mmHg.    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 Admitted to CCU for emergent HD and managment of hypertensive emergency, now off nicardipine drip and back on most of his home medications. Blood pressures have been stable, pending HD today.    Cardio  # Hypertensive emergency  - BP on arrival 209/78. Patient admitted in august with similar picture of hypertensive crises, back pain, requiring HD  - Received in ED: Clonidine 0.3mg x1, hydralazine 100mg x1, meclizine 25mg x1, percocet 5mg x1  - 15 minutes into dialysis in CCU, approx 500cc off, when he became unresponsive. Dialysis was held. Followed by episode of ladarius down to HR of 30's and repeat BP systolic 90's. Rapid response was called. Heart rate spontaneously started to increase without medications or management. Patient became more arousable and was able to answer some questions, but was still lethargic  - A-line placed for better blood pressure management on 1/11.  - c/w imdur 60mg q24h  - c/w hydralazine 100mg q8h  - c/w losartan 100mg q24h  - nicardipine infusion now dc'd  - coreg 12.5mg q12h started  - clonidine 0.3mg q8h started    # Hx of CAD, PAD (with stents), BRYNN (with stents), carotid artery stenosis (with stents)  - c/w ASA, plavix, atorvastatin  - f/u lipid profile, TSH  - f/u on CAD collateral and stent placements    # Aortic Stenosis  - Grade 3 systolic murmur  - ECHO 8/19: Moderate symmetric left ventricular hypertrophy. Normal right ventricular size and systolic function. Moderate aortic stenosis.  - f/u repeat ECHO    Renal  # ESRD on HD TTS via LUE AVF  - last HD on 1/10 was stopped after syncopal episode outpatient   - volume status and electrolytes acceptable at this time per nephro  - HD per nephrology recs  - Renvela 800mg TID  - daily weights  - renal diet  - strict I/O    Neuro  # Questionable History of CVA  - CT Brain showed microangiopathic ischemic disease. There are again lacunar infarcts in the basal ganglia bilaterally. There is no intracranial hemorrhage or acute transcortical infarct. External carotid artery branch calcifications, as seen in patients on dialysis.   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  - neuro checks  - Avoid sedating medications     Pulm  # Elevated pulmonary artery pressure   ECHO 8/19: Severe pulmonary hypertension, PASP is 72.1 mmHg.    Endo  # Hypothyroidism  - c/w home synthroid 50mcg daily    # IDDM  - A1c 8/19: 7.2%  - c/w moderate ISS    Prophylaxis  F: None  E: Replete per nephro recs  N: DASH/TLC  GI: None  DVT; SQH 5000 U q8h    DISPO: CCU  CODE: Full

## 2020-01-13 NOTE — PROGRESS NOTE ADULT - SUBJECTIVE AND OBJECTIVE BOX
O/N Events: KAT  Subjective:  bp shortly down to 140-160 in am but back up again to 190, resumed on home meds, bradycardic in mid 50s  nicardipine gtt stopped around mn  denies any complaints, no CP/SOB, satting 100% on RA  CXR with right di diaphragm, otherwise clear     VITALS  Vital Signs Last 24 Hrs  T(C): 36.6 (13 Jan 2020 10:10), Max: 37.3 (12 Jan 2020 17:00)  T(F): 97.9 (13 Jan 2020 10:10), Max: 99.1 (12 Jan 2020 17:00)  HR: 64 (13 Jan 2020 13:00) (56 - 70)  BP: 190/64  RR: 14 (13 Jan 2020 13:00) (10 - 23)  SpO2: 100% (13 Jan 2020 13:00) (96% - 100%)    PHYSICAL EXAM  Gen: NAD  Neck: no JVD  Respiratory: CTA B/L  Cardiac: +S1/S2; RRR; systolic murmur present   Gastrointestinal: soft, NT/ND, no abdominal bruit present   Extremities: WWP, no peripheral edema with chronic skin changes   Neurologic: AAOx3; non focal except known OD poor sight   access:  Left arm AVF with bruit    MEDICATIONS  (STANDING):  amLODIPine   Tablet 10 milliGRAM(s) Oral daily  aspirin  chewable 81 milliGRAM(s) Oral every 24 hours  atorvastatin 40 milliGRAM(s) Oral at bedtime  carvedilol 25 milliGRAM(s) Oral every 12 hours  chlorhexidine 2% Cloths 1 Application(s) Topical <User Schedule>  cloNIDine 0.3 milliGRAM(s) Oral every 8 hours  clopidogrel Tablet 75 milliGRAM(s) Oral daily  cyclobenzaprine 5 milliGRAM(s) Oral every 24 hours  dextrose 5%. 1000 milliLiter(s) (50 mL/Hr) IV Continuous <Continuous>  dextrose 50% Injectable 12.5 Gram(s) IV Push once  dextrose 50% Injectable 25 Gram(s) IV Push once  dextrose 50% Injectable 25 Gram(s) IV Push once  gabapentin 100 milliGRAM(s) Oral every 8 hours  heparin  Injectable 5000 Unit(s) SubCutaneous every 8 hours  hydrALAZINE 100 milliGRAM(s) Oral every 8 hours  insulin lispro (HumaLOG) corrective regimen sliding scale   SubCutaneous Before meals and at bedtime  isosorbide   mononitrate ER Tablet (IMDUR) 60 milliGRAM(s) Oral every 24 hours  levothyroxine 50 MICROGram(s) Oral daily  losartan 100 milliGRAM(s) Oral every 24 hours  sevelamer carbonate 800 milliGRAM(s) Oral three times a day with meals    MEDICATIONS  (PRN):  acetaminophen   Tablet .. 650 milliGRAM(s) Oral every 6 hours PRN Moderate Pain (4 - 6)  dextrose 40% Gel 15 Gram(s) Oral once PRN Blood Glucose LESS THAN 70 milliGRAM(s)/deciliter  glucagon  Injectable 1 milliGRAM(s) IntraMuscular once PRN Glucose LESS THAN 70 milligrams/deciliter  traMADol 25 milliGRAM(s) Oral daily PRN Severe Pain (7 - 10)      LABS                        10.9   6.72  )-----------( 125      ( 13 Jan 2020 05:54 )             35.4     01-13    143  |  102  |  59<H>  ----------------------------<  44<LL>  4.8   |  24  |  5.66<H>    Ca    10.2      13 Jan 2020 05:54  Phos  5.2     01-13  Mg     2.2     01-13  CARDIAC MARKERS ( 11 Jan 2020 17:21 )  x     / 0.22 ng/mL / x     / x     / x

## 2020-01-13 NOTE — PROGRESS NOTE ADULT - ASSESSMENT
A/p  68 y/o m with PMHx of ESRD on dialysis via AVF TTS, HTN, CAD, BRYNN (s/p stent), IDDM (with peripheral nueropathy), hypothyroidism and CVA cb right eye blindness- patient is s/p carotid artery stent), PAD (s/p bilateral stents) who was BIBEMS to [facility] for syncopal episode and found to have elevated blood pressure. Admitted to CCU for emergent HD and management of hypertensive emergency.

## 2020-01-14 LAB
ANION GAP SERPL CALC-SCNC: 15 MMOL/L — SIGNIFICANT CHANGE UP (ref 5–17)
ANION GAP SERPL CALC-SCNC: 15 MMOL/L — SIGNIFICANT CHANGE UP (ref 5–17)
APPEARANCE UR: ABNORMAL
BASOPHILS # BLD AUTO: 0 K/UL — SIGNIFICANT CHANGE UP (ref 0–0.2)
BASOPHILS NFR BLD AUTO: 0 % — SIGNIFICANT CHANGE UP (ref 0–2)
BILIRUB UR-MCNC: ABNORMAL
BUN SERPL-MCNC: 39 MG/DL — HIGH (ref 7–23)
BUN SERPL-MCNC: 53 MG/DL — HIGH (ref 7–23)
CALCIUM SERPL-MCNC: 10 MG/DL — SIGNIFICANT CHANGE UP (ref 8.4–10.5)
CALCIUM SERPL-MCNC: 9.8 MG/DL — SIGNIFICANT CHANGE UP (ref 8.4–10.5)
CHLORIDE SERPL-SCNC: 97 MMOL/L — SIGNIFICANT CHANGE UP (ref 96–108)
CHLORIDE SERPL-SCNC: 98 MMOL/L — SIGNIFICANT CHANGE UP (ref 96–108)
CO2 SERPL-SCNC: 25 MMOL/L — SIGNIFICANT CHANGE UP (ref 22–31)
CO2 SERPL-SCNC: 26 MMOL/L — SIGNIFICANT CHANGE UP (ref 22–31)
COLOR SPEC: YELLOW — SIGNIFICANT CHANGE UP
CREAT SERPL-MCNC: 3.96 MG/DL — HIGH (ref 0.5–1.3)
CREAT SERPL-MCNC: 4.92 MG/DL — HIGH (ref 0.5–1.3)
DIFF PNL FLD: NEGATIVE — SIGNIFICANT CHANGE UP
EOSINOPHIL # BLD AUTO: 0.07 K/UL — SIGNIFICANT CHANGE UP (ref 0–0.5)
EOSINOPHIL NFR BLD AUTO: 0.9 % — SIGNIFICANT CHANGE UP (ref 0–6)
GLUCOSE SERPL-MCNC: 161 MG/DL — HIGH (ref 70–99)
GLUCOSE SERPL-MCNC: 179 MG/DL — HIGH (ref 70–99)
GLUCOSE UR QL: NEGATIVE — SIGNIFICANT CHANGE UP
HCT VFR BLD CALC: 34.8 % — LOW (ref 39–50)
HCT VFR BLD CALC: 37.8 % — LOW (ref 39–50)
HGB BLD-MCNC: 10.8 G/DL — LOW (ref 13–17)
HGB BLD-MCNC: 12.1 G/DL — LOW (ref 13–17)
KETONES UR-MCNC: NEGATIVE — SIGNIFICANT CHANGE UP
LACTATE SERPL-SCNC: 1.1 MMOL/L — SIGNIFICANT CHANGE UP (ref 0.5–2)
LEUKOCYTE ESTERASE UR-ACNC: NEGATIVE — SIGNIFICANT CHANGE UP
LYMPHOCYTES # BLD AUTO: 0.2 K/UL — LOW (ref 1–3.3)
LYMPHOCYTES # BLD AUTO: 2.6 % — LOW (ref 13–44)
MAGNESIUM SERPL-MCNC: 1.9 MG/DL — SIGNIFICANT CHANGE UP (ref 1.6–2.6)
MCHC RBC-ENTMCNC: 29.2 PG — SIGNIFICANT CHANGE UP (ref 27–34)
MCHC RBC-ENTMCNC: 29.7 PG — SIGNIFICANT CHANGE UP (ref 27–34)
MCHC RBC-ENTMCNC: 31 GM/DL — LOW (ref 32–36)
MCHC RBC-ENTMCNC: 32 GM/DL — SIGNIFICANT CHANGE UP (ref 32–36)
MCV RBC AUTO: 92.6 FL — SIGNIFICANT CHANGE UP (ref 80–100)
MCV RBC AUTO: 94.1 FL — SIGNIFICANT CHANGE UP (ref 80–100)
MONOCYTES # BLD AUTO: 0.34 K/UL — SIGNIFICANT CHANGE UP (ref 0–0.9)
MONOCYTES NFR BLD AUTO: 4.3 % — SIGNIFICANT CHANGE UP (ref 2–14)
NEUTROPHILS # BLD AUTO: 7.22 K/UL — SIGNIFICANT CHANGE UP (ref 1.8–7.4)
NEUTROPHILS NFR BLD AUTO: 92.2 % — HIGH (ref 43–77)
NITRITE UR-MCNC: NEGATIVE — SIGNIFICANT CHANGE UP
NRBC # BLD: 0 /100 WBCS — SIGNIFICANT CHANGE UP (ref 0–0)
PH UR: 6.5 — SIGNIFICANT CHANGE UP (ref 5–8)
PHOSPHATE SERPL-MCNC: 4.5 MG/DL — SIGNIFICANT CHANGE UP (ref 2.5–4.5)
PLATELET # BLD AUTO: 108 K/UL — LOW (ref 150–400)
PLATELET # BLD AUTO: 95 K/UL — LOW (ref 150–400)
POTASSIUM SERPL-MCNC: 4.3 MMOL/L — SIGNIFICANT CHANGE UP (ref 3.5–5.3)
POTASSIUM SERPL-MCNC: 4.8 MMOL/L — SIGNIFICANT CHANGE UP (ref 3.5–5.3)
POTASSIUM SERPL-SCNC: 4.3 MMOL/L — SIGNIFICANT CHANGE UP (ref 3.5–5.3)
POTASSIUM SERPL-SCNC: 4.8 MMOL/L — SIGNIFICANT CHANGE UP (ref 3.5–5.3)
PROT UR-MCNC: 100 MG/DL
RBC # BLD: 3.7 M/UL — LOW (ref 4.2–5.8)
RBC # BLD: 4.08 M/UL — LOW (ref 4.2–5.8)
RBC # FLD: 14.3 % — SIGNIFICANT CHANGE UP (ref 10.3–14.5)
RBC # FLD: 14.6 % — HIGH (ref 10.3–14.5)
SODIUM SERPL-SCNC: 138 MMOL/L — SIGNIFICANT CHANGE UP (ref 135–145)
SODIUM SERPL-SCNC: 138 MMOL/L — SIGNIFICANT CHANGE UP (ref 135–145)
SP GR SPEC: 1.02 — SIGNIFICANT CHANGE UP (ref 1–1.03)
UROBILINOGEN FLD QL: 0.2 E.U./DL — SIGNIFICANT CHANGE UP
WBC # BLD: 6.81 K/UL — SIGNIFICANT CHANGE UP (ref 3.8–10.5)
WBC # BLD: 7.83 K/UL — SIGNIFICANT CHANGE UP (ref 3.8–10.5)
WBC # FLD AUTO: 6.81 K/UL — SIGNIFICANT CHANGE UP (ref 3.8–10.5)
WBC # FLD AUTO: 7.83 K/UL — SIGNIFICANT CHANGE UP (ref 3.8–10.5)

## 2020-01-14 PROCEDURE — 99232 SBSQ HOSP IP/OBS MODERATE 35: CPT | Mod: GC

## 2020-01-14 PROCEDURE — 99291 CRITICAL CARE FIRST HOUR: CPT

## 2020-01-14 PROCEDURE — 99223 1ST HOSP IP/OBS HIGH 75: CPT

## 2020-01-14 PROCEDURE — 71045 X-RAY EXAM CHEST 1 VIEW: CPT | Mod: 26

## 2020-01-14 RX ORDER — NIFEDIPINE 30 MG
60 TABLET, EXTENDED RELEASE 24 HR ORAL EVERY 24 HOURS
Refills: 0 | Status: DISCONTINUED | OUTPATIENT
Start: 2020-01-14 | End: 2020-01-14

## 2020-01-14 RX ORDER — LANOLIN ALCOHOL/MO/W.PET/CERES
5 CREAM (GRAM) TOPICAL AT BEDTIME
Refills: 0 | Status: DISCONTINUED | OUTPATIENT
Start: 2020-01-14 | End: 2020-02-04

## 2020-01-14 RX ORDER — TRAMADOL HYDROCHLORIDE 50 MG/1
25 TABLET ORAL EVERY 6 HOURS
Refills: 0 | Status: DISCONTINUED | OUTPATIENT
Start: 2020-01-14 | End: 2020-01-16

## 2020-01-14 RX ORDER — ACETAMINOPHEN 500 MG
650 TABLET ORAL EVERY 6 HOURS
Refills: 0 | Status: DISCONTINUED | OUTPATIENT
Start: 2020-01-14 | End: 2020-01-21

## 2020-01-14 RX ORDER — TRAMADOL HYDROCHLORIDE 50 MG/1
50 TABLET ORAL EVERY 6 HOURS
Refills: 0 | Status: DISCONTINUED | OUTPATIENT
Start: 2020-01-14 | End: 2020-01-16

## 2020-01-14 RX ADMIN — ISOSORBIDE MONONITRATE 90 MILLIGRAM(S): 60 TABLET, EXTENDED RELEASE ORAL at 11:31

## 2020-01-14 RX ADMIN — Medication 50 MICROGRAM(S): at 05:33

## 2020-01-14 RX ADMIN — TRAMADOL HYDROCHLORIDE 25 MILLIGRAM(S): 50 TABLET ORAL at 09:58

## 2020-01-14 RX ADMIN — GABAPENTIN 100 MILLIGRAM(S): 400 CAPSULE ORAL at 21:46

## 2020-01-14 RX ADMIN — Medication 100 MILLIGRAM(S): at 21:45

## 2020-01-14 RX ADMIN — CHLORHEXIDINE GLUCONATE 1 APPLICATION(S): 213 SOLUTION TOPICAL at 05:34

## 2020-01-14 RX ADMIN — Medication 0.3 MILLIGRAM(S): at 21:48

## 2020-01-14 RX ADMIN — CARVEDILOL PHOSPHATE 25 MILLIGRAM(S): 80 CAPSULE, EXTENDED RELEASE ORAL at 21:46

## 2020-01-14 RX ADMIN — GABAPENTIN 100 MILLIGRAM(S): 400 CAPSULE ORAL at 13:28

## 2020-01-14 RX ADMIN — Medication 81 MILLIGRAM(S): at 13:30

## 2020-01-14 RX ADMIN — Medication 650 MILLIGRAM(S): at 03:14

## 2020-01-14 RX ADMIN — CARVEDILOL PHOSPHATE 25 MILLIGRAM(S): 80 CAPSULE, EXTENDED RELEASE ORAL at 09:52

## 2020-01-14 RX ADMIN — SEVELAMER CARBONATE 800 MILLIGRAM(S): 2400 POWDER, FOR SUSPENSION ORAL at 08:10

## 2020-01-14 RX ADMIN — GABAPENTIN 100 MILLIGRAM(S): 400 CAPSULE ORAL at 05:34

## 2020-01-14 RX ADMIN — HEPARIN SODIUM 5000 UNIT(S): 5000 INJECTION INTRAVENOUS; SUBCUTANEOUS at 13:26

## 2020-01-14 RX ADMIN — Medication 4: at 10:48

## 2020-01-14 RX ADMIN — TRAMADOL HYDROCHLORIDE 50 MILLIGRAM(S): 50 TABLET ORAL at 18:20

## 2020-01-14 RX ADMIN — Medication 650 MILLIGRAM(S): at 19:42

## 2020-01-14 RX ADMIN — Medication 5 MILLIGRAM(S): at 00:21

## 2020-01-14 RX ADMIN — TRAMADOL HYDROCHLORIDE 50 MILLIGRAM(S): 50 TABLET ORAL at 19:35

## 2020-01-14 RX ADMIN — Medication 650 MILLIGRAM(S): at 01:58

## 2020-01-14 RX ADMIN — LOSARTAN POTASSIUM 100 MILLIGRAM(S): 100 TABLET, FILM COATED ORAL at 17:26

## 2020-01-14 RX ADMIN — Medication 100 MILLIGRAM(S): at 05:34

## 2020-01-14 RX ADMIN — SEVELAMER CARBONATE 800 MILLIGRAM(S): 2400 POWDER, FOR SUSPENSION ORAL at 17:26

## 2020-01-14 RX ADMIN — Medication 6: at 17:24

## 2020-01-14 RX ADMIN — Medication 0.3 MILLIGRAM(S): at 05:34

## 2020-01-14 RX ADMIN — Medication 100 MILLIGRAM(S): at 13:26

## 2020-01-14 RX ADMIN — Medication 650 MILLIGRAM(S): at 15:10

## 2020-01-14 RX ADMIN — SEVELAMER CARBONATE 800 MILLIGRAM(S): 2400 POWDER, FOR SUSPENSION ORAL at 11:32

## 2020-01-14 RX ADMIN — Medication 650 MILLIGRAM(S): at 21:46

## 2020-01-14 RX ADMIN — CYCLOBENZAPRINE HYDROCHLORIDE 5 MILLIGRAM(S): 10 TABLET, FILM COATED ORAL at 05:34

## 2020-01-14 RX ADMIN — ATORVASTATIN CALCIUM 40 MILLIGRAM(S): 80 TABLET, FILM COATED ORAL at 21:46

## 2020-01-14 RX ADMIN — Medication 60 MILLIGRAM(S): at 04:35

## 2020-01-14 RX ADMIN — Medication 0.3 MILLIGRAM(S): at 13:26

## 2020-01-14 RX ADMIN — Medication 2: at 21:45

## 2020-01-14 RX ADMIN — Medication 2: at 06:31

## 2020-01-14 RX ADMIN — Medication 650 MILLIGRAM(S): at 09:53

## 2020-01-14 RX ADMIN — TRAMADOL HYDROCHLORIDE 25 MILLIGRAM(S): 50 TABLET ORAL at 10:55

## 2020-01-14 RX ADMIN — CLOPIDOGREL BISULFATE 75 MILLIGRAM(S): 75 TABLET, FILM COATED ORAL at 11:29

## 2020-01-14 RX ADMIN — Medication 650 MILLIGRAM(S): at 22:46

## 2020-01-14 RX ADMIN — Medication 1 APPLICATION(S): at 06:31

## 2020-01-14 RX ADMIN — Medication 650 MILLIGRAM(S): at 10:53

## 2020-01-14 NOTE — PROGRESS NOTE ADULT - ATTENDING COMMENTS
I independently performed the key portions of the evaluation and management service provided. I agree with the above history, physical, and plan which I have reviewed and edited where appropriate. I find HTN improved. Dialyzed yesterday. Cont BP meds. Dialysis tomorrow.  See full note. (Patient seen earlier in day.)

## 2020-01-14 NOTE — CONSULT NOTE ADULT - SUBJECTIVE AND OBJECTIVE BOX
Surgeon: Dr. Daniel Mcfarlane    Requesting Physician: Dr. 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 partial amputation of 3rd toe 11/2018 who was BIBEMS to [facility] for syncopal event during dialysis and found to have elevated blood pressure with possible aspiration of food since patient was eating at the time of the episode.  In the ED, patient received Clonidine and Hydralazine for /78 then admitted to the CCU where Hemodialysis was initiated during which patient became unresponsive 15minutes into the session.  His vitals showed bradycardia in the 30's, hypotension with sbp 90's and blood glucose 160.  A rapid response was called and patient's heart rate returned to normal sinus before intervention and patient was more responsive but noted to be lethargic with right sided facial droop.  Subsequently, a stroke code was called.  CT head 1/11/20 showed microangiopathic ischemic disease, lacunar infarcts in the basal ganglia bilaterally; no intracranial hemorrhage, acute transcortical infarct, or calvarial fracture; no significant interval change since 8/8/2019.  Repeat CT head showed no change from prior imaging. Patient has since transitioned from Nicardipine to home po antihypertensives.  TTE done 1/13/20 showed severe AS, ADOLFO 0.67, MG 38, Mild AR, Mild MR.  The Structural Team is being consulted to evaluate for possible TAVR.      PAST MEDICAL & SURGICAL HISTORY:  Peripheral neuropathy  Peripheral artery disease: s/p bilateral stents  Anemia of renal disease  End-stage renal disease (ESRD)  Type II diabetes mellitus  Retinal artery occlusion  Hypothyroidism  Low back pain  CAD (coronary artery disease)  H/O renal artery stenosis: s/p L renal stent  Hyperlipidemia  Hypertension  No significant past surgical history      MEDICATIONS  (STANDING):  aspirin  chewable 81 milliGRAM(s) Oral every 24 hours  atorvastatin 40 milliGRAM(s) Oral at bedtime  carvedilol 25 milliGRAM(s) Oral every 12 hours  chlorhexidine 2% Cloths 1 Application(s) Topical <User Schedule>  cloNIDine 0.3 milliGRAM(s) Oral every 8 hours  clopidogrel Tablet 75 milliGRAM(s) Oral daily  cyclobenzaprine 5 milliGRAM(s) Oral every 24 hours  dextrose 5%. 1000 milliLiter(s) (50 mL/Hr) IV Continuous <Continuous>  dextrose 50% Injectable 12.5 Gram(s) IV Push once  dextrose 50% Injectable 25 Gram(s) IV Push once  dextrose 50% Injectable 25 Gram(s) IV Push once  gabapentin 100 milliGRAM(s) Oral every 8 hours  heparin  Injectable 5000 Unit(s) SubCutaneous every 8 hours  hydrALAZINE 100 milliGRAM(s) Oral every 8 hours  insulin lispro (HumaLOG) corrective regimen sliding scale   SubCutaneous Before meals and at bedtime  isosorbide   mononitrate ER Tablet (IMDUR) 90 milliGRAM(s) Oral every 24 hours  levothyroxine 50 MICROGram(s) Oral daily  losartan 100 milliGRAM(s) Oral every 24 hours  melatonin 5 milliGRAM(s) Oral at bedtime  NIFEdipine XL 60 milliGRAM(s) Oral every 24 hours  sevelamer carbonate 800 milliGRAM(s) Oral three times a day with meals    MEDICATIONS  (PRN):  acetaminophen   Tablet .. 650 milliGRAM(s) Oral every 6 hours PRN Mild Pain (1 - 3)  dextrose 40% Gel 15 Gram(s) Oral once PRN Blood Glucose LESS THAN 70 milliGRAM(s)/deciliter  glucagon  Injectable 1 milliGRAM(s) IntraMuscular once PRN Glucose LESS THAN 70 milligrams/deciliter  traMADol 25 milliGRAM(s) Oral every 12 hours PRN Moderate Pain (4 - 6)      Allergies    No Known Allergies    Intolerances      SOCIAL HISTORY:  Smoker:  YES / NO        PACK YEARS:                         WHEN QUIT?  ETOH use:  YES / NO               FREQUENCY / QUANTITY:  Ilicit Drug use:  YES / NO  Occupation:  Assisted device use (Cane / Walker):  Live with:    FAMILY HISTORY:  FH: stroke: father at 66, mother at 85      Review of Systems (Need 10):  CONSTITUTIONAL: Denies fevers / chills, sweats, fatigue, weight loss, weight gain                                       NEURO:  Denies parathesias, seizures, syncope, confusion                                                                                  EYES:  Denies blurry vision, discharge, pain, loss of vision                                                                                    ENMT:  Denies difficulty hearing, vertigo, dysphagia, epistaxis, recent dental work                                       CV:  Denies chest pain, palpitations, DE LEON, orthopnea                                                                                           RESPIRATORY:  Denies Wheezing, SOB, cough / sputum, hemoptysis                                                               GI:  Denies nausea, vomiting, diarrhea, constipation, melena                                                                          : Denies hematuria, dysuria, urgency, incontinence                                                                                          MUSKULOSKELETAL:  Denies arthritis, joint swelling, muscle weakness                                                             SKIN/BREAST:  Denies rash, itching, hair loss, masses                                                                                              PSYCH:  Denies depression, anxiety, suicidal ideation                                                                                                HEME/LYMPH:  Denies bruises easily, enlarged lymph nodes, tender lymph nodes                                          ENDOCRINE:  Denies cold intolerance, heat intolerance, polydipsia                                                                      Vital Signs Last 24 Hrs  T(C): 38.6 (14 Jan 2020 10:00), Max: 38.6 (14 Jan 2020 10:00)  T(F): 101.4 (14 Jan 2020 10:00), Max: 101.4 (14 Jan 2020 10:00)  HR: 82 (14 Jan 2020 10:00) (60 - 82)  BP: 169/51 (13 Jan 2020 17:00) (169/51 - 169/51)  BP(mean): 88 (13 Jan 2020 17:00) (88 - 88)  RR: 18 (14 Jan 2020 10:00) (9 - 28)  SpO2: 100% (14 Jan 2020 10:00) (92% - 100%)    Physical Exam (Need 8)  CONSTITUTIONAL:                                                                            NEURO:                                                                                                                 EYES:                                                                                                    ENMT:                                                                                                WNL  CV:                                                                                                      WNL  RESPIRATORY:                                                                                  WNL  GI:                                                                                                       WNL  : TORRES + / -                                                                                 WNL  MUSKULOSKELETAL:                                                                       WNL  SKIN / BREAST:                                                                                 WNL                                                          LABS:                        10.8   6.81  )-----------( 95       ( 14 Jan 2020 05:28 )             34.8     01-14    138  |  98  |  39<H>  ----------------------------<  179<H>  4.3   |  25  |  3.96<H>    Ca    9.8      14 Jan 2020 05:28  Phos  4.5     01-14  Mg     1.9     01-14      RADIOLOGY & ADDITIONAL STUDIES:  CAROTID U/S:    CXR:< from: Xray Chest 1 View- PORTABLE-Routine (01.13.20 @ 07:23) >  Findings/  impression: Pulmonary vascular congestion, new. Stable cardiomegaly, thoracic aortic calcification.    < end of copied text >      CT Scan: < from: CT Brain Stroke Protocol (01.11.20 @ 15:52) >  IMPRESSION: No intracranial hemorrhage or acute transcortical infarct. No significant interval change since 8:37 AM.    < end of copied text >    < from: CT Head No Cont (01.11.20 @ 08:42) >  IMPRESSION: No intracranial hemorrhage, acute transcortical infarct, or calvarial fracture. No significant interval change since 8/8/2019.      < end of copied text >      EKG:< from: 12 Lead ECG (01.13.20 @ 04:48) >  Ventricular Rate 62 BPM    Atrial Rate 62 BPM    P-R Interval 150 ms    QRS Duration 96 ms    Q-T Interval 464 ms    QTC Calculation(Bezet) 470 ms    P Axis 55 degrees    R Axis -11 degrees    T Axis 72 degrees    Diagnosis Line Normal sinus rhythm  EKG within normal limits    < end of copied text >      TTE: < from: Echocardiogram (01.13.20 @ 12:54) >  CONCLUSIONS:     1. Normal left and right ventricular size and function.   2. Severe symmetric left ventricular hypertrophy.   3. Severe aortic stenosis.   4. Mild aortic regurgitation.   5. Mild mitral regurgitation.   6. Mild-to-moderate tricuspid regurgitation.   7. Pulmonary hypertension present, pulmonary artery systolic pressure is 70.00 mmHg.   8. No pericardial effusion. Ef 60-65%    < end of copied text >      Cardiac Cath: Surgeon: Dr. Daniel Mcfarlane    Requesting Physician: Dr. 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 partial amputation of 3rd toe 11/2018 who was BIBEMS to [facility] for syncopal event during dialysis and found to have elevated blood pressure with possible aspiration of food since patient was eating at the time of the episode.  In the ED, patient received Clonidine and Hydralazine for /78 then admitted to the CCU where Hemodialysis was initiated during which patient became unresponsive 15minutes into the session.  His vitals showed bradycardia in the 30's, hypotension with sbp 90's and blood glucose 160.  A rapid response was called and patient's heart rate returned to normal sinus before intervention and patient was more responsive but noted to be lethargic with right sided facial droop.  Subsequently, a stroke code was called.  CT head 1/11/20 showed microangiopathic ischemic disease, lacunar infarcts in the basal ganglia bilaterally; no intracranial hemorrhage, acute transcortical infarct, or calvarial fracture; no significant interval change since 8/8/2019.  Repeat CT head showed no change from prior imaging. Patient has since transitioned from Nicardipine to home po antihypertensives.  TTE done 1/13/20 showed severe AS, ADOLFO 0.67, MG 38, Mild AR, Mild MR.  The Structural Team is being consulted to evaluate for possible TAVR.      PAST MEDICAL & SURGICAL HISTORY:  Peripheral neuropathy  Peripheral artery disease: s/p bilateral stents  Anemia of renal disease  End-stage renal disease (ESRD)  Type II diabetes mellitus  Retinal artery occlusion  Hypothyroidism  Low back pain  CAD (coronary artery disease)  H/O renal artery stenosis: s/p L renal stent  Hyperlipidemia  Hypertension  No significant past surgical history      MEDICATIONS  (STANDING):  aspirin  chewable 81 milliGRAM(s) Oral every 24 hours  atorvastatin 40 milliGRAM(s) Oral at bedtime  carvedilol 25 milliGRAM(s) Oral every 12 hours  chlorhexidine 2% Cloths 1 Application(s) Topical <User Schedule>  cloNIDine 0.3 milliGRAM(s) Oral every 8 hours  clopidogrel Tablet 75 milliGRAM(s) Oral daily  cyclobenzaprine 5 milliGRAM(s) Oral every 24 hours  dextrose 5%. 1000 milliLiter(s) (50 mL/Hr) IV Continuous <Continuous>  dextrose 50% Injectable 12.5 Gram(s) IV Push once  dextrose 50% Injectable 25 Gram(s) IV Push once  dextrose 50% Injectable 25 Gram(s) IV Push once  gabapentin 100 milliGRAM(s) Oral every 8 hours  heparin  Injectable 5000 Unit(s) SubCutaneous every 8 hours  hydrALAZINE 100 milliGRAM(s) Oral every 8 hours  insulin lispro (HumaLOG) corrective regimen sliding scale   SubCutaneous Before meals and at bedtime  isosorbide   mononitrate ER Tablet (IMDUR) 90 milliGRAM(s) Oral every 24 hours  levothyroxine 50 MICROGram(s) Oral daily  losartan 100 milliGRAM(s) Oral every 24 hours  melatonin 5 milliGRAM(s) Oral at bedtime  NIFEdipine XL 60 milliGRAM(s) Oral every 24 hours  sevelamer carbonate 800 milliGRAM(s) Oral three times a day with meals    MEDICATIONS  (PRN):  acetaminophen   Tablet .. 650 milliGRAM(s) Oral every 6 hours PRN Mild Pain (1 - 3)  dextrose 40% Gel 15 Gram(s) Oral once PRN Blood Glucose LESS THAN 70 milliGRAM(s)/deciliter  glucagon  Injectable 1 milliGRAM(s) IntraMuscular once PRN Glucose LESS THAN 70 milligrams/deciliter  traMADol 25 milliGRAM(s) Oral every 12 hours PRN Moderate Pain (4 - 6)      Allergies    No Known Allergies    Intolerances      SOCIAL HISTORY:  Smoker:  Former        PACK YEARS:                         WHEN QUIT? 30 years ago  ETOH use:   NO             Ilicit Drug use: NO  Occupation:  Assisted device use (Cane / Walker): walker 2/2 neuropathy  Live with: in transitional housing. Has been homeless for 1 1/2 months    FAMILY HISTORY:  FH: stroke: father at 66, mother at 85    Review of Systems (Need 10):  CONSTITUTIONAL: Denies fevers / chills, sweats,  weight loss, weight gain       /Admits fatigue,                               NEURO:  Denies parathesias, seizures, confusion                                           /Admits  syncope, B/L LE neuropathy                                       EYES:  Denies blurry vision, discharge, pain, loss of vision                                                                                    ENMT:  Denies difficulty hearing, dysphagia, epistaxis, recent dental work        /Admits vertigo x 2days                               CV:  Denies chest pain, palpitations, DE LEON, orthopnea                                                                                           RESPIRATORY:  Denies Wheezing,  cough / sputum, hemoptysis   /admits SOB,                                                            GI:  Denies nausea, vomiting, diarrhea, constipation, melena                                                                          : Denies hematuria, dysuria, urgency, incontinence                 /Admits ESRD x 7 years                                                                         MUSKULOSKELETAL:  Denies arthritis, joint swelling, muscle weakness                                                             SKIN/BREAST:  Denies  itching, hair loss, masses                      /Admits rash                                                                          PSYCH:  Denies depression, anxiety, suicidal ideation                                                                                                HEME/LYMPH:  Denies bruises easily, enlarged lymph nodes, tender lymph nodes                                          ENDOCRINE:  Denies cold intolerance, heat intolerance, polydipsia                                                                      Vital Signs Last 24 Hrs  T(C): 38.6 (14 Jan 2020 10:00), Max: 38.6 (14 Jan 2020 10:00)  T(F): 101.4 (14 Jan 2020 10:00), Max: 101.4 (14 Jan 2020 10:00)  HR: 82 (14 Jan 2020 10:00) (60 - 82)  BP: 169/51 (13 Jan 2020 17:00) (169/51 - 169/51)  BP(mean): 88 (13 Jan 2020 17:00) (88 - 88)  RR: 18 (14 Jan 2020 10:00) (9 - 28)  SpO2: 100% (14 Jan 2020 10:00) (92% - 100%)    Physical Exam (Need 8)  CONSTITUTIONAL:                                                      NEURO:  Alert and oriented x3, no gross deficits appreciated                   EYES:      PERRL           ENMT:  supple neck, no lymphadenopathy, no bruit B/L carotids appreciated  CV:     RRR, S1S2, 5/6 crescendo-decrescendo systolic murmur RSB  RESPIRATORY:  equal breath sounds, positive rales left base, no rhonchi, no wheeze  GI:  soft, nontender, positive bowel sounds  : TORRES + / -  negative  MUSKULOSKELETAL:  moves all extremities, palpable b/l radial, right DP, R. PT  SKIN / BREAST:   B/L LE vascular hyperpigmention, healed ulcers LLE                                                            LABS:                        10.8   6.81  )-----------( 95       ( 14 Jan 2020 05:28 )             34.8     01-14    138  |  98  |  39<H>  ----------------------------<  179<H>  4.3   |  25  |  3.96<H>    Ca    9.8      14 Jan 2020 05:28  Phos  4.5     01-14  Mg     1.9     01-14      RADIOLOGY & ADDITIONAL STUDIES:  CAROTID U/S:    CXR:< from: Xray Chest 1 View- PORTABLE-Routine (01.13.20 @ 07:23) >  Findings/  impression: Pulmonary vascular congestion, new. Stable cardiomegaly, thoracic aortic calcification.    < end of copied text >      CT Scan: < from: CT Brain Stroke Protocol (01.11.20 @ 15:52) >  IMPRESSION: No intracranial hemorrhage or acute transcortical infarct. No significant interval change since 8:37 AM.    < end of copied text >    < from: CT Head No Cont (01.11.20 @ 08:42) >  IMPRESSION: No intracranial hemorrhage, acute transcortical infarct, or calvarial fracture. No significant interval change since 8/8/2019.      < end of copied text >      EKG:< from: 12 Lead ECG (01.13.20 @ 04:48) >  Ventricular Rate 62 BPM    Atrial Rate 62 BPM    P-R Interval 150 ms    QRS Duration 96 ms    Q-T Interval 464 ms    QTC Calculation(Bezet) 470 ms    P Axis 55 degrees    R Axis -11 degrees    T Axis 72 degrees    Diagnosis Line Normal sinus rhythm  EKG within normal limits    < end of copied text >      TTE: < from: Echocardiogram (01.13.20 @ 12:54) >  CONCLUSIONS:     1. Normal left and right ventricular size and function.   2. Severe symmetric left ventricular hypertrophy.   3. Severe aortic stenosis.   4. Mild aortic regurgitation.   5. Mild mitral regurgitation.   6. Mild-to-moderate tricuspid regurgitation.   7. Pulmonary hypertension present, pulmonary artery systolic pressure is 70.00 mmHg.   8. No pericardial effusion. Ef 60-65%    < end of copied text >      Cardiac Cath: Surgeon: Dr. Daniel Mcfarlane    Requesting Physician: Dr. 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 partial amputation of 3rd toe 11/2018 who was BIBEMS to [facility] for syncopal event during dialysis and found to have elevated blood pressure with possible aspiration of food since patient was eating at the time of the episode.  In the ED, patient received Clonidine and Hydralazine for /78 then admitted to the CCU where Hemodialysis was initiated during which patient became unresponsive 15minutes into the session.  His vitals showed bradycardia in the 30's, hypotension with sbp 90's and blood glucose 160.  A rapid response was called and patient's heart rate returned to normal sinus before intervention and patient was more responsive but noted to be lethargic with right sided facial droop.  Subsequently, a stroke code was called.  CT head 1/11/20 showed microangiopathic ischemic disease, lacunar infarcts in the basal ganglia bilaterally; no intracranial hemorrhage, acute transcortical infarct, or calvarial fracture; no significant interval change since 8/8/2019.  Repeat CT head showed no change from prior imaging. Patient has since transitioned from Nicardipine to home po antihypertensives.  TTE done 1/13/20 showed severe AS, ADOLFO 0.67, MG 38, Mild AR, Mild MR.  The Structural Team is being consulted to evaluate for possible TAVR.      PAST MEDICAL & SURGICAL HISTORY:  Peripheral neuropathy  Peripheral artery disease: s/p bilateral stents  Anemia of renal disease  End-stage renal disease (ESRD)  Type II diabetes mellitus  Retinal artery occlusion  Hypothyroidism  Low back pain  CAD (coronary artery disease)  H/O renal artery stenosis: s/p L renal stent  Hyperlipidemia  Hypertension  No significant past surgical history      MEDICATIONS  (STANDING):  aspirin  chewable 81 milliGRAM(s) Oral every 24 hours  atorvastatin 40 milliGRAM(s) Oral at bedtime  carvedilol 25 milliGRAM(s) Oral every 12 hours  chlorhexidine 2% Cloths 1 Application(s) Topical <User Schedule>  cloNIDine 0.3 milliGRAM(s) Oral every 8 hours  clopidogrel Tablet 75 milliGRAM(s) Oral daily  cyclobenzaprine 5 milliGRAM(s) Oral every 24 hours  dextrose 5%. 1000 milliLiter(s) (50 mL/Hr) IV Continuous <Continuous>  dextrose 50% Injectable 12.5 Gram(s) IV Push once  dextrose 50% Injectable 25 Gram(s) IV Push once  dextrose 50% Injectable 25 Gram(s) IV Push once  gabapentin 100 milliGRAM(s) Oral every 8 hours  heparin  Injectable 5000 Unit(s) SubCutaneous every 8 hours  hydrALAZINE 100 milliGRAM(s) Oral every 8 hours  insulin lispro (HumaLOG) corrective regimen sliding scale   SubCutaneous Before meals and at bedtime  isosorbide   mononitrate ER Tablet (IMDUR) 90 milliGRAM(s) Oral every 24 hours  levothyroxine 50 MICROGram(s) Oral daily  losartan 100 milliGRAM(s) Oral every 24 hours  melatonin 5 milliGRAM(s) Oral at bedtime  NIFEdipine XL 60 milliGRAM(s) Oral every 24 hours  sevelamer carbonate 800 milliGRAM(s) Oral three times a day with meals    MEDICATIONS  (PRN):  acetaminophen   Tablet .. 650 milliGRAM(s) Oral every 6 hours PRN Mild Pain (1 - 3)  dextrose 40% Gel 15 Gram(s) Oral once PRN Blood Glucose LESS THAN 70 milliGRAM(s)/deciliter  glucagon  Injectable 1 milliGRAM(s) IntraMuscular once PRN Glucose LESS THAN 70 milligrams/deciliter  traMADol 25 milliGRAM(s) Oral every 12 hours PRN Moderate Pain (4 - 6)      Allergies    No Known Allergies    Intolerances    SOCIAL HISTORY:  Smoker:  Former        PACK YEARS:                         WHEN QUIT? 30 years ago  ETOH use:   NO             Ilicit Drug use: NO  Occupation:  Assisted device use (Cane / Walker): walker 2/2 neuropathy  Live with: in transitional housing. Has been homeless for 1 1/2 months. Of Note:    FAMILY HISTORY:  FH: stroke: father at 66, mother at 85    Review of Systems (Need 10):  CONSTITUTIONAL: Denies fevers / chills, sweats,  weight loss, weight gain       /Admits fatigue,                               NEURO:  Denies parathesias, seizures, confusion                                           /Admits  syncope, B/L LE neuropathy                                       EYES:  Denies blurry vision, discharge, pain, loss of vision                                                                                    ENMT:  Denies difficulty hearing, dysphagia, epistaxis, recent dental work        /Admits vertigo x 2days                               CV:  Denies chest pain, palpitations, DE LEON, orthopnea                                                                                           RESPIRATORY:  Denies Wheezing,  cough / sputum, hemoptysis   /admits SOB,                                                            GI:  Denies nausea, vomiting, diarrhea, constipation, melena                                                                          : Denies hematuria, dysuria, urgency, incontinence                 /Admits ESRD x 7 years                                                                         MUSKULOSKELETAL:  Denies arthritis, joint swelling, muscle weakness                                                             SKIN/BREAST:  Denies  itching, hair loss, masses                      /Admits rash                                                                          PSYCH:  Denies depression, anxiety, suicidal ideation                                                                                                HEME/LYMPH:  Denies bruises easily, enlarged lymph nodes, tender lymph nodes                                          ENDOCRINE:  Denies cold intolerance, heat intolerance, polydipsia                                                                      Vital Signs Last 24 Hrs  T(C): 38.6 (14 Jan 2020 10:00), Max: 38.6 (14 Jan 2020 10:00)  T(F): 101.4 (14 Jan 2020 10:00), Max: 101.4 (14 Jan 2020 10:00)  HR: 82 (14 Jan 2020 10:00) (60 - 82)  BP: 169/51 (13 Jan 2020 17:00) (169/51 - 169/51)  BP(mean): 88 (13 Jan 2020 17:00) (88 - 88)  RR: 18 (14 Jan 2020 10:00) (9 - 28)  SpO2: 100% (14 Jan 2020 10:00) (92% - 100%)    Physical Exam (Need 8)  CONSTITUTIONAL:                                                      NEURO:  Alert and oriented x3, no gross deficits appreciated                   EYES:      PERRL           ENMT:  supple neck, no lymphadenopathy, murmur radiated to B/L carotids   CV:     RRR, S1S2, 5/6 crescendo-decrescendo systolic murmur RSB  RESPIRATORY:  equal breath sounds, positive rales left base, no rhonchi, no wheeze  GI:  soft, nontender, positive bowel sounds  : TORRES + / -  negative  MUSKULOSKELETAL:  moves all extremities, palpable b/l radial, right DP, R. PT, LUE AVF with thrill bruit  SKIN / BREAST:   B/L LE vascular hyperpigmention, healed ulcers LLE                                                            LABS:                        10.8   6.81  )-----------( 95       ( 14 Jan 2020 05:28 )             34.8     01-14    138  |  98  |  39<H>  ----------------------------<  179<H>  4.3   |  25  |  3.96<H>    Ca    9.8      14 Jan 2020 05:28  Phos  4.5     01-14  Mg     1.9     01-14      RADIOLOGY & ADDITIONAL STUDIES:  CAROTID U/S:    CXR:< from: Xray Chest 1 View- PORTABLE-Routine (01.13.20 @ 07:23) >  Findings/  impression: Pulmonary vascular congestion, new. Stable cardiomegaly, thoracic aortic calcification.    < end of copied text >      CT Scan: < from: CT Brain Stroke Protocol (01.11.20 @ 15:52) >  IMPRESSION: No intracranial hemorrhage or acute transcortical infarct. No significant interval change since 8:37 AM.    < end of copied text >    < from: CT Head No Cont (01.11.20 @ 08:42) >  IMPRESSION: No intracranial hemorrhage, acute transcortical infarct, or calvarial fracture. No significant interval change since 8/8/2019.      < end of copied text >      EKG:< from: 12 Lead ECG (01.13.20 @ 04:48) >  Ventricular Rate 62 BPM    Atrial Rate 62 BPM    P-R Interval 150 ms    QRS Duration 96 ms    Q-T Interval 464 ms    QTC Calculation(Bezet) 470 ms    P Axis 55 degrees    R Axis -11 degrees    T Axis 72 degrees    Diagnosis Line Normal sinus rhythm  EKG within normal limits    < end of copied text >      TTE: < from: Echocardiogram (01.13.20 @ 12:54) >  CONCLUSIONS:     1. Normal left and right ventricular size and function.   2. Severe symmetric left ventricular hypertrophy.   3. Severe aortic stenosis.   4. Mild aortic regurgitation.   5. Mild mitral regurgitation.   6. Mild-to-moderate tricuspid regurgitation.   7. Pulmonary hypertension present, pulmonary artery systolic pressure is 70.00 mmHg.   8. No pericardial effusion. Ef 60-65%    < end of copied text >      Cardiac Cath: Surgeon: Dr. Daniel Mcfarlane    Requesting Physician: Dr. 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  In the ED, patient received Clonidine and Hydralazine for /78 then admitted to the CCU where Hemodialysis was initiated during which patient became unresponsive 15minutes into the session.  His vitals showed bradycardia in the 30's, hypotension with sbp 90's and blood glucose 160.  A rapid response was called and patient's heart rate returned to normal sinus before intervention and patient was more responsive but noted to be lethargic with right sided facial droop.  Subsequently, a stroke code was called.  CT head 1/11/20 showed microangiopathic ischemic disease, lacunar infarcts in the basal ganglia bilaterally; no intracranial hemorrhage, acute transcortical infarct, or calvarial fracture; no significant interval change since 8/8/2019.  Repeat CT head showed no change from prior imaging. Patient has since transitioned from Nicardipine to home po antihypertensives.  TTE done 1/13/20 showed severe AS, ADOLFO 0.67, MG 38, Mild AR, Mild MR.  The Structural Team is being consulted to evaluate for possible TAVR.      PAST MEDICAL & SURGICAL HISTORY:  Peripheral neuropathy  Peripheral artery disease: s/p bilateral stents  Anemia of renal disease  End-stage renal disease (ESRD)  Type II diabetes mellitus  Retinal artery occlusion  Hypothyroidism  Low back pain  CAD (coronary artery disease)  H/O renal artery stenosis: s/p L renal stent  Hyperlipidemia  Hypertension  No significant past surgical history      MEDICATIONS  (STANDING):  aspirin  chewable 81 milliGRAM(s) Oral every 24 hours  atorvastatin 40 milliGRAM(s) Oral at bedtime  carvedilol 25 milliGRAM(s) Oral every 12 hours  chlorhexidine 2% Cloths 1 Application(s) Topical <User Schedule>  cloNIDine 0.3 milliGRAM(s) Oral every 8 hours  clopidogrel Tablet 75 milliGRAM(s) Oral daily  cyclobenzaprine 5 milliGRAM(s) Oral every 24 hours  dextrose 5%. 1000 milliLiter(s) (50 mL/Hr) IV Continuous <Continuous>  dextrose 50% Injectable 12.5 Gram(s) IV Push once  dextrose 50% Injectable 25 Gram(s) IV Push once  dextrose 50% Injectable 25 Gram(s) IV Push once  gabapentin 100 milliGRAM(s) Oral every 8 hours  heparin  Injectable 5000 Unit(s) SubCutaneous every 8 hours  hydrALAZINE 100 milliGRAM(s) Oral every 8 hours  insulin lispro (HumaLOG) corrective regimen sliding scale   SubCutaneous Before meals and at bedtime  isosorbide   mononitrate ER Tablet (IMDUR) 90 milliGRAM(s) Oral every 24 hours  levothyroxine 50 MICROGram(s) Oral daily  losartan 100 milliGRAM(s) Oral every 24 hours  melatonin 5 milliGRAM(s) Oral at bedtime  NIFEdipine XL 60 milliGRAM(s) Oral every 24 hours  sevelamer carbonate 800 milliGRAM(s) Oral three times a day with meals    MEDICATIONS  (PRN):  acetaminophen   Tablet .. 650 milliGRAM(s) Oral every 6 hours PRN Mild Pain (1 - 3)  dextrose 40% Gel 15 Gram(s) Oral once PRN Blood Glucose LESS THAN 70 milliGRAM(s)/deciliter  glucagon  Injectable 1 milliGRAM(s) IntraMuscular once PRN Glucose LESS THAN 70 milligrams/deciliter  traMADol 25 milliGRAM(s) Oral every 12 hours PRN Moderate Pain (4 - 6)      Allergies    No Known Allergies    Intolerances    SOCIAL HISTORY:  Smoker:  Former        PACK YEARS:                         WHEN QUIT? 30 years ago  ETOH use:   NO             Ilicit Drug use: NO  Occupation:  Assisted device use (Cane / Walker): walker 2/2 neuropathy  Live with: in transitional housing. Has been homeless for 1 1/2 months. Of Note:    FAMILY HISTORY:  FH: stroke: father at 66, mother at 85    Review of Systems (Need 10):  CONSTITUTIONAL: Denies fevers / chills, sweats,  weight loss, weight gain       /Admits fatigue,                               NEURO:  Denies parathesias, seizures, confusion                                           /Admits  syncope, B/L LE neuropathy                                       EYES:  Denies blurry vision, discharge, pain, loss of vision                                                                                    ENMT:  Denies difficulty hearing, dysphagia, epistaxis, recent dental work        /Admits vertigo x 2days                               CV:  Denies chest pain, palpitations, DE LEON, orthopnea                                                                                           RESPIRATORY:  Denies Wheezing,  cough / sputum, hemoptysis   /admits SOB,                                                            GI:  Denies nausea, vomiting, diarrhea, constipation, melena                                                                          : Denies hematuria, dysuria, urgency, incontinence                 /Admits ESRD x 7 years                                                                         MUSKULOSKELETAL:  Denies arthritis, joint swelling, muscle weakness                                                             SKIN/BREAST:  Denies  itching, hair loss, masses                      /Admits rash                                                                          PSYCH:  Denies depression, anxiety, suicidal ideation                                                                                                HEME/LYMPH:  Denies bruises easily, enlarged lymph nodes, tender lymph nodes                                          ENDOCRINE:  Denies cold intolerance, heat intolerance, polydipsia                                                                      Vital Signs Last 24 Hrs  T(C): 38.6 (14 Jan 2020 10:00), Max: 38.6 (14 Jan 2020 10:00)  T(F): 101.4 (14 Jan 2020 10:00), Max: 101.4 (14 Jan 2020 10:00)  HR: 82 (14 Jan 2020 10:00) (60 - 82)  BP: 169/51 (13 Jan 2020 17:00) (169/51 - 169/51)  BP(mean): 88 (13 Jan 2020 17:00) (88 - 88)  RR: 18 (14 Jan 2020 10:00) (9 - 28)  SpO2: 100% (14 Jan 2020 10:00) (92% - 100%)    Physical Exam (Need 8)  CONSTITUTIONAL:                                                      NEURO:  Alert and oriented x3, no gross deficits appreciated                   EYES:      PERRL           ENMT:  supple neck, no lymphadenopathy, murmur radiated to B/L carotids   CV:     RRR, S1S2, 5/6 crescendo-decrescendo systolic murmur RSB  RESPIRATORY:  equal breath sounds, positive rales left base, no rhonchi, no wheeze  GI:  soft, nontender, positive bowel sounds  : TORRES + / -  negative  MUSKULOSKELETAL:  moves all extremities, palpable b/l radial, right DP, R. PT, LUE AVF with thrill bruit  SKIN / BREAST:   B/L LE vascular hyperpigmention, healed ulcers LLE, healed Left 3rd amputated digit                                                             LABS:                        10.8   6.81  )-----------( 95       ( 14 Jan 2020 05:28 )             34.8     01-14    138  |  98  |  39<H>  ----------------------------<  179<H>  4.3   |  25  |  3.96<H>    Ca    9.8      14 Jan 2020 05:28  Phos  4.5     01-14  Mg     1.9     01-14    RADIOLOGY & ADDITIONAL STUDIES:  CAROTID U/S: < from: US Duplex Carotid Arteries Complete, Bilateral (08.09.19 @ 17:03) >  IMPRESSION:  No hemodynamically significant stenosis in bilateral CCA and ICA.  Severe calcified plaque in the right proximal ICA and ECA, probable   significant stenosis in the proximal ECA with asymmetrically increased peak systolic velocity.  Mild-to-moderate calcified plaques in the left CCA. Stent in place in the left ICA.    < end of copied text >    CXR:< from: Xray Chest 1 View- PORTABLE-Routine (01.13.20 @ 07:23) >  Findings/  impression: Pulmonary vascular congestion, new. Stable cardiomegaly, thoracic aortic calcification.    < end of copied text >      CT Scan: < from: CT Brain Stroke Protocol (01.11.20 @ 15:52) >  IMPRESSION: No intracranial hemorrhage or acute transcortical infarct. No significant interval change since 8:37 AM.    < end of copied text >      < from: CT Head No Cont (01.11.20 @ 08:42) >  IMPRESSION: No intracranial hemorrhage, acute transcortical infarct, or calvarial fracture. No significant interval change since 8/8/2019.    < end of copied text >      EKG:< from: 12 Lead ECG (01.13.20 @ 04:48) >  Ventricular Rate 62 BPM    Atrial Rate 62 BPM    P-R Interval 150 ms    QRS Duration 96 ms    Q-T Interval 464 ms    QTC Calculation(Bezet) 470 ms    P Axis 55 degrees    R Axis -11 degrees    T Axis 72 degrees    Diagnosis Line Normal sinus rhythm  EKG within normal limits    < end of copied text >      TTE: < from: Echocardiogram (01.13.20 @ 12:54) >  CONCLUSIONS:     1. Normal left and right ventricular size and function.   2. Severe symmetric left ventricular hypertrophy.   3. Severe aortic stenosis.   4. Mild aortic regurgitation.   5. Mild mitral regurgitation.   6. Mild-to-moderate tricuspid regurgitation.   7. Pulmonary hypertension present, pulmonary artery systolic pressure is 70.00 mmHg.   8. No pericardial effusion. Ef 60-65%    < end of copied text >      Cardiac Cath: Surgeon: Dr. Daniel Mcfarlane    Requesting Physician: Dr. 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 partial amputation of 3rd toe 11/2018 who was BIBEMS to [facility] for syncopal event during dialysis and found to have elevated blood pressure with possible aspiration of food since patient was eating at the time of the episode.  In the ED, patient received Clonidine and Hydralazine for /78 then admitted to the CCU where Hemodialysis was initiated during which patient became unresponsive 15minutes into the session.  His vitals showed bradycardia in the 30's, hypotension with sbp 90's and blood glucose 160.  A rapid response was called and patient's heart rate returned to normal sinus before intervention and patient was more responsive but noted to be lethargic with right sided facial droop.  Subsequently, a stroke code was called.  CT head 1/11/20 showed microangiopathic ischemic disease, lacunar infarcts in the basal ganglia bilaterally; no intracranial hemorrhage, acute transcortical infarct, or calvarial fracture; no significant interval change since 8/8/2019.  Repeat CT head showed no change from prior imaging. Patient has since transitioned from Nicardipine to home po antihypertensives.  TTE done 1/13/20 showed severe AS, ADOLFO 0.67, MG 38, Mild AR, Mild MR.  The Structural Team is being consulted to evaluate for possible TAVR.      PAST MEDICAL & SURGICAL HISTORY:  Peripheral neuropathy  Peripheral artery disease: s/p bilateral stents  Anemia of renal disease  End-stage renal disease (ESRD)  Type II diabetes mellitus  Retinal artery occlusion  Hypothyroidism  Low back pain  CAD (coronary artery disease)  H/O renal artery stenosis: s/p L renal stent  Hyperlipidemia  Hypertension  No significant past surgical history      MEDICATIONS  (STANDING):  aspirin  chewable 81 milliGRAM(s) Oral every 24 hours  atorvastatin 40 milliGRAM(s) Oral at bedtime  carvedilol 25 milliGRAM(s) Oral every 12 hours  chlorhexidine 2% Cloths 1 Application(s) Topical <User Schedule>  cloNIDine 0.3 milliGRAM(s) Oral every 8 hours  clopidogrel Tablet 75 milliGRAM(s) Oral daily  cyclobenzaprine 5 milliGRAM(s) Oral every 24 hours  dextrose 5%. 1000 milliLiter(s) (50 mL/Hr) IV Continuous <Continuous>  dextrose 50% Injectable 12.5 Gram(s) IV Push once  dextrose 50% Injectable 25 Gram(s) IV Push once  dextrose 50% Injectable 25 Gram(s) IV Push once  gabapentin 100 milliGRAM(s) Oral every 8 hours  heparin  Injectable 5000 Unit(s) SubCutaneous every 8 hours  hydrALAZINE 100 milliGRAM(s) Oral every 8 hours  insulin lispro (HumaLOG) corrective regimen sliding scale   SubCutaneous Before meals and at bedtime  isosorbide   mononitrate ER Tablet (IMDUR) 90 milliGRAM(s) Oral every 24 hours  levothyroxine 50 MICROGram(s) Oral daily  losartan 100 milliGRAM(s) Oral every 24 hours  melatonin 5 milliGRAM(s) Oral at bedtime  NIFEdipine XL 60 milliGRAM(s) Oral every 24 hours  sevelamer carbonate 800 milliGRAM(s) Oral three times a day with meals    MEDICATIONS  (PRN):  acetaminophen   Tablet .. 650 milliGRAM(s) Oral every 6 hours PRN Mild Pain (1 - 3)  dextrose 40% Gel 15 Gram(s) Oral once PRN Blood Glucose LESS THAN 70 milliGRAM(s)/deciliter  glucagon  Injectable 1 milliGRAM(s) IntraMuscular once PRN Glucose LESS THAN 70 milligrams/deciliter  traMADol 25 milliGRAM(s) Oral every 12 hours PRN Moderate Pain (4 - 6)      Allergies    No Known Allergies    Intolerances    SOCIAL HISTORY:  Smoker:  Former        PACK YEARS:                         WHEN QUIT? 30 years ago  ETOH use:   NO             Ilicit Drug use: NO  Occupation:  Assisted device use (Cane / Walker): walker 2/2 neuropathy  Live with: in transitional housing. Has been homeless for 1 1/2 months. Of Note:    FAMILY HISTORY:  FH: stroke: father at 66, mother at 85    Review of Systems (Need 10):  CONSTITUTIONAL: Denies fevers / chills, sweats,  weight loss, weight gain       /Admits fatigue,                               NEURO:  Denies parathesias, seizures, confusion                                           /Admits  syncope, B/L LE neuropathy                                       EYES:  Denies blurry vision, discharge, pain, loss of vision                                                                                    ENMT:  Denies difficulty hearing, dysphagia, epistaxis, recent dental work        /Admits vertigo x 2days                               CV:  Denies chest pain, palpitations, DE LEON, orthopnea                                                                                           RESPIRATORY:  Denies Wheezing,  cough / sputum, hemoptysis   /admits SOB,                                                            GI:  Denies nausea, vomiting, diarrhea, constipation, melena                                                                          : Denies hematuria, dysuria, urgency, incontinence                 /Admits ESRD x 7 years                                                                         MUSKULOSKELETAL:  Denies arthritis, joint swelling, muscle weakness                                                             SKIN/BREAST:  Denies  itching, hair loss, masses                      /Admits rash                                                                          PSYCH:  Denies depression, anxiety, suicidal ideation                                                                                                HEME/LYMPH:  Denies bruises easily, enlarged lymph nodes, tender lymph nodes                                          ENDOCRINE:  Denies cold intolerance, heat intolerance, polydipsia                                                                      Vital Signs Last 24 Hrs  T(C): 38.6 (14 Jan 2020 10:00), Max: 38.6 (14 Jan 2020 10:00)  T(F): 101.4 (14 Jan 2020 10:00), Max: 101.4 (14 Jan 2020 10:00)  HR: 82 (14 Jan 2020 10:00) (60 - 82)  BP: 169/51 (13 Jan 2020 17:00) (169/51 - 169/51)  BP(mean): 88 (13 Jan 2020 17:00) (88 - 88)  RR: 18 (14 Jan 2020 10:00) (9 - 28)  SpO2: 100% (14 Jan 2020 10:00) (92% - 100%)    Physical Exam (Need 8)  CONSTITUTIONAL:                                                      NEURO:  Alert and oriented x3, no gross deficits appreciated                   EYES:      PERRL           ENMT:  supple neck, no lymphadenopathy, murmur radiated to B/L carotids   CV:     RRR, S1S2, 5/6 crescendo-decrescendo systolic murmur RSB  RESPIRATORY:  equal breath sounds, positive rales left base, no rhonchi, no wheeze  GI:  soft, nontender, positive bowel sounds  : TORRES + / -  negative  MUSKULOSKELETAL:  moves all extremities, palpable b/l radial, right DP, R. PT, LUE AVF with thrill bruit  SKIN / BREAST:   B/L LE vascular hyperpigmention, healed ulcers LLE, healed Left 3rd amputated digit                                                             LABS:                        10.8   6.81  )-----------( 95       ( 14 Jan 2020 05:28 )             34.8     01-14    138  |  98  |  39<H>  ----------------------------<  179<H>  4.3   |  25  |  3.96<H>    Ca    9.8      14 Jan 2020 05:28  Phos  4.5     01-14  Mg     1.9     01-14    RADIOLOGY & ADDITIONAL STUDIES:  CAROTID U/S: < from: US Duplex Carotid Arteries Complete, Bilateral (08.09.19 @ 17:03) >  IMPRESSION:  No hemodynamically significant stenosis in bilateral CCA and ICA.  Severe calcified plaque in the right proximal ICA and ECA, probable   significant stenosis in the proximal ECA with asymmetrically increased peak systolic velocity.  Mild-to-moderate calcified plaques in the left CCA. Stent in place in the left ICA.    < end of copied text >    CXR:< from: Xray Chest 1 View- PORTABLE-Routine (01.13.20 @ 07:23) >  Findings/  impression: Pulmonary vascular congestion, new. Stable cardiomegaly, thoracic aortic calcification.    < end of copied text >      CT Scan: < from: CT Brain Stroke Protocol (01.11.20 @ 15:52) >  IMPRESSION: No intracranial hemorrhage or acute transcortical infarct. No significant interval change since 8:37 AM.    < end of copied text >      < from: CT Head No Cont (01.11.20 @ 08:42) >  IMPRESSION: No intracranial hemorrhage, acute transcortical infarct, or calvarial fracture. No significant interval change since 8/8/2019.    < end of copied text >    < from: CT Angio Chest w/ IV Cont (08.08.19 @ 11:22) >  IMPRESSION:  1. No dissection.    2. Severe calcified and noncalcified atherosclerotic aortic plaques, Severe coronary artery disease.     3. 1.2 cm cystic lesion in the pancreas. Follow-up pancreas protocol CT   or MRI in 2 years is advised.    < end of copied text >          EKG:< from: 12 Lead ECG (01.13.20 @ 04:48) >  Ventricular Rate 62 BPM    Atrial Rate 62 BPM    P-R Interval 150 ms    QRS Duration 96 ms    Q-T Interval 464 ms    QTC Calculation(Bezet) 470 ms    P Axis 55 degrees    R Axis -11 degrees    T Axis 72 degrees    Diagnosis Line Normal sinus rhythm  EKG within normal limits    < end of copied text >      TTE: < from: Echocardiogram (01.13.20 @ 12:54) >  CONCLUSIONS:     1. Normal left and right ventricular size and function.   2. Severe symmetric left ventricular hypertrophy.   3. Severe aortic stenosis.   4. Mild aortic regurgitation.   5. Mild mitral regurgitation.   6. Mild-to-moderate tricuspid regurgitation.   7. Pulmonary hypertension present, pulmonary artery systolic pressure is 70.00 mmHg.   8. No pericardial effusion. Ef 60-65%    < end of copied text >      Cardiac Cath: Surgeon: Dr. Daniel Mcfarlane    Requesting Physician: Dr. 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 partial amputation of 3rd toe 11/2018 who was BIBEMS to [facility] for syncopal event during dialysis and found to have elevated blood pressure with possible aspiration of food since patient was eating at the time of the episode.  In the ED, patient received Clonidine and Hydralazine for /78 then admitted to the CCU where Hemodialysis was initiated during which patient became unresponsive 15minutes into the session.  His vitals showed bradycardia in the 30's, hypotension with sbp 90's and blood glucose 160.  A rapid response was called and patient's heart rate returned to normal sinus before intervention and patient was more responsive but noted to be lethargic with right sided facial droop.  Subsequently, a stroke code was called.  CT head 1/11/20 showed microangiopathic ischemic disease, lacunar infarcts in the basal ganglia bilaterally; no intracranial hemorrhage, acute transcortical infarct, or calvarial fracture; no significant interval change since 8/8/2019.  Repeat CT head showed no change from prior imaging. Patient has since transitioned from Nicardipine to home po antihypertensives.  TTE done 1/13/20 showed severe AS, ADOLFO 0.67, MG 38, Mild AR, Mild MR.  The Structural Team is being consulted to evaluate for possible TAVR.      PAST MEDICAL & SURGICAL HISTORY:  Peripheral neuropathy  Peripheral artery disease: s/p bilateral stents  Anemia of renal disease  End-stage renal disease (ESRD)  Type II diabetes mellitus  Retinal artery occlusion  Hypothyroidism  Low back pain  CAD (coronary artery disease)  H/O renal artery stenosis: s/p L renal stent  Hyperlipidemia  Hypertension  No significant past surgical history      MEDICATIONS  (STANDING):  aspirin  chewable 81 milliGRAM(s) Oral every 24 hours  atorvastatin 40 milliGRAM(s) Oral at bedtime  carvedilol 25 milliGRAM(s) Oral every 12 hours  chlorhexidine 2% Cloths 1 Application(s) Topical <User Schedule>  cloNIDine 0.3 milliGRAM(s) Oral every 8 hours  clopidogrel Tablet 75 milliGRAM(s) Oral daily  cyclobenzaprine 5 milliGRAM(s) Oral every 24 hours  dextrose 5%. 1000 milliLiter(s) (50 mL/Hr) IV Continuous <Continuous>  dextrose 50% Injectable 12.5 Gram(s) IV Push once  dextrose 50% Injectable 25 Gram(s) IV Push once  dextrose 50% Injectable 25 Gram(s) IV Push once  gabapentin 100 milliGRAM(s) Oral every 8 hours  heparin  Injectable 5000 Unit(s) SubCutaneous every 8 hours  hydrALAZINE 100 milliGRAM(s) Oral every 8 hours  insulin lispro (HumaLOG) corrective regimen sliding scale   SubCutaneous Before meals and at bedtime  isosorbide   mononitrate ER Tablet (IMDUR) 90 milliGRAM(s) Oral every 24 hours  levothyroxine 50 MICROGram(s) Oral daily  losartan 100 milliGRAM(s) Oral every 24 hours  melatonin 5 milliGRAM(s) Oral at bedtime  NIFEdipine XL 60 milliGRAM(s) Oral every 24 hours  sevelamer carbonate 800 milliGRAM(s) Oral three times a day with meals    MEDICATIONS  (PRN):  acetaminophen   Tablet .. 650 milliGRAM(s) Oral every 6 hours PRN Mild Pain (1 - 3)  dextrose 40% Gel 15 Gram(s) Oral once PRN Blood Glucose LESS THAN 70 milliGRAM(s)/deciliter  glucagon  Injectable 1 milliGRAM(s) IntraMuscular once PRN Glucose LESS THAN 70 milligrams/deciliter  traMADol 25 milliGRAM(s) Oral every 12 hours PRN Moderate Pain (4 - 6)      Allergies    No Known Allergies    Intolerances    SOCIAL HISTORY:  Smoker:  Former        PACK YEARS:                         WHEN QUIT? 30 years ago  ETOH use:   NO             Ilicit Drug use: NO  Occupation:  Assisted device use (Cane / Walker): walker 2/2 neuropathy  Live with: in transitional housing. Has been homeless for 1 1/2 months. Of Note:    FAMILY HISTORY:  FH: stroke: father at 66, mother at 85    Review of Systems (Need 10):  CONSTITUTIONAL: Denies fevers / chills, sweats,  weight loss, weight gain       /Admits fatigue,                               NEURO:  Denies parathesias, seizures, confusion                                           /Admits  syncope, B/L LE neuropathy                                       EYES:  Denies blurry vision, discharge, pain, loss of vision                                                                                    ENMT:  Denies difficulty hearing, dysphagia, epistaxis, recent dental work        /Admits vertigo x 2days                               CV:  Denies chest pain, palpitations, DE LEON, orthopnea                                                                                           RESPIRATORY:  Denies Wheezing,  cough / sputum, hemoptysis   /admits SOB,                                                            GI:  Denies nausea, vomiting, diarrhea, constipation, melena                                                                          : Denies hematuria, dysuria, urgency, incontinence                 /Admits ESRD x 7 years                                                                         MUSKULOSKELETAL:  Denies arthritis, joint swelling, muscle weakness                                                             SKIN/BREAST:  Denies  itching, hair loss, masses                      /Admits rash                                                                          PSYCH:  Denies depression, anxiety, suicidal ideation                                                                                                HEME/LYMPH:  Denies bruises easily, enlarged lymph nodes, tender lymph nodes                                          ENDOCRINE:  Denies cold intolerance, heat intolerance, polydipsia                                                                      Vital Signs Last 24 Hrs  T(C): 38.6 (14 Jan 2020 10:00), Max: 38.6 (14 Jan 2020 10:00)  T(F): 101.4 (14 Jan 2020 10:00), Max: 101.4 (14 Jan 2020 10:00)  HR: 82 (14 Jan 2020 10:00) (60 - 82)  BP: 169/51 (13 Jan 2020 17:00) (169/51 - 169/51)  BP(mean): 88 (13 Jan 2020 17:00) (88 - 88)  RR: 18 (14 Jan 2020 10:00) (9 - 28)  SpO2: 100% (14 Jan 2020 10:00) (92% - 100%)    Physical Exam (Need 8)  CONSTITUTIONAL:                                                      NEURO:  Alert and oriented x3, no gross deficits appreciated                   EYES:      PERRL           ENMT:  supple neck, no lymphadenopathy, murmur radiated to B/L carotids   CV:     RRR, S1S2, 3/6 SALAZAR base rad to carotids  RESPIRATORY:  equal breath sounds, positive rales left base, no rhonchi, no wheeze  GI:  soft, nontender, positive bowel sounds  : TORRES + / -  negative  MUSKULOSKELETAL:  moves all extremities, palpable b/l radial, right DP, R. PT, LUE AVF with thrill bruit  SKIN / BREAST:   B/L LE vascular hyperpigmention, healed ulcers LLE, healed Left 3rd amputated digit                                                             LABS:                        10.8   6.81  )-----------( 95       ( 14 Jan 2020 05:28 )             34.8     01-14    138  |  98  |  39<H>  ----------------------------<  179<H>  4.3   |  25  |  3.96<H>    Ca    9.8      14 Jan 2020 05:28  Phos  4.5     01-14  Mg     1.9     01-14    RADIOLOGY & ADDITIONAL STUDIES:  CAROTID U/S: < from: US Duplex Carotid Arteries Complete, Bilateral (08.09.19 @ 17:03) >  IMPRESSION:  No hemodynamically significant stenosis in bilateral CCA and ICA.  Severe calcified plaque in the right proximal ICA and ECA, probable   significant stenosis in the proximal ECA with asymmetrically increased peak systolic velocity.  Mild-to-moderate calcified plaques in the left CCA. Stent in place in the left ICA.    < end of copied text >    CXR:< from: Xray Chest 1 View- PORTABLE-Routine (01.13.20 @ 07:23) >  Findings/  impression: Pulmonary vascular congestion, new. Stable cardiomegaly, thoracic aortic calcification.    < end of copied text >      CT Scan: < from: CT Brain Stroke Protocol (01.11.20 @ 15:52) >  IMPRESSION: No intracranial hemorrhage or acute transcortical infarct. No significant interval change since 8:37 AM.    < end of copied text >      < from: CT Head No Cont (01.11.20 @ 08:42) >  IMPRESSION: No intracranial hemorrhage, acute transcortical infarct, or calvarial fracture. No significant interval change since 8/8/2019.    < end of copied text >    < from: CT Angio Chest w/ IV Cont (08.08.19 @ 11:22) >  IMPRESSION:  1. No dissection.    2. Severe calcified and noncalcified atherosclerotic aortic plaques, Severe coronary artery disease.     3. 1.2 cm cystic lesion in the pancreas. Follow-up pancreas protocol CT   or MRI in 2 years is advised.    < end of copied text >          EKG:< from: 12 Lead ECG (01.13.20 @ 04:48) >  Ventricular Rate 62 BPM    Atrial Rate 62 BPM    P-R Interval 150 ms    QRS Duration 96 ms    Q-T Interval 464 ms    QTC Calculation(Bezet) 470 ms    P Axis 55 degrees    R Axis -11 degrees    T Axis 72 degrees    Diagnosis Line Normal sinus rhythm  EKG within normal limits    < end of copied text >      TTE: < from: Echocardiogram (01.13.20 @ 12:54) >  CONCLUSIONS:     1. Normal left and right ventricular size and function.   2. Severe symmetric left ventricular hypertrophy.   3. Severe aortic stenosis.   4. Mild aortic regurgitation.   5. Mild mitral regurgitation.   6. Mild-to-moderate tricuspid regurgitation.   7. Pulmonary hypertension present, pulmonary artery systolic pressure is 70.00 mmHg.   8. No pericardial effusion. Ef 60-65%    < end of copied text >      Cardiac Cath:

## 2020-01-14 NOTE — CONSULT NOTE ADULT - SUBJECTIVE AND OBJECTIVE BOX
Pain Management Consult Note - [facility] [phone]    Chief Complaint: b/l LE pain    HPI: Called to evaluate b/l LE pain, R knee and R hip > L LE. States pain has been present for a long time, worse x 2 weeks. Patient is a poor historian but states he takes Percocet and Tramadol at home for pain, unable to verify percocet through [facility] per primary team.   Seen laying in bed this afternoon in NAD. States pain is 7/10 currently. Also c/o chronic neck pain.       Pain is ___ sharp ____dull ___burning __x_achy ___ Intensity: ____ mild __x_mod ___severe     Location ___x_surgical site _x___cervical _____lumbar ____abd ____upper ext_x___lower ext    Worse with ___x_activity _x___movement _____physical therapy___ Rest    Improved with __x__medication ___x_rest ____physical therapy    ROS: Const:  __-_febrile   Eyes:___ENT:___CV: __-_chest pain  Resp: __-__sob  GI:___nausea ___vomiting __-_abd pain ___npo ___clears __full diet __bm  :___ Musk: _x__pain ___spasm  Skin:___ Neuro:  ___sedation___confusion___ numbness ___weakness ___paresth  Psych:__anxiety  Endo:___ Heme:___Allergy:_________, _x__all others reviewed and negative    PAST MEDICAL & SURGICAL HISTORY:  Peripheral neuropathy  Peripheral artery disease: s/p bilateral stents  Anemia of renal disease  End-stage renal disease (ESRD)  Type II diabetes mellitus  Retinal artery occlusion  Hypothyroidism  Low back pain  CAD (coronary artery disease)  H/O renal artery stenosis: s/p L renal stent  Hyperlipidemia  Hypertension  No significant past surgical history      SH: __-_Tobacco   __x_Alcohol                          FH:FAMILY HISTORY:  FH: stroke: father at 66, mother at 85      acetaminophen   Tablet .. 650 milliGRAM(s) Oral every 6 hours PRN  aspirin  chewable 81 milliGRAM(s) Oral every 24 hours  atorvastatin 40 milliGRAM(s) Oral at bedtime  carvedilol 25 milliGRAM(s) Oral every 12 hours  chlorhexidine 2% Cloths 1 Application(s) Topical <User Schedule>  cloNIDine 0.3 milliGRAM(s) Oral every 8 hours  clopidogrel Tablet 75 milliGRAM(s) Oral daily  cyclobenzaprine 5 milliGRAM(s) Oral every 24 hours  dextrose 40% Gel 15 Gram(s) Oral once PRN  dextrose 5%. 1000 milliLiter(s) IV Continuous <Continuous>  dextrose 50% Injectable 12.5 Gram(s) IV Push once  dextrose 50% Injectable 25 Gram(s) IV Push once  dextrose 50% Injectable 25 Gram(s) IV Push once  gabapentin 100 milliGRAM(s) Oral every 8 hours  glucagon  Injectable 1 milliGRAM(s) IntraMuscular once PRN  heparin  Injectable 5000 Unit(s) SubCutaneous every 8 hours  hydrALAZINE 100 milliGRAM(s) Oral every 8 hours  insulin lispro (HumaLOG) corrective regimen sliding scale   SubCutaneous Before meals and at bedtime  isosorbide   mononitrate ER Tablet (IMDUR) 90 milliGRAM(s) Oral every 24 hours  levothyroxine 50 MICROGram(s) Oral daily  losartan 100 milliGRAM(s) Oral every 24 hours  melatonin 5 milliGRAM(s) Oral at bedtime  NIFEdipine XL 60 milliGRAM(s) Oral every 24 hours  sevelamer carbonate 800 milliGRAM(s) Oral three times a day with meals  traMADol 25 milliGRAM(s) Oral every 6 hours PRN      T(C): 38.6 (01-14-20 @ 10:00), Max: 38.6 (01-14-20 @ 10:00)  HR: 72 (01-14-20 @ 13:00) (60 - 82)  BP: 169/51 (01-13-20 @ 17:00) (169/51 - 169/51)  RR: 23 (01-14-20 @ 13:00) (9 - 28)  SpO2: 100% (01-14-20 @ 13:00) (92% - 100%)  Wt(kg): --    T(C): 38.6 (01-14-20 @ 10:00), Max: 38.6 (01-14-20 @ 10:00)  HR: 72 (01-14-20 @ 13:00) (60 - 82)  BP: 169/51 (01-13-20 @ 17:00) (169/51 - 169/51)  RR: 23 (01-14-20 @ 13:00) (9 - 28)  SpO2: 100% (01-14-20 @ 13:00) (92% - 100%)  Wt(kg): --    T(C): 38.6 (01-14-20 @ 10:00), Max: 38.6 (01-14-20 @ 10:00)  HR: 72 (01-14-20 @ 13:00) (60 - 82)  BP: 169/51 (01-13-20 @ 17:00) (169/51 - 169/51)  RR: 23 (01-14-20 @ 13:00) (9 - 28)  SpO2: 100% (01-14-20 @ 13:00) (92% - 100%)  Wt(kg): --    PHYSICAL EXAM:  Gen Appearance: __-_no acute distress __-_appropriate        Neuro: _x__SILT feet____ EOM Intact Psych: AAOX_3_, __x_mood/affect appropriate        Eyes: _x__conjunctiva WNL  _____ Pupils equal and round        ENT: __x_ears and nose atraumatic__x_ Hearing grossly intact        Neck: _x__trachea midline, no visible masses ___thyroid without palpable mass    Resp: _x__Nml WOB____No tactile fremitus ___clear to auscultation    Cardio: _x__extremities free from edema __x__pedal pulses palpable    GI/Abdomen: __x_soft ___x__ Nontender___x___Nondistended_____HSM    Lymphatic: ___no palpable nodes in neck  ___no palpable nodes calves and feet    Skin/Wound: ___Incision, _x__Dressing c/d/i,   ____surrounding tissues soft,  ___drain/chest tube present____    Muscular: EHL __5_/5  Gastrocnemius__5_/5    _x__absent clubbing/cyanosis      ASSESSMENT: This is a 69y old Male with a history of   SYNCOPE  ADVANCED ILLNESS  FH: stroke  Handoff  MEWS Score  Peripheral neuropathy  Peripheral artery disease  Anemia of renal disease  End-stage renal disease (ESRD)  Type II diabetes mellitus  Retinal artery occlusion  Hypothyroidism  Low back pain  CAD (coronary artery disease)  H/O renal artery stenosis  Hyperlipidemia  Hypertension  Dizziness  End-stage renal disease (ESRD)  Arterial line pressure monitoring  No significant past surgical history  SYNCOPE  90+      Recommended Treatment PLAN:    1. Recommend Tramadol 25-50mg PO q6 PRN moderate to severe pain  2. Continue Gabapentin 100mg PO q8  3. Continue Flexeril 5mg PO q24 PRN  Plan discussed with Dr. Rea

## 2020-01-14 NOTE — PROGRESS NOTE ADULT - ASSESSMENT
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 for syncopal episode and found to have elevated blood pressure. Admitted to CCU for emergent HD and managment of hypertensive emergency, now off nicardipine drip and back on most of his home medications. Blood pressures have been stable, pending HD today.    Cardio  # Hypertensive emergency  - BP on arrival 209/78. Patient admitted in august with similar picture of hypertensive crises, back pain, requiring HD  - Received in ED: Clonidine 0.3mg x1, hydralazine 100mg x1, meclizine 25mg x1, percocet 5mg x1  - 15 minutes into dialysis in CCU, approx 500cc off, when he became unresponsive. Dialysis was held. Followed by episode of ladarius down to HR of 30's and repeat BP systolic 90's. Rapid response was called. Heart rate spontaneously started to increase without medications or management. Patient became more arousable and was able to answer some questions, but was still lethargic  - A-line placed for better blood pressure management on 1/11.  - c/w imdur 60mg q24h  - c/w hydralazine 100mg q8h  - c/w losartan 100mg q24h  - nicardipine infusion now dc'd  - coreg 12.5mg q12h started  - clonidine 0.3mg q8h started    # Hx of CAD, PAD (with stents), BRYNN (with stents), carotid artery stenosis (with stents)  - c/w ASA, plavix, atorvastatin  - f/u lipid profile, TSH  - f/u on CAD collateral and stent placements    # Aortic Stenosis  - Grade 3 systolic murmur  - ECHO 8/19: Moderate symmetric left ventricular hypertrophy. Normal right ventricular size and systolic function. Moderate aortic stenosis.  - f/u repeat ECHO    Renal  # ESRD on HD TTS via LUE AVF  - last HD on 1/10 was stopped after syncopal episode outpatient   - volume status and electrolytes acceptable at this time per nephro  - HD per nephrology recs  - Renvela 800mg TID  - daily weights  - renal diet  - strict I/O    Neuro  # Questionable History of CVA  - CT Brain showed microangiopathic ischemic disease. There are again lacunar infarcts in the basal ganglia bilaterally. There is no intracranial hemorrhage or acute transcortical infarct. External carotid artery branch calcifications, as seen in patients on dialysis.   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  - neuro checks  - Avoid sedating medications     Pulm  # Elevated pulmonary artery pressure   ECHO 8/19: Severe pulmonary hypertension, PASP is 72.1 mmHg.    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 Admitted to CCU for emergent HD and managment of hypertensive emergency, now off nicardipine drip and back on most of his home medications. Blood pressures have been stable, pending HD today.    Cardio  # Hypertensive emergency  - BP on arrival 209/78. Patient admitted in august with similar picture of hypertensive crises, back pain, requiring HD  - Received in ED: Clonidine 0.3mg x1, hydralazine 100mg x1, meclizine 25mg x1, percocet 5mg x1  - 15 minutes into dialysis in CCU, approx 500cc off, when he became unresponsive. Dialysis was held. Followed by episode of ladarius down to HR of 30's and repeat BP systolic 90's. Rapid response was called. Heart rate spontaneously started to increase without medications or management. Patient became more arousable and was able to answer some questions, but was still lethargic  - A-line placed for better blood pressure management on 1/11.  - c/w hydralazine 100mg q8h  - c/w losartan 100mg q24h  - carvedilol 25mg q12h  - clonidine 0.3mg q8h started  - amlodipine switched to nifedipine xl 60mg    # Hx of CAD, PAD (with stents), BRYNN (with stents), carotid artery stenosis (with stents)  - c/w ASA, plavix, atorvastatin    # Aortic Stenosis  - Grade 3 systolic murmur  - ECHO 8/19: Moderate symmetric left ventricular hypertrophy. Normal right ventricular size and systolic function. Moderate aortic stenosis.  - repeat ECHO 1/13 showed severe aortic stenosis, EF 60-65%  - call CT surg for severe AS    Renal  # ESRD on HD TTS via LUE AVF  - last HD on 1/10 was stopped after syncopal episode outpatient   - volume status and electrolytes acceptable at this time per nephro  - HD per nephrology recs  - Renvela 800mg TID  - daily weights  - renal diet  - strict I/O    Neuro  # Questionable History of CVA  - CT Brain showed microangiopathic ischemic disease. There are again lacunar infarcts in the basal ganglia bilaterally. There is no intracranial hemorrhage or acute transcortical infarct. External carotid artery branch calcifications, as seen in patients on dialysis.   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  - neuro checks  - Avoid sedating medications     Pulm  # Elevated pulmonary artery pressure   ECHO 8/19: Severe pulmonary hypertension, PASP is 72.1 mmHg.    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 for syncopal episode and found to have elevated blood pressure. Admitted to CCU for emergent HD and managment of hypertensive emergency, now off nicardipine drip and back on most of his home medications. Blood pressures have been stable, pending HD today.    Cardio  # Hypertensive emergency  - BP on arrival 209/78. Patient admitted in august with similar picture of hypertensive crises, back pain, requiring HD  - Received in ED: Clonidine 0.3mg x1, hydralazine 100mg x1, meclizine 25mg x1, percocet 5mg x1  - 15 minutes into dialysis in CCU, approx 500cc off, when he became unresponsive. Dialysis was held. Followed by episode of ladarius down to HR of 30's and repeat BP systolic 90's. Rapid response was called. Heart rate spontaneously started to increase without medications or management. Patient became more arousable and was able to answer some questions, but was still lethargic  - A-line placed for better blood pressure management on 1/11.  - c/w hydralazine 100mg q8h  - c/w losartan 100mg q24h  - carvedilol 25mg q12h  - clonidine 0.3mg q8h started  - amlodipine switched to nifedipine xl 60mg  - imdur increased to 90mg q24h    # Hx of CAD, PAD (with stents), BRYNN (with stents), carotid artery stenosis (with stents)  - c/w ASA, plavix, atorvastatin    # Aortic Stenosis  - Grade 3 systolic murmur  - ECHO 8/19: Moderate symmetric left ventricular hypertrophy. Normal right ventricular size and systolic function. Moderate aortic stenosis.  - repeat ECHO 1/13 showed severe aortic stenosis, EF 60-65%  - call CT surg for severe AS    Renal  # ESRD on HD TTS via LUE AVF  - last HD on 1/10 was stopped after syncopal episode outpatient   - volume status and electrolytes acceptable at this time per nephro  - HD per nephrology recs  - Renvela 800mg TID  - daily weights  - renal diet  - strict I/O    Neuro  # Questionable History of CVA  - CT Brain showed microangiopathic ischemic disease. There are again lacunar infarcts in the basal ganglia bilaterally. There is no intracranial hemorrhage or acute transcortical infarct. External carotid artery branch calcifications, as seen in patients on dialysis.   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  - neuro checks  - Avoid sedating medications     Pulm  # Elevated pulmonary artery pressure   ECHO 8/19: Severe pulmonary hypertension, PASP is 72.1 mmHg.    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 Admitted to CCU for emergent HD and managment of hypertensive emergency, now off nicardipine drip and back on most of his home medications. Blood pressures have been stable, pending HD today.    Cardio  # Hypertensive emergency  - BP on arrival 209/78. Patient admitted in august with similar picture of hypertensive crises, back pain, requiring HD  - Received in ED: Clonidine 0.3mg x1, hydralazine 100mg x1, meclizine 25mg x1, percocet 5mg x1  - 15 minutes into dialysis in CCU, approx 500cc off, when he became unresponsive. Dialysis was held. Followed by episode of ladarius down to HR of 30's and repeat BP systolic 90's. Rapid response was called. Heart rate spontaneously started to increase without medications or management. Patient became more arousable and was able to answer some questions, but was still lethargic  - A-line placed for better blood pressure management on 1/11.  - c/w hydralazine 100mg q8h  - c/w losartan 100mg q24h  - carvedilol 25mg q12h  - clonidine 0.3mg q8h started  - amlodipine switched to nifedipine xl 60mg  - imdur increased to 90mg q24h    # Hx of CAD, PAD (with stents), BRYNN (with stents), carotid artery stenosis (with stents)  - c/w ASA, plavix, atorvastatin  - pain management consulted for pt's chronic leg pain    # Aortic Stenosis  - Grade 3 systolic murmur  - ECHO 8/19: Moderate symmetric left ventricular hypertrophy. Normal right ventricular size and systolic function. Moderate aortic stenosis.  - repeat ECHO 1/13 showed severe aortic stenosis, EF 60-65%  - call CT surg for severe AS    Renal  # ESRD on HD TTS via LUE AVF  - last HD on 1/10 was stopped after syncopal episode outpatient   - volume status and electrolytes acceptable at this time per nephro  - HD per nephrology recs  - Renvela 800mg TID  - daily weights  - renal diet  - strict I/O    Neuro  # Questionable History of CVA  - CT Brain showed microangiopathic ischemic disease. There are again lacunar infarcts in the basal ganglia bilaterally. There is no intracranial hemorrhage or acute transcortical infarct. External carotid artery branch calcifications, as seen in patients on dialysis.   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  - neuro checks  - Avoid sedating medications     Pulm  # Elevated pulmonary artery pressure   ECHO 8/19: Severe pulmonary hypertension, PASP is 72.1 mmHg.    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 Admitted to CCU for emergent HD and managment of hypertensive emergency, now off nicardipine drip and back on most of his home medications. Blood pressures have been stable, pending HD today.    Cardio  # Hypertensive emergency  - BP on arrival 209/78. Patient admitted in august with similar picture of hypertensive crises, back pain, requiring HD  - Received in ED: Clonidine 0.3mg x1, hydralazine 100mg x1, meclizine 25mg x1, percocet 5mg x1  - 15 minutes into dialysis in CCU, approx 500cc off, when he became unresponsive. Dialysis was held. Followed by episode of ladarius down to HR of 30's and repeat BP systolic 90's. Rapid response was called. Heart rate spontaneously started to increase without medications or management. Patient became more arousable and was able to answer some questions, but was still lethargic  - A-line placed for better blood pressure management on 1/11.  - c/w hydralazine 100mg q8h  - c/w losartan 100mg q24h  - carvedilol 25mg q12h  - clonidine 0.3mg q8h started  - amlodipine switched to nifedipine xl 60mg  - imdur increased to 90mg q24h    # Hx of CAD, PAD (with stents), BRYNN (with stents), carotid artery stenosis (with stents)  - c/w ASA, plavix, atorvastatin  - pain management consulted for pt's chronic leg pain    # Aortic Stenosis  - Grade 3 systolic murmur  - ECHO 8/19: Moderate symmetric left ventricular hypertrophy. Normal right ventricular size and systolic function. Moderate aortic stenosis.  - repeat ECHO 1/13 showed severe aortic stenosis, EF 60-65%  - call CT surg for severe AS    Renal  # ESRD on HD TTS via LUE AVF  - last HD on 1/10 was stopped after syncopal episode outpatient   - volume status and electrolytes acceptable at this time per nephro  - HD per nephrology recs  - Renvela 800mg TID  - daily weights  - renal diet  - strict I/O    Neuro  # Questionable History of CVA  - CT Brain showed microangiopathic ischemic disease. There are again lacunar infarcts in the basal ganglia bilaterally. There is no intracranial hemorrhage or acute transcortical infarct. External carotid artery branch calcifications, as seen in patients on dialysis.   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  - neuro checks  - Avoid sedating medications     Pulm  # Elevated pulmonary artery pressure   ECHO 8/19: Severe pulmonary hypertension, PASP is 72.1 mmHg.    Endo  # Hypothyroidism  - c/w home synthroid 50mcg daily    # IDDM  - A1c 8/19: 7.2%  - c/w moderate ISS    Prophylaxis  F: None  E: Replete per nephro recs  N: DASH/TLC  GI: None  DVT; SQH 5000 U q8h    CODE: FULL

## 2020-01-14 NOTE — PROGRESS NOTE ADULT - PROBLEM SELECTOR PLAN 1
# ESRD on HD TTS via LUE AVF  last HD 1/13 with 3 L UF  Bp at goal on current antihypertensives, would defer HD for tomorrow as patient does not need clearance today also would be gentle with UF given preload dependancy in severe aortic stenosis  - HD tmr  - continue renvela 800 mg po tid w/meals  - H&H at goal    structural heart to follow   Will follow

## 2020-01-14 NOTE — PROGRESS NOTE ADULT - SUBJECTIVE AND OBJECTIVE BOX
O/N Events: KAT  Subjective:  complaints of SOB, RR 19/ satting 100% on 2 L NC, bp remained elevated in am 170-180 systolic, amlodipine switched over nifedipine XL 60 mg, imdur increased to 90  HR up to 70 bpm, HD yesterday with ~ 3 L UF  repeat TTE remarkable for progression of mod AS to severe in few months     VITALS  Vital Signs Last 24 Hrs  T(C): 37.7 (2020 16:00), Max: 38.6 (2020 10:00)  T(F): 99.8 (2020 16:00), Max: 101.4 (2020 10:00)  HR: 70 (2020 17:00) (62 - 82)  BP: 130/44 mmhg  RR: 21 (2020 17:00) (9 - 28)  SpO2: 97% (2020 17:00) (92% - 100%)    PHYSICAL EXAM  Gen: NAD  Neck: no JVD  Respiratory: CTA B/L  Cardiac: +S1/S2; RRR; systolic murmur with radiation to carotides+  Gastrointestinal: soft, NT/ND   Extremities: WWP, no peripheral edema  Neurologic: AAOx3; non focal except known OD poor sight   access:  Left arm AVF with bruit    MEDICATIONS  (STANDING):  aspirin  chewable 81 milliGRAM(s) Oral every 24 hours  atorvastatin 40 milliGRAM(s) Oral at bedtime  carvedilol 25 milliGRAM(s) Oral every 12 hours  chlorhexidine 2% Cloths 1 Application(s) Topical <User Schedule>  cloNIDine 0.3 milliGRAM(s) Oral every 8 hours  clopidogrel Tablet 75 milliGRAM(s) Oral daily  cyclobenzaprine 5 milliGRAM(s) Oral every 24 hours  dextrose 5%. 1000 milliLiter(s) (50 mL/Hr) IV Continuous <Continuous>  dextrose 50% Injectable 12.5 Gram(s) IV Push once  dextrose 50% Injectable 25 Gram(s) IV Push once  dextrose 50% Injectable 25 Gram(s) IV Push once  gabapentin 100 milliGRAM(s) Oral every 8 hours  heparin  Injectable 5000 Unit(s) SubCutaneous every 8 hours  hydrALAZINE 100 milliGRAM(s) Oral every 8 hours  insulin lispro (HumaLOG) corrective regimen sliding scale   SubCutaneous Before meals and at bedtime  isosorbide   mononitrate ER Tablet (IMDUR) 90 milliGRAM(s) Oral every 24 hours  levothyroxine 50 MICROGram(s) Oral daily  losartan 100 milliGRAM(s) Oral every 24 hours  melatonin 5 milliGRAM(s) Oral at bedtime  NIFEdipine XL 60 milliGRAM(s) Oral every 24 hours  sevelamer carbonate 800 milliGRAM(s) Oral three times a day with meals    MEDICATIONS  (PRN):  acetaminophen   Tablet .. 650 milliGRAM(s) Oral every 6 hours PRN Mild Pain (1 - 3)  dextrose 40% Gel 15 Gram(s) Oral once PRN Blood Glucose LESS THAN 70 milliGRAM(s)/deciliter  glucagon  Injectable 1 milliGRAM(s) IntraMuscular once PRN Glucose LESS THAN 70 milligrams/deciliter  traMADol 25 milliGRAM(s) Oral every 6 hours PRN Moderate Pain (4 - 6)  traMADol 50 milliGRAM(s) Oral every 6 hours PRN Severe Pain (7 - 10)      LABS                        12.1   7.83  )-----------( 108      ( 2020 11:07 )             37.8     01-14    138  |  98  |  39<H>  ----------------------------<  179<H>  4.3   |  25  |  3.96<H>    Ca    9.8      2020 05:28  Phos  4.5     01-14  Mg     1.9     -14          Urinalysis Basic - ( 2020 11:33 )    Color: Yellow / Appearance: Hazy / S.025 / pH: x  Gluc: x / Ketone: NEGATIVE  / Bili: Small / Urobili: 0.2 E.U./dL   Blood: x / Protein: 100 mg/dL / Nitrite: NEGATIVE   Leuk Esterase: NEGATIVE / RBC: < 5 /HPF / WBC 5-10 /HPF   Sq Epi: x / Non Sq Epi: 0-5 /HPF / Bacteria: Many /HPF

## 2020-01-14 NOTE — CONSULT NOTE ADULT - ASSESSMENT
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 partial amputation of 3rd toe 11/2018 who was BIBEMS to [facility] for syncopal event during dialysis and found to have elevated blood pressure with possible aspiration of food since patient was eating at the time of the episode.  In the ED, patient received Clonidine and Hydralazine for /78 then admitted to the CCU where Hemodialysis was initiated during which patient became unresponsive 15minutes into the session.  His vitals showed bradycardia in the 30's, hypotension with sbp 90's and blood glucose 160.  A rapid response was called and patient's heart rate returned to normal sinus before intervention and patient was more responsive but noted to be lethargic with right sided facial droop.  Subsequently, a stroke code was called.  CT head 1/11/20 showed microangiopathic ischemic disease, lacunar infarcts in the basal ganglia bilaterally; no intracranial hemorrhage, acute transcortical infarct, or calvarial fracture; no significant interval change since 8/8/2019.  Repeat CT head showed no change from prior imaging. Patient has since transitioned from Nicardipine to home po antihypertensives.  TTE done 1/13/20 showed severe AS, ADOLFO 0.67, MG 38, Mild AR, Mild MR.  The Structural Team is being consulted to evaluate for possible TAVR.    Plan:  Problem 1:      Problem 2:      Problem 3:      Problem 4:    I have reviewed clinical labs tests and reports, radiology tests and reports, as well as old patient medical records, and discussed with the referring physician. 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 partial amputation of 3rd toe 11/2018 who was BIBEMS to [facility] for syncopal event during dialysis and found to have elevated blood pressure with possible aspiration of food since patient was eating at the time of the episode.  In the ED, patient received Clonidine and Hydralazine for /78 then admitted to the CCU where Hemodialysis was initiated during which patient became unresponsive 15minutes into the session.  His vitals showed bradycardia in the 30's, hypotension with sbp 90's and blood glucose 160.  A rapid response was called and patient's heart rate returned to normal sinus before intervention and patient was more responsive but noted to be lethargic with right sided facial droop.  Subsequently, a stroke code was called.  CT head 1/11/20 showed microangiopathic ischemic disease, lacunar infarcts in the basal ganglia bilaterally; no intracranial hemorrhage, acute transcortical infarct, or calvarial fracture; no significant interval change since 8/8/2019.  Repeat CT head showed no change from prior imaging. Patient has since transitioned from Nicardipine to home po antihypertensives.  TTE done 1/13/20 showed severe AS, ADOLFO 0.67, MG 38, Mild AR, Mild MR.  The Structural Team is being consulted to evaluate for possible TAVR.    Plan:  Problem 1: severe AS on TTE 1/13/20  -continue coreg 25mg q12  -keep euvolemic- ESRD on HD was hemodialyzed Mon -2.9L   (reg. sched is tue/thurs/sat)  -Dr. Mcfarlane/Dr. Gasca (D team) to see patient       Problem 2: severe Hypertension sbp 200's  -weaned off Nicardipine  -continue home meds: coreg 25mg q12, Nifedipine XL 60mg daily, Imdur 90mg daily, Hydralazine 100mg q8h, Losartan 100mg daily, clonidine 0.3mg q8h      Problem 3: IDDM  -HgA1C 6.3  -continue diet control  -continue managing BG with insulin sliding scale      Problem 4:    I have reviewed clinical labs tests and reports, radiology tests and reports, as well as old patient medical records, and discussed with the referring physician. 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 partial amputation of 3rd toe 11/2018 who was BIBEMS to [facility] for syncopal event during dialysis and found to have elevated blood pressure with possible aspiration of food since patient was eating at the time of the episode.  In the ED, patient received Clonidine and Hydralazine for /78 then admitted to the CCU where Hemodialysis was initiated during which patient became unresponsive 15minutes into the session.  His vitals showed bradycardia in the 30's, hypotension with sbp 90's and blood glucose 160.  A rapid response was called and patient's heart rate returned to normal sinus before intervention and patient was more responsive but noted to be lethargic with right sided facial droop.  Subsequently, a stroke code was called.  CT head 1/11/20 showed microangiopathic ischemic disease, lacunar infarcts in the basal ganglia bilaterally; no intracranial hemorrhage, acute transcortical infarct, or calvarial fracture; no significant interval change since 8/8/2019.  Repeat CT head showed no change from prior imaging. Patient has since transitioned from Nicardipine to home po antihypertensives.  TTE done 1/13/20 showed severe AS, ADOLFO 0.67, MG 38, Mild AR, Mild MR.  The Structural Team is being consulted to evaluate for possible TAVR.    Plan:  Problem 1: severe AS on TTE 1/13/20  -continue coreg 25mg q12  -continue atorvastatin 80mg hs  -keep euvolemic- ESRD on HD was hemodialyzed Mon -2.9L   (reg. sched is tue/thurs/sat)  -Dr. Mcfarlane/Dr. Gasca (D team) to see patient       Problem 2: severe Hypertension sbp 200's  -weaned off Nicardipine  -continue home meds: coreg 25mg q12, Nifedipine XL 60mg daily, Imdur 90mg daily, Hydralazine 100mg q8h, Losartan 100mg daily, clonidine 0.3mg q8h      Problem 3: IDDM  -HgA1C 6.3  -continue diet control  -continue managing BG with insulin sliding scale      Problem 4: left carotid stent  -continue plavix and aspirin    I have reviewed clinical labs tests and reports, radiology tests and reports, as well as old patient medical records, and discussed with the referring physician. 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 partial amputation of 3rd toe 11/2018 who was BIBEMS to [facility] for syncopal event during dialysis and found to have elevated blood pressure with possible aspiration of food since patient was eating at the time of the episode.  In the ED, patient received Clonidine and Hydralazine for /78 then admitted to the CCU where Hemodialysis was initiated during which patient became unresponsive 15minutes into the session.  His vitals showed bradycardia in the 30's, hypotension with sbp 90's and blood glucose 160.  A rapid response was called and patient's heart rate returned to normal sinus before intervention and patient was more responsive but noted to be lethargic with right sided facial droop.  Subsequently, a stroke code was called.  CT head 1/11/20 showed microangiopathic ischemic disease, lacunar infarcts in the basal ganglia bilaterally; no intracranial hemorrhage, acute transcortical infarct, or calvarial fracture; no significant interval change since 8/8/2019.  Repeat CT head showed no change from prior imaging. Patient has since transitioned from Nicardipine to home po antihypertensives.  TTE done 1/13/20 showed severe AS, ADOLFO 0.67, MG 38, Mild AR, Mild MR.  The Structural Team is being consulted to evaluate for possible TAVR.    Plan:  Problem 1: severe AS on TTE 1/13/20  -continue coreg 25mg q12  -continue atorvastatin 80mg hs  -keep euvolemic- ESRD on HD was hemodialyzed Mon -2.9L   (reg. sched is tue/thurs/sat)  -Dr. Mcfarlane/Dr. Gasca (D team) to see patient       Problem 2: severe Hypertension sbp 200's  -weaned off Nicardipine  -continue home meds: coreg 25mg q12, Nifedipine XL 60mg daily, Imdur 90mg daily, Hydralazine 100mg q8h, Losartan 100mg daily, clonidine 0.3mg q8h      Problem 3: IDDM  -HgA1C 6.3  -continue diet control  -continue managing BG with insulin sliding scale      Problem 4: left carotid stent  -carotid duplex showed No hemodynamically significant stenosis in bilateral CCA and ICA  -continue plavix and aspirin    I have reviewed clinical labs tests and reports, radiology tests and reports, as well as old patient medical records, and discussed with the referring physician.

## 2020-01-14 NOTE — PROGRESS NOTE ADULT - ASSESSMENT
A/p  70 y/o m with PMHx of ESRD on dialysis via AVF TTS, HTN, CAD, BRYNN (s/p stent), IDDM (with peripheral nueropathy), hypothyroidism and CVA cb right eye blindness- patient is s/p carotid artery stent), PAD (s/p bilateral stents) who was BIBEMS to [facility] for syncopal episode and found to have elevated blood pressure. Admitted to CCU for emergent HD and management of hypertensive emergency.

## 2020-01-14 NOTE — PROGRESS NOTE ADULT - ATTENDING COMMENTS
Please see housestaff note for full details.  I have reviewed the case, examined the patient and agree with plan.  69 M HTN DM ESRD PVD carotid stent BRYNN stent with uncontrolled HTN and syncope.   On BP still in the 200s. HR 50-60s   -218/   HR 68-82  Tm 99.3   2l NC  RR 15-26  Coreg held due to bradycardia.    NAD  RRR SALAZAR 3/6 RUSB  CTA anteriorly  NT soft + BS    K 4.3   Kr  3.9  WBC 6.8  Hg 10  plts 95    1. Continue BP control.  2. F/u Renal.  3. Structural team consult for severe AS.  4. Continue ASA and plavix for PVD and carotid stent.

## 2020-01-14 NOTE — PROGRESS NOTE ADULT - SUBJECTIVE AND OBJECTIVE BOX
---in progress    INTERVAL HPI/OVERNIGHT EVENTS: R brachiocephalic IV removed because pt was complaining of discomfort. Small induration was noted on IV site, and topical bacitracin ordered for it. Tramadol was increased to 25mg BID. Amlodipine switched to nifedipine xl 60mg    SUBJECTIVE: Patient seen and examined at bedside.    VITAL SIGNS:  ICU Vital Signs Last 24 Hrs  T(C): 37.2 (14 Jan 2020 06:00), Max: 37.6 (14 Jan 2020 01:00)  T(F): 98.9 (14 Jan 2020 06:00), Max: 99.6 (14 Jan 2020 01:00)  HR: 64 (14 Jan 2020 07:00) (58 - 78)  BP: 169/51 (13 Jan 2020 17:00) (169/51 - 169/51)  BP(mean): 88 (13 Jan 2020 17:00) (88 - 88)  ABP: 168/48 (14 Jan 2020 07:00) (145/41 - 218/100)  ABP(mean): 86 (14 Jan 2020 07:00) (73 - 136)  RR: 16 (14 Jan 2020 07:00) (9 - 28)  SpO2: 100% (14 Jan 2020 07:00) (92% - 100%)        01-13 @ 07:01  -  01-14 @ 07:00  --------------------------------------------------------  IN: 750 mL / OUT: 3350 mL / NET: -2600 mL      CAPILLARY BLOOD GLUCOSE      POCT Blood Glucose.: 183 mg/dL (14 Jan 2020 05:31)      PHYSICAL EXAM:    Constitutional: NAD  HEENT: NC/AT; PERRL, anicteric sclera; MMM  Neck: supple, no JVD  Cardiovascular: +S1/S2, RRR  Respiratory: CTA B/L, no W/R/R  Gastrointestinal: abdomen soft, NT/ND; no rebound or guarding; +BSx4  Genitourinary: no suprapubic tenderness or fullness  Extremities: WWP; no LE edema; no clubbing or cyanosis  Vascular: 2+ radial, DP/PT and femoral pulses B/L  Dermatologic: normal color and turgor; no visible rashes  Neurological:     MEDICATIONS:  MEDICATIONS  (STANDING):  aspirin  chewable 81 milliGRAM(s) Oral every 24 hours  atorvastatin 40 milliGRAM(s) Oral at bedtime  carvedilol 25 milliGRAM(s) Oral every 12 hours  chlorhexidine 2% Cloths 1 Application(s) Topical <User Schedule>  cloNIDine 0.3 milliGRAM(s) Oral every 8 hours  clopidogrel Tablet 75 milliGRAM(s) Oral daily  cyclobenzaprine 5 milliGRAM(s) Oral every 24 hours  dextrose 5%. 1000 milliLiter(s) (50 mL/Hr) IV Continuous <Continuous>  dextrose 50% Injectable 12.5 Gram(s) IV Push once  dextrose 50% Injectable 25 Gram(s) IV Push once  dextrose 50% Injectable 25 Gram(s) IV Push once  gabapentin 100 milliGRAM(s) Oral every 8 hours  heparin  Injectable 5000 Unit(s) SubCutaneous every 8 hours  hydrALAZINE 100 milliGRAM(s) Oral every 8 hours  insulin lispro (HumaLOG) corrective regimen sliding scale   SubCutaneous Before meals and at bedtime  isosorbide   mononitrate ER Tablet (IMDUR) 90 milliGRAM(s) Oral every 24 hours  levothyroxine 50 MICROGram(s) Oral daily  losartan 100 milliGRAM(s) Oral every 24 hours  melatonin 5 milliGRAM(s) Oral at bedtime  NIFEdipine XL 60 milliGRAM(s) Oral every 24 hours  sevelamer carbonate 800 milliGRAM(s) Oral three times a day with meals    MEDICATIONS  (PRN):  acetaminophen   Tablet .. 650 milliGRAM(s) Oral every 6 hours PRN Moderate Pain (4 - 6)  dextrose 40% Gel 15 Gram(s) Oral once PRN Blood Glucose LESS THAN 70 milliGRAM(s)/deciliter  glucagon  Injectable 1 milliGRAM(s) IntraMuscular once PRN Glucose LESS THAN 70 milligrams/deciliter  traMADol 25 milliGRAM(s) Oral every 12 hours PRN Moderate Pain (4 - 6)      ALLERGIES:  Allergies    No Known Allergies    Intolerances        LABS:                        10.8   6.81  )-----------( 95       ( 14 Jan 2020 05:28 )             34.8     01-14    138  |  98  |  39<H>  ----------------------------<  179<H>  4.3   |  25  |  3.96<H>    Ca    9.8      14 Jan 2020 05:28  Phos  4.5     01-14  Mg     1.9     01-14            RADIOLOGY & ADDITIONAL TESTS: Reviewed. ---in progress    INTERVAL HPI/OVERNIGHT EVENTS: R brachiocephalic IV removed because pt was complaining of discomfort. Small induration was noted on IV site, and topical bacitracin ordered for it. Tramadol was increased to 25mg BID. Amlodipine switched to nifedipine xl 60mg    Additional collateral obtained from PCP Dr. Taylor: AVF fistula 2012, L carotid artery stent, L renal artery stent and b/l LE stent placed in 2006, 11/2018 partial amputation of 3rd toe     SUBJECTIVE: Patient seen and examined at bedside.    VITAL SIGNS:  ICU Vital Signs Last 24 Hrs  T(C): 37.2 (14 Jan 2020 06:00), Max: 37.6 (14 Jan 2020 01:00)  T(F): 98.9 (14 Jan 2020 06:00), Max: 99.6 (14 Jan 2020 01:00)  HR: 64 (14 Jan 2020 07:00) (58 - 78)  BP: 169/51 (13 Jan 2020 17:00) (169/51 - 169/51)  BP(mean): 88 (13 Jan 2020 17:00) (88 - 88)  ABP: 168/48 (14 Jan 2020 07:00) (145/41 - 218/100)  ABP(mean): 86 (14 Jan 2020 07:00) (73 - 136)  RR: 16 (14 Jan 2020 07:00) (9 - 28)  SpO2: 100% (14 Jan 2020 07:00) (92% - 100%)        01-13 @ 07:01  -  01-14 @ 07:00  --------------------------------------------------------  IN: 750 mL / OUT: 3350 mL / NET: -2600 mL      CAPILLARY BLOOD GLUCOSE      POCT Blood Glucose.: 183 mg/dL (14 Jan 2020 05:31)      PHYSICAL EXAM:    Constitutional: NAD  HEENT: NC/AT; PERRL, anicteric sclera; MMM  Neck: supple, no JVD  Cardiovascular: +S1/S2, RRR  Respiratory: CTA B/L, no W/R/R  Gastrointestinal: abdomen soft, NT/ND; no rebound or guarding; +BSx4  Genitourinary: no suprapubic tenderness or fullness  Extremities: WWP; no LE edema; no clubbing or cyanosis  Vascular: 2+ radial, DP/PT and femoral pulses B/L  Dermatologic: normal color and turgor; no visible rashes  Neurological:     MEDICATIONS:  MEDICATIONS  (STANDING):  aspirin  chewable 81 milliGRAM(s) Oral every 24 hours  atorvastatin 40 milliGRAM(s) Oral at bedtime  carvedilol 25 milliGRAM(s) Oral every 12 hours  chlorhexidine 2% Cloths 1 Application(s) Topical <User Schedule>  cloNIDine 0.3 milliGRAM(s) Oral every 8 hours  clopidogrel Tablet 75 milliGRAM(s) Oral daily  cyclobenzaprine 5 milliGRAM(s) Oral every 24 hours  dextrose 5%. 1000 milliLiter(s) (50 mL/Hr) IV Continuous <Continuous>  dextrose 50% Injectable 12.5 Gram(s) IV Push once  dextrose 50% Injectable 25 Gram(s) IV Push once  dextrose 50% Injectable 25 Gram(s) IV Push once  gabapentin 100 milliGRAM(s) Oral every 8 hours  heparin  Injectable 5000 Unit(s) SubCutaneous every 8 hours  hydrALAZINE 100 milliGRAM(s) Oral every 8 hours  insulin lispro (HumaLOG) corrective regimen sliding scale   SubCutaneous Before meals and at bedtime  isosorbide   mononitrate ER Tablet (IMDUR) 90 milliGRAM(s) Oral every 24 hours  levothyroxine 50 MICROGram(s) Oral daily  losartan 100 milliGRAM(s) Oral every 24 hours  melatonin 5 milliGRAM(s) Oral at bedtime  NIFEdipine XL 60 milliGRAM(s) Oral every 24 hours  sevelamer carbonate 800 milliGRAM(s) Oral three times a day with meals    MEDICATIONS  (PRN):  acetaminophen   Tablet .. 650 milliGRAM(s) Oral every 6 hours PRN Moderate Pain (4 - 6)  dextrose 40% Gel 15 Gram(s) Oral once PRN Blood Glucose LESS THAN 70 milliGRAM(s)/deciliter  glucagon  Injectable 1 milliGRAM(s) IntraMuscular once PRN Glucose LESS THAN 70 milligrams/deciliter  traMADol 25 milliGRAM(s) Oral every 12 hours PRN Moderate Pain (4 - 6)      ALLERGIES:  Allergies    No Known Allergies    Intolerances        LABS:                        10.8   6.81  )-----------( 95       ( 14 Jan 2020 05:28 )             34.8     01-14    138  |  98  |  39<H>  ----------------------------<  179<H>  4.3   |  25  |  3.96<H>    Ca    9.8      14 Jan 2020 05:28  Phos  4.5     01-14  Mg     1.9     01-14            RADIOLOGY & ADDITIONAL TESTS: Reviewed. ---in progress    INTERVAL HPI/OVERNIGHT EVENTS: R brachiocephalic IV removed because pt was complaining of discomfort. Small induration was noted on IV site, and topical bacitracin ordered for it. Tramadol was increased to 25mg BID. Amlodipine switched to nifedipine xl 60mg    Additional collateral obtained from PCP Dr. Taylor: AVF fistula 2012, L carotid artery stent, L renal artery stent and b/l LE stent placed in 2006, 11/2018 partial amputation of 3rd toe     SUBJECTIVE: Patient seen and examined at bedside. Continues to complain of leg pain, and also endorsing intermittent shortness of breath    VITAL SIGNS:  ICU Vital Signs Last 24 Hrs  T(C): 37.2 (14 Jan 2020 06:00), Max: 37.6 (14 Jan 2020 01:00)  T(F): 98.9 (14 Jan 2020 06:00), Max: 99.6 (14 Jan 2020 01:00)  HR: 64 (14 Jan 2020 07:00) (58 - 78)  BP: 169/51 (13 Jan 2020 17:00) (169/51 - 169/51)  BP(mean): 88 (13 Jan 2020 17:00) (88 - 88)  ABP: 168/48 (14 Jan 2020 07:00) (145/41 - 218/100)  ABP(mean): 86 (14 Jan 2020 07:00) (73 - 136)  RR: 16 (14 Jan 2020 07:00) (9 - 28)  SpO2: 100% (14 Jan 2020 07:00) (92% - 100%)        01-13 @ 07:01  -  01-14 @ 07:00  --------------------------------------------------------  IN: 750 mL / OUT: 3350 mL / NET: -2600 mL      CAPILLARY BLOOD GLUCOSE      POCT Blood Glucose.: 183 mg/dL (14 Jan 2020 05:31)      PHYSICAL EXAM:    Constitutional: WDWN resting comfortably in bed; now intermittently requiring 2L NC  Head: NC/AT  Eyes: Right pupil non-reactive to light, left pupil sluggishly reactive to light, EOMI, anicteric sclera  Neck: supple; no JVD or thyromegaly  Respiratory: CTA B/L; no W/R/R, no retractions  Cardiac: +S1/S2; RRR; grade 3 early-midsystolic crescendo-decrescendo murmur heard loudest in the R upper sternal border with radiation to carotids c/w aortic stenosis  Gastrointestinal: soft, NT/ND; no rebound or guarding; +BSx4  Extremities: WWP, no clubbing or cyanosis; no peripheral edema, 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 Small ulcer on left thumb, scattered healing scabs on left hand.  Lymphatic: no submandibular or cervical LAD  Neurologic: AAOx3; visual field defect in right eye, slight facial droop in right face, unknown baseline. Motor 5/5 and sensation intact.      MEDICATIONS:  MEDICATIONS  (STANDING):  aspirin  chewable 81 milliGRAM(s) Oral every 24 hours  atorvastatin 40 milliGRAM(s) Oral at bedtime  carvedilol 25 milliGRAM(s) Oral every 12 hours  chlorhexidine 2% Cloths 1 Application(s) Topical <User Schedule>  cloNIDine 0.3 milliGRAM(s) Oral every 8 hours  clopidogrel Tablet 75 milliGRAM(s) Oral daily  cyclobenzaprine 5 milliGRAM(s) Oral every 24 hours  dextrose 5%. 1000 milliLiter(s) (50 mL/Hr) IV Continuous <Continuous>  dextrose 50% Injectable 12.5 Gram(s) IV Push once  dextrose 50% Injectable 25 Gram(s) IV Push once  dextrose 50% Injectable 25 Gram(s) IV Push once  gabapentin 100 milliGRAM(s) Oral every 8 hours  heparin  Injectable 5000 Unit(s) SubCutaneous every 8 hours  hydrALAZINE 100 milliGRAM(s) Oral every 8 hours  insulin lispro (HumaLOG) corrective regimen sliding scale   SubCutaneous Before meals and at bedtime  isosorbide   mononitrate ER Tablet (IMDUR) 90 milliGRAM(s) Oral every 24 hours  levothyroxine 50 MICROGram(s) Oral daily  losartan 100 milliGRAM(s) Oral every 24 hours  melatonin 5 milliGRAM(s) Oral at bedtime  NIFEdipine XL 60 milliGRAM(s) Oral every 24 hours  sevelamer carbonate 800 milliGRAM(s) Oral three times a day with meals    MEDICATIONS  (PRN):  acetaminophen   Tablet .. 650 milliGRAM(s) Oral every 6 hours PRN Moderate Pain (4 - 6)  dextrose 40% Gel 15 Gram(s) Oral once PRN Blood Glucose LESS THAN 70 milliGRAM(s)/deciliter  glucagon  Injectable 1 milliGRAM(s) IntraMuscular once PRN Glucose LESS THAN 70 milligrams/deciliter  traMADol 25 milliGRAM(s) Oral every 12 hours PRN Moderate Pain (4 - 6)      ALLERGIES:  Allergies    No Known Allergies    Intolerances        LABS:                        10.8   6.81  )-----------( 95       ( 14 Jan 2020 05:28 )             34.8     01-14    138  |  98  |  39<H>  ----------------------------<  179<H>  4.3   |  25  |  3.96<H>    Ca    9.8      14 Jan 2020 05:28  Phos  4.5     01-14  Mg     1.9     01-14            RADIOLOGY & ADDITIONAL TESTS: Reviewed. INTERVAL HPI/OVERNIGHT EVENTS: R brachiocephalic IV removed because pt was complaining of discomfort. Small induration was noted on IV site, and topical bacitracin ordered for it. Tramadol was increased to 25mg BID. Amlodipine switched to nifedipine xl 60mg    Additional collateral obtained from PCP Dr. Taylor: AVF fistula 2012, L carotid artery stent, L renal artery stent and b/l LE stent placed in 2006, 11/2018 partial amputation of 3rd toe     SUBJECTIVE: Patient seen and examined at bedside. Continues to complain of leg pain, and also endorsing intermittent shortness of breath    VITAL SIGNS:  ICU Vital Signs Last 24 Hrs  T(C): 37.2 (14 Jan 2020 06:00), Max: 37.6 (14 Jan 2020 01:00)  T(F): 98.9 (14 Jan 2020 06:00), Max: 99.6 (14 Jan 2020 01:00)  HR: 64 (14 Jan 2020 07:00) (58 - 78)  BP: 169/51 (13 Jan 2020 17:00) (169/51 - 169/51)  BP(mean): 88 (13 Jan 2020 17:00) (88 - 88)  ABP: 168/48 (14 Jan 2020 07:00) (145/41 - 218/100)  ABP(mean): 86 (14 Jan 2020 07:00) (73 - 136)  RR: 16 (14 Jan 2020 07:00) (9 - 28)  SpO2: 100% (14 Jan 2020 07:00) (92% - 100%)        01-13 @ 07:01  -  01-14 @ 07:00  --------------------------------------------------------  IN: 750 mL / OUT: 3350 mL / NET: -2600 mL      CAPILLARY BLOOD GLUCOSE      POCT Blood Glucose.: 183 mg/dL (14 Jan 2020 05:31)      PHYSICAL EXAM:    Constitutional: WDWN resting comfortably in bed; now intermittently requiring 2L NC  Head: NC/AT  Eyes: Right pupil non-reactive to light, left pupil sluggishly reactive to light, EOMI, anicteric sclera  Neck: supple; no JVD or thyromegaly  Respiratory: CTA B/L; no W/R/R, no retractions  Cardiac: +S1/S2; RRR; grade 3 early-midsystolic crescendo-decrescendo murmur heard loudest in the R upper sternal border with radiation to carotids c/w aortic stenosis  Gastrointestinal: soft, NT/ND; no rebound or guarding; +BSx4  Extremities: WWP, no clubbing or cyanosis; no peripheral edema, 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 Small ulcer on left thumb, scattered healing scabs on left hand.  Lymphatic: no submandibular or cervical LAD  Neurologic: AAOx3; visual field defect in right eye, slight facial droop in right face, unknown baseline. Motor 5/5 and sensation intact.      MEDICATIONS:  MEDICATIONS  (STANDING):  aspirin  chewable 81 milliGRAM(s) Oral every 24 hours  atorvastatin 40 milliGRAM(s) Oral at bedtime  carvedilol 25 milliGRAM(s) Oral every 12 hours  chlorhexidine 2% Cloths 1 Application(s) Topical <User Schedule>  cloNIDine 0.3 milliGRAM(s) Oral every 8 hours  clopidogrel Tablet 75 milliGRAM(s) Oral daily  cyclobenzaprine 5 milliGRAM(s) Oral every 24 hours  dextrose 5%. 1000 milliLiter(s) (50 mL/Hr) IV Continuous <Continuous>  dextrose 50% Injectable 12.5 Gram(s) IV Push once  dextrose 50% Injectable 25 Gram(s) IV Push once  dextrose 50% Injectable 25 Gram(s) IV Push once  gabapentin 100 milliGRAM(s) Oral every 8 hours  heparin  Injectable 5000 Unit(s) SubCutaneous every 8 hours  hydrALAZINE 100 milliGRAM(s) Oral every 8 hours  insulin lispro (HumaLOG) corrective regimen sliding scale   SubCutaneous Before meals and at bedtime  isosorbide   mononitrate ER Tablet (IMDUR) 90 milliGRAM(s) Oral every 24 hours  levothyroxine 50 MICROGram(s) Oral daily  losartan 100 milliGRAM(s) Oral every 24 hours  melatonin 5 milliGRAM(s) Oral at bedtime  NIFEdipine XL 60 milliGRAM(s) Oral every 24 hours  sevelamer carbonate 800 milliGRAM(s) Oral three times a day with meals    MEDICATIONS  (PRN):  acetaminophen   Tablet .. 650 milliGRAM(s) Oral every 6 hours PRN Moderate Pain (4 - 6)  dextrose 40% Gel 15 Gram(s) Oral once PRN Blood Glucose LESS THAN 70 milliGRAM(s)/deciliter  glucagon  Injectable 1 milliGRAM(s) IntraMuscular once PRN Glucose LESS THAN 70 milligrams/deciliter  traMADol 25 milliGRAM(s) Oral every 12 hours PRN Moderate Pain (4 - 6)      ALLERGIES:  Allergies    No Known Allergies    Intolerances        LABS:                        10.8   6.81  )-----------( 95       ( 14 Jan 2020 05:28 )             34.8     01-14    138  |  98  |  39<H>  ----------------------------<  179<H>  4.3   |  25  |  3.96<H>    Ca    9.8      14 Jan 2020 05:28  Phos  4.5     01-14  Mg     1.9     01-14            RADIOLOGY & ADDITIONAL TESTS: Reviewed.

## 2020-01-15 DIAGNOSIS — F43.23 ADJUSTMENT DISORDER WITH MIXED ANXIETY AND DEPRESSED MOOD: ICD-10-CM

## 2020-01-15 LAB
ANION GAP SERPL CALC-SCNC: 19 MMOL/L — HIGH (ref 5–17)
BASOPHILS # BLD AUTO: 0.01 K/UL — SIGNIFICANT CHANGE UP (ref 0–0.2)
BASOPHILS NFR BLD AUTO: 0.2 % — SIGNIFICANT CHANGE UP (ref 0–2)
BUN SERPL-MCNC: 57 MG/DL — HIGH (ref 7–23)
CALCIUM SERPL-MCNC: 9.8 MG/DL — SIGNIFICANT CHANGE UP (ref 8.4–10.5)
CHLORIDE SERPL-SCNC: 96 MMOL/L — SIGNIFICANT CHANGE UP (ref 96–108)
CO2 SERPL-SCNC: 23 MMOL/L — SIGNIFICANT CHANGE UP (ref 22–31)
CREAT SERPL-MCNC: 5.19 MG/DL — HIGH (ref 0.5–1.3)
EOSINOPHIL # BLD AUTO: 0.01 K/UL — SIGNIFICANT CHANGE UP (ref 0–0.5)
EOSINOPHIL NFR BLD AUTO: 0.2 % — SIGNIFICANT CHANGE UP (ref 0–6)
GLUCOSE SERPL-MCNC: 126 MG/DL — HIGH (ref 70–99)
GRAM STN FLD: SIGNIFICANT CHANGE UP
HCT VFR BLD CALC: 34.2 % — LOW (ref 39–50)
HGB BLD-MCNC: 11 G/DL — LOW (ref 13–17)
IMM GRANULOCYTES NFR BLD AUTO: 0.7 % — SIGNIFICANT CHANGE UP (ref 0–1.5)
LYMPHOCYTES # BLD AUTO: 0.42 K/UL — LOW (ref 1–3.3)
LYMPHOCYTES # BLD AUTO: 9.4 % — LOW (ref 13–44)
MAGNESIUM SERPL-MCNC: 2 MG/DL — SIGNIFICANT CHANGE UP (ref 1.6–2.6)
MCHC RBC-ENTMCNC: 29.7 PG — SIGNIFICANT CHANGE UP (ref 27–34)
MCHC RBC-ENTMCNC: 32.2 GM/DL — SIGNIFICANT CHANGE UP (ref 32–36)
MCV RBC AUTO: 92.4 FL — SIGNIFICANT CHANGE UP (ref 80–100)
METHOD TYPE: SIGNIFICANT CHANGE UP
MONOCYTES # BLD AUTO: 0.37 K/UL — SIGNIFICANT CHANGE UP (ref 0–0.9)
MONOCYTES NFR BLD AUTO: 8.3 % — SIGNIFICANT CHANGE UP (ref 2–14)
MRSA SPEC QL CULT: SIGNIFICANT CHANGE UP
NEUTROPHILS # BLD AUTO: 3.61 K/UL — SIGNIFICANT CHANGE UP (ref 1.8–7.4)
NEUTROPHILS NFR BLD AUTO: 81.2 % — HIGH (ref 43–77)
NRBC # BLD: 0 /100 WBCS — SIGNIFICANT CHANGE UP (ref 0–0)
PLATELET # BLD AUTO: 100 K/UL — LOW (ref 150–400)
POTASSIUM SERPL-MCNC: 4.5 MMOL/L — SIGNIFICANT CHANGE UP (ref 3.5–5.3)
POTASSIUM SERPL-SCNC: 4.5 MMOL/L — SIGNIFICANT CHANGE UP (ref 3.5–5.3)
RAPID RVP RESULT: SIGNIFICANT CHANGE UP
RBC # BLD: 3.7 M/UL — LOW (ref 4.2–5.8)
RBC # FLD: 14.4 % — SIGNIFICANT CHANGE UP (ref 10.3–14.5)
SODIUM SERPL-SCNC: 138 MMOL/L — SIGNIFICANT CHANGE UP (ref 135–145)
WBC # BLD: 4.45 K/UL — SIGNIFICANT CHANGE UP (ref 3.8–10.5)
WBC # FLD AUTO: 4.45 K/UL — SIGNIFICANT CHANGE UP (ref 3.8–10.5)

## 2020-01-15 PROCEDURE — 99232 SBSQ HOSP IP/OBS MODERATE 35: CPT | Mod: GC

## 2020-01-15 PROCEDURE — 71045 X-RAY EXAM CHEST 1 VIEW: CPT | Mod: 26

## 2020-01-15 PROCEDURE — 99223 1ST HOSP IP/OBS HIGH 75: CPT

## 2020-01-15 PROCEDURE — 99291 CRITICAL CARE FIRST HOUR: CPT

## 2020-01-15 PROCEDURE — 93010 ELECTROCARDIOGRAM REPORT: CPT

## 2020-01-15 RX ORDER — NAFCILLIN 10 G/100ML
2 INJECTION, POWDER, FOR SOLUTION INTRAVENOUS EVERY 4 HOURS
Refills: 0 | Status: DISCONTINUED | OUTPATIENT
Start: 2020-01-15 | End: 2020-01-15

## 2020-01-15 RX ORDER — ESCITALOPRAM OXALATE 10 MG/1
5 TABLET, FILM COATED ORAL EVERY 24 HOURS
Refills: 0 | Status: DISCONTINUED | OUTPATIENT
Start: 2020-01-16 | End: 2020-02-04

## 2020-01-15 RX ORDER — VANCOMYCIN HCL 1 G
1000 VIAL (EA) INTRAVENOUS ONCE
Refills: 0 | Status: COMPLETED | OUTPATIENT
Start: 2020-01-15 | End: 2020-01-15

## 2020-01-15 RX ORDER — NIFEDIPINE 30 MG
60 TABLET, EXTENDED RELEASE 24 HR ORAL EVERY 24 HOURS
Refills: 0 | Status: DISCONTINUED | OUTPATIENT
Start: 2020-01-15 | End: 2020-01-15

## 2020-01-15 RX ORDER — POLYETHYLENE GLYCOL 3350 17 G/17G
17 POWDER, FOR SOLUTION ORAL AT BEDTIME
Refills: 0 | Status: DISCONTINUED | OUTPATIENT
Start: 2020-01-15 | End: 2020-02-03

## 2020-01-15 RX ORDER — SENNA PLUS 8.6 MG/1
2 TABLET ORAL AT BEDTIME
Refills: 0 | Status: DISCONTINUED | OUTPATIENT
Start: 2020-01-15 | End: 2020-02-03

## 2020-01-15 RX ORDER — NIFEDIPINE 30 MG
60 TABLET, EXTENDED RELEASE 24 HR ORAL EVERY 24 HOURS
Refills: 0 | Status: DISCONTINUED | OUTPATIENT
Start: 2020-01-16 | End: 2020-01-18

## 2020-01-15 RX ADMIN — GABAPENTIN 100 MILLIGRAM(S): 400 CAPSULE ORAL at 15:15

## 2020-01-15 RX ADMIN — Medication 650 MILLIGRAM(S): at 23:27

## 2020-01-15 RX ADMIN — Medication 60 MILLIGRAM(S): at 09:44

## 2020-01-15 RX ADMIN — HEPARIN SODIUM 5000 UNIT(S): 5000 INJECTION INTRAVENOUS; SUBCUTANEOUS at 21:34

## 2020-01-15 RX ADMIN — TRAMADOL HYDROCHLORIDE 25 MILLIGRAM(S): 50 TABLET ORAL at 15:00

## 2020-01-15 RX ADMIN — TRAMADOL HYDROCHLORIDE 50 MILLIGRAM(S): 50 TABLET ORAL at 12:58

## 2020-01-15 RX ADMIN — CYCLOBENZAPRINE HYDROCHLORIDE 5 MILLIGRAM(S): 10 TABLET, FILM COATED ORAL at 05:55

## 2020-01-15 RX ADMIN — Medication 0.3 MILLIGRAM(S): at 22:21

## 2020-01-15 RX ADMIN — Medication 100 MILLIGRAM(S): at 21:33

## 2020-01-15 RX ADMIN — TRAMADOL HYDROCHLORIDE 50 MILLIGRAM(S): 50 TABLET ORAL at 20:09

## 2020-01-15 RX ADMIN — GABAPENTIN 100 MILLIGRAM(S): 400 CAPSULE ORAL at 07:04

## 2020-01-15 RX ADMIN — GABAPENTIN 100 MILLIGRAM(S): 400 CAPSULE ORAL at 21:34

## 2020-01-15 RX ADMIN — ISOSORBIDE MONONITRATE 90 MILLIGRAM(S): 60 TABLET, EXTENDED RELEASE ORAL at 15:14

## 2020-01-15 RX ADMIN — CARVEDILOL PHOSPHATE 25 MILLIGRAM(S): 80 CAPSULE, EXTENDED RELEASE ORAL at 10:03

## 2020-01-15 RX ADMIN — LOSARTAN POTASSIUM 100 MILLIGRAM(S): 100 TABLET, FILM COATED ORAL at 15:15

## 2020-01-15 RX ADMIN — Medication 250 MILLIGRAM(S): at 20:30

## 2020-01-15 RX ADMIN — Medication 0.3 MILLIGRAM(S): at 07:04

## 2020-01-15 RX ADMIN — SENNA PLUS 2 TABLET(S): 8.6 TABLET ORAL at 21:33

## 2020-01-15 RX ADMIN — SEVELAMER CARBONATE 800 MILLIGRAM(S): 2400 POWDER, FOR SUSPENSION ORAL at 16:50

## 2020-01-15 RX ADMIN — Medication 100 MILLIGRAM(S): at 14:10

## 2020-01-15 RX ADMIN — ATORVASTATIN CALCIUM 40 MILLIGRAM(S): 80 TABLET, FILM COATED ORAL at 21:33

## 2020-01-15 RX ADMIN — TRAMADOL HYDROCHLORIDE 25 MILLIGRAM(S): 50 TABLET ORAL at 14:30

## 2020-01-15 RX ADMIN — CLOPIDOGREL BISULFATE 75 MILLIGRAM(S): 75 TABLET, FILM COATED ORAL at 15:14

## 2020-01-15 RX ADMIN — POLYETHYLENE GLYCOL 3350 17 GRAM(S): 17 POWDER, FOR SOLUTION ORAL at 21:34

## 2020-01-15 RX ADMIN — TRAMADOL HYDROCHLORIDE 50 MILLIGRAM(S): 50 TABLET ORAL at 04:43

## 2020-01-15 RX ADMIN — TRAMADOL HYDROCHLORIDE 50 MILLIGRAM(S): 50 TABLET ORAL at 11:23

## 2020-01-15 RX ADMIN — Medication 81 MILLIGRAM(S): at 15:15

## 2020-01-15 RX ADMIN — HEPARIN SODIUM 5000 UNIT(S): 5000 INJECTION INTRAVENOUS; SUBCUTANEOUS at 15:15

## 2020-01-15 RX ADMIN — Medication 5 MILLIGRAM(S): at 21:33

## 2020-01-15 RX ADMIN — Medication 2: at 16:45

## 2020-01-15 RX ADMIN — CARVEDILOL PHOSPHATE 25 MILLIGRAM(S): 80 CAPSULE, EXTENDED RELEASE ORAL at 21:33

## 2020-01-15 RX ADMIN — Medication 0.3 MILLIGRAM(S): at 15:15

## 2020-01-15 RX ADMIN — Medication 50 MICROGRAM(S): at 05:55

## 2020-01-15 RX ADMIN — Medication 100 MILLIGRAM(S): at 05:55

## 2020-01-15 RX ADMIN — SEVELAMER CARBONATE 800 MILLIGRAM(S): 2400 POWDER, FOR SUSPENSION ORAL at 07:04

## 2020-01-15 RX ADMIN — Medication 650 MILLIGRAM(S): at 13:06

## 2020-01-15 RX ADMIN — Medication 650 MILLIGRAM(S): at 14:30

## 2020-01-15 RX ADMIN — TRAMADOL HYDROCHLORIDE 50 MILLIGRAM(S): 50 TABLET ORAL at 05:43

## 2020-01-15 RX ADMIN — TRAMADOL HYDROCHLORIDE 50 MILLIGRAM(S): 50 TABLET ORAL at 21:34

## 2020-01-15 RX ADMIN — Medication 4: at 21:54

## 2020-01-15 NOTE — BEHAVIORAL HEALTH ASSESSMENT NOTE - DESCRIPTION
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

## 2020-01-15 NOTE — BEHAVIORAL HEALTH ASSESSMENT NOTE - HPI (INCLUDE ILLNESS QUALITY, SEVERITY, DURATION, TIMING, CONTEXT, MODIFYING FACTORS, ASSOCIATED SIGNS AND SYMPTOMS)
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 Admitted to CCU for emergent HD and managment of hypertensive emergency, now off nicardipine drip and back on most of his home medications. At first he denied feeling depressed or anxious, but then admitted he had been feeling down due to all his medical problems and being in the hospital. He feels very low in energy and morale. He agreed to trial of lexapro 5 mg and would be OK with therapy/counseling.

## 2020-01-15 NOTE — BEHAVIORAL HEALTH ASSESSMENT NOTE - SUICIDE PROTECTIVE FACTORS
Identifies reasons for living/Has future plans/Supportive social network of family or friends/Responsibility to family and others

## 2020-01-15 NOTE — PROGRESS NOTE ADULT - PROBLEM SELECTOR PLAN 1
# ESRD on HD TTS via LUE AVF  last HD 1/13 with 3 L UF  Bp above goal but overall better controlled on current antihypertensive regimen    - HD today per reg schedule, gentle UF ~ 2L given preload dependency in severe AS  - cw antihypertensives for bp mgmt along with UF w/HD  - continue renvela 800 mg po tid w/meals and obtaion phos level with bmp   - H&H at goal      Will follow

## 2020-01-15 NOTE — BEHAVIORAL HEALTH ASSESSMENT NOTE - DIFFERENTIAL
adjustment d/o with depressed mood and anxiety, consider MDD, BHARGAVI, depression and anxiety secondary to general medical condition

## 2020-01-15 NOTE — BEHAVIORAL HEALTH ASSESSMENT NOTE - SUMMARY
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 Admitted to CCU for emergent HD and managment of hypertensive emergency, now off nicardipine drip and back on most of his home medications. At first he denied feeling depressed or anxious, but then admitted he had been feeling down due to all his medical problems and being in the hospital. He feels very low in energy and morale. He agreed to trial of lexapro 5 mg and would be OK with therapy/counseling.    1)Start lexapro 5 mg daily for depression and anxiety.  2)Supportive therapy.

## 2020-01-15 NOTE — PROGRESS NOTE ADULT - ATTENDING COMMENTS
Please see housestaff note for full details.  I have reviewed the case, examined the patient and agree with plan.  69 M HTN DM ESRD PVD carotid stent BRYNN stent with uncontrolled HTN and syncope.   On BP control improving.  -170s/ 46-58   RR 12-20s  HR 58-82   Tm  100.0  Coreg held due to bradycardia.    NAD  RRR SALAZAR 3/6 RUSB  CTA anteriorly  NT soft + BS    K 4.5   Cr  1.15  Hg  12     1. Continue BP control.  2. F/u Renal.  3. Seen by structural CT. Will do CT to assess for TAVR.  4. Continue ASA and plavix for PVD and carotid stent.

## 2020-01-15 NOTE — PROGRESS NOTE ADULT - SUBJECTIVE AND OBJECTIVE BOX
O/N Events: KAT  Subjective:  denies acute complaints, bp control overall improving with few spikes to 170s and 180s  FB negative 1.5 L for hospital stay and even for the past 24 hours  CXR clear     VITALS  Vital Signs Last 24 Hrs  T(C): 36.9 (15 Oscar 2020 13:50), Max: 37.8 (14 Jan 2020 22:00)  T(F): 98.5 (15 Oscar 2020 13:50), Max: 100 (14 Jan 2020 22:00)  HR: 75 (15 Oscar 2020 14:00) (58 - 775)  BP: 156/50 MAP 80 mmhg  RR: 15 (15 Oscar 2020 14:00) (12 - 28)  SpO2: 100% (15 Oscar 2020 14:00) (97% - 100%)    PHYSICAL EXAM  Gen: NAD  Neck: no JVD  Respiratory: CTA B/L  Cardiac: +S1/S2; RRR; systolic murmur with radiation to carotides+  Gastrointestinal: soft, NT/ND   Extremities: WWP, no peripheral edema  Neurologic: AAOx3; non focal except known OD poor sight   access:  Left arm AVF with bruit    MEDICATIONS  (STANDING):  aspirin  chewable 81 milliGRAM(s) Oral every 24 hours  atorvastatin 40 milliGRAM(s) Oral at bedtime  carvedilol 25 milliGRAM(s) Oral every 12 hours  chlorhexidine 2% Cloths 1 Application(s) Topical <User Schedule>  cloNIDine 0.3 milliGRAM(s) Oral every 8 hours  clopidogrel Tablet 75 milliGRAM(s) Oral daily  cyclobenzaprine 5 milliGRAM(s) Oral every 24 hours  dextrose 5%. 1000 milliLiter(s) (50 mL/Hr) IV Continuous <Continuous>  dextrose 50% Injectable 12.5 Gram(s) IV Push once  dextrose 50% Injectable 25 Gram(s) IV Push once  dextrose 50% Injectable 25 Gram(s) IV Push once  gabapentin 100 milliGRAM(s) Oral every 8 hours  heparin  Injectable 5000 Unit(s) SubCutaneous every 8 hours  hydrALAZINE 100 milliGRAM(s) Oral every 8 hours  insulin lispro (HumaLOG) corrective regimen sliding scale   SubCutaneous Before meals and at bedtime  isosorbide   mononitrate ER Tablet (IMDUR) 90 milliGRAM(s) Oral every 24 hours  levothyroxine 50 MICROGram(s) Oral daily  losartan 100 milliGRAM(s) Oral every 24 hours  melatonin 5 milliGRAM(s) Oral at bedtime  polyethylene glycol 3350 17 Gram(s) Oral at bedtime  senna 2 Tablet(s) Oral at bedtime  sevelamer carbonate 800 milliGRAM(s) Oral three times a day with meals    MEDICATIONS  (PRN):  acetaminophen   Tablet .. 650 milliGRAM(s) Oral every 6 hours PRN Mild Pain (1 - 3)  dextrose 40% Gel 15 Gram(s) Oral once PRN Blood Glucose LESS THAN 70 milliGRAM(s)/deciliter  glucagon  Injectable 1 milliGRAM(s) IntraMuscular once PRN Glucose LESS THAN 70 milligrams/deciliter  traMADol 25 milliGRAM(s) Oral every 6 hours PRN Moderate Pain (4 - 6)  traMADol 50 milliGRAM(s) Oral every 6 hours PRN Severe Pain (7 - 10)      LABS                        11.0   4.45  )-----------( 100      ( 15 Oscar 2020 05:15 )             34.2     01-15    138  |  96  |  57<H>  ----------------------------<  126<H>  4.5   |  23  |  5.19<H>    Ca    9.8      15 Oscar 2020 05:15  Phos  4.5     01-14  Mg     2.0     01-15

## 2020-01-15 NOTE — PROGRESS NOTE ADULT - SUBJECTIVE AND OBJECTIVE BOX
Patient was seen and evaluated on dialysis.   HR: 74 (01-15-20 @ 16:00)  BP: 139/48 mmhg  Wt(kg): 57.5 kg  01-15    138  |  96  |  57<H>  ----------------------------<  126<H>  4.5   |  23  |  5.19<H>    Ca    9.8      15 Oscar 2020 05:15  Phos  4.5     01-14  Mg     2.0     01-15      Continue dialysis:   Dialyzer:   Optiflux 180        QB: 400 ml/min  K bath: 2  Goal UF: 2L  Duration: 180 min    Patient is tolerated the procedure well.

## 2020-01-15 NOTE — BEHAVIORAL HEALTH ASSESSMENT NOTE - NSBHADMITCOUNSELOTHER_PSY_A_CORE FT
Discussed how he is coping with hospitalization and adjusting to new assisted living, discussed symptoms of depression and fatigue. Discussed rational and side FX of lexapro. Discussed therapy.

## 2020-01-15 NOTE — PROGRESS NOTE ADULT - SUBJECTIVE AND OBJECTIVE BOX
---in progress    INTERVAL HPI/OVERNIGHT EVENTS:    SUBJECTIVE: Patient seen and examined at bedside.    VITAL SIGNS:  ICU Vital Signs Last 24 Hrs  T(C): 37.3 (15 Oscar 2020 05:00), Max: 38.6 (2020 10:00)  T(F): 99.1 (15 Oscar 2020 05:00), Max: 101.4 (2020 10:00)  HR: 66 (15 Oscar 2020 08:00) (58 - 82)  BP: --  BP(mean): --  ABP: 162/46 (15 Oscar 2020 08:00) (120/34 - 178/54)  ABP(mean): 82 (15 Oscar 2020 08:00) (60 - 96)  RR: 12 (15 Oscar 2020 08:00) (12 - 28)  SpO2: 100% (15 Oscar 2020 08:00) (97% - 100%)        14 @ 07:01  -  15 @ 07:00  --------------------------------------------------------  IN: 370 mL / OUT: 110 mL / NET: 260 mL      CAPILLARY BLOOD GLUCOSE      POCT Blood Glucose.: 129 mg/dL (15 Oscar 2020 05:49)      PHYSICAL EXAM:    Constitutional: NAD  HEENT: NC/AT; PERRL, anicteric sclera; MMM  Neck: supple, no JVD  Cardiovascular: +S1/S2, RRR  Respiratory: CTA B/L, no W/R/R  Gastrointestinal: abdomen soft, NT/ND; no rebound or guarding; +BSx4  Genitourinary: no suprapubic tenderness or fullness  Extremities: WWP; no LE edema; no clubbing or cyanosis  Vascular: 2+ radial, DP/PT and femoral pulses B/L  Dermatologic: normal color and turgor; no visible rashes  Neurological:     MEDICATIONS:  MEDICATIONS  (STANDING):  aspirin  chewable 81 milliGRAM(s) Oral every 24 hours  atorvastatin 40 milliGRAM(s) Oral at bedtime  carvedilol 25 milliGRAM(s) Oral every 12 hours  chlorhexidine 2% Cloths 1 Application(s) Topical <User Schedule>  cloNIDine 0.3 milliGRAM(s) Oral every 8 hours  clopidogrel Tablet 75 milliGRAM(s) Oral daily  cyclobenzaprine 5 milliGRAM(s) Oral every 24 hours  dextrose 5%. 1000 milliLiter(s) (50 mL/Hr) IV Continuous <Continuous>  dextrose 50% Injectable 12.5 Gram(s) IV Push once  dextrose 50% Injectable 25 Gram(s) IV Push once  dextrose 50% Injectable 25 Gram(s) IV Push once  gabapentin 100 milliGRAM(s) Oral every 8 hours  heparin  Injectable 5000 Unit(s) SubCutaneous every 8 hours  hydrALAZINE 100 milliGRAM(s) Oral every 8 hours  insulin lispro (HumaLOG) corrective regimen sliding scale   SubCutaneous Before meals and at bedtime  isosorbide   mononitrate ER Tablet (IMDUR) 90 milliGRAM(s) Oral every 24 hours  levothyroxine 50 MICROGram(s) Oral daily  losartan 100 milliGRAM(s) Oral every 24 hours  melatonin 5 milliGRAM(s) Oral at bedtime  NIFEdipine XL 60 milliGRAM(s) Oral every 24 hours  polyethylene glycol 3350 17 Gram(s) Oral at bedtime  senna 2 Tablet(s) Oral at bedtime  sevelamer carbonate 800 milliGRAM(s) Oral three times a day with meals    MEDICATIONS  (PRN):  acetaminophen   Tablet .. 650 milliGRAM(s) Oral every 6 hours PRN Mild Pain (1 - 3)  dextrose 40% Gel 15 Gram(s) Oral once PRN Blood Glucose LESS THAN 70 milliGRAM(s)/deciliter  glucagon  Injectable 1 milliGRAM(s) IntraMuscular once PRN Glucose LESS THAN 70 milligrams/deciliter  traMADol 25 milliGRAM(s) Oral every 6 hours PRN Moderate Pain (4 - 6)  traMADol 50 milliGRAM(s) Oral every 6 hours PRN Severe Pain (7 - 10)      ALLERGIES:  Allergies    No Known Allergies    Intolerances        LABS:                        11.0   4.45  )-----------( 100      ( 15 Oscar 2020 05:15 )             34.2     01-15    138  |  96  |  57<H>  ----------------------------<  126<H>  4.5   |  23  |  5.19<H>    Ca    9.8      15 Oscar 2020 05:15  Phos  4.5     01-14  Mg     2.0     -15        Urinalysis Basic - ( 2020 11:33 )    Color: Yellow / Appearance: Hazy / S.025 / pH: x  Gluc: x / Ketone: NEGATIVE  / Bili: Small / Urobili: 0.2 E.U./dL   Blood: x / Protein: 100 mg/dL / Nitrite: NEGATIVE   Leuk Esterase: NEGATIVE / RBC: < 5 /HPF / WBC 5-10 /HPF   Sq Epi: x / Non Sq Epi: 0-5 /HPF / Bacteria: Many /HPF        RADIOLOGY & ADDITIONAL TESTS: Reviewed. ---in progress    INTERVAL HPI/OVERNIGHT EVENTS: Early last night SBPs were 125-130s but gradually increased back to 140s-150s. Nifedipine was reordered for 9am instead of 4am. Pain management was consulted for his leg pain, and his tramadol dosing schedule was adjusted per their recommendations to 25mg q6h for moderate pain and 50mg q6h for severe pain    SUBJECTIVE: Patient seen and examined at bedside. He slept through the night without complaining of pain. Has no current complaints.     VITAL SIGNS:  ICU Vital Signs Last 24 Hrs  T(C): 37.3 (15 Oscar 2020 05:00), Max: 38.6 (2020 10:00)  T(F): 99.1 (15 Oscar 2020 05:00), Max: 101.4 (2020 10:00)  HR: 66 (15 Oscar 2020 08:00) (58 - 82)  BP: --  BP(mean): --  ABP: 162/46 (15 Oscar 2020 08:00) (120/34 - 178/54)  ABP(mean): 82 (15 Oscar 2020 08:00) (60 - 96)  RR: 12 (15 Oscar 2020 08:00) (12 - 28)  SpO2: 100% (15 Oscar 2020 08:00) (97% - 100%)        -14 @ 07:01  -  -15 @ 07:00  --------------------------------------------------------  IN: 370 mL / OUT: 110 mL / NET: 260 mL      CAPILLARY BLOOD GLUCOSE      POCT Blood Glucose.: 129 mg/dL (15 Oscar 2020 05:49)      PHYSICAL EXAM:    Constitutional: WDWN resting comfortably in bed; now intermittently requiring 2L NC  Head: NC/AT  Eyes: Right pupil non-reactive to light, left pupil sluggishly reactive to light, anicteric sclera, MMM  Neck: supple; no JVD or thyromegaly  Respiratory: CTA B/L; no W/R/R, no retractions  Cardiac: +S1/S2; RRR; grade 3 early-midsystolic crescendo-decrescendo murmur heard loudest in the R upper sternal border with radiation to carotids c/w aortic stenosis  Gastrointestinal: soft, NT/ND; no rebound or guarding; +BSx4  Extremities: WWP, no clubbing or cyanosis; no peripheral edema, 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 Small ulcer on left thumb, scattered healing scabs on left hand.  Lymphatic: no submandibular or cervical LAD  Neurologic: AAOx3; visual field defect in right eye, slight facial droop in right face, unknown baseline. Motor 5/5 and sensation intact.    MEDICATIONS:  MEDICATIONS  (STANDING):  aspirin  chewable 81 milliGRAM(s) Oral every 24 hours  atorvastatin 40 milliGRAM(s) Oral at bedtime  carvedilol 25 milliGRAM(s) Oral every 12 hours  chlorhexidine 2% Cloths 1 Application(s) Topical <User Schedule>  cloNIDine 0.3 milliGRAM(s) Oral every 8 hours  clopidogrel Tablet 75 milliGRAM(s) Oral daily  cyclobenzaprine 5 milliGRAM(s) Oral every 24 hours  dextrose 5%. 1000 milliLiter(s) (50 mL/Hr) IV Continuous <Continuous>  dextrose 50% Injectable 12.5 Gram(s) IV Push once  dextrose 50% Injectable 25 Gram(s) IV Push once  dextrose 50% Injectable 25 Gram(s) IV Push once  gabapentin 100 milliGRAM(s) Oral every 8 hours  heparin  Injectable 5000 Unit(s) SubCutaneous every 8 hours  hydrALAZINE 100 milliGRAM(s) Oral every 8 hours  insulin lispro (HumaLOG) corrective regimen sliding scale   SubCutaneous Before meals and at bedtime  isosorbide   mononitrate ER Tablet (IMDUR) 90 milliGRAM(s) Oral every 24 hours  levothyroxine 50 MICROGram(s) Oral daily  losartan 100 milliGRAM(s) Oral every 24 hours  melatonin 5 milliGRAM(s) Oral at bedtime  NIFEdipine XL 60 milliGRAM(s) Oral every 24 hours  polyethylene glycol 3350 17 Gram(s) Oral at bedtime  senna 2 Tablet(s) Oral at bedtime  sevelamer carbonate 800 milliGRAM(s) Oral three times a day with meals    MEDICATIONS  (PRN):  acetaminophen   Tablet .. 650 milliGRAM(s) Oral every 6 hours PRN Mild Pain (1 - 3)  dextrose 40% Gel 15 Gram(s) Oral once PRN Blood Glucose LESS THAN 70 milliGRAM(s)/deciliter  glucagon  Injectable 1 milliGRAM(s) IntraMuscular once PRN Glucose LESS THAN 70 milligrams/deciliter  traMADol 25 milliGRAM(s) Oral every 6 hours PRN Moderate Pain (4 - 6)  traMADol 50 milliGRAM(s) Oral every 6 hours PRN Severe Pain (7 - 10)      ALLERGIES:  Allergies    No Known Allergies    Intolerances        LABS:                        11.0   4.45  )-----------( 100      ( 15 Oscar 2020 05:15 )             34.2     01-15    138  |  96  |  57<H>  ----------------------------<  126<H>  4.5   |  23  |  5.19<H>    Ca    9.8      15 Oscar 2020 05:15  Phos  4.5     01-14  Mg     2.0     -15        Urinalysis Basic - ( 2020 11:33 )    Color: Yellow / Appearance: Hazy / S.025 / pH: x  Gluc: x / Ketone: NEGATIVE  / Bili: Small / Urobili: 0.2 E.U./dL   Blood: x / Protein: 100 mg/dL / Nitrite: NEGATIVE   Leuk Esterase: NEGATIVE / RBC: < 5 /HPF / WBC 5-10 /HPF   Sq Epi: x / Non Sq Epi: 0-5 /HPF / Bacteria: Many /HPF        RADIOLOGY & ADDITIONAL TESTS: Reviewed. INTERVAL HPI/OVERNIGHT EVENTS: Early last night SBPs were 125-130s but gradually increased back to 140s-150s. Nifedipine was reordered for 9am instead of 4am. Pain management was consulted for his leg pain, and his tramadol dosing schedule was adjusted per their recommendations to 25mg q6h for moderate pain and 50mg q6h for severe pain    SUBJECTIVE: Patient seen and examined at bedside. He slept through the night without complaining of pain. Has no current complaints.     VITAL SIGNS:  ICU Vital Signs Last 24 Hrs  T(C): 37.3 (15 Oscar 2020 05:00), Max: 38.6 (2020 10:00)  T(F): 99.1 (15 Oscar 2020 05:00), Max: 101.4 (2020 10:00)  HR: 66 (15 Oscar 2020 08:00) (58 - 82)  BP: --  BP(mean): --  ABP: 162/46 (15 Oscar 2020 08:00) (120/34 - 178/54)  ABP(mean): 82 (15 [place] 2020 08:00) (60 - 96)  RR: 12 (15 Oscar 2020 08:00) (12 - 28)  SpO2: 100% (15 Oscar 2020 08:00) (97% - 100%)        -14 @ 07:01  -  -15 @ 07:00  --------------------------------------------------------  IN: 370 mL / OUT: 110 mL / NET: 260 mL      CAPILLARY BLOOD GLUCOSE      POCT Blood Glucose.: 129 mg/dL (15 Oscar 2020 05:49)      PHYSICAL EXAM:    Constitutional: WDWN resting comfortably in bed; now intermittently requiring 2L NC  Head: NC/AT  Eyes: Right pupil non-reactive to light, left pupil sluggishly reactive to light, anicteric sclera, MMM  Neck: supple; no JVD or thyromegaly  Respiratory: CTA B/L; no W/R/R, no retractions  Cardiac: +S1/S2; RRR; grade 3 early-midsystolic crescendo-decrescendo murmur heard loudest in the R upper sternal border with radiation to carotids c/w aortic stenosis  Gastrointestinal: soft, NT/ND; no rebound or guarding; +BSx4  Extremities: WWP, no clubbing or cyanosis; no peripheral edema, 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 Small ulcer on left thumb, scattered healing scabs on left hand.  Lymphatic: no submandibular or cervical LAD  Neurologic: AAOx3; visual field defect in right eye, slight facial droop in right face, unknown baseline. Motor 5/5 and sensation intact.    MEDICATIONS:  MEDICATIONS  (STANDING):  aspirin  chewable 81 milliGRAM(s) Oral every 24 hours  atorvastatin 40 milliGRAM(s) Oral at bedtime  carvedilol 25 milliGRAM(s) Oral every 12 hours  chlorhexidine 2% Cloths 1 Application(s) Topical <User Schedule>  cloNIDine 0.3 milliGRAM(s) Oral every 8 hours  clopidogrel Tablet 75 milliGRAM(s) Oral daily  cyclobenzaprine 5 milliGRAM(s) Oral every 24 hours  dextrose 5%. 1000 milliLiter(s) (50 mL/Hr) IV Continuous <Continuous>  dextrose 50% Injectable 12.5 Gram(s) IV Push once  dextrose 50% Injectable 25 Gram(s) IV Push once  dextrose 50% Injectable 25 Gram(s) IV Push once  gabapentin 100 milliGRAM(s) Oral every 8 hours  heparin  Injectable 5000 Unit(s) SubCutaneous every 8 hours  hydrALAZINE 100 milliGRAM(s) Oral every 8 hours  insulin lispro (HumaLOG) corrective regimen sliding scale   SubCutaneous Before meals and at bedtime  isosorbide   mononitrate ER Tablet (IMDUR) 90 milliGRAM(s) Oral every 24 hours  levothyroxine 50 MICROGram(s) Oral daily  losartan 100 milliGRAM(s) Oral every 24 hours  melatonin 5 milliGRAM(s) Oral at bedtime  NIFEdipine XL 60 milliGRAM(s) Oral every 24 hours  polyethylene glycol 3350 17 Gram(s) Oral at bedtime  senna 2 Tablet(s) Oral at bedtime  sevelamer carbonate 800 milliGRAM(s) Oral three times a day with meals    MEDICATIONS  (PRN):  acetaminophen   Tablet .. 650 milliGRAM(s) Oral every 6 hours PRN Mild Pain (1 - 3)  dextrose 40% Gel 15 Gram(s) Oral once PRN Blood Glucose LESS THAN 70 milliGRAM(s)/deciliter  glucagon  Injectable 1 milliGRAM(s) IntraMuscular once PRN Glucose LESS THAN 70 milligrams/deciliter  traMADol 25 milliGRAM(s) Oral every 6 hours PRN Moderate Pain (4 - 6)  traMADol 50 milliGRAM(s) Oral every 6 hours PRN Severe Pain (7 - 10)      ALLERGIES:  Allergies    No Known Allergies    Intolerances        LABS:                        11.0   4.45  )-----------( 100      ( 15 Oscar 2020 05:15 )             34.2     01-15    138  |  96  |  57<H>  ----------------------------<  126<H>  4.5   |  23  |  5.19<H>    Ca    9.8      15 Oscar 2020 05:15  Phos  4.5     01-14  Mg     2.0     -15        Urinalysis Basic - ( 2020 11:33 )    Color: Yellow / Appearance: Hazy / S.025 / pH: x  Gluc: x / Ketone: NEGATIVE  / Bili: Small / Urobili: 0.2 E.U./dL   Blood: x / Protein: 100 mg/dL / Nitrite: NEGATIVE   Leuk Esterase: NEGATIVE / RBC: < 5 /HPF / WBC 5-10 /HPF   Sq Epi: x / Non Sq Epi: 0-5 /HPF / Bacteria: Many /HPF        RADIOLOGY & ADDITIONAL TESTS: Reviewed.

## 2020-01-15 NOTE — PROGRESS NOTE ADULT - ASSESSMENT
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 for syncopal episode and found to have elevated blood pressure. Admitted to CCU for emergent HD and managment of hypertensive emergency, now off nicardipine drip and back on most of his home medications. Blood pressures have been stable, pending HD today.    Cardio  # Hypertensive emergency  - BP on arrival 209/78. Patient admitted in august with similar picture of hypertensive crises, back pain, requiring HD  - Received in ED: Clonidine 0.3mg x1, hydralazine 100mg x1, meclizine 25mg x1, percocet 5mg x1  - 15 minutes into dialysis in CCU, approx 500cc off, when he became unresponsive. Dialysis was held. Followed by episode of ladarius down to HR of 30's and repeat BP systolic 90's. Rapid response was called. Heart rate spontaneously started to increase without medications or management. Patient became more arousable and was able to answer some questions, but was still lethargic  - A-line placed for better blood pressure management on 1/11.  - c/w hydralazine 100mg q8h  - c/w losartan 100mg q24h  - carvedilol 25mg q12h  - clonidine 0.3mg q8h started  - amlodipine switched to nifedipine xl 60mg  - imdur increased to 90mg q24h    # Hx of CAD, PAD (with stents), BRYNN (with stents), carotid artery stenosis (with stents)  - c/w ASA, plavix, atorvastatin  - pain management consulted for pt's chronic leg pain    # Aortic Stenosis  - Grade 3 systolic murmur  - ECHO 8/19: Moderate symmetric left ventricular hypertrophy. Normal right ventricular size and systolic function. Moderate aortic stenosis.  - repeat ECHO 1/13 showed severe aortic stenosis, EF 60-65%  - call CT surg for severe AS    Renal  # ESRD on HD TTS via LUE AVF  - last HD on 1/10 was stopped after syncopal episode outpatient   - volume status and electrolytes acceptable at this time per nephro  - HD per nephrology recs  - Renvela 800mg TID  - daily weights  - renal diet  - strict I/O    Neuro  # Questionable History of CVA  - CT Brain showed microangiopathic ischemic disease. There are again lacunar infarcts in the basal ganglia bilaterally. There is no intracranial hemorrhage or acute transcortical infarct. External carotid artery branch calcifications, as seen in patients on dialysis.   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  - neuro checks  - Avoid sedating medications     Pulm  # Elevated pulmonary artery pressure   ECHO 8/19: Severe pulmonary hypertension, PASP is 72.1 mmHg.    Endo  # Hypothyroidism  - c/w home synthroid 50mcg daily    # IDDM  - A1c 8/19: 7.2%  - c/w moderate ISS    Prophylaxis  F: None  E: Replete per nephro recs  N: DASH/TLC  GI: None  DVT; SQH 5000 U q8h    CODE: FULL 68 y/o M who is a POOR/UNRELIABLE HISTORIAN with PMHx of ESRD on dialysis via AVF (tues/thurs/sat), HTN, HLD, CAD, BRYNN (s/p stent), IDDM (with peripheral nueropathy), ESRD on dialysis (tues/thurs/sat), hypothryoidism, ?CVA (blind in right eye, little vision) - patient is s/p carotid artery stent), PAD (s/p bilateral stents) who was BIBEMS to [facility] for syncopal episode and found to have elevated blood pressure. Admitted to CCU for emergent HD and managment of hypertensive emergency, now off nicardipine drip and back on most of his home medications. Blood pressures have been stable, pending HD today.    Cardio  # Hypertensive emergency  - BP on arrival 209/78. Patient admitted in august with similar picture of hypertensive crises, back pain, requiring HD  - Received in ED: Clonidine 0.3mg x1, hydralazine 100mg x1, meclizine 25mg x1, percocet 5mg x1  - 15 minutes into dialysis in CCU, approx 500cc off, when he became unresponsive. Dialysis was held. Followed by episode of ladarius down to HR of 30's and repeat BP systolic 90's. Rapid response was called. Heart rate spontaneously started to increase without medications or management. Patient became more arousable and was able to answer some questions, but was still lethargic  - A-line placed for better blood pressure management on 1/11.  - c/w hydralazine 100mg q8h  - c/w losartan 100mg q24h  - carvedilol 25mg q12h  - clonidine 0.3mg q8h started  - amlodipine switched to nifedipine xl 60mg, nifedipine administration moved from 4am to 9am  - imdur increased to 90mg q24h  - BPs currently at goal     # Hx of CAD, PAD (with stents), BRYNN (with stents), carotid artery stenosis (with stents)  - c/w ASA, plavix, atorvastatin  - pain management consulted for pt's chronic leg pain  - pain management recommendations: Tramadol 25mg q6h for moderate pain, 50mg q6h for severe pain    # Aortic Stenosis  - Grade 3 systolic murmur  - ECHO 8/19: Moderate symmetric left ventricular hypertrophy. Normal right ventricular size and systolic function. Moderate aortic stenosis.  - repeat ECHO 1/13 showed severe aortic stenosis, EF 60-65%  - undergoing evaluation by structural heart to determine intervention options    Renal  # ESRD on HD TTS via LUE AVF  - pending HD today 1/15  - Renvela 800mg TID  - daily weights  - renal diet  - strict I/O    Neuro  # Questionable History of CVA  - CT Brain showed microangiopathic ischemic disease. There are again lacunar infarcts in the basal ganglia bilaterally. There is no intracranial hemorrhage or acute transcortical infarct. External carotid artery branch calcifications, as seen in patients on dialysis.   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  - neuro checks  - Avoid sedating medications     Pulm  # Elevated pulmonary artery pressure   ECHO 8/19: Severe pulmonary hypertension, PASP is 72.1 mmHg.    Endo  # Hypothyroidism  - c/w home synthroid 50mcg daily    # IDDM  - A1c 8/19: 7.2%  - c/w moderate ISS    Prophylaxis  F: None  E: Replete per nephro recs  N: DASH/TLC  GI: None  DVT; SQH 5000 U q8h    CODE: FULL

## 2020-01-15 NOTE — BEHAVIORAL HEALTH ASSESSMENT NOTE - NSBHSOCIALHXDETAILSFT_PSY_A_CORE
Used to work repairing computers, supportive wife, just started living at assisted living several weeks ago. He says the staff there is nice and the place is clean.

## 2020-01-15 NOTE — PROGRESS NOTE ADULT - SUBJECTIVE AND OBJECTIVE BOX
Pain Management Progress Note - [facility] [phone]      HPI:  Patient seen and examined today, patient undergoing dialysis at bedside. Patient with a history of syncope, stroke, ESRD, hypothyroidism, dizziness, hypertension, CAD, retinal artery occlusion, admitted with emergent hemodialyses and management of hyperintensive urgency. Patient reports pain to his R Hip, R thigh and neck, worse with movement as well as paresthesias and numbness to his bilateral hands and feet. Patient reports moderate pain relief with Tramadol PO. Patient Axox3, denies n,v, no s/s of oversedation. Reviewed pain medication regimen with patient at bedside.       Pain is ___ sharp ____dull ___burning __x_achy ___ Intensity: ____ mild __x_mod ___severe     Location ___x_surgical site _x___cervical _____lumbar ____abd ____upper ext_x___lower ext    Worse with ___x_activity _x___movement _____physical therapy___ Rest    Improved with __x__medication ___x_rest ____physical therapy      hydrALAZINE  cloNIDine  morphine  - Injectable  oxycodone    5 mG/acetaminophen 325 mG  chlorhexidine 2% Cloths  aspirin  chewable  losartan  isosorbide   mononitrate ER Tablet (IMDUR)  gabapentin  atorvastatin  clopidogrel Tablet  carvedilol  hydrALAZINE Injectable  sevelamer carbonate  levothyroxine  amLODIPine   Tablet  hydrALAZINE  insulin lispro (HumaLOG) corrective regimen sliding scale  dextrose 5%.  dextrose 40% Gel  dextrose 50% Injectable  glucagon  Injectable  heparin  Injectable  niCARdipine Infusion  niCARdipine Infusion  oxycodone    5 mG/acetaminophen 325 mG  acetaminophen   Tablet ..  lidocaine   Patch  cyclobenzaprine  cloNIDine  amLODIPine   Tablet  gabapentin  carvedilol  traMADol  carvedilol  insulin glargine Injectable (LANTUS)  dextrose 50% Injectable  isosorbide   mononitrate ER Tablet (IMDUR)  BACItracin   Ointment  traMADol  melatonin  NIFEdipine XL  acetaminophen   Tablet ..  traMADol  traMADol  NIFEdipine XL  polyethylene glycol 3350  senna  NIFEdipine XL      ROS: Const:  __-_febrile   Eyes:___ENT:___CV: __-_chest pain  Resp: __-__sob  GI:___nausea -___vomiting __-_abd pain ___npo ___clears x__full diet __bm  :___ [place]: _x__pain ___spasm  Skin:___ Neuro:  -___aqhxfted__-_rvzuvfolq_-__ numbness _x__weakness __x_paresth  Psych:_-_anxiety  Endo:___ Heme:___Allergy:_________, _x__all others reviewed and negative      PAST MEDICAL & SURGICAL HISTORY:  Peripheral neuropathy  Peripheral artery disease: s/p bilateral stents  Anemia of renal disease  End-stage renal disease (ESRD)  Type II diabetes mellitus  Retinal artery occlusion  Hypothyroidism  Low back pain  CAD (coronary artery disease)  H/O renal artery stenosis: s/p L renal stent  Hyperlipidemia  Hypertension  No significant past surgical history      01-15 @ 05:1510 mL/min/1.73M2<L>      01-14 @ 22:0111 mL/min/1.73M2<L>      Hemoglobin: 11.0 g/dL (01-15 @ 05:15)  Hemoglobin: 12.1 g/dL (01-14 @ 11:07)  Hemoglobin: 10.8 g/dL (01-14 @ 05:28)        T(C): 36.9 (01-15-20 @ 13:50), Max: 37.8 (01-14-20 @ 22:00)  HR: 75 (01-15-20 @ 14:00) (58 - 775)  BP: --  RR: 15 (01-15-20 @ 14:00) (12 - 28)  SpO2: 100% (01-15-20 @ 14:00) (97% - 100%)  Wt(kg): --       PHYSICAL EXAM:  Gen Appearance: __-_no acute distress __-_appropriate        Neuro: _x__SILT feet____ EOM Intact Psych: AAOX_3_, __x_mood/affect appropriate        Eyes: _x__conjunctiva WNL  _____ Pupils equal and round        ENT: __x_ears and nose atraumatic__x_ Hearing grossly intact        Neck: _x__trachea midline, no visible masses ___thyroid without palpable mass    Resp: _x__Nml WOB____No tactile fremitus ___clear to auscultation    Cardio: _x__extremities free from edema __x__pedal pulses palpable    GI/Abdomen: __x_soft ___x__ Nontender___x___Nondistended_____HSM    Lymphatic: ___no palpable nodes in neck  ___no palpable nodes calves and feet    Skin/Wound: ___Incision, _x__Dressing c/d/i,   ____surrounding tissues soft,  ___drain/chest tube present____    Muscular: EHL __5_/5  Gastrocnemius__5_/5    _x__absent clubbing/cyanosis        ASSESSMENT: This is a 69y old Male with a history of syncope, stroke, ESRD, hypothyroidism, dizziness, hypertension, CAD, retinal artery occlusion, admitted with emergent hemodialyses and management of hyperintensive urgency.       Recommended Treatment PLAN:  1. Recommend Tramadol 50mg PO q6 PRN severe pain  2. Continue Gabapentin 100mg PO q8  3. Continue Flexeril 5mg PO q12 PRN  Plan discussed with Dr. Rea

## 2020-01-15 NOTE — BEHAVIORAL HEALTH ASSESSMENT NOTE - NSBHADMITCOUNSEL_PSY_A_CORE
prognosis/instructions for management, treatment and follow up/risk factor reduction/client/family/caregiver education/risks and benefits of treatment options/other.../diagnostic results/impressions and/or recommended studies/importance of adherence to chosen treatment

## 2020-01-15 NOTE — PROGRESS NOTE ADULT - ASSESSMENT
A/p  68 y/o m with PMHx of ESRD on dialysis via AVF TTS, HTN, CAD, BRYNN (s/p stent), IDDM (with peripheral nueropathy), hypothyroidism and CVA cb right eye blindness- patient is s/p carotid artery stent), PAD (s/p bilateral stents) who was BIBEMS to [facility] for syncopal episode and found to have elevated blood pressure. Admitted to CCU for emergent HD and management of hypertensive emergency.  found to have severe AS most likely contributing to Syncopal episode, currently undergoing pre-op TAVR

## 2020-01-15 NOTE — PROGRESS NOTE ADULT - ATTENDING COMMENTS
I independently performed the key portions of the evaluation and management service provided. I agree with the above history, physical, and plan which I have reviewed and edited where appropriate. I find ESRD, severe AS. HD today, follow BP. See full note. (Patient seen earlier in day.)

## 2020-01-16 LAB
ANION GAP SERPL CALC-SCNC: 14 MMOL/L — SIGNIFICANT CHANGE UP (ref 5–17)
BUN SERPL-MCNC: 43 MG/DL — HIGH (ref 7–23)
CALCIUM SERPL-MCNC: 9.6 MG/DL — SIGNIFICANT CHANGE UP (ref 8.4–10.5)
CHLORIDE SERPL-SCNC: 98 MMOL/L — SIGNIFICANT CHANGE UP (ref 96–108)
CO2 SERPL-SCNC: 26 MMOL/L — SIGNIFICANT CHANGE UP (ref 22–31)
CREAT SERPL-MCNC: 3.94 MG/DL — HIGH (ref 0.5–1.3)
GLUCOSE SERPL-MCNC: 182 MG/DL — HIGH (ref 70–99)
HCT VFR BLD CALC: 33.5 % — LOW (ref 39–50)
HGB BLD-MCNC: 10.3 G/DL — LOW (ref 13–17)
MAGNESIUM SERPL-MCNC: 2.2 MG/DL — SIGNIFICANT CHANGE UP (ref 1.6–2.6)
MCHC RBC-ENTMCNC: 28.9 PG — SIGNIFICANT CHANGE UP (ref 27–34)
MCHC RBC-ENTMCNC: 30.7 GM/DL — LOW (ref 32–36)
MCV RBC AUTO: 93.8 FL — SIGNIFICANT CHANGE UP (ref 80–100)
MRSA PCR RESULT.: POSITIVE
NRBC # BLD: 0 /100 WBCS — SIGNIFICANT CHANGE UP (ref 0–0)
PLATELET # BLD AUTO: 97 K/UL — LOW (ref 150–400)
POTASSIUM SERPL-MCNC: 4 MMOL/L — SIGNIFICANT CHANGE UP (ref 3.5–5.3)
POTASSIUM SERPL-SCNC: 4 MMOL/L — SIGNIFICANT CHANGE UP (ref 3.5–5.3)
RBC # BLD: 3.57 M/UL — LOW (ref 4.2–5.8)
RBC # FLD: 13.9 % — SIGNIFICANT CHANGE UP (ref 10.3–14.5)
S AUREUS DNA NOSE QL NAA+PROBE: POSITIVE
SODIUM SERPL-SCNC: 138 MMOL/L — SIGNIFICANT CHANGE UP (ref 135–145)
WBC # BLD: 2.97 K/UL — LOW (ref 3.8–10.5)
WBC # FLD AUTO: 2.97 K/UL — LOW (ref 3.8–10.5)

## 2020-01-16 PROCEDURE — 99291 CRITICAL CARE FIRST HOUR: CPT

## 2020-01-16 PROCEDURE — 71045 X-RAY EXAM CHEST 1 VIEW: CPT | Mod: 26

## 2020-01-16 PROCEDURE — 99233 SBSQ HOSP IP/OBS HIGH 50: CPT

## 2020-01-16 PROCEDURE — 75573 CT HRT C+ STRUX CGEN HRT DS: CPT | Mod: 26

## 2020-01-16 PROCEDURE — 93971 EXTREMITY STUDY: CPT | Mod: 26,RT

## 2020-01-16 PROCEDURE — 99232 SBSQ HOSP IP/OBS MODERATE 35: CPT | Mod: GC

## 2020-01-16 PROCEDURE — 74174 CTA ABD&PLVS W/CONTRAST: CPT | Mod: 26

## 2020-01-16 RX ORDER — HYDRALAZINE HCL 50 MG
100 TABLET ORAL EVERY 8 HOURS
Refills: 0 | Status: DISCONTINUED | OUTPATIENT
Start: 2020-01-16 | End: 2020-01-16

## 2020-01-16 RX ORDER — TRAMADOL HYDROCHLORIDE 50 MG/1
50 TABLET ORAL EVERY 6 HOURS
Refills: 0 | Status: DISCONTINUED | OUTPATIENT
Start: 2020-01-16 | End: 2020-01-17

## 2020-01-16 RX ORDER — LOSARTAN POTASSIUM 100 MG/1
100 TABLET, FILM COATED ORAL EVERY 24 HOURS
Refills: 0 | Status: DISCONTINUED | OUTPATIENT
Start: 2020-01-16 | End: 2020-02-04

## 2020-01-16 RX ORDER — ISOSORBIDE MONONITRATE 60 MG/1
60 TABLET, EXTENDED RELEASE ORAL EVERY 24 HOURS
Refills: 0 | Status: DISCONTINUED | OUTPATIENT
Start: 2020-01-16 | End: 2020-01-18

## 2020-01-16 RX ORDER — LIDOCAINE 4 G/100G
1 CREAM TOPICAL EVERY 24 HOURS
Refills: 0 | Status: DISCONTINUED | OUTPATIENT
Start: 2020-01-16 | End: 2020-02-04

## 2020-01-16 RX ORDER — ISOSORBIDE MONONITRATE 60 MG/1
60 TABLET, EXTENDED RELEASE ORAL EVERY 24 HOURS
Refills: 0 | Status: DISCONTINUED | OUTPATIENT
Start: 2020-01-16 | End: 2020-01-16

## 2020-01-16 RX ORDER — GABAPENTIN 400 MG/1
100 CAPSULE ORAL
Refills: 0 | Status: DISCONTINUED | OUTPATIENT
Start: 2020-01-16 | End: 2020-02-04

## 2020-01-16 RX ORDER — CARVEDILOL PHOSPHATE 80 MG/1
25 CAPSULE, EXTENDED RELEASE ORAL EVERY 12 HOURS
Refills: 0 | Status: DISCONTINUED | OUTPATIENT
Start: 2020-01-16 | End: 2020-02-04

## 2020-01-16 RX ORDER — HYDRALAZINE HCL 50 MG
100 TABLET ORAL EVERY 8 HOURS
Refills: 0 | Status: DISCONTINUED | OUTPATIENT
Start: 2020-01-16 | End: 2020-01-27

## 2020-01-16 RX ADMIN — SEVELAMER CARBONATE 800 MILLIGRAM(S): 2400 POWDER, FOR SUSPENSION ORAL at 13:11

## 2020-01-16 RX ADMIN — Medication 81 MILLIGRAM(S): at 13:43

## 2020-01-16 RX ADMIN — Medication 60 MILLIGRAM(S): at 09:10

## 2020-01-16 RX ADMIN — HEPARIN SODIUM 5000 UNIT(S): 5000 INJECTION INTRAVENOUS; SUBCUTANEOUS at 06:27

## 2020-01-16 RX ADMIN — HEPARIN SODIUM 5000 UNIT(S): 5000 INJECTION INTRAVENOUS; SUBCUTANEOUS at 13:40

## 2020-01-16 RX ADMIN — Medication 100 MILLIGRAM(S): at 21:43

## 2020-01-16 RX ADMIN — TRAMADOL HYDROCHLORIDE 50 MILLIGRAM(S): 50 TABLET ORAL at 11:34

## 2020-01-16 RX ADMIN — Medication 50 MICROGRAM(S): at 06:28

## 2020-01-16 RX ADMIN — HEPARIN SODIUM 5000 UNIT(S): 5000 INJECTION INTRAVENOUS; SUBCUTANEOUS at 21:43

## 2020-01-16 RX ADMIN — Medication 650 MILLIGRAM(S): at 00:27

## 2020-01-16 RX ADMIN — Medication 650 MILLIGRAM(S): at 15:25

## 2020-01-16 RX ADMIN — GABAPENTIN 100 MILLIGRAM(S): 400 CAPSULE ORAL at 06:27

## 2020-01-16 RX ADMIN — SEVELAMER CARBONATE 800 MILLIGRAM(S): 2400 POWDER, FOR SUSPENSION ORAL at 18:45

## 2020-01-16 RX ADMIN — SEVELAMER CARBONATE 800 MILLIGRAM(S): 2400 POWDER, FOR SUSPENSION ORAL at 08:15

## 2020-01-16 RX ADMIN — LOSARTAN POTASSIUM 100 MILLIGRAM(S): 100 TABLET, FILM COATED ORAL at 17:39

## 2020-01-16 RX ADMIN — ISOSORBIDE MONONITRATE 60 MILLIGRAM(S): 60 TABLET, EXTENDED RELEASE ORAL at 12:40

## 2020-01-16 RX ADMIN — TRAMADOL HYDROCHLORIDE 50 MILLIGRAM(S): 50 TABLET ORAL at 22:30

## 2020-01-16 RX ADMIN — Medication 0.3 MILLIGRAM(S): at 13:40

## 2020-01-16 RX ADMIN — SENNA PLUS 2 TABLET(S): 8.6 TABLET ORAL at 21:44

## 2020-01-16 RX ADMIN — CARVEDILOL PHOSPHATE 25 MILLIGRAM(S): 80 CAPSULE, EXTENDED RELEASE ORAL at 10:33

## 2020-01-16 RX ADMIN — TRAMADOL HYDROCHLORIDE 50 MILLIGRAM(S): 50 TABLET ORAL at 21:44

## 2020-01-16 RX ADMIN — Medication 4: at 21:37

## 2020-01-16 RX ADMIN — ATORVASTATIN CALCIUM 40 MILLIGRAM(S): 80 TABLET, FILM COATED ORAL at 21:42

## 2020-01-16 RX ADMIN — LIDOCAINE 1 PATCH: 4 CREAM TOPICAL at 17:00

## 2020-01-16 RX ADMIN — TRAMADOL HYDROCHLORIDE 50 MILLIGRAM(S): 50 TABLET ORAL at 12:00

## 2020-01-16 RX ADMIN — Medication 650 MILLIGRAM(S): at 16:00

## 2020-01-16 RX ADMIN — Medication 5 MILLIGRAM(S): at 21:44

## 2020-01-16 RX ADMIN — Medication 6: at 16:01

## 2020-01-16 RX ADMIN — POLYETHYLENE GLYCOL 3350 17 GRAM(S): 17 POWDER, FOR SOLUTION ORAL at 21:44

## 2020-01-16 RX ADMIN — CARVEDILOL PHOSPHATE 25 MILLIGRAM(S): 80 CAPSULE, EXTENDED RELEASE ORAL at 21:42

## 2020-01-16 RX ADMIN — Medication 2: at 11:10

## 2020-01-16 RX ADMIN — CLOPIDOGREL BISULFATE 75 MILLIGRAM(S): 75 TABLET, FILM COATED ORAL at 12:38

## 2020-01-16 RX ADMIN — Medication 2: at 06:41

## 2020-01-16 RX ADMIN — Medication 0.3 MILLIGRAM(S): at 21:41

## 2020-01-16 RX ADMIN — ESCITALOPRAM OXALATE 5 MILLIGRAM(S): 10 TABLET, FILM COATED ORAL at 19:40

## 2020-01-16 RX ADMIN — CYCLOBENZAPRINE HYDROCHLORIDE 5 MILLIGRAM(S): 10 TABLET, FILM COATED ORAL at 06:27

## 2020-01-16 RX ADMIN — LIDOCAINE 1 PATCH: 4 CREAM TOPICAL at 19:00

## 2020-01-16 RX ADMIN — CHLORHEXIDINE GLUCONATE 1 APPLICATION(S): 213 SOLUTION TOPICAL at 06:27

## 2020-01-16 RX ADMIN — Medication 100 MILLIGRAM(S): at 14:26

## 2020-01-16 RX ADMIN — Medication 100 MILLIGRAM(S): at 07:45

## 2020-01-16 RX ADMIN — Medication 0.3 MILLIGRAM(S): at 07:45

## 2020-01-16 NOTE — DIETITIAN INITIAL EVALUATION ADULT. - ADD RECOMMEND
Monitor Skin, Labs, Wts, GI distress, GOC. Appreciative assistance during meals PRN. Nephro-Po daily. RD to remain available for additional nutrition interventions as needed. Recs made with team.

## 2020-01-16 NOTE — DIETITIAN INITIAL EVALUATION ADULT. - ENERGY NEEDS
(1/16) 123 pounds, (1/14) 132.9 pounds, (1/13) 134.9 pounds / 128.5 pounds, (1/12) 136.6 pounds, (1/11) 131 pounds  5'9''  pounds, %IBW 76.8% BMI 18.16 -- Based on most recent EMR wt   IBW used for EER; Adjusted for HD, fluids per team vs 1000ml + UOP

## 2020-01-16 NOTE — PROGRESS NOTE ADULT - ASSESSMENT
69year old male, poor historian with PMHx of ESRD on dialysis via AVF (tues/thurs/sat), AVF fistula 2012, HTN, HLD, CAD, BRYNN (s/p  Left renal artery stent), IDDM (with peripheral neuropathy), hypothryoidism, ?CVA (blind in right eye, little vision), s/p Left carotid artery stent, PAD (s/p bilateral LE stent 2006). partial amputation of 3rd toe 11/2018 who was BIBEMS to [facility] for syncopal event during dialysis and found to have elevated blood pressure with possible aspiration of food since patient was eating at the time of the episode.  In the ED, patient received Clonidine and Hydralazine for /78 then admitted to the CCU where Hemodialysis was initiated during which patient became unresponsive 15minutes into the session.  His vitals showed bradycardia in the 30's, hypotension with sbp 90's and blood glucose 160.  A rapid response was called and patient's heart rate returned to normal sinus before intervention and patient was more responsive but noted to be lethargic with right sided facial droop.  Subsequently, a stroke code was called.  CT head 1/11/20 showed microangiopathic ischemic disease, lacunar infarcts in the basal ganglia bilaterally; no intracranial hemorrhage, acute transcortical infarct, or calvarial fracture; no significant interval change since 8/8/2019.  Repeat CT head showed no change from prior imaging. Patient has since transitioned from Nicardipine to home po antihypertensives.  TTE done 1/13/20 showed severe AS, ADOLFO 0.67, MG 38, Mild AR, Mild MR.  The Structural Team is being consulted to evaluate for possible TAVR.     Plan:  Problem 1: severe AS on TTE 1/13/20  - Pt underwent CT congenital heart and CT angio abd/pelvis today. Will review imaged with Dr. Mcfarlane and Dr. Gasca  - Bedside PFTs needed prior to intervention   - Blood cultures 1/14 positive for MRSA. Will need to treat infection before any TAVR as the prosthetic valve will be at high risk for infection  -continue coreg 25mg q12  -continue atorvastatin 80mg hs  -keep euvolemic- ESRD on HD last HD on 1/15      Problem 2: Bacteremia   - Pt with intermittent fevers, no leukocytosis  - Blood cultures drawn 1/14 positive for MRSA, on contact isolation   - Repeat blood cultures daily until clear  - Start antibiotics per primary team. Will need to treat infection prior to TAVR to prevent infection to new valve    Problem 2: severe Hypertension sbp 200's  -weaned off Nicardipine  -continue home meds: coreg 25mg q12, Nifedipine XL 60mg daily, Imdur 90mg daily, Hydralazine 100mg q8h, Losartan 100mg daily, clonidine 0.3mg q8h  - SBP 130s-150s today, continue to monitor       Problem 3: IDDM  -HgA1C 6.3  -continue consistent carb diet  -continue insulin sliding scale, FS 160s today      Problem 4: left carotid stent  -carotid duplex showed No hemodynamically significant stenosis in bilateral CCA and ICA  -continue plavix and aspirin

## 2020-01-16 NOTE — PHYSICAL THERAPY INITIAL EVALUATION ADULT - CRITERIA FOR SKILLED THERAPEUTIC INTERVENTIONS
impairments found/therapy frequency/rehab potential/functional limitations in following categories/risk reduction/prevention/anticipated discharge recommendation

## 2020-01-16 NOTE — DIETITIAN INITIAL EVALUATION ADULT. - OTHER INFO
70yo M, poor unreliable historian, PMHx of ESRD on dialysis via AVF (tues/thurs/sat), HTN, HLD, CAD, BRYNN (s/p stent), IDDM A1c 8/19: 7.2% (with peripheral nueropathy), hypothryoidism, ?CVA (blind in right eye, little vision) - patient is s/p carotid artery stent), PAD (s/p bilateral stents) who was BIBEMS to [facility] for syncopal episode (with possible aspiration of food) and found to have elevated blood pressure BP on arrival 209/78 - Followed by episode of ladarius down (+Rapid response, Heart rate spontaneously started to increase). Pt with EF 60-65%, severe AS on TTE, possible TAVR pending MRSA bacteremia. Per flow sheets, complains of pain/discomfort generalized level 10 - Pain management following. No edema. +BM 1/13. No pressure ulcers; +BL thumb wounds, RLE scabs, LLE resolving wound.   Pt ordered for DASH consCHO no snack Renal diet, ASP Precautions order noted 1/11. 68yo M, poor unreliable historian, PMHx of ESRD on dialysis via AVF (tues/thurs/sat), HTN, HLD, CAD, BRYNN (s/p stent), IDDM A1c 8/19: 7.2% (with peripheral nueropathy), hypothyroidism, ?CVA (blind in right eye, little vision) - patient is s/p carotid artery stent), PAD (s/p bilateral stents) who was BIBEMS to [facility] for syncopal episode (with possible aspiration of food) and found to have elevated blood pressure BP on arrival 209/78 - Followed by episode of ladarius down (+Rapid response, Heart rate spontaneously started to increase). Pt with EF 60-65%, severe AS on TTE, possible TAVR pending MRSA bacteremia. Per flow sheets, complains of pain/discomfort generalized level 10 - Pain management following. No edema. +BM 1/13. No pressure ulcers; +BL thumb wounds, RLE scabs, LLE resolving wound.   Spoke with Team & RN - attempted to speak with pt however pt unable to remain engaged for interview. Pt ordered for DASH consCHO no snack Renal diet, ASP Precautions order noted 1/11. RN reports pt consumed most of protein and roll at lunch today, however did not eat Other items on avelina - feels avelina was ~25% consumed in total. Reports pt needs much assistance during meals 2/2 not wanting to feed himself, does not feel pt needs chopped diet to aide in issues as pt able to self feed however does not want to. Tolerance to PO diet reported at this time, per team does not feel pt at risk for aspiration - reports episode of aspiration PTA was d/t falling asleep while eating not d/t true dysphagia. Team reports it is likely intake remains diminished at this time as pt also sleep for majority of the day, has less time to eat.  Please see below for nutritions recommendations.

## 2020-01-16 NOTE — PROGRESS NOTE ADULT - ATTENDING COMMENTS
69year old male who is a poor historian with past medical history of ESRD on dialysis, AVF fistula 2012, HTN, HLD, CAD, BRYNN (s/p  Left renal artery stent), IDDM (with peripheral neuropathy), hypothryoidism, ?CVA (blind in right eye, little vision), s/p Left carotid artery stent, PAD (s/p bilateral LE stent 2006), partial amputation of 3rd toe 11/2018 who presented with syncope in setting of reported htn episode. hospital course complicated by unresponsive event in dialysis with bradycardia 30's/hypotension 90's, CT head 1/11/20 showed microangiopathic ischemic disease, lacunar infarcts in the basal ganglia bilaterally; no intracranial hemorrhage, acute transcortical infarct, or calvarial fracture. TTE 1/13/20 showed severe AS, ADOLFO 0.67, MG 38, Mild AR, Mild MR. Pt reports having remote hx approximately 1mo ago rt hip sx with continued pain and limited ambulation at rehab. exam remarkable for 3/6 SALAZAR base rad to carotids, cta bl, no cce with dimished LE pulses dopplerable, venous/aterial insufficiency changes, LUE fistula. imp: severe AS; unresponsive event unclear etiology but significant baseline AS; ESRD on HD; PAD. pt is high risk for SAVR/TAVR. now with MRSA bacteremia.   -IV abx for bacteremia; EBONI to evaluate IE  -would consider for BAV if cleared bacteremia and no IE; CTA with signfiicant PAD making high risk intervention and now transfemoral TAVR bailout option  -neuro evaluation  -PT, nutrition

## 2020-01-16 NOTE — PROGRESS NOTE ADULT - SUBJECTIVE AND OBJECTIVE BOX
=INCOMPLETE NOTE   Patient discussed on morning rounds with Dr. Maeve CALHOUN evaluation     SUBJECTIVE ASSESSMENT:  69y Male seen and examined at the bedside. He states he feels tired today. He reports having some shortness of breath this morning, but states overall he feels SOB has improved. He offer no new complaints at this time. He denies CP, abd pain, n/v/, leg swelling.         Vital Signs Last 24 Hrs  T(C): 36.4 (16 Jan 2020 09:00), Max: 38 (15 Oscar 2020 18:00)  T(F): 97.6 (16 Jan 2020 09:00), Max: 100.4 (15 Oscar 2020 18:00)  HR: 54 (16 Jan 2020 11:00) (53 - 775)  BP: --  BP(mean): --  RR: 16 (16 Jan 2020 11:00) (10 - 26)  SpO2: 100% (16 Jan 2020 11:00) (100% - 100%)  I&O's Detail    15 Oscar 2020 07:01  -  16 Jan 2020 07:00  --------------------------------------------------------  IN:    IV PiggyBack: 250 mL    Oral Fluid: 430 mL    Other: 900 mL  Total IN: 1580 mL    OUT:    Intermittent Catheterization - Urethral: 375 mL    Other: 2900 mL  Total OUT: 3275 mL    Total NET: -1695 mL          CHEST TUBE:  no  MARLIN DRAIN:  No.  EPICARDIAL WIRES: No.  TIE DOWNS: No.  TORRES: no.    PHYSICAL EXAM:    General: Lying comfortably in bed, NAD    HEENT: MMM, NCAT    Neurological: appears lethargic, falling asleep during conversation, no focal deficits    Cardiovascular: RRR, 3/6 SALAZAR     Respiratory: Non-labored breathing, chest expansion symmetric, CTA anteriorly, pt unable to lift himself off bed for complete exam     Gastrointestinal: +BS x4 quadrant, soft, non-tender to palpation, non-distended    Extremities: WWP, no pitting edema, no calf tenderness    Vascular: 3rd R toe partial amputation 2+radial pulses b/l,          LABS:                        10.3   2.97  )-----------( 97       ( 16 Jan 2020 05:08 )             33.5       COUMADIN:  no        01-16    138  |  98  |  43<H>  ----------------------------<  182<H>  4.0   |  26  |  3.94<H>    Ca    9.6      16 Jan 2020 05:08  Mg     2.2     01-16            MEDICATIONS  (STANDING):  aspirin  chewable 81 milliGRAM(s) Oral every 24 hours  atorvastatin 40 milliGRAM(s) Oral at bedtime  carvedilol 25 milliGRAM(s) Oral every 12 hours  chlorhexidine 2% Cloths 1 Application(s) Topical <User Schedule>  cloNIDine 0.3 milliGRAM(s) Oral every 8 hours  clopidogrel Tablet 75 milliGRAM(s) Oral daily  dextrose 5%. 1000 milliLiter(s) (50 mL/Hr) IV Continuous <Continuous>  dextrose 50% Injectable 12.5 Gram(s) IV Push once  dextrose 50% Injectable 25 Gram(s) IV Push once  dextrose 50% Injectable 25 Gram(s) IV Push once  escitalopram 5 milliGRAM(s) Oral every 24 hours  gabapentin 100 milliGRAM(s) Oral <User Schedule>  heparin  Injectable 5000 Unit(s) SubCutaneous every 8 hours  hydrALAZINE 100 milliGRAM(s) Oral every 8 hours  insulin lispro (HumaLOG) corrective regimen sliding scale   SubCutaneous Before meals and at bedtime  isosorbide   mononitrate ER Tablet (IMDUR) 60 milliGRAM(s) Oral every 24 hours  levothyroxine 50 MICROGram(s) Oral daily  losartan 100 milliGRAM(s) Oral every 24 hours  melatonin 5 milliGRAM(s) Oral at bedtime  NIFEdipine XL 60 milliGRAM(s) Oral every 24 hours  polyethylene glycol 3350 17 Gram(s) Oral at bedtime  senna 2 Tablet(s) Oral at bedtime  sevelamer carbonate 800 milliGRAM(s) Oral three times a day with meals    MEDICATIONS  (PRN):  acetaminophen   Tablet .. 650 milliGRAM(s) Oral every 6 hours PRN Mild Pain (1 - 3)  dextrose 40% Gel 15 Gram(s) Oral once PRN Blood Glucose LESS THAN 70 milliGRAM(s)/deciliter  glucagon  Injectable 1 milliGRAM(s) IntraMuscular once PRN Glucose LESS THAN 70 milligrams/deciliter  traMADol 25 milliGRAM(s) Oral every 6 hours PRN Moderate Pain (4 - 6)  traMADol 50 milliGRAM(s) Oral every 6 hours PRN Severe Pain (7 - 10)        RADIOLOGY & ADDITIONAL TESTS:    TTE 1/13: EG 60-65% severe AS MG 32, mild AI trace MR, mild/mod TR, pulm HTN Patient discussed on morning rounds with Dr. Maeve CALHOUN evaluation     SUBJECTIVE ASSESSMENT:  69y Male seen and examined at the bedside. He states he feels tired today. He reports having some shortness of breath this morning, but states overall he feels SOB has improved. He offer no new complaints at this time. He denies CP, abd pain, n/v/, leg swelling.         Vital Signs Last 24 Hrs  T(C): 36.4 (16 Jan 2020 09:00), Max: 38 (15 Oscar 2020 18:00)  T(F): 97.6 (16 Jan 2020 09:00), Max: 100.4 (15 Oscar 2020 18:00)  HR: 54 (16 Jan 2020 11:00) (53 - 775)  BP: --  BP(mean): --  RR: 16 (16 Jan 2020 11:00) (10 - 26)  SpO2: 100% (16 Jan 2020 11:00) (100% - 100%)  I&O's Detail    15 Oscar 2020 07:01  -  16 Jan 2020 07:00  --------------------------------------------------------  IN:    IV PiggyBack: 250 mL    Oral Fluid: 430 mL    Other: 900 mL  Total IN: 1580 mL    OUT:    Intermittent Catheterization - Urethral: 375 mL    Other: 2900 mL  Total OUT: 3275 mL    Total NET: -1695 mL          CHEST TUBE:  no  MARLIN DRAIN:  No.  EPICARDIAL WIRES: No.  TIE DOWNS: No.  TORRES: no.    PHYSICAL EXAM:    General: Lying comfortably in bed, NAD    HEENT: MMM, NCAT    Neurological: appears lethargic, falling asleep during conversation, no focal deficits    Cardiovascular: RRR, 3/6 SALAZAR     Respiratory: Non-labored breathing, chest expansion symmetric, CTA anteriorly, pt unable to lift himself off bed for complete exam     Gastrointestinal: +BS x4 quadrant, soft, non-tender to palpation, non-distended    Extremities: WWP, no pitting edema, no calf tenderness    Vascular: 3rd R toe partial amputation 2+radial pulses b/l,          LABS:                        10.3   2.97  )-----------( 97       ( 16 Jan 2020 05:08 )             33.5       COUMADIN:  no        01-16    138  |  98  |  43<H>  ----------------------------<  182<H>  4.0   |  26  |  3.94<H>    Ca    9.6      16 Jan 2020 05:08  Mg     2.2     01-16            MEDICATIONS  (STANDING):  aspirin  chewable 81 milliGRAM(s) Oral every 24 hours  atorvastatin 40 milliGRAM(s) Oral at bedtime  carvedilol 25 milliGRAM(s) Oral every 12 hours  chlorhexidine 2% Cloths 1 Application(s) Topical <User Schedule>  cloNIDine 0.3 milliGRAM(s) Oral every 8 hours  clopidogrel Tablet 75 milliGRAM(s) Oral daily  dextrose 5%. 1000 milliLiter(s) (50 mL/Hr) IV Continuous <Continuous>  dextrose 50% Injectable 12.5 Gram(s) IV Push once  dextrose 50% Injectable 25 Gram(s) IV Push once  dextrose 50% Injectable 25 Gram(s) IV Push once  escitalopram 5 milliGRAM(s) Oral every 24 hours  gabapentin 100 milliGRAM(s) Oral <User Schedule>  heparin  Injectable 5000 Unit(s) SubCutaneous every 8 hours  hydrALAZINE 100 milliGRAM(s) Oral every 8 hours  insulin lispro (HumaLOG) corrective regimen sliding scale   SubCutaneous Before meals and at bedtime  isosorbide   mononitrate ER Tablet (IMDUR) 60 milliGRAM(s) Oral every 24 hours  levothyroxine 50 MICROGram(s) Oral daily  losartan 100 milliGRAM(s) Oral every 24 hours  melatonin 5 milliGRAM(s) Oral at bedtime  NIFEdipine XL 60 milliGRAM(s) Oral every 24 hours  polyethylene glycol 3350 17 Gram(s) Oral at bedtime  senna 2 Tablet(s) Oral at bedtime  sevelamer carbonate 800 milliGRAM(s) Oral three times a day with meals    MEDICATIONS  (PRN):  acetaminophen   Tablet .. 650 milliGRAM(s) Oral every 6 hours PRN Mild Pain (1 - 3)  dextrose 40% Gel 15 Gram(s) Oral once PRN Blood Glucose LESS THAN 70 milliGRAM(s)/deciliter  glucagon  Injectable 1 milliGRAM(s) IntraMuscular once PRN Glucose LESS THAN 70 milligrams/deciliter  traMADol 25 milliGRAM(s) Oral every 6 hours PRN Moderate Pain (4 - 6)  traMADol 50 milliGRAM(s) Oral every 6 hours PRN Severe Pain (7 - 10)        RADIOLOGY & ADDITIONAL TESTS:    TTE 1/13: EG 60-65% severe AS MG 32, mild AI trace MR, mild/mod TR, pulm HTN

## 2020-01-16 NOTE — PHYSICAL THERAPY INITIAL EVALUATION ADULT - IMPAIRMENTS FOUND, PT EVAL
gross motor/poor safety awareness/aerobic capacity/endurance/gait, locomotion, and balance/integumentary integrity/joint integrity and mobility/posture/muscle strength

## 2020-01-16 NOTE — PROGRESS NOTE ADULT - ATTENDING COMMENTS
Please see housestaff note for full details.  I have reviewed the case, examined the patient and agree with plan.  69 M HTN DM ESRD PVD carotid stent BRYNN stent with uncontrolled HTN and syncope.   On BP control improving.  -150s/ 38-50s  HR 54  Tm 100.4   RR 13   Coreg held due to bradycardia.    NAD  RRR SALAZAR 3/6 RUSB  CTA anteriorly  NT soft + BS    K 4.0      Cr  3.9   Hg 10  WBC 9.9   1. Continue BP control.  2. F/u Renal.  3. F/u structural CT.  4. Continue ASA and plavix for PVD and carotid stent.

## 2020-01-16 NOTE — DIETITIAN INITIAL EVALUATION ADULT. - PHYSICAL APPEARANCE
Attempted NFPE; Pt unable to participate for full exam However is noted with Mild wasting to temporal region .

## 2020-01-16 NOTE — PHYSICAL THERAPY INITIAL EVALUATION ADULT - ADDITIONAL COMMENTS
pt not a good historian, unable to determine accuracy of social hx. pt states that he was previously in [facility] where he was only using a wheelchair but would be transferred in to the chair w/ assist. States that he was not doing PT often

## 2020-01-16 NOTE — PROGRESS NOTE ADULT - ASSESSMENT
A/p  70 y/o m with PMHx of ESRD on dialysis via AVF TTS, HTN, CAD, BRYNN (s/p stent), IDDM (with peripheral nueropathy), hypothyroidism and CVA cb right eye blindness- patient is s/p carotid artery stent), PAD (s/p bilateral stents) who was BIBEMS to [facility] for syncopal episode and found to have elevated blood pressure. Admitted to CCU for emergent HD and management of hypertensive emergency.  found to have severe AS most likely contributing to Syncopal episode, currently undergoing pre-op TAVR

## 2020-01-16 NOTE — PROGRESS NOTE ADULT - ATTENDING COMMENTS
I independently performed the key portions of the evaluation and management service provided. I agree with the above history, physical, and plan which I have reviewed and edited where appropriate. I find MRSA bacteremia. Stable on dialysis. Cont HD. Follow predialysis vanco levels. See full note. (Patient seen earlier in day.) No

## 2020-01-16 NOTE — PROGRESS NOTE ADULT - SUBJECTIVE AND OBJECTIVE BOX
--in progress    INTERVAL HPI/OVERNIGHT EVENTS:    SUBJECTIVE: Patient seen and examined at bedside.    VITAL SIGNS:  ICU Vital Signs Last 24 Hrs  T(C): 37 (2020 05:00), Max: 38 (15 Oscar 2020 18:00)  T(F): 98.6 (2020 05:00), Max: 100.4 (15 Oscar 2020 18:00)  HR: 55 (2020 07:00) (53 - 775)  BP: --  BP(mean): --  ABP: 145/43 (2020 07:00) (127/36 - 192/58)  ABP(mean): 76 (2020 07:00) (62 - 104)  RR: 11 (2020 07:00) (11 - 26)  SpO2: 100% (2020 07:00) (100% - 100%)        -15 @ 07:01  -  -16 @ 07:00  --------------------------------------------------------  IN: 1580 mL / OUT: 3275 mL / NET: -1695 mL      CAPILLARY BLOOD GLUCOSE      POCT Blood Glucose.: 161 mg/dL (2020 06:35)      PHYSICAL EXAM:    Constitutional: NAD  HEENT: NC/AT; PERRL, anicteric sclera; MMM  Neck: supple, no JVD  Cardiovascular: +S1/S2, RRR  Respiratory: CTA B/L, no W/R/R  Gastrointestinal: abdomen soft, NT/ND; no rebound or guarding; +BSx4  Genitourinary: no suprapubic tenderness or fullness  Extremities: WWP; no LE edema; no clubbing or cyanosis  Vascular: 2+ radial, DP/PT and femoral pulses B/L  Dermatologic: normal color and turgor; no visible rashes  Neurological:     MEDICATIONS:  MEDICATIONS  (STANDING):  aspirin  chewable 81 milliGRAM(s) Oral every 24 hours  atorvastatin 40 milliGRAM(s) Oral at bedtime  carvedilol 25 milliGRAM(s) Oral every 12 hours  chlorhexidine 2% Cloths 1 Application(s) Topical <User Schedule>  cloNIDine 0.3 milliGRAM(s) Oral every 8 hours  clopidogrel Tablet 75 milliGRAM(s) Oral daily  dextrose 5%. 1000 milliLiter(s) (50 mL/Hr) IV Continuous <Continuous>  dextrose 50% Injectable 12.5 Gram(s) IV Push once  dextrose 50% Injectable 25 Gram(s) IV Push once  dextrose 50% Injectable 25 Gram(s) IV Push once  escitalopram 5 milliGRAM(s) Oral every 24 hours  gabapentin 100 milliGRAM(s) Oral every 8 hours  heparin  Injectable 5000 Unit(s) SubCutaneous every 8 hours  hydrALAZINE 100 milliGRAM(s) Oral every 8 hours  insulin lispro (HumaLOG) corrective regimen sliding scale   SubCutaneous Before meals and at bedtime  isosorbide   mononitrate ER Tablet (IMDUR) 60 milliGRAM(s) Oral every 24 hours  levothyroxine 50 MICROGram(s) Oral daily  losartan 100 milliGRAM(s) Oral every 24 hours  melatonin 5 milliGRAM(s) Oral at bedtime  NIFEdipine XL 60 milliGRAM(s) Oral every 24 hours  polyethylene glycol 3350 17 Gram(s) Oral at bedtime  senna 2 Tablet(s) Oral at bedtime  sevelamer carbonate 800 milliGRAM(s) Oral three times a day with meals    MEDICATIONS  (PRN):  acetaminophen   Tablet .. 650 milliGRAM(s) Oral every 6 hours PRN Mild Pain (1 - 3)  dextrose 40% Gel 15 Gram(s) Oral once PRN Blood Glucose LESS THAN 70 milliGRAM(s)/deciliter  glucagon  Injectable 1 milliGRAM(s) IntraMuscular once PRN Glucose LESS THAN 70 milligrams/deciliter  traMADol 25 milliGRAM(s) Oral every 6 hours PRN Moderate Pain (4 - 6)  traMADol 50 milliGRAM(s) Oral every 6 hours PRN Severe Pain (7 - 10)      ALLERGIES:  Allergies    No Known Allergies    Intolerances        LABS:                        10.3   2.97  )-----------( 97       ( 2020 05:08 )             33.5     01-16    138  |  98  |  43<H>  ----------------------------<  182<H>  4.0   |  26  |  3.94<H>    Ca    9.6      2020 05:08  Mg     2.2     01-16        Urinalysis Basic - ( 2020 11:33 )    Color: Yellow / Appearance: Hazy / S.025 / pH: x  Gluc: x / Ketone: NEGATIVE  / Bili: Small / Urobili: 0.2 E.U./dL   Blood: x / Protein: 100 mg/dL / Nitrite: NEGATIVE   Leuk Esterase: NEGATIVE / RBC: < 5 /HPF / WBC 5-10 /HPF   Sq Epi: x / Non Sq Epi: 0-5 /HPF / Bacteria: Many /HPF        RADIOLOGY & ADDITIONAL TESTS: Reviewed. INTERVAL HPI/OVERNIGHT EVENTS:    SUBJECTIVE: Patient seen and examined at bedside.    VITAL SIGNS:  ICU Vital Signs Last 24 Hrs  T(C): 37 (2020 05:00), Max: 38 (15 Oscar 2020 18:00)  T(F): 98.6 (2020 05:00), Max: 100.4 (15 Oscar 2020 18:00)  HR: 55 (2020 07:00) (53 - 775)  BP: --  BP(mean): --  ABP: 145/43 (2020 07:00) (127/36 - 192/58)  ABP(mean): 76 (2020 07:00) (62 - 104)  RR: 11 (2020 07:00) (11 - 26)  SpO2: 100% (2020 07:00) (100% - 100%)        -15 @ 07:01  -  -16 @ 07:00  --------------------------------------------------------  IN: 1580 mL / OUT: 3275 mL / NET: -1695 mL      CAPILLARY BLOOD GLUCOSE      POCT Blood Glucose.: 161 mg/dL (2020 06:35)      PHYSICAL EXAM:    Constitutional: WDWN resting comfortably in bed; now intermittently requiring 2L NC  Head: NC/AT  Eyes: Right pupil non-reactive to light, left pupil sluggishly reactive to light, anicteric sclera, MMM  Neck: supple; no JVD or thyromegaly  Respiratory: CTA B/L; no W/R/R, no retractions  Cardiac: +S1/S2; RRR; grade 3 early-midsystolic crescendo-decrescendo murmur heard loudest in the R upper sternal border with radiation to carotids c/w aortic stenosis  Gastrointestinal: soft, NT/ND; no rebound or guarding; +BSx4  Extremities: WWP, no clubbing or cyanosis; no peripheral edema, 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 Small ulcer on left thumb, scattered healing scabs on left hand.  Lymphatic: no submandibular or cervical LAD  Neurologic: AAOx3; visual field defect in right eye, slight facial droop in right face, unknown baseline. Motor 5/5 and sensation intact.    MEDICATIONS:  MEDICATIONS  (STANDING):  aspirin  chewable 81 milliGRAM(s) Oral every 24 hours  atorvastatin 40 milliGRAM(s) Oral at bedtime  carvedilol 25 milliGRAM(s) Oral every 12 hours  chlorhexidine 2% Cloths 1 Application(s) Topical <User Schedule>  cloNIDine 0.3 milliGRAM(s) Oral every 8 hours  clopidogrel Tablet 75 milliGRAM(s) Oral daily  dextrose 5%. 1000 milliLiter(s) (50 mL/Hr) IV Continuous <Continuous>  dextrose 50% Injectable 12.5 Gram(s) IV Push once  dextrose 50% Injectable 25 Gram(s) IV Push once  dextrose 50% Injectable 25 Gram(s) IV Push once  escitalopram 5 milliGRAM(s) Oral every 24 hours  gabapentin 100 milliGRAM(s) Oral every 8 hours  heparin  Injectable 5000 Unit(s) SubCutaneous every 8 hours  hydrALAZINE 100 milliGRAM(s) Oral every 8 hours  insulin lispro (HumaLOG) corrective regimen sliding scale   SubCutaneous Before meals and at bedtime  isosorbide   mononitrate ER Tablet (IMDUR) 60 milliGRAM(s) Oral every 24 hours  levothyroxine 50 MICROGram(s) Oral daily  losartan 100 milliGRAM(s) Oral every 24 hours  melatonin 5 milliGRAM(s) Oral at bedtime  NIFEdipine XL 60 milliGRAM(s) Oral every 24 hours  polyethylene glycol 3350 17 Gram(s) Oral at bedtime  senna 2 Tablet(s) Oral at bedtime  sevelamer carbonate 800 milliGRAM(s) Oral three times a day with meals    MEDICATIONS  (PRN):  acetaminophen   Tablet .. 650 milliGRAM(s) Oral every 6 hours PRN Mild Pain (1 - 3)  dextrose 40% Gel 15 Gram(s) Oral once PRN Blood Glucose LESS THAN 70 milliGRAM(s)/deciliter  glucagon  Injectable 1 milliGRAM(s) IntraMuscular once PRN Glucose LESS THAN 70 milligrams/deciliter  traMADol 25 milliGRAM(s) Oral every 6 hours PRN Moderate Pain (4 - 6)  traMADol 50 milliGRAM(s) Oral every 6 hours PRN Severe Pain (7 - 10)      ALLERGIES:  Allergies    No Known Allergies    Intolerances        LABS:                        10.3   2.97  )-----------( 97       ( 2020 05:08 )             33.5     01-16    138  |  98  |  43<H>  ----------------------------<  182<H>  4.0   |  26  |  3.94<H>    Ca    9.6      2020 05:08  Mg     2.2             Urinalysis Basic - ( 2020 11:33 )    Color: Yellow / Appearance: Hazy / S.025 / pH: x  Gluc: x / Ketone: NEGATIVE  / Bili: Small / Urobili: 0.2 E.U./dL   Blood: x / Protein: 100 mg/dL / Nitrite: NEGATIVE   Leuk Esterase: NEGATIVE / RBC: < 5 /HPF / WBC 5-10 /HPF   Sq Epi: x / Non Sq Epi: 0-5 /HPF / Bacteria: Many /HPF        RADIOLOGY & ADDITIONAL TESTS: Reviewed. INTERVAL HPI/OVERNIGHT EVENTS: Blood cultures came back positive for MRSA. Received 1g Vancomycin. Was seen by psychiatry and started on lexapro 5mg. Overnight, hold parameters were changed for SBP <130. This morning, hold parameters were changed back to SBP <100    SUBJECTIVE: Patient seen and examined at bedside. Endorses improvement in pain.    VITAL SIGNS:  ICU Vital Signs Last 24 Hrs  T(C): 37 (2020 05:00), Max: 38 (15 Oscar 2020 18:00)  T(F): 98.6 (2020 05:00), Max: 100.4 (15 Oscar 2020 18:00)  HR: 55 (2020 07:00) (53 - 775)  BP: --  BP(mean): --  ABP: 145/43 (2020 07:00) (127/36 - 192/58)  ABP(mean): 76 (2020 07:00) (62 - 104)  RR: 11 (2020 07:00) (11 - 26)  SpO2: 100% (2020 07:00) (100% - 100%)        -15 @ 07:01  -  -16 @ 07:00  --------------------------------------------------------  IN: 1580 mL / OUT: 3275 mL / NET: -1695 mL      CAPILLARY BLOOD GLUCOSE      POCT Blood Glucose.: 161 mg/dL (2020 06:35)      PHYSICAL EXAM:    Constitutional: WDWN resting comfortably in bed; now intermittently requiring 2L NC  Head: NC/AT  Eyes: Right pupil non-reactive to light, left pupil sluggishly reactive to light, anicteric sclera, MMM  Neck: supple; no JVD or thyromegaly  Respiratory: CTA B/L; no W/R/R, no retractions  Cardiac: +S1/S2; RRR; grade 3 early-midsystolic crescendo-decrescendo murmur heard loudest in the R upper sternal border with radiation to carotids c/w aortic stenosis  Gastrointestinal: soft, NT/ND; no rebound or guarding; +BSx4  Extremities: WWP, no clubbing or cyanosis; no peripheral edema, 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 Small ulcer on left thumb, scattered healing scabs on left hand.  Lymphatic: no submandibular or cervical LAD  Neurologic: AAOx3; visual field defect in right eye, slight facial droop in right face, unknown baseline. Motor 5/5 and sensation intact.    MEDICATIONS:  MEDICATIONS  (STANDING):  aspirin  chewable 81 milliGRAM(s) Oral every 24 hours  atorvastatin 40 milliGRAM(s) Oral at bedtime  carvedilol 25 milliGRAM(s) Oral every 12 hours  chlorhexidine 2% Cloths 1 Application(s) Topical <User Schedule>  cloNIDine 0.3 milliGRAM(s) Oral every 8 hours  clopidogrel Tablet 75 milliGRAM(s) Oral daily  dextrose 5%. 1000 milliLiter(s) (50 mL/Hr) IV Continuous <Continuous>  dextrose 50% Injectable 12.5 Gram(s) IV Push once  dextrose 50% Injectable 25 Gram(s) IV Push once  dextrose 50% Injectable 25 Gram(s) IV Push once  escitalopram 5 milliGRAM(s) Oral every 24 hours  gabapentin 100 milliGRAM(s) Oral every 8 hours  heparin  Injectable 5000 Unit(s) SubCutaneous every 8 hours  hydrALAZINE 100 milliGRAM(s) Oral every 8 hours  insulin lispro (HumaLOG) corrective regimen sliding scale   SubCutaneous Before meals and at bedtime  isosorbide   mononitrate ER Tablet (IMDUR) 60 milliGRAM(s) Oral every 24 hours  levothyroxine 50 MICROGram(s) Oral daily  losartan 100 milliGRAM(s) Oral every 24 hours  melatonin 5 milliGRAM(s) Oral at bedtime  NIFEdipine XL 60 milliGRAM(s) Oral every 24 hours  polyethylene glycol 3350 17 Gram(s) Oral at bedtime  senna 2 Tablet(s) Oral at bedtime  sevelamer carbonate 800 milliGRAM(s) Oral three times a day with meals    MEDICATIONS  (PRN):  acetaminophen   Tablet .. 650 milliGRAM(s) Oral every 6 hours PRN Mild Pain (1 - 3)  dextrose 40% Gel 15 Gram(s) Oral once PRN Blood Glucose LESS THAN 70 milliGRAM(s)/deciliter  glucagon  Injectable 1 milliGRAM(s) IntraMuscular once PRN Glucose LESS THAN 70 milligrams/deciliter  traMADol 25 milliGRAM(s) Oral every 6 hours PRN Moderate Pain (4 - 6)  traMADol 50 milliGRAM(s) Oral every 6 hours PRN Severe Pain (7 - 10)      ALLERGIES:  Allergies    No Known Allergies    Intolerances        LABS:                        10.3   2.97  )-----------( 97       ( 2020 05:08 )             33.5     01-16    138  |  98  |  43<H>  ----------------------------<  182<H>  4.0   |  26  |  3.94<H>    Ca    9.6      2020 05:08  Mg     2.2     -        Urinalysis Basic - ( 2020 11:33 )    Color: Yellow / Appearance: Hazy / S.025 / pH: x  Gluc: x / Ketone: NEGATIVE  / Bili: Small / Urobili: 0.2 E.U./dL   Blood: x / Protein: 100 mg/dL / Nitrite: NEGATIVE   Leuk Esterase: NEGATIVE / RBC: < 5 /HPF / WBC 5-10 /HPF   Sq Epi: x / Non Sq Epi: 0-5 /HPF / Bacteria: Many /HPF        RADIOLOGY & ADDITIONAL TESTS: Reviewed.

## 2020-01-16 NOTE — PROGRESS NOTE ADULT - PROBLEM SELECTOR PLAN 1
# ESRD on HD TTS via LUE AVF  last HD 1/15 with 2 L UF  - HD tmr per reg schedule   -Bp at goal on current antihypertensives and UFw/HD however given sepsis and bacteremia would suggest to hold the long acting bp meds for now   - continue renvela 800 mg po tid w/meals and obtain phos level with bmp   - vancomycin to be administered post-HD on dialysis days     Will follow

## 2020-01-16 NOTE — PROGRESS NOTE ADULT - ASSESSMENT
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 Admitted to CCU for emergent HD and managment of hypertensive emergency, now off nicardipine drip, with adjustments made to hi home medications, pending CT per structural heart recommendations now found to have MRSA bacteremia with no identifiable source.    Cardio  # Hypertensive emergency  - BP on arrival 209/78. Patient admitted in august with similar picture of hypertensive crises, back pain, requiring HD  - Received in ED: Clonidine 0.3mg x1, hydralazine 100mg x1, meclizine 25mg x1, percocet 5mg x1  - 15 minutes into dialysis in CCU, approx 500cc off, when he became unresponsive. Dialysis was held. Followed by episode of ladarius down to HR of 30's and repeat BP systolic 90's. Rapid response was called. Heart rate spontaneously started to increase without medications or management. Patient became more arousable and was able to answer some questions, but was still lethargic  - A-line placed for better blood pressure management on 1/11.  - c/w hydralazine 100mg q8h  - c/w losartan 100mg q24h  - carvedilol 25mg q12h  - clonidine 0.3mg q8h started  - amlodipine switched to nifedipine xl 60mg, nifedipine administration moved from 4am to 9am  - imdur increased to 90mg q24h  - BPs currently at goal     # Hx of CAD, PAD (with stents), BRYNN (with stents), carotid artery stenosis (with stents)  - c/w ASA, plavix, atorvastatin  - pain management consulted for pt's chronic leg pain  - pain management recommendations: Tramadol 25mg q6h for moderate pain, 50mg q6h for severe pain    # Aortic Stenosis  - Grade 3 systolic murmur  - ECHO 8/19: Moderate symmetric left ventricular hypertrophy. Normal right ventricular size and systolic function. Moderate aortic stenosis.  - repeat ECHO 1/13 showed severe aortic stenosis, EF 60-65%  - undergoing evaluation by structural heart to determine intervention options    Renal  # ESRD on HD TTS via LUE AVF  - pending HD today 1/15  - Renvela 800mg TID  - daily weights  - renal diet  - strict I/O    Neuro  # Questionable History of CVA  - CT Brain showed microangiopathic ischemic disease. There are again lacunar infarcts in the basal ganglia bilaterally. There is no intracranial hemorrhage or acute transcortical infarct. External carotid artery branch calcifications, as seen in patients on dialysis.   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  - neuro checks  - Avoid sedating medications     Pulm  # Elevated pulmonary artery pressure   ECHO 8/19: Severe pulmonary hypertension, PASP is 72.1 mmHg.    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 Admitted to CCU for emergent HD and managment of hypertensive emergency, now off nicardipine drip, with adjustments made to hi home medications, pending CT per structural heart recommendations now found to have MRSA bacteremia with no identifiable source.    Cardio  # Hypertensive emergency  - BP on arrival 209/78. Patient admitted in august with similar picture of hypertensive crises, back pain, requiring HD  - Received in ED: Clonidine 0.3mg x1, hydralazine 100mg x1, meclizine 25mg x1, percocet 5mg x1  - 15 minutes into dialysis in CCU, approx 500cc off, when he became unresponsive. Dialysis was held. Followed by episode of ladarius down to HR of 30's and repeat BP systolic 90's. Rapid response was called. Heart rate spontaneously started to increase without medications or management. Patient became more arousable and was able to answer some questions, but was still lethargic  - A-line placed for better blood pressure management on 1/11.  - c/w hydralazine 100mg q8h  - c/w losartan 100mg q24h  - carvedilol 25mg q12h  - clonidine 0.3mg q8h started  - amlodipine switched to nifedipine xl 60mg, nifedipine administration moved from 4am to 9am  - imdur increased to 90mg q24h  - BP hold parameters for medications: hold for SBP <100    # Hx of CAD, PAD (with stents), BRYNN (with stents), carotid artery stenosis (with stents)  - c/w ASA, plavix, atorvastatin  - pain management consulted for pt's chronic leg pain  - pain management recommendations: Tramadol 25mg q6h for moderate pain, 50mg q6h for severe pain    # Aortic Stenosis  - Grade 3 systolic murmur  - ECHO 8/19: Moderate symmetric left ventricular hypertrophy. Normal right ventricular size and systolic function. Moderate aortic stenosis.  - repeat ECHO 1/13 showed severe aortic stenosis, EF 60-65%  - undergoing evaluation by structural heart to determine intervention options  - f/u CT results    ID  # MRSA bacteremia with no obvious source  - Received 1g Vancomycin  - Check Vanc Trough after next dialysis session, redose as appropriate  - Send surveillance bcx  - Contact precautions as pt is on HD    Renal  # ESRD on HD TTS via LUE AVF, in hospital schedule has been MWF so far, per nephro  - last HD 1/15  - Renvela 800mg TID  - daily weights  - renal diet  - strict I/O    Neuro  # Questionable History of CVA  - CT Brain showed microangiopathic ischemic disease. There are again lacunar infarcts in the basal ganglia bilaterally. There is no intracranial hemorrhage or acute transcortical infarct. External carotid artery branch calcifications, as seen in patients on dialysis.   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  - neuro checks  - Avoid sedating medications     Pulm  # Elevated pulmonary artery pressure   ECHO 8/19: Severe pulmonary hypertension, PASP is 72.1 mmHg.    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 Admitted to CCU for emergent HD and managment of hypertensive emergency, now off nicardipine drip, with adjustments made to hi home medications, pending CT per structural heart recommendations now found to have MRSA bacteremia with no identifiable source.    Cardio  # Hypertensive emergency  - BP on arrival 209/78. Patient admitted in august with similar picture of hypertensive crises, back pain, requiring HD  - Received in ED: Clonidine 0.3mg x1, hydralazine 100mg x1, meclizine 25mg x1, percocet 5mg x1  - 15 minutes into dialysis in CCU, approx 500cc off, when he became unresponsive. Dialysis was held. Followed by episode of ladarius down to HR of 30's and repeat BP systolic 90's. Rapid response was called. Heart rate spontaneously started to increase without medications or management. Patient became more arousable and was able to answer some questions, but was still lethargic  - A-line placed for better blood pressure management on 1/11.  - c/w hydralazine 100mg q8h  - c/w losartan 100mg q24h  - carvedilol 25mg q12h  - clonidine 0.3mg q8h started  - amlodipine switched to nifedipine xl 60mg, nifedipine administration moved from 4am to 9am  - imdur increased to 90mg q24h  - BP hold parameters for medications: hold for SBP <100    # Hx of CAD, PAD (with stents), BRYNN (with stents), carotid artery stenosis (with stents)  - c/w ASA, plavix, atorvastatin  - pain management consulted for pt's chronic leg pain  - pain management recommendations: Tramadol 25mg q6h for moderate pain, 50mg q6h for severe pain    # Aortic Stenosis  - Grade 3 systolic murmur  - ECHO 8/19: Moderate symmetric left ventricular hypertrophy. Normal right ventricular size and systolic function. Moderate aortic stenosis.  - repeat ECHO 1/13 showed severe aortic stenosis, EF 60-65%  - undergoing evaluation by structural heart to determine intervention options  - f/u CT results    ID  # MRSA bacteremia with no obvious source  - Received 1g Vancomycin  - Check Vanc Trough after next dialysis session, redose as appropriate  - Send surveillance bcx  - Contact precautions as pt is on HD  - RUE U/S to r/o abscess    Renal  # ESRD on HD TTS via LUE AVF, in hospital schedule has been MWF so far, per nephro  - last HD 1/15  - Renvela 800mg TID  - daily weights  - renal diet  - strict I/O    Neuro  # Questionable History of CVA  - CT Brain showed microangiopathic ischemic disease. There are again lacunar infarcts in the basal ganglia bilaterally. There is no intracranial hemorrhage or acute transcortical infarct. External carotid artery branch calcifications, as seen in patients on dialysis.   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  - neuro checks  - Avoid sedating medications     Pulm  # Elevated pulmonary artery pressure   ECHO 8/19: Severe pulmonary hypertension, PASP is 72.1 mmHg.    Endo  # Hypothyroidism  - c/w home synthroid 50mcg daily    # IDDM  - A1c 8/19: 7.2%  - c/w moderate ISS    Prophylaxis  F: None  E: Replete per nephro recs  N: DASH/TLC  GI: None  DVT; SQH 5000 U q8h    CODE: FULL

## 2020-01-16 NOTE — PROGRESS NOTE ADULT - SUBJECTIVE AND OBJECTIVE BOX
Pain Management Progress Note - [facility] [phone]      HPI: Patient with a history of syncope, stroke, ESRD, hypothyroidism, dizziness, hypertension, CAD, retinal artery occlusion, admitted with emergent hemodialyses and management of hyperintensive urgency. Patient reports pain to his R Hip, R thigh and neck, worse with movement as well as paresthesias and numbness to his bilateral hands and feet. Patient dangled his feet with pt today. Patient Axox3, denies n,v, no s/s of oversedation. Reviewed pain medication regimen with CCU team.       Pain is ___ sharp ____dull ___burning __x_achy ___ Intensity: ____ mild __x_mod ___severe     Location ___x_surgical site _x___cervical _____lumbar ____abd ____upper ext_x___lower ext    Worse with ___x_activity _x___movement _____physical therapy___ Rest    Improved with __x__medication ___x_rest ____physical therapy      hydrALAZINE  cloNIDine  meclizine  morphine  - Injectable  oxycodone    5 mG/acetaminophen 325 mG  chlorhexidine 2% Cloths  aspirin  chewable  losartan  isosorbide   mononitrate ER Tablet (IMDUR)  gabapentin  atorvastatin  clopidogrel Tablet  carvedilol  hydrALAZINE Injectable  sevelamer carbonate  levothyroxine  amLODIPine   Tablet  hydrALAZINE  insulin lispro (HumaLOG) corrective regimen sliding scale  dextrose 5%.  dextrose 40% Gel  dextrose 50% Injectable  glucagon  Injectable  heparin  Injectable  niCARdipine Infusion  niCARdipine Infusion  oxycodone    5 mG/acetaminophen 325 mG  acetaminophen   Tablet ..  lidocaine   Patch  cyclobenzaprine  cloNIDine  amLODIPine   Tablet  gabapentin  carvedilol  traMADol  carvedilol  carvedilol  insulin glargine Injectable (LANTUS)  dextrose 50% Injectable  isosorbide   mononitrate ER Tablet (IMDUR)  BACItracin   Ointment  traMADol  melatonin  NIFEdipine XL  acetaminophen   Tablet ..  traMADol  traMADol  NIFEdipine XL  polyethylene glycol 3350  senna  NIFEdipine XL  nafcillin  IVPB  vancomycin  IVPB  (ADM OVERRIDE)  escitalopram  hydrALAZINE  cloNIDine  hydrALAZINE  hydrALAZINE  isosorbide   mononitrate ER Tablet (IMDUR)  isosorbide   mononitrate ER Tablet (IMDUR)  cloNIDine  hydrALAZINE  isosorbide   mononitrate ER Tablet (IMDUR)  carvedilol  gabapentin  losartan  lidocaine   Patch  traMADol      ROS: Const:  __-_febrile   Eyes:___ENT:___CV: __-_chest pain  Resp: __-__sob  GI:___nausea -___vomiting __-_abd pain ___npo ___clears x__full diet __bm  :___ Musk: _x__pain ___spasm  Skin:___ Neuro:  -___iepegjby__-_hdeqnnypp_-__ numbness _x__weakness __x_paresth  Psych:_-_anxiety  Endo:___ Heme:___Allergy:_________, _x__all others reviewed and negative      PAST MEDICAL & SURGICAL HISTORY:  Peripheral neuropathy  Peripheral artery disease: s/p bilateral stents  Anemia of renal disease  End-stage renal disease (ESRD)  Type II diabetes mellitus  Retinal artery occlusion  Hypothyroidism  Low back pain  CAD (coronary artery disease)  H/O renal artery stenosis: s/p L renal stent  Hyperlipidemia  Hypertension  No significant past surgical history      01-16 @ 05:0814 mL/min/1.73M2<L>        Hemoglobin: 10.3 g/dL (01-16 @ 05:08)  Hemoglobin: 11.0 g/dL (01-15 @ 05:15)        T(C): 36.4 (01-16-20 @ 13:00), Max: 38 (01-15-20 @ 18:00)  HR: 60 (01-16-20 @ 15:00) (53 - 71)  BP: --  RR: 13 (01-16-20 @ 15:00) (10 - 26)  SpO2: 100% (01-16-20 @ 15:00) (100% - 100%)  Wt(kg): --        PHYSICAL EXAM:  Gen Appearance: __-_no acute distress __-_appropriate        Neuro: _x__SILT feet____ EOM Intact Psych: AAOX_3_, __x_mood/affect appropriate        Eyes: _x__conjunctiva WNL  ___x__ Pupils equal and round        ENT: __x_ears and nose atraumatic__x_ Hearing grossly intact        Neck: _x__trachea midline, no visible masses ___thyroid without palpable mass    Resp: _x__Nml WOB____No tactile fremitus ___clear to auscultation    Cardio: _x__extremities free from edema __x__pedal pulses palpable    GI/Abdomen: __x_soft ___x__ Nontender___x___Nondistended_____HSM    Lymphatic: ___no palpable nodes in neck  ___no palpable nodes calves and feet    Skin/Wound: ___Incision, _x__Dressing c/d/i,   ____surrounding tissues soft,  ___drain/chest tube present____    Muscular: EHL __5_/5  Gastrocnemius__5_/5    _x__absent clubbing/cyanosis        ASSESSMENT: This is a 69y old Male with a history of syncope, stroke, ESRD, hypothyroidism, dizziness, hypertension, CAD, retinal artery occlusion, admitted with emergent hemodialyses and management of hyperintensive urgency.       Recommended Treatment PLAN:  1. Recommend Tramadol 50mg PO q6 PRN moderate pain  2. Continue Gabapentin 100mg PO q8  3. Recommend Dilaudid 1mg PO Q6h prn severe pain  Plan discussed with Dr. Rea Pain Management Progress Note - [facility] [phone]      HPI: Patient with a history of syncope, stroke, ESRD, hypothyroidism, dizziness, hypertension, CAD, retinal artery occlusion, admitted with emergent hemodialyses and management of hyperintensive urgency. Patient reports pain to his R Hip, R thigh and neck, worse with movement as well as paresthesias and numbness to his bilateral hands and feet. Patient dangled his feet with pt today. Patient Axox3, denies n,v, no s/s of oversedation. Reviewed pain medication regimen with CCU team.       Pain is ___ sharp ____dull ___burning __x_achy ___ Intensity: ____ mild __x_mod ___severe     Location ____surgical site _x___cervical _____lumbar ____abd ____upper ext_x___lower ext    Worse with ___x_activity _x___movement _____physical therapy___ Rest    Improved with __x__medication ___x_rest ____physical therapy      hydrALAZINE  cloNIDine  meclizine  morphine  - Injectable  oxycodone    5 mG/acetaminophen 325 mG  chlorhexidine 2% Cloths  aspirin  chewable  losartan  isosorbide   mononitrate ER Tablet (IMDUR)  gabapentin  atorvastatin  clopidogrel Tablet  carvedilol  hydrALAZINE Injectable  sevelamer carbonate  levothyroxine  amLODIPine   Tablet  hydrALAZINE  insulin lispro (HumaLOG) corrective regimen sliding scale  dextrose 5%.  dextrose 40% Gel  dextrose 50% Injectable  glucagon  Injectable  heparin  Injectable  niCARdipine Infusion  niCARdipine Infusion  oxycodone    5 mG/acetaminophen 325 mG  acetaminophen   Tablet ..  lidocaine   Patch  cyclobenzaprine  cloNIDine  amLODIPine   Tablet  gabapentin  carvedilol  traMADol  carvedilol  carvedilol  insulin glargine Injectable (LANTUS)  dextrose 50% Injectable  isosorbide   mononitrate ER Tablet (IMDUR)  BACItracin   Ointment  traMADol  melatonin  NIFEdipine XL  acetaminophen   Tablet ..  traMADol  traMADol  NIFEdipine XL  polyethylene glycol 3350  senna  NIFEdipine XL  nafcillin  IVPB  vancomycin  IVPB  (ADM OVERRIDE)  escitalopram  hydrALAZINE  cloNIDine  hydrALAZINE  hydrALAZINE  isosorbide   mononitrate ER Tablet (IMDUR)  isosorbide   mononitrate ER Tablet (IMDUR)  cloNIDine  hydrALAZINE  isosorbide   mononitrate ER Tablet (IMDUR)  carvedilol  gabapentin  losartan  lidocaine   Patch  traMADol      ROS: Const:  __-_febrile   Eyes:___ENT:___CV: __-_chest pain  Resp: __-__sob  GI:___nausea -___vomiting __-_abd pain ___npo ___clears x__full diet __bm  :___ Musk: _x__pain ___spasm  Skin:___ Neuro:  -___zihvmgrv__-_xcgeqmfsb_-__ numbness _x__weakness __x_paresth  Psych:_-_anxiety  Endo:___ Heme:___Allergy:_________, _x__all others reviewed and negative      PAST MEDICAL & SURGICAL HISTORY:  Peripheral neuropathy  Peripheral artery disease: s/p bilateral stents  Anemia of renal disease  End-stage renal disease (ESRD)  Type II diabetes mellitus  Retinal artery occlusion  Hypothyroidism  Low back pain  CAD (coronary artery disease)  H/O renal artery stenosis: s/p L renal stent  Hyperlipidemia  Hypertension  No significant past surgical history      01-16 @ 05:0814 mL/min/1.73M2<L>        Hemoglobin: 10.3 g/dL (01-16 @ 05:08)  Hemoglobin: 11.0 g/dL (01-15 @ 05:15)        T(C): 36.4 (01-16-20 @ 13:00), Max: 38 (01-15-20 @ 18:00)  HR: 60 (01-16-20 @ 15:00) (53 - 71)  BP: --  RR: 13 (01-16-20 @ 15:00) (10 - 26)  SpO2: 100% (01-16-20 @ 15:00) (100% - 100%)  Wt(kg): --        PHYSICAL EXAM:  Gen Appearance: __-_no acute distress __-_appropriate        Neuro: _x__SILT feet____ EOM Intact Psych: AAOX_3_, __x_mood/affect appropriate        Eyes: _x__conjunctiva WNL  ___x__ Pupils equal and round        ENT: __x_ears and nose atraumatic__x_ Hearing grossly intact        Neck: _x__trachea midline, no visible masses ___thyroid without palpable mass    Resp: _x__Nml WOB____No tactile fremitus ___clear to auscultation    Cardio: _x__extremities free from edema __x__pedal pulses palpable    GI/Abdomen: __x_soft ___x__ Nontender___x___Nondistended_____HSM    Lymphatic: ___no palpable nodes in neck  ___no palpable nodes calves and feet    Skin/Wound: ___Incision, _x__Dressing c/d/i,   ____surrounding tissues soft,  ___drain/chest tube present____    Muscular: EHL __5_/5  Gastrocnemius__5_/5    _x__absent clubbing/cyanosis        ASSESSMENT: This is a 69y old Male with a history of syncope, stroke, ESRD, hypothyroidism, dizziness, hypertension, CAD, retinal artery occlusion, admitted with emergent hemodialyses and management of hyperintensive urgency.       Recommended Treatment PLAN:  1. Recommend Tramadol 50mg PO q6 PRN moderate pain  2. Continue Gabapentin 100mg PO q8  3. Recommend Dilaudid 1mg PO Q6h prn severe pain  Plan discussed with Dr. Rea

## 2020-01-16 NOTE — PHYSICAL THERAPY INITIAL EVALUATION ADULT - PERTINENT HX OF CURRENT PROBLEM, REHAB EVAL
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

## 2020-01-16 NOTE — DIETITIAN INITIAL EVALUATION ADULT. - DIET TYPE
While PO diet active suggest Renal diet, Nepro x3 per day as oral nutrition supplements (425kcal/20gm prot per 1 can); Monitor for %PO intake and diet tolerance. Should pt be noted with s/s of issues swallowing vs be too lethargic for meals recommend NPO/FEESST

## 2020-01-16 NOTE — PROVIDER CONTACT NOTE (CRITICAL VALUE NOTIFICATION) - ACTION/TREATMENT ORDERED:
Patient placed back on contact isolation as he has AV fistula that is accessed for dialysis and there is a risk of exposure and contamination

## 2020-01-17 LAB
-  CEFAZOLIN: SIGNIFICANT CHANGE UP
-  CLINDAMYCIN: SIGNIFICANT CHANGE UP
-  DAPTOMYCIN: SIGNIFICANT CHANGE UP
-  ERYTHROMYCIN: SIGNIFICANT CHANGE UP
-  LINEZOLID: SIGNIFICANT CHANGE UP
-  OXACILLIN: SIGNIFICANT CHANGE UP
-  PENICILLIN: SIGNIFICANT CHANGE UP
-  RIFAMPIN: SIGNIFICANT CHANGE UP
-  TETRACYCLINE: SIGNIFICANT CHANGE UP
-  TRIMETHOPRIM/SULFAMETHOXAZOLE: SIGNIFICANT CHANGE UP
-  VANCOMYCIN: SIGNIFICANT CHANGE UP
ANION GAP SERPL CALC-SCNC: 18 MMOL/L — HIGH (ref 5–17)
BASOPHILS # BLD AUTO: 0.04 K/UL — SIGNIFICANT CHANGE UP (ref 0–0.2)
BASOPHILS NFR BLD AUTO: 0.9 % — SIGNIFICANT CHANGE UP (ref 0–2)
BUN SERPL-MCNC: 57 MG/DL — HIGH (ref 7–23)
CALCIUM SERPL-MCNC: 9.5 MG/DL — SIGNIFICANT CHANGE UP (ref 8.4–10.5)
CHLORIDE SERPL-SCNC: 99 MMOL/L — SIGNIFICANT CHANGE UP (ref 96–108)
CO2 SERPL-SCNC: 23 MMOL/L — SIGNIFICANT CHANGE UP (ref 22–31)
CREAT SERPL-MCNC: 5.53 MG/DL — HIGH (ref 0.5–1.3)
EOSINOPHIL # BLD AUTO: 0.07 K/UL — SIGNIFICANT CHANGE UP (ref 0–0.5)
EOSINOPHIL NFR BLD AUTO: 1.7 % — SIGNIFICANT CHANGE UP (ref 0–6)
GLUCOSE SERPL-MCNC: 83 MG/DL — SIGNIFICANT CHANGE UP (ref 70–99)
GRAM STN FLD: SIGNIFICANT CHANGE UP
HCT VFR BLD CALC: 34.9 % — LOW (ref 39–50)
HGB BLD-MCNC: 10.8 G/DL — LOW (ref 13–17)
LYMPHOCYTES # BLD AUTO: 0.7 K/UL — LOW (ref 1–3.3)
LYMPHOCYTES # BLD AUTO: 16.5 % — SIGNIFICANT CHANGE UP (ref 13–44)
MAGNESIUM SERPL-MCNC: 2.4 MG/DL — SIGNIFICANT CHANGE UP (ref 1.6–2.6)
MCHC RBC-ENTMCNC: 28.8 PG — SIGNIFICANT CHANGE UP (ref 27–34)
MCHC RBC-ENTMCNC: 30.9 GM/DL — LOW (ref 32–36)
MCV RBC AUTO: 93.1 FL — SIGNIFICANT CHANGE UP (ref 80–100)
METHOD TYPE: SIGNIFICANT CHANGE UP
METHOD TYPE: SIGNIFICANT CHANGE UP
MONOCYTES # BLD AUTO: 0.48 K/UL — SIGNIFICANT CHANGE UP (ref 0–0.9)
MONOCYTES NFR BLD AUTO: 11.3 % — SIGNIFICANT CHANGE UP (ref 2–14)
NEUTROPHILS # BLD AUTO: 2.9 K/UL — SIGNIFICANT CHANGE UP (ref 1.8–7.4)
NEUTROPHILS NFR BLD AUTO: 68.7 % — SIGNIFICANT CHANGE UP (ref 43–77)
PHOSPHATE SERPL-MCNC: 7 MG/DL — HIGH (ref 2.5–4.5)
PLATELET # BLD AUTO: 127 K/UL — LOW (ref 150–400)
POTASSIUM SERPL-MCNC: 4.6 MMOL/L — SIGNIFICANT CHANGE UP (ref 3.5–5.3)
POTASSIUM SERPL-SCNC: 4.6 MMOL/L — SIGNIFICANT CHANGE UP (ref 3.5–5.3)
RBC # BLD: 3.75 M/UL — LOW (ref 4.2–5.8)
RBC # FLD: 13.9 % — SIGNIFICANT CHANGE UP (ref 10.3–14.5)
SODIUM SERPL-SCNC: 140 MMOL/L — SIGNIFICANT CHANGE UP (ref 135–145)
VANCOMYCIN TROUGH SERPL-MCNC: 7.9 UG/ML — LOW (ref 10–20)
WBC # BLD: 4.22 K/UL — SIGNIFICANT CHANGE UP (ref 3.8–10.5)
WBC # FLD AUTO: 4.22 K/UL — SIGNIFICANT CHANGE UP (ref 3.8–10.5)

## 2020-01-17 PROCEDURE — 76376 3D RENDER W/INTRP POSTPROCES: CPT | Mod: 26

## 2020-01-17 PROCEDURE — 93312 ECHO TRANSESOPHAGEAL: CPT | Mod: 26

## 2020-01-17 PROCEDURE — 99291 CRITICAL CARE FIRST HOUR: CPT

## 2020-01-17 PROCEDURE — 71045 X-RAY EXAM CHEST 1 VIEW: CPT | Mod: 26

## 2020-01-17 PROCEDURE — 99292 CRITICAL CARE ADDL 30 MIN: CPT | Mod: 25

## 2020-01-17 PROCEDURE — 99232 SBSQ HOSP IP/OBS MODERATE 35: CPT | Mod: GC

## 2020-01-17 RX ORDER — TRAMADOL HYDROCHLORIDE 50 MG/1
50 TABLET ORAL EVERY 6 HOURS
Refills: 0 | Status: DISCONTINUED | OUTPATIENT
Start: 2020-01-17 | End: 2020-01-18

## 2020-01-17 RX ORDER — VANCOMYCIN HCL 1 G
1000 VIAL (EA) INTRAVENOUS ONCE
Refills: 0 | Status: COMPLETED | OUTPATIENT
Start: 2020-01-17 | End: 2020-01-17

## 2020-01-17 RX ORDER — TRAMADOL HYDROCHLORIDE 50 MG/1
50 TABLET ORAL EVERY 6 HOURS
Refills: 0 | Status: DISCONTINUED | OUTPATIENT
Start: 2020-01-17 | End: 2020-01-17

## 2020-01-17 RX ORDER — HYDROMORPHONE HYDROCHLORIDE 2 MG/ML
0.5 INJECTION INTRAMUSCULAR; INTRAVENOUS; SUBCUTANEOUS ONCE
Refills: 0 | Status: DISCONTINUED | OUTPATIENT
Start: 2020-01-17 | End: 2020-01-17

## 2020-01-17 RX ORDER — MIDAZOLAM HYDROCHLORIDE 1 MG/ML
5 INJECTION, SOLUTION INTRAMUSCULAR; INTRAVENOUS ONCE
Refills: 0 | Status: DISCONTINUED | OUTPATIENT
Start: 2020-01-17 | End: 2020-01-17

## 2020-01-17 RX ORDER — FENTANYL CITRATE 50 UG/ML
100 INJECTION INTRAVENOUS ONCE
Refills: 0 | Status: DISCONTINUED | OUTPATIENT
Start: 2020-01-17 | End: 2020-01-17

## 2020-01-17 RX ADMIN — CARVEDILOL PHOSPHATE 25 MILLIGRAM(S): 80 CAPSULE, EXTENDED RELEASE ORAL at 21:51

## 2020-01-17 RX ADMIN — ATORVASTATIN CALCIUM 40 MILLIGRAM(S): 80 TABLET, FILM COATED ORAL at 21:51

## 2020-01-17 RX ADMIN — LIDOCAINE 1 PATCH: 4 CREAM TOPICAL at 19:30

## 2020-01-17 RX ADMIN — Medication 0.3 MILLIGRAM(S): at 21:51

## 2020-01-17 RX ADMIN — HYDROMORPHONE HYDROCHLORIDE 0.5 MILLIGRAM(S): 2 INJECTION INTRAMUSCULAR; INTRAVENOUS; SUBCUTANEOUS at 21:40

## 2020-01-17 RX ADMIN — CARVEDILOL PHOSPHATE 25 MILLIGRAM(S): 80 CAPSULE, EXTENDED RELEASE ORAL at 10:45

## 2020-01-17 RX ADMIN — TRAMADOL HYDROCHLORIDE 50 MILLIGRAM(S): 50 TABLET ORAL at 15:22

## 2020-01-17 RX ADMIN — SENNA PLUS 2 TABLET(S): 8.6 TABLET ORAL at 21:53

## 2020-01-17 RX ADMIN — Medication 6: at 12:23

## 2020-01-17 RX ADMIN — TRAMADOL HYDROCHLORIDE 50 MILLIGRAM(S): 50 TABLET ORAL at 16:30

## 2020-01-17 RX ADMIN — LOSARTAN POTASSIUM 100 MILLIGRAM(S): 100 TABLET, FILM COATED ORAL at 20:09

## 2020-01-17 RX ADMIN — LIDOCAINE 1 PATCH: 4 CREAM TOPICAL at 05:30

## 2020-01-17 RX ADMIN — HYDROMORPHONE HYDROCHLORIDE 0.5 MILLIGRAM(S): 2 INJECTION INTRAMUSCULAR; INTRAVENOUS; SUBCUTANEOUS at 21:12

## 2020-01-17 RX ADMIN — HYDROMORPHONE HYDROCHLORIDE 0.5 MILLIGRAM(S): 2 INJECTION INTRAMUSCULAR; INTRAVENOUS; SUBCUTANEOUS at 12:18

## 2020-01-17 RX ADMIN — Medication 81 MILLIGRAM(S): at 19:32

## 2020-01-17 RX ADMIN — Medication 5 MILLIGRAM(S): at 21:51

## 2020-01-17 RX ADMIN — Medication 0.3 MILLIGRAM(S): at 06:17

## 2020-01-17 RX ADMIN — CLOPIDOGREL BISULFATE 75 MILLIGRAM(S): 75 TABLET, FILM COATED ORAL at 12:24

## 2020-01-17 RX ADMIN — TRAMADOL HYDROCHLORIDE 50 MILLIGRAM(S): 50 TABLET ORAL at 05:30

## 2020-01-17 RX ADMIN — ESCITALOPRAM OXALATE 5 MILLIGRAM(S): 10 TABLET, FILM COATED ORAL at 19:31

## 2020-01-17 RX ADMIN — ISOSORBIDE MONONITRATE 60 MILLIGRAM(S): 60 TABLET, EXTENDED RELEASE ORAL at 12:29

## 2020-01-17 RX ADMIN — Medication 100 MILLIGRAM(S): at 06:18

## 2020-01-17 RX ADMIN — HEPARIN SODIUM 5000 UNIT(S): 5000 INJECTION INTRAVENOUS; SUBCUTANEOUS at 06:18

## 2020-01-17 RX ADMIN — HYDROMORPHONE HYDROCHLORIDE 0.5 MILLIGRAM(S): 2 INJECTION INTRAMUSCULAR; INTRAVENOUS; SUBCUTANEOUS at 11:14

## 2020-01-17 RX ADMIN — HEPARIN SODIUM 5000 UNIT(S): 5000 INJECTION INTRAVENOUS; SUBCUTANEOUS at 21:51

## 2020-01-17 RX ADMIN — Medication 50 MICROGRAM(S): at 06:18

## 2020-01-17 RX ADMIN — GABAPENTIN 100 MILLIGRAM(S): 400 CAPSULE ORAL at 19:29

## 2020-01-17 RX ADMIN — TRAMADOL HYDROCHLORIDE 50 MILLIGRAM(S): 50 TABLET ORAL at 06:15

## 2020-01-17 RX ADMIN — Medication 250 MILLIGRAM(S): at 19:29

## 2020-01-17 RX ADMIN — POLYETHYLENE GLYCOL 3350 17 GRAM(S): 17 POWDER, FOR SOLUTION ORAL at 21:53

## 2020-01-17 RX ADMIN — Medication 100 MILLIGRAM(S): at 21:52

## 2020-01-17 RX ADMIN — Medication 60 MILLIGRAM(S): at 10:45

## 2020-01-17 RX ADMIN — Medication 650 MILLIGRAM(S): at 13:39

## 2020-01-17 RX ADMIN — Medication 4: at 21:50

## 2020-01-17 RX ADMIN — Medication 650 MILLIGRAM(S): at 14:30

## 2020-01-17 NOTE — PROGRESS NOTE ADULT - ASSESSMENT
A/p  70 y/o m with PMHx of ESRD on dialysis via AVF TTS, HTN, CAD, BRYNN (s/p stent), IDDM (with peripheral nueropathy), hypothyroidism and CVA cb right eye blindness- patient is s/p carotid artery stent), PAD (s/p bilateral stents) who was BIBEMS to [facility] for syncopal episode and found to have elevated blood pressure. Admitted to CCU for emergent HD and management of hypertensive emergency.  found to have severe AS most likely contributing to Syncopal episode, currently undergoing pre-op TAVR

## 2020-01-17 NOTE — PROGRESS NOTE ADULT - SUBJECTIVE AND OBJECTIVE BOX
Pain Management Progress Note - [facility] [phone]        HPI: Patient with a history of syncope, stroke, ESRD, hypothyroidism, dizziness, hypertension, CAD, retinal artery occlusion, admitted with emergent hemodialyses and management of hyperintensive urgency. Patient continues to reports pain to his R Hip, R thigh and neck, worse with movement,  as well as paresthesias and numbness to his bilateral hands and feet.  Patient reporting mild pain relief with current pain medication regimen.  Axox3, denies n,v, no s/s of oversedation. Reviewed pain medication regimen with CCU team, team reports patients sleeps most of the day, and  would like a conservative pain management approach with patient pain.       Pain is _x__ sharp ____dull ___burning __x_achy ___ Intensity: ___x_ mild __x_mod ___severe     Location ___surgical site _x___cervical _____lumbar ____abd ____upper ext_x___lower ext    Worse with ___x_activity _x___movement _____physical therapy___ Rest    Improved with __x__medication ___x_rest ____physical therapy      hydrALAZINE  cloNIDine  meclizine  morphine  - Injectable  oxycodone    5 mG/acetaminophen 325 mG  chlorhexidine 2% Cloths  aspirin  chewable  losartan  isosorbide   mononitrate ER Tablet (IMDUR)  gabapentin  atorvastatin  clopidogrel Tablet  carvedilol  hydrALAZINE Injectable  sevelamer carbonate  levothyroxine  amLODIPine   Tablet  hydrALAZINE  insulin lispro (HumaLOG) corrective regimen sliding scale  dextrose 5%.  dextrose 40% Gel  dextrose 50% Injectable  dextrose 50% Injectable  niCARdipine Infusion  niCARdipine Infusion  oxycodone    5 mG/acetaminophen 325 mG  acetaminophen   Tablet ..  lidocaine   Patch  cyclobenzaprine  cloNIDine  amLODIPine   Tablet  gabapentin  carvedilol  traMADol  carvedilol  carvedilol  dextrose 50% Injectable  isosorbide   mononitrate ER Tablet (IMDUR)  BACItracin   Ointment  traMADol  melatonin  NIFEdipine XL  acetaminophen   Tablet ..  traMADol  traMADol  NIFEdipine XL  polyethylene glycol 3350  senna  NIFEdipine XL  nafcillin  IVPB  vancomycin  IVPB  escitalopram  hydrALAZINE  cloNIDine  hydrALAZINE  hydrALAZINE  isosorbide   mononitrate ER Tablet (IMDUR)  isosorbide   mononitrate ER Tablet (IMDUR)  cloNIDine  hydrALAZINE  isosorbide   mononitrate ER Tablet (IMDUR)  carvedilol  gabapentin  losartan  lidocaine   Patch  traMADol  HYDROmorphone  Injectable      ROS: Const:  __-_febrile   Eyes:___ENT:___CV: __-_chest pain  Resp: __-__sob  GI:___nausea -___vomiting __-_abd pain ___npo ___clears x__full diet __bm  :___ Musk: _x__pain ___spasm  Skin:___ Neuro:  -___kafzetqh__-_nvnahkzei_-__ numbness _x__weakness __x_paresth  Psych:_-_anxiety  Endo:___ Heme:___Allergy:_________, _x__all others reviewed and negative      PAST MEDICAL & SURGICAL HISTORY:  Peripheral neuropathy  Peripheral artery disease: s/p bilateral stents  Anemia of renal disease  End-stage renal disease (ESRD)  Type II diabetes mellitus  Retinal artery occlusion  Hypothyroidism  Low back pain  CAD (coronary artery disease)  H/O renal artery stenosis: s/p L renal stent  Hyperlipidemia  Hypertension  No significant past surgical history      01-17 @ 05:3710 mL/min/1.73M2<L>      Hemoglobin: 10.8 g/dL (01-17 @ 05:37)  Hemoglobin: 10.3 g/dL (01-16 @ 05:08)        T(C): 35.8 (01-17-20 @ 13:01), Max: 37.2 (01-16-20 @ 22:53)  HR: 64 (01-17-20 @ 13:01) (53 - 64)  BP: 162/71 (01-17-20 @ 13:01) (104/55 - 174/77)  RR: 14 (01-17-20 @ 13:01) (11 - 40)  SpO2: 100% (01-17-20 @ 13:01) (95% - 100%)  Wt(kg): --       PHYSICAL EXAM:  Gen Appearance: __-_no acute distress __-_appropriate        Neuro: _x__SILT feet____ EOM Intact Psych: AAOX_3_, __x_mood/affect appropriate        Eyes: _x__conjunctiva WNL  ___x__ Pupils equal and round        ENT: __x_ears and nose atraumatic__x_ Hearing grossly intact        Neck: _x__trachea midline, no visible masses ___thyroid without palpable mass    Resp: _x__Nml WOB____No tactile fremitus ___clear to auscultation    Cardio: _x__extremities free from edema __x__pedal pulses palpable    GI/Abdomen: __x_soft ___x__ Nontender___x___Nondistended_____HSM    Lymphatic: ___no palpable nodes in neck  ___no palpable nodes calves and feet    Skin/Wound: ___Incision, _x__Dressing c/d/i,   ____surrounding tissues soft,  ___drain/chest tube present____    Muscular: EHL __5_/5  Gastrocnemius__5_/5    _x__absent clubbing/cyanosis        ASSESSMENT: This is a 69y old Male with a history of syncope, stroke, ESRD, hypothyroidism, dizziness, hypertension, CAD, retinal artery occlusion, admitted with emergent hemodialyses and management of hyperintensive urgency.       Recommended Treatment PLAN:  1. Tramadol 50mg PO q6 PRN severe pain  2. Continue Gabapentin 100mg PO q8  3. Recommend Dilaudid 1mg PO Q6h prn severe pain, if tramadol Po does not address severe pain   Plan discussed with Dr. Rea Pain Management Progress Note - [facility] [phone]        HPI: Patient with a history of syncope, stroke, ESRD, hypothyroidism, dizziness, hypertension, CAD, retinal artery occlusion, admitted with emergent hemodialyses and management of hyperintensive urgency. Patient continues to reports pain to his R Hip, R thigh and neck, worse with movement,  as well as paresthesias and numbness to his bilateral hands and feet.  Patient reporting mild pain relief with current pain medication regimen.  Axox3, denies n,v, no s/s of oversedation. Reviewed pain medication regimen with CCU team, team reports patients sleeps most of the day, and  would like a conservative pain management approach with patient pain.       Pain is _x__ sharp ____dull ___burning __x_achy ___ Intensity: ___x_ mild __x_mod ___severe     Location ___surgical site _x___cervical _____lumbar ____abd ____upper ext_x___lower ext    Worse with ___x_activity _x___movement _____physical therapy___ Rest    Improved with __x__medication ___x_rest ____physical therapy      hydrALAZINE  cloNIDine  meclizine  morphine  - Injectable  oxycodone    5 mG/acetaminophen 325 mG  chlorhexidine 2% Cloths  aspirin  chewable  losartan  isosorbide   mononitrate ER Tablet (IMDUR)  gabapentin  atorvastatin  clopidogrel Tablet  carvedilol  hydrALAZINE Injectable  sevelamer carbonate  levothyroxine  amLODIPine   Tablet  hydrALAZINE  insulin lispro (HumaLOG) corrective regimen sliding scale  dextrose 5%.  dextrose 40% Gel  dextrose 50% Injectable  dextrose 50% Injectable  niCARdipine Infusion  niCARdipine Infusion  oxycodone    5 mG/acetaminophen 325 mG  acetaminophen   Tablet ..  lidocaine   Patch  cyclobenzaprine  cloNIDine  amLODIPine   Tablet  gabapentin  carvedilol  traMADol  carvedilol  carvedilol  dextrose 50% Injectable  isosorbide   mononitrate ER Tablet (IMDUR)  BACItracin   Ointment  traMADol  melatonin  NIFEdipine XL  acetaminophen   Tablet ..  traMADol  traMADol  NIFEdipine XL  polyethylene glycol 3350  senna  NIFEdipine XL  nafcillin  IVPB  vancomycin  IVPB  escitalopram  hydrALAZINE  cloNIDine  hydrALAZINE  hydrALAZINE  isosorbide   mononitrate ER Tablet (IMDUR)  isosorbide   mononitrate ER Tablet (IMDUR)  cloNIDine  hydrALAZINE  isosorbide   mononitrate ER Tablet (IMDUR)  carvedilol  gabapentin  losartan  lidocaine   Patch  traMADol  HYDROmorphone  Injectable      ROS: Const:  __-_febrile   Eyes:___ENT:___CV: __-_chest pain  Resp: __-__sob  GI:___nausea -___vomiting __-_abd pain ___npo ___clears x__full diet __bm  :___ Musk: _x__pain ___spasm  Skin:___ Neuro:  -___yjquhmsg__-_nawcoqxzk_-__ numbness _x__weakness __x_paresth  Psych:_-_anxiety  Endo:___ Heme:___Allergy:_________, _x__all others reviewed and negative      PAST MEDICAL & SURGICAL HISTORY:  Peripheral neuropathy  Peripheral artery disease: s/p bilateral stents  Anemia of renal disease  End-stage renal disease (ESRD)  Type II diabetes mellitus  Retinal artery occlusion  Hypothyroidism  Low back pain  CAD (coronary artery disease)  H/O renal artery stenosis: s/p L renal stent  Hyperlipidemia  Hypertension  No significant past surgical history      01-17 @ 05:3710 mL/min/1.73M2<L>      Hemoglobin: 10.8 g/dL (01-17 @ 05:37)  Hemoglobin: 10.3 g/dL (01-16 @ 05:08)        T(C): 35.8 (01-17-20 @ 13:01), Max: 37.2 (01-16-20 @ 22:53)  HR: 64 (01-17-20 @ 13:01) (53 - 64)  BP: 162/71 (01-17-20 @ 13:01) (104/55 - 174/77)  RR: 14 (01-17-20 @ 13:01) (11 - 40)  SpO2: 100% (01-17-20 @ 13:01) (95% - 100%)  Wt(kg): --       PHYSICAL EXAM:  Gen Appearance: __-_no acute distress __-_appropriate        Neuro: _x__SILT feet____ EOM Intact Psych: AAOX_3_, __x_mood/affect appropriate        Eyes: _x__conjunctiva WNL  ___x__ Pupils equal and round        ENT: __x_ears and nose atraumatic__x_ Hearing grossly intact        Neck: _x__trachea midline, no visible masses ___thyroid without palpable mass    Resp: _x__Nml WOB____No tactile fremitus ___clear to auscultation    Cardio: _x__extremities free from edema __x__pedal pulses palpable    GI/Abdomen: __x_soft ___x__ Nontender___x___Nondistended_____HSM    Lymphatic: ___no palpable nodes in neck  ___no palpable nodes calves and feet    Skin/Wound: ___Incision, _x__Dressing c/d/i,   ____surrounding tissues soft,  ___drain/chest tube present____    Muscular: EHL __5_/5  Gastrocnemius__5_/5    _x__absent clubbing/cyanosis        ASSESSMENT: This is a 69y old Male with a history of syncope, stroke, ESRD, hypothyroidism, dizziness, hypertension, CAD, retinal artery occlusion, admitted with emergent hemodialyses and management of hyperintensive urgency.       Recommended Treatment PLAN:  1. Tramadol 50mg PO q6 PRN severe pain  2. Continue Gabapentin 100mg PO q8  3. Recommend Dilaudid 1mg PO Q6h prn severe pain, if tramadol Po does not address severe pain   Plan discussed with Dr. Rea    Addendum:  Patient seen on rounds with Dr. Rea. Reviewed plan pain management plan of care with CCU team. Agreed patient may benefit from Diazepam PO for anxiety.   1. Diazepam 2mg PO Daily  2. Continue Tramadol 50mg Po Q6h prn severe pain  3. Continue Gabapentin PO  4. Recommend Dilaudid 1mg PO Q6h prn severe pain, if tramadol Po does not address severe pain   5. Dilaudid 0.5mg Q4h IVP prn breakthrough pain  Plan discussed with Dr. Rea

## 2020-01-17 NOTE — PROGRESS NOTE ADULT - ASSESSMENT
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 Admitted to CCU for emergent HD and managment of hypertensive emergency, now off nicardipine drip, with adjustments made to hi home medications, pending CT per structural heart recommendations now found to have MRSA bacteremia with no identifiable source.    Cardio  # Hypertensive emergency  - BP on arrival 209/78. Patient admitted in august with similar picture of hypertensive crises, back pain, requiring HD  - Received in ED: Clonidine 0.3mg x1, hydralazine 100mg x1, meclizine 25mg x1, percocet 5mg x1  - 15 minutes into dialysis in CCU, approx 500cc off, when he became unresponsive. Dialysis was held. Followed by episode of ladarius down to HR of 30's and repeat BP systolic 90's. Rapid response was called. Heart rate spontaneously started to increase without medications or management. Patient became more arousable and was able to answer some questions, but was still lethargic  - A-line placed for better blood pressure management on 1/11.  - c/w hydralazine 100mg q8h  - c/w losartan 100mg q24h  - carvedilol 25mg q12h  - clonidine 0.3mg q8h started  - amlodipine switched to nifedipine xl 60mg, nifedipine administration moved from 4am to 9am  - imdur increased to 90mg q24h  - BP hold parameters for medications: hold for SBP <100    # Hx of CAD, PAD (with stents), BRYNN (with stents), carotid artery stenosis (with stents)  - c/w ASA, plavix, atorvastatin  - pain management consulted for pt's chronic leg pain  - pain management recommendations: Tramadol 25mg q6h for moderate pain, 50mg q6h for severe pain    # Aortic Stenosis  - Grade 3 systolic murmur  - ECHO 8/19: Moderate symmetric left ventricular hypertrophy. Normal right ventricular size and systolic function. Moderate aortic stenosis.  - repeat ECHO 1/13 showed severe aortic stenosis, EF 60-65%  - undergoing evaluation by structural heart to determine intervention options  - f/u CT results    ID  # MRSA bacteremia with no obvious source  - Received 1g Vancomycin  - Check Vanc Trough after next dialysis session, redose as appropriate  - Send surveillance bcx  - Contact precautions as pt is on HD  - RUE U/S to r/o abscess    Renal  # ESRD on HD TTS via LUE AVF, in hospital schedule has been MWF so far, per nephro  - last HD 1/15  - Renvela 800mg TID  - daily weights  - renal diet  - strict I/O    Neuro  # Questionable History of CVA  - CT Brain showed microangiopathic ischemic disease. There are again lacunar infarcts in the basal ganglia bilaterally. There is no intracranial hemorrhage or acute transcortical infarct. External carotid artery branch calcifications, as seen in patients on dialysis.   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  - neuro checks  - Avoid sedating medications     Pulm  # Elevated pulmonary artery pressure   ECHO 8/19: Severe pulmonary hypertension, PASP is 72.1 mmHg.    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 for syncopal episode and found to have elevated blood pressure. Admitted to CCU for emergent HD and managment of hypertensive emergency, now off nicardipine drip, with adjustments made to his home medications, pending CT per structural heart recommendations now found to have MRSA bacteremia with no identifiable source.    Cardio  # Hypertensive emergency  - BP on arrival 209/78. Patient admitted in august with similar picture of hypertensive crises, back pain, requiring HD  - Received in ED: Clonidine 0.3mg x1, hydralazine 100mg x1, meclizine 25mg x1, percocet 5mg x1  - 15 minutes into dialysis in CCU, approx 500cc off, when he became unresponsive. Dialysis was held. Followed by episode of ladarius down to HR of 30's and repeat BP systolic 90's. Rapid response was called. Heart rate spontaneously started to increase without medications or management. Patient became more arousable and was able to answer some questions, but was still lethargic  - A-line placed for better blood pressure management on 1/11.  - c/w hydralazine 100mg q8h  - c/w losartan 100mg q24h  - carvedilol 25mg q12h  - clonidine 0.3mg q8h started  - amlodipine switched to nifedipine xl 60mg, nifedipine administration moved from 4am to 9am  - imdur increased to 90mg q24h  - BP hold parameters for medications: hold for SBP <100    # Hx of CAD, PAD (with stents), BRYNN (with stents), carotid artery stenosis (with stents)  - c/w ASA, plavix, atorvastatin  - pain management consulted for pt's chronic leg pain  - pain management recommendations: Tramadol 25mg q6h for moderate pain, 50mg q6h for severe pain    # Aortic Stenosis  - Grade 3 systolic murmur  - ECHO 8/19: Moderate symmetric left ventricular hypertrophy. Normal right ventricular size and systolic function. Moderate aortic stenosis.  - repeat ECHO 1/13 showed severe aortic stenosis, EF 60-65%  - undergoing evaluation by structural heart to determine intervention options  - f/u CT results    ID  # MRSA bacteremia with no obvious source  - Received 1g Vancomycin  - Check Vanc Trough after next dialysis session, redose as appropriate  - Send surveillance bcx  - Contact precautions as pt is on HD  - RUE US showing superficial clot in previous IV site    Renal  # ESRD on HD TTS via LUE AVF, in hospital schedule has been MWF so far, per nephro  - last HD 1/15, next HD planned for today 1/17  - Renvela 800mg TID  - daily weights  - renal diet  - strict I/O    Neuro  # Questionable History of CVA  - CT Brain showed microangiopathic ischemic disease. There are again lacunar infarcts in the basal ganglia bilaterally. There is no intracranial hemorrhage or acute transcortical infarct. External carotid artery branch calcifications, as seen in patients on dialysis.   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  - neuro checks  - Avoid sedating medications     Pulm  # Elevated pulmonary artery pressure   ECHO 8/19: Severe pulmonary hypertension, PASP is 72.1 mmHg.    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 Admitted to CCU for emergent HD and managment of hypertensive emergency, now off nicardipine drip, with adjustments made to his home medications, pending CT per structural heart recommendations now found to have MRSA bacteremia with no identifiable source.    Cardio  # Hypertensive emergency (BP now stable 140-150s systolic)  - BP on arrival 209/78. Patient admitted in august with similar picture of hypertensive crises, back pain, requiring HD  - Received in ED: Clonidine 0.3mg x1, hydralazine 100mg x1, meclizine 25mg x1, percocet 5mg x1  - 15 minutes into dialysis in CCU, approx 500cc off, when he became unresponsive. Dialysis was held. Followed by episode of ladarius down to HR of 30's and repeat BP systolic 90's. Rapid response was called. Heart rate spontaneously started to increase without medications or management. Patient became more arousable and was able to answer some questions, but was still lethargic  - A-line placed for better blood pressure management on 1/11, removed on 1/16  - c/w hydralazine 100mg q8h  - c/w losartan 100mg q24h  - carvedilol 25mg q12h  - clonidine 0.3mg q8h started  - amlodipine switched to nifedipine xl 60mg, nifedipine administration moved from 4am to 9am  - imdur increased to 90mg q24h  - BP hold parameters for medications: hold for SBP <100    # Hx of CAD, PAD (with stents), BRYNN (with stents), carotid artery stenosis (with stents)  - c/w ASA, plavix, atorvastatin  - pain management consulted for pt's chronic leg pain  - pain management recommendations: Tramadol 25mg q6h for moderate pain, 50mg q6h for severe pain    # Aortic Stenosis  - Grade 3 systolic murmur  - ECHO 8/19: Moderate symmetric left ventricular hypertrophy. Normal right ventricular size and systolic function. Moderate aortic stenosis.  - repeat ECHO 1/13 showed severe aortic stenosis, EF 60-65%  - undergoing evaluation by structural heart to determine intervention options  - f/u CT results    ID  # MRSA bacteremia with no obvious source  - Received 1g Vancomycin (1/15)  - Check Vanc Trough after next dialysis session on 1/17, redose as appropriate  - Send surveillance bcx  - Contact precautions as pt is on HD  - RUE US showing superficial clot in previous IV site    Renal  # ESRD on HD TTS via LUE AVF, in hospital schedule has been MWF so far, per nephro  - last HD 1/15, next HD planned for today 1/17  - Renvela 800mg TID  - daily weights  - renal diet  - strict I/O    Neuro  # Questionable History of CVA  - CT Brain showed microangiopathic ischemic disease. There are again lacunar infarcts in the basal ganglia bilaterally. There is no intracranial hemorrhage or acute transcortical infarct. External carotid artery branch calcifications, as seen in patients on dialysis.   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  - neuro checks  - Avoid sedating medications     Pulm  # Elevated pulmonary artery pressure   ECHO 8/19: Severe pulmonary hypertension, PASP is 72.1 mmHg.    Endo  # Hypothyroidism  - c/w home synthroid 50mcg daily    # IDDM  - A1c 8/19: 7.2%  - c/w moderate ISS    Prophylaxis  F: None  E: Replete per nephro recs  N: DASH/TLC  GI: None  DVT; SQH 5000 U q8h    CODE: FULL

## 2020-01-17 NOTE — PROGRESS NOTE ADULT - SUBJECTIVE AND OBJECTIVE BOX
O/N Events: KAT  Subjective:  surveillance clx NGTD, leukopenia improving, afeb, NPO for EBONI, FB 1.7 L   CXR clear, satting 98% on RA    VITALS  Vital Signs Last 24 Hrs  T(C): 35.9 (17 Jan 2020 15:45), Max: 37.2 (16 Jan 2020 22:53)  T(F): 96.7 (17 Jan 2020 15:45), Max: 98.9 (16 Jan 2020 22:53)  HR: 60 (17 Jan 2020 15:45) (53 - 64)  BP: 128/60 (17 Jan 2020 15:45) (104/55 - 174/77)  BP(mean): 102 (17 Jan 2020 13:00) (75 - 111)  RR: 20 (17 Jan 2020 15:45) (11 - 40)  SpO2: 99% (17 Jan 2020 15:45) (95% - 100%)    PHYSICAL EXAM  Gen: NAD, ill appearing   Neck: no JVD  Respiratory: CTA B/L  Cardiac: +S1/S2; RRR; systolic murmur with radiation to carotids  Gastrointestinal: soft, NT/ND   Extremities: WWP, no peripheral edema  Neurologic: AAOx3; non focal  access:  Left arm AVF with bruit    MEDICATIONS  (STANDING):  aspirin  chewable 81 milliGRAM(s) Oral every 24 hours  atorvastatin 40 milliGRAM(s) Oral at bedtime  carvedilol 25 milliGRAM(s) Oral every 12 hours  chlorhexidine 2% Cloths 1 Application(s) Topical <User Schedule>  cloNIDine 0.3 milliGRAM(s) Oral every 8 hours  clopidogrel Tablet 75 milliGRAM(s) Oral daily  dextrose 5%. 1000 milliLiter(s) (50 mL/Hr) IV Continuous <Continuous>  dextrose 50% Injectable 12.5 Gram(s) IV Push once  dextrose 50% Injectable 25 Gram(s) IV Push once  dextrose 50% Injectable 25 Gram(s) IV Push once  escitalopram 5 milliGRAM(s) Oral every 24 hours  fentaNYL    Injectable 100 MICROGram(s) IV Push once  gabapentin 100 milliGRAM(s) Oral <User Schedule>  heparin  Injectable 5000 Unit(s) SubCutaneous every 8 hours  hydrALAZINE 100 milliGRAM(s) Oral every 8 hours  insulin lispro (HumaLOG) corrective regimen sliding scale   SubCutaneous Before meals and at bedtime  isosorbide   mononitrate ER Tablet (IMDUR) 60 milliGRAM(s) Oral every 24 hours  levothyroxine 50 MICROGram(s) Oral daily  lidocaine   Patch 1 Patch Transdermal every 24 hours  losartan 100 milliGRAM(s) Oral every 24 hours  melatonin 5 milliGRAM(s) Oral at bedtime  midazolam Injectable 5 milliGRAM(s) IV Push once  NIFEdipine XL 60 milliGRAM(s) Oral every 24 hours  polyethylene glycol 3350 17 Gram(s) Oral at bedtime  senna 2 Tablet(s) Oral at bedtime  sevelamer carbonate 800 milliGRAM(s) Oral three times a day with meals    MEDICATIONS  (PRN):  acetaminophen   Tablet .. 650 milliGRAM(s) Oral every 6 hours PRN Mild Pain (1 - 3)  dextrose 40% Gel 15 Gram(s) Oral once PRN Blood Glucose LESS THAN 70 milliGRAM(s)/deciliter  glucagon  Injectable 1 milliGRAM(s) IntraMuscular once PRN Glucose LESS THAN 70 milligrams/deciliter  traMADol 50 milliGRAM(s) Oral every 6 hours PRN Moderate Pain (4 - 6)      LABS                        10.8   4.22  )-----------( 127      ( 17 Jan 2020 05:37 )             34.9     01-17    140  |  99  |  57<H>  ----------------------------<  83  4.6   |  23  |  5.53<H>    Ca    9.5      17 Jan 2020 05:37  Phos  7.0     01-17  Mg     2.4     01-17

## 2020-01-17 NOTE — PROGRESS NOTE ADULT - PROBLEM SELECTOR PLAN 1
# ESRD on HD TTS via LUE AVF  last HD 1/15 with 2 L UF  - HD today per reg schedule with 2 L UF  - Bp at goal on current antihypertensives and UFw/HD   - continue renvela 800 mg po tid w/meals and obtain phos level with bmp   - vancomycin to be administered post-HD on dialysis days     Will follow

## 2020-01-17 NOTE — PROGRESS NOTE ADULT - SUBJECTIVE AND OBJECTIVE BOX
--in progress    INTERVAL HPI/OVERNIGHT EVENTS:    SUBJECTIVE: Patient seen and examined at bedside.    VITAL SIGNS:  ICU Vital Signs Last 24 Hrs  T(C): 36.8 (17 Jan 2020 06:02), Max: 37.2 (16 Jan 2020 22:53)  T(F): 98.2 (17 Jan 2020 06:02), Max: 98.9 (16 Jan 2020 22:53)  HR: 58 (17 Jan 2020 07:00) (53 - 60)  BP: 147/66 (17 Jan 2020 07:00) (104/55 - 155/67)  BP(mean): 95 (17 Jan 2020 07:00) (75 - 103)  ABP: 115/38 (16 Jan 2020 16:00) (115/38 - 155/54)  ABP(mean): 62 (16 Jan 2020 16:00) (62 - 86)  RR: 40 (17 Jan 2020 07:00) (10 - 40)  SpO2: 100% (17 Jan 2020 07:00) (95% - 100%)        CAPILLARY BLOOD GLUCOSE      POCT Blood Glucose.: 100 mg/dL (17 Jan 2020 06:46)      PHYSICAL EXAM:    Eyes: Right pupil non-reactive to light, left pupil sluggishly reactive to light, anicteric sclera, MMM  Neck: supple; no JVD or thyromegaly  Respiratory: CTA B/L; no W/R/R, no retractions  Cardiac: +S1/S2; RRR; grade 3 early-midsystolic crescendo-decrescendo murmur heard loudest in the R upper sternal border with radiation to carotids c/w aortic stenosis  Gastrointestinal: soft, NT/ND; no rebound or guarding; +BSx4  Extremities: WWP, no clubbing or cyanosis; no peripheral edema, 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 Small ulcer on left thumb, scattered healing scabs on left hand.  Lymphatic: no submandibular or cervical LAD  Neurologic: AAOx3; visual field defect in right eye, slight facial droop in right face, unknown baseline. Motor 5/5 and sensation intact.    MEDICATIONS:  MEDICATIONS  (STANDING):  aspirin  chewable 81 milliGRAM(s) Oral every 24 hours  atorvastatin 40 milliGRAM(s) Oral at bedtime  carvedilol 25 milliGRAM(s) Oral every 12 hours  chlorhexidine 2% Cloths 1 Application(s) Topical <User Schedule>  cloNIDine 0.3 milliGRAM(s) Oral every 8 hours  clopidogrel Tablet 75 milliGRAM(s) Oral daily  dextrose 5%. 1000 milliLiter(s) (50 mL/Hr) IV Continuous <Continuous>  dextrose 50% Injectable 12.5 Gram(s) IV Push once  dextrose 50% Injectable 25 Gram(s) IV Push once  dextrose 50% Injectable 25 Gram(s) IV Push once  escitalopram 5 milliGRAM(s) Oral every 24 hours  gabapentin 100 milliGRAM(s) Oral <User Schedule>  heparin  Injectable 5000 Unit(s) SubCutaneous every 8 hours  hydrALAZINE 100 milliGRAM(s) Oral every 8 hours  insulin lispro (HumaLOG) corrective regimen sliding scale   SubCutaneous Before meals and at bedtime  isosorbide   mononitrate ER Tablet (IMDUR) 60 milliGRAM(s) Oral every 24 hours  levothyroxine 50 MICROGram(s) Oral daily  lidocaine   Patch 1 Patch Transdermal every 24 hours  losartan 100 milliGRAM(s) Oral every 24 hours  melatonin 5 milliGRAM(s) Oral at bedtime  NIFEdipine XL 60 milliGRAM(s) Oral every 24 hours  polyethylene glycol 3350 17 Gram(s) Oral at bedtime  senna 2 Tablet(s) Oral at bedtime  sevelamer carbonate 800 milliGRAM(s) Oral three times a day with meals    MEDICATIONS  (PRN):  acetaminophen   Tablet .. 650 milliGRAM(s) Oral every 6 hours PRN Mild Pain (1 - 3)  dextrose 40% Gel 15 Gram(s) Oral once PRN Blood Glucose LESS THAN 70 milliGRAM(s)/deciliter  glucagon  Injectable 1 milliGRAM(s) IntraMuscular once PRN Glucose LESS THAN 70 milligrams/deciliter  traMADol 50 milliGRAM(s) Oral every 6 hours PRN Moderate Pain (4 - 6)      ALLERGIES:  Allergies    No Known Allergies    Intolerances        LABS:                        10.8   4.22  )-----------( 127      ( 17 Jan 2020 05:37 )             34.9     01-17    140  |  99  |  57<H>  ----------------------------<  83  4.6   |  23  |  5.53<H>    Ca    9.5      17 Jan 2020 05:37  Phos  7.0     01-17  Mg     2.4     01-17            RADIOLOGY & ADDITIONAL TESTS: Reviewed.    ASSESSMENT:    PLAN:    PULMONARY    NEURO    CARDIOVASCULAR    RENAL    ID    GI/FEN    DVT PPx -     LINES -     CODE -     DISPO - continue intensive level of care in CCM/MICU service INTERVAL HPI/OVERNIGHT EVENTS: Did not require additional pain medication overnight and just slept through. Pain management adjusted their medications to Tramadol 50mg q6h for moderate pain and Dilaudid 1mg q6h for severe pain. He did not require any dilaudid.    SUBJECTIVE: Patient seen and examined at bedside.    VITAL SIGNS:  ICU Vital Signs Last 24 Hrs  T(C): 36.8 (17 Jan 2020 06:02), Max: 37.2 (16 Jan 2020 22:53)  T(F): 98.2 (17 Jan 2020 06:02), Max: 98.9 (16 Jan 2020 22:53)  HR: 58 (17 Jan 2020 07:00) (53 - 60)  BP: 147/66 (17 Jan 2020 07:00) (104/55 - 155/67)  BP(mean): 95 (17 Jan 2020 07:00) (75 - 103)  ABP: 115/38 (16 Jan 2020 16:00) (115/38 - 155/54)  ABP(mean): 62 (16 Jan 2020 16:00) (62 - 86)  RR: 40 (17 Jan 2020 07:00) (10 - 40)  SpO2: 100% (17 Jan 2020 07:00) (95% - 100%)        CAPILLARY BLOOD GLUCOSE      POCT Blood Glucose.: 100 mg/dL (17 Jan 2020 06:46)      PHYSICAL EXAM:    Eyes: Right pupil non-reactive to light, left pupil sluggishly reactive to light, anicteric sclera, MMM  Neck: supple; no JVD or thyromegaly  Respiratory: CTA B/L; no W/R/R, no retractions  Cardiac: +S1/S2; RRR; grade 3 early-midsystolic crescendo-decrescendo murmur heard loudest in the R upper sternal border with radiation to carotids c/w aortic stenosis  Gastrointestinal: soft, NT/ND; no rebound or guarding; +BSx4  Extremities: WWP, no clubbing or cyanosis; no peripheral edema, 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 Small ulcer on left thumb, scattered healing scabs on left hand.  Lymphatic: no submandibular or cervical LAD  Neurologic: AAOx3; visual field defect in right eye, slight facial droop in right face, unknown baseline. Motor 5/5 and sensation intact.    MEDICATIONS:  MEDICATIONS  (STANDING):  aspirin  chewable 81 milliGRAM(s) Oral every 24 hours  atorvastatin 40 milliGRAM(s) Oral at bedtime  carvedilol 25 milliGRAM(s) Oral every 12 hours  chlorhexidine 2% Cloths 1 Application(s) Topical <User Schedule>  cloNIDine 0.3 milliGRAM(s) Oral every 8 hours  clopidogrel Tablet 75 milliGRAM(s) Oral daily  dextrose 5%. 1000 milliLiter(s) (50 mL/Hr) IV Continuous <Continuous>  dextrose 50% Injectable 12.5 Gram(s) IV Push once  dextrose 50% Injectable 25 Gram(s) IV Push once  dextrose 50% Injectable 25 Gram(s) IV Push once  escitalopram 5 milliGRAM(s) Oral every 24 hours  gabapentin 100 milliGRAM(s) Oral <User Schedule>  heparin  Injectable 5000 Unit(s) SubCutaneous every 8 hours  hydrALAZINE 100 milliGRAM(s) Oral every 8 hours  insulin lispro (HumaLOG) corrective regimen sliding scale   SubCutaneous Before meals and at bedtime  isosorbide   mononitrate ER Tablet (IMDUR) 60 milliGRAM(s) Oral every 24 hours  levothyroxine 50 MICROGram(s) Oral daily  lidocaine   Patch 1 Patch Transdermal every 24 hours  losartan 100 milliGRAM(s) Oral every 24 hours  melatonin 5 milliGRAM(s) Oral at bedtime  NIFEdipine XL 60 milliGRAM(s) Oral every 24 hours  polyethylene glycol 3350 17 Gram(s) Oral at bedtime  senna 2 Tablet(s) Oral at bedtime  sevelamer carbonate 800 milliGRAM(s) Oral three times a day with meals    MEDICATIONS  (PRN):  acetaminophen   Tablet .. 650 milliGRAM(s) Oral every 6 hours PRN Mild Pain (1 - 3)  dextrose 40% Gel 15 Gram(s) Oral once PRN Blood Glucose LESS THAN 70 milliGRAM(s)/deciliter  glucagon  Injectable 1 milliGRAM(s) IntraMuscular once PRN Glucose LESS THAN 70 milligrams/deciliter  traMADol 50 milliGRAM(s) Oral every 6 hours PRN Moderate Pain (4 - 6)      ALLERGIES:  Allergies    No Known Allergies    Intolerances        LABS:                        10.8   4.22  )-----------( 127      ( 17 Jan 2020 05:37 )             34.9     01-17    140  |  99  |  57<H>  ----------------------------<  83  4.6   |  23  |  5.53<H>    Ca    9.5      17 Jan 2020 05:37  Phos  7.0     01-17  Mg     2.4     01-17            RADIOLOGY & ADDITIONAL TESTS: Reviewed.

## 2020-01-17 NOTE — PROCEDURAL SAFETY CHECKLIST WITH OR WITHOUT SEDATION - NSGUIDEWIRESREMOVEDSD_GEN_ALL_CORE
Occupational Therapy   Evaluation    Name: Tammie Saunders  MRN: [id number]  Admitting Diagnosis:  <principal problem not specified>      Recommendations:     Discharge Recommendations: other (see comments)(OP pulmonary/cardiac rehab; if unable then HH OT/PT)  Discharge Equipment Recommendations:  none  Barriers to discharge:  None    History:     Occupational Profile:  Living Environment: Lives w/family SSH, 2 BLAKE , no HR  Previous level of function: generally able to care for self, assist w/LBD as needed  Roles and Routines:   Equipment Used at Home:  bedside commode, walker, rolling, oxygen, shower chair  Assistance upon Discharge: family    History reviewed. No pertinent past medical history.    History reviewed. No pertinent surgical history.    Subjective     Chief Complaint: weakness, decreased endurance  Patient/Family Comments/goals: return to indep living    Pain/Comfort:  · Pain Rating 1: 0/10  · Pain Rating Post-Intervention 1: 0/10    Patients cultural, spiritual, Mu-ism conflicts given the current situation:      Objective:     Communicated with: nurse prior to session.  Patient found all lines intact, call button in reach and daughter present and BIPAP, tuttle catheter upon OT entry to room.    General Precautions: Standard, fall, respiratory   Orthopedic Precautions:    Braces:       Occupational Performance:    Bed Mobility:    · Patient completed Rolling/Turning to Left with  stand by assistance  · Patient completed Scooting/Bridging with stand by assistance  · Patient completed Supine to Sit with stand by assistance  · Patient completed Sit to Supine with stand by assistance    Functional Mobility/Transfers:  · Patient completed Sit <> Stand Transfer with stand by assistance  with  no assistive device   · Functional Mobility: side stepped to HOB SBA    Activities of Daily Living:  · Lower Body Dressing: total assistance socks    Cognitive/Visual Perceptual:  AO4    Physical Exam:  BUE AROM/strength  "WFL  Good sit balance, fair+ stand balance    AMPA 6 Click ADL:  AMPA Total Score: 18    Treatment & Education:Education:    Pt/fam educated on role of OT/POC, post acute therapy recs    Patient left HOB elevated with all lines intact, call button in reach, nurse notified and family present    Assessment:     Tammie Saunders is a 67 y.o. female with a medical diagnosis of <principal problem not specified>.  She presents with the following performance deficits affecting function: weakness, impaired functional mobilty, impaired cardiopulmonary response to activity, impaired endurance, gait instability, impaired self care skills, impaired balance.      Rehab Prognosis: Good; patient would benefit from acute skilled OT services to address these deficits and reach maximum level of function.         Clinical Decision Makin.  OT Low:  "Pt evaluation falls under low complexity for evaluation coding due to performance deficits noted in 1-3 areas as stated above and 0 co-morbities affecting current functional status. Data obtained from problem focused assessments. No modifications or assistance was required for completion of evaluation. Only brief occupational profile and history review completed."     Plan:     Patient to be seen 5 x/week to address the above listed problems via self-care/home management, therapeutic activities, therapeutic exercises  · Plan of Care Expires: 18  · Plan of Care Reviewed with: patient, daughter    This Plan of care has been discussed with the patient who was involved in its development and understands and is in agreement with the identified goals and treatment plan    GOALS:   Multidisciplinary Problems     Occupational Therapy Goals        Problem: Occupational Therapy Goal    Goal Priority Disciplines Outcome Interventions   Occupational Therapy Goal     OT, PT/OT Ongoing (interventions implemented as appropriate)    Description:  Goals to be met by:      Patient will " increase functional independence with ADLs by performing:    UE Dressing with Modified [place].  LE Dressing with Set-up Assistance.  Grooming while standing with Modified [place].  Toileting from toilet with Modified [place] for hygiene and clothing management.   Toilet transfer to toilet with Modified [place].  Upper extremity exercise program x10 reps per handout, with independence.                      Time Tracking:     OT Date of Treatment: 10/27/18  OT Start Time: 1101  OT Stop Time: 1120  OT Total Time (min): 19 min w/PT    Billable Minutes:Evaluation 19    Jefferson Caro OT  10/27/2018     not applicable

## 2020-01-17 NOTE — PROGRESS NOTE ADULT - ATTENDING COMMENTS
On Date: 1/17/20  I have personally provided 92 minutes of critical care time.    This time excludes time spent on teaching and/or separate procedures.  I have reviewed the resident's documentation and I agree with the assessment and plan of care.    The Patient has a Critical Illness needing Critical Care for the following Reasons:  Severe malignant htn emergency  Unstable hemodynamics    House Staff/Fellow note read, including vitals, physical findings, laboratory data, and radiological reports.   Revisions included below.   Direct personal management at bed side and extensive interpretation of the data.    Plan was outlined and discussed in details with the House Staff/Fellow.    Decision making of high complexity   Risk high of complications, morbidity, and/or mortality    Assessment and Action taken for acute disease activity to reflect the level of care provided:  -Hemodynamic evaluation and support  -ACS assessment and treatment as applicable      -Heart failure assessment and treatment as applicable  -Cardiac Telemetry reviewed  -Medication reconciliation  -Review laboratory data  -EKG reviewed   -Echo reviewed  -Interdisciplinary discussion with IC / EP / HF / CTS teams as needed    My plan includes :  close hemodynamic monitoring and management   ICU Vital Signs Last 24 Hrs  T(C): 35.8 (17 Jan 2020 13:01), Max: 37.2 (16 Jan 2020 22:53)  T(F): 96.5 (17 Jan 2020 13:01), Max: 98.9 (16 Jan 2020 22:53)  HR: 64 (17 Jan 2020 13:01) (53 - 64)  BP: 162/71 (17 Jan 2020 13:01) (104/55 - 174/77)  BP(mean): 102 (17 Jan 2020 13:00) (75 - 111)  ABP: 115/38 (16 Jan 2020 16:00) (115/38 - 120/38)  ABP(mean): 62 (16 Jan 2020 16:00) (62 - 63)  RR: 14 (17 Jan 2020 13:01) (11 - 40)  SpO2: 100% (17 Jan 2020 13:01) (95% - 100%)    Monitor for arrhythmias and monitor parameters for organ perfusion  monitor adequacy of oxygenation and ventilation and attempt to wean oxygen

## 2020-01-18 DIAGNOSIS — R78.81 BACTEREMIA: ICD-10-CM

## 2020-01-18 LAB
ANION GAP SERPL CALC-SCNC: 16 MMOL/L — SIGNIFICANT CHANGE UP (ref 5–17)
BASOPHILS # BLD AUTO: 0.01 K/UL — SIGNIFICANT CHANGE UP (ref 0–0.2)
BASOPHILS NFR BLD AUTO: 0.2 % — SIGNIFICANT CHANGE UP (ref 0–2)
BUN SERPL-MCNC: 44 MG/DL — HIGH (ref 7–23)
CALCIUM SERPL-MCNC: 9.4 MG/DL — SIGNIFICANT CHANGE UP (ref 8.4–10.5)
CHLORIDE SERPL-SCNC: 99 MMOL/L — SIGNIFICANT CHANGE UP (ref 96–108)
CO2 SERPL-SCNC: 25 MMOL/L — SIGNIFICANT CHANGE UP (ref 22–31)
CREAT SERPL-MCNC: 4.47 MG/DL — HIGH (ref 0.5–1.3)
EOSINOPHIL # BLD AUTO: 0.09 K/UL — SIGNIFICANT CHANGE UP (ref 0–0.5)
EOSINOPHIL NFR BLD AUTO: 2 % — SIGNIFICANT CHANGE UP (ref 0–6)
GLUCOSE SERPL-MCNC: 111 MG/DL — HIGH (ref 70–99)
HCT VFR BLD CALC: 36.4 % — LOW (ref 39–50)
HGB BLD-MCNC: 11.5 G/DL — LOW (ref 13–17)
IMM GRANULOCYTES NFR BLD AUTO: 0.2 % — SIGNIFICANT CHANGE UP (ref 0–1.5)
LYMPHOCYTES # BLD AUTO: 1.1 K/UL — SIGNIFICANT CHANGE UP (ref 1–3.3)
LYMPHOCYTES # BLD AUTO: 24.7 % — SIGNIFICANT CHANGE UP (ref 13–44)
MAGNESIUM SERPL-MCNC: 2.3 MG/DL — SIGNIFICANT CHANGE UP (ref 1.6–2.6)
MCHC RBC-ENTMCNC: 29 PG — SIGNIFICANT CHANGE UP (ref 27–34)
MCHC RBC-ENTMCNC: 31.6 GM/DL — LOW (ref 32–36)
MCV RBC AUTO: 91.7 FL — SIGNIFICANT CHANGE UP (ref 80–100)
MONOCYTES # BLD AUTO: 0.66 K/UL — SIGNIFICANT CHANGE UP (ref 0–0.9)
MONOCYTES NFR BLD AUTO: 14.8 % — HIGH (ref 2–14)
NEUTROPHILS # BLD AUTO: 2.58 K/UL — SIGNIFICANT CHANGE UP (ref 1.8–7.4)
NEUTROPHILS NFR BLD AUTO: 58.1 % — SIGNIFICANT CHANGE UP (ref 43–77)
NRBC # BLD: 0 /100 WBCS — SIGNIFICANT CHANGE UP (ref 0–0)
PHOSPHATE SERPL-MCNC: 5.4 MG/DL — HIGH (ref 2.5–4.5)
PLATELET # BLD AUTO: 123 K/UL — LOW (ref 150–400)
POTASSIUM SERPL-MCNC: 4.1 MMOL/L — SIGNIFICANT CHANGE UP (ref 3.5–5.3)
POTASSIUM SERPL-SCNC: 4.1 MMOL/L — SIGNIFICANT CHANGE UP (ref 3.5–5.3)
RBC # BLD: 3.97 M/UL — LOW (ref 4.2–5.8)
RBC # FLD: 13.6 % — SIGNIFICANT CHANGE UP (ref 10.3–14.5)
SODIUM SERPL-SCNC: 140 MMOL/L — SIGNIFICANT CHANGE UP (ref 135–145)
VANCOMYCIN TROUGH SERPL-MCNC: 15.5 UG/ML — SIGNIFICANT CHANGE UP (ref 10–20)
WBC # BLD: 4.45 K/UL — SIGNIFICANT CHANGE UP (ref 3.8–10.5)
WBC # FLD AUTO: 4.45 K/UL — SIGNIFICANT CHANGE UP (ref 3.8–10.5)

## 2020-01-18 PROCEDURE — 99232 SBSQ HOSP IP/OBS MODERATE 35: CPT

## 2020-01-18 PROCEDURE — 71045 X-RAY EXAM CHEST 1 VIEW: CPT | Mod: 26

## 2020-01-18 PROCEDURE — 73501 X-RAY EXAM HIP UNI 1 VIEW: CPT | Mod: 26,RT

## 2020-01-18 PROCEDURE — 72146 MRI CHEST SPINE W/O DYE: CPT | Mod: 26

## 2020-01-18 PROCEDURE — 99291 CRITICAL CARE FIRST HOUR: CPT

## 2020-01-18 PROCEDURE — 72148 MRI LUMBAR SPINE W/O DYE: CPT | Mod: 26

## 2020-01-18 RX ORDER — HYDROMORPHONE HYDROCHLORIDE 2 MG/ML
0.5 INJECTION INTRAMUSCULAR; INTRAVENOUS; SUBCUTANEOUS EVERY 4 HOURS
Refills: 0 | Status: DISCONTINUED | OUTPATIENT
Start: 2020-01-18 | End: 2020-01-18

## 2020-01-18 RX ORDER — VANCOMYCIN HCL 1 G
750 VIAL (EA) INTRAVENOUS ONCE
Refills: 0 | Status: COMPLETED | OUTPATIENT
Start: 2020-01-18 | End: 2020-01-18

## 2020-01-18 RX ORDER — ISOSORBIDE MONONITRATE 60 MG/1
90 TABLET, EXTENDED RELEASE ORAL EVERY 24 HOURS
Refills: 0 | Status: DISCONTINUED | OUTPATIENT
Start: 2020-01-18 | End: 2020-01-20

## 2020-01-18 RX ORDER — HYDROMORPHONE HYDROCHLORIDE 2 MG/ML
0.25 INJECTION INTRAMUSCULAR; INTRAVENOUS; SUBCUTANEOUS EVERY 4 HOURS
Refills: 0 | Status: DISCONTINUED | OUTPATIENT
Start: 2020-01-18 | End: 2020-01-24

## 2020-01-18 RX ORDER — HYDROMORPHONE HYDROCHLORIDE 2 MG/ML
1 INJECTION INTRAMUSCULAR; INTRAVENOUS; SUBCUTANEOUS ONCE
Refills: 0 | Status: DISCONTINUED | OUTPATIENT
Start: 2020-01-18 | End: 2020-01-18

## 2020-01-18 RX ORDER — NIFEDIPINE 30 MG
90 TABLET, EXTENDED RELEASE 24 HR ORAL EVERY 24 HOURS
Refills: 0 | Status: DISCONTINUED | OUTPATIENT
Start: 2020-01-18 | End: 2020-01-19

## 2020-01-18 RX ORDER — TRAMADOL HYDROCHLORIDE 50 MG/1
50 TABLET ORAL EVERY 6 HOURS
Refills: 0 | Status: DISCONTINUED | OUTPATIENT
Start: 2020-01-18 | End: 2020-01-24

## 2020-01-18 RX ADMIN — ISOSORBIDE MONONITRATE 90 MILLIGRAM(S): 60 TABLET, EXTENDED RELEASE ORAL at 12:27

## 2020-01-18 RX ADMIN — Medication 650 MILLIGRAM(S): at 07:48

## 2020-01-18 RX ADMIN — HYDROMORPHONE HYDROCHLORIDE 1 MILLIGRAM(S): 2 INJECTION INTRAMUSCULAR; INTRAVENOUS; SUBCUTANEOUS at 16:01

## 2020-01-18 RX ADMIN — Medication 0.3 MILLIGRAM(S): at 14:26

## 2020-01-18 RX ADMIN — HYDROMORPHONE HYDROCHLORIDE 0.5 MILLIGRAM(S): 2 INJECTION INTRAMUSCULAR; INTRAVENOUS; SUBCUTANEOUS at 10:37

## 2020-01-18 RX ADMIN — TRAMADOL HYDROCHLORIDE 50 MILLIGRAM(S): 50 TABLET ORAL at 20:16

## 2020-01-18 RX ADMIN — HYDROMORPHONE HYDROCHLORIDE 0.25 MILLIGRAM(S): 2 INJECTION INTRAMUSCULAR; INTRAVENOUS; SUBCUTANEOUS at 23:32

## 2020-01-18 RX ADMIN — SEVELAMER CARBONATE 800 MILLIGRAM(S): 2400 POWDER, FOR SUSPENSION ORAL at 10:04

## 2020-01-18 RX ADMIN — Medication 100 MILLIGRAM(S): at 21:56

## 2020-01-18 RX ADMIN — LIDOCAINE 1 PATCH: 4 CREAM TOPICAL at 19:11

## 2020-01-18 RX ADMIN — HEPARIN SODIUM 5000 UNIT(S): 5000 INJECTION INTRAVENOUS; SUBCUTANEOUS at 05:16

## 2020-01-18 RX ADMIN — HEPARIN SODIUM 5000 UNIT(S): 5000 INJECTION INTRAVENOUS; SUBCUTANEOUS at 14:27

## 2020-01-18 RX ADMIN — Medication 2: at 21:56

## 2020-01-18 RX ADMIN — Medication 100 MILLIGRAM(S): at 14:27

## 2020-01-18 RX ADMIN — TRAMADOL HYDROCHLORIDE 50 MILLIGRAM(S): 50 TABLET ORAL at 05:35

## 2020-01-18 RX ADMIN — CHLORHEXIDINE GLUCONATE 1 APPLICATION(S): 213 SOLUTION TOPICAL at 05:18

## 2020-01-18 RX ADMIN — Medication 100 MILLIGRAM(S): at 05:17

## 2020-01-18 RX ADMIN — TRAMADOL HYDROCHLORIDE 50 MILLIGRAM(S): 50 TABLET ORAL at 21:41

## 2020-01-18 RX ADMIN — LIDOCAINE 1 PATCH: 4 CREAM TOPICAL at 14:28

## 2020-01-18 RX ADMIN — Medication 0.3 MILLIGRAM(S): at 21:54

## 2020-01-18 RX ADMIN — LOSARTAN POTASSIUM 100 MILLIGRAM(S): 100 TABLET, FILM COATED ORAL at 21:56

## 2020-01-18 RX ADMIN — ATORVASTATIN CALCIUM 40 MILLIGRAM(S): 80 TABLET, FILM COATED ORAL at 21:54

## 2020-01-18 RX ADMIN — LIDOCAINE 1 PATCH: 4 CREAM TOPICAL at 06:05

## 2020-01-18 RX ADMIN — CARVEDILOL PHOSPHATE 25 MILLIGRAM(S): 80 CAPSULE, EXTENDED RELEASE ORAL at 21:54

## 2020-01-18 RX ADMIN — HYDROMORPHONE HYDROCHLORIDE 0.5 MILLIGRAM(S): 2 INJECTION INTRAMUSCULAR; INTRAVENOUS; SUBCUTANEOUS at 10:06

## 2020-01-18 RX ADMIN — HYDROMORPHONE HYDROCHLORIDE 0.25 MILLIGRAM(S): 2 INJECTION INTRAMUSCULAR; INTRAVENOUS; SUBCUTANEOUS at 23:15

## 2020-01-18 RX ADMIN — Medication 1 MILLIGRAM(S): at 16:01

## 2020-01-18 RX ADMIN — Medication 5 MILLIGRAM(S): at 21:56

## 2020-01-18 RX ADMIN — HYDROMORPHONE HYDROCHLORIDE 1 MILLIGRAM(S): 2 INJECTION INTRAMUSCULAR; INTRAVENOUS; SUBCUTANEOUS at 16:08

## 2020-01-18 RX ADMIN — Medication 650 MILLIGRAM(S): at 09:26

## 2020-01-18 RX ADMIN — Medication 81 MILLIGRAM(S): at 14:27

## 2020-01-18 RX ADMIN — Medication 50 MICROGRAM(S): at 05:17

## 2020-01-18 RX ADMIN — CARVEDILOL PHOSPHATE 25 MILLIGRAM(S): 80 CAPSULE, EXTENDED RELEASE ORAL at 10:04

## 2020-01-18 RX ADMIN — TRAMADOL HYDROCHLORIDE 50 MILLIGRAM(S): 50 TABLET ORAL at 12:30

## 2020-01-18 RX ADMIN — HEPARIN SODIUM 5000 UNIT(S): 5000 INJECTION INTRAVENOUS; SUBCUTANEOUS at 21:55

## 2020-01-18 RX ADMIN — ESCITALOPRAM OXALATE 5 MILLIGRAM(S): 10 TABLET, FILM COATED ORAL at 21:55

## 2020-01-18 RX ADMIN — CLOPIDOGREL BISULFATE 75 MILLIGRAM(S): 75 TABLET, FILM COATED ORAL at 12:27

## 2020-01-18 RX ADMIN — Medication 250 MILLIGRAM(S): at 23:14

## 2020-01-18 RX ADMIN — TRAMADOL HYDROCHLORIDE 50 MILLIGRAM(S): 50 TABLET ORAL at 04:49

## 2020-01-18 RX ADMIN — LIDOCAINE 1 PATCH: 4 CREAM TOPICAL at 07:53

## 2020-01-18 RX ADMIN — TRAMADOL HYDROCHLORIDE 50 MILLIGRAM(S): 50 TABLET ORAL at 12:29

## 2020-01-18 RX ADMIN — Medication 0.3 MILLIGRAM(S): at 05:17

## 2020-01-18 RX ADMIN — Medication 90 MILLIGRAM(S): at 10:03

## 2020-01-18 NOTE — CONSULT NOTE ADULT - SUBJECTIVE AND OBJECTIVE BOX
Consult Note Critical Care    HPI:  Mr. 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 patient was at dialysis this morning with a reported of syncopal episode with possible aspiration of food. Patient states that yesterday he was complaining of feeling dizzy. Report of possible syncope and aspiration while eating food during dialysis. Patient currently admits to pain in the neck and right hip that radiates down his right leg as well as some mild shortness of breath. States dizziness has improved. Denies headache, nausea, vomiting, chest pain, or abdominal pain. Cannot state if he started new medications, but endorsed no changes since previous admission. Patient was arousable and answering questions, but would nod off to sleep at times    ED Vitals: T 98.6 P66 /78 RR24 O2 100%  ED Course: Clonidine 0.3mg x1, hydralizine 100mg x1, meclizine 25mg x1, percocet 5mg x1. Admitted to CCU for emergent HD and management of hypertensive urgency.    CCU Event Note after arrival to unit: Patient was 15 minutes into dialysis, approx 500cc off, when he became unresponsive. Dialysis was held. On telemetry he began to ladarius down to HR of 30's and repeat BP systolic 90's. Rapid response was called. Blood glucose ~160. Heart rate spontaneously started to increase without medications or management. Patient became more arousable and was able to answer some questions, but was still lethargic. Stroke code was called for slight right sided facial droop. CT Brain showed microangiopathic ischemic disease. There are again lacunar infarcts in the basal ganglia bilaterally. There is no intracranial hemorrhage or acute transcortical infarct. External carotid artery branch calcifications, as seen in patients on dialysis. A-line placed for better blood pressure management. (11 Jan 2020 13:46)      PAST MEDICAL & SURGICAL HISTORY:  Peripheral neuropathy  Peripheral artery disease: s/p bilateral stents  Anemia of renal disease  End-stage renal disease (ESRD)  Type II diabetes mellitus  Retinal artery occlusion  Hypothyroidism  Low back pain  CAD (coronary artery disease)  H/O renal artery stenosis: s/p L renal stent  Hyperlipidemia  Hypertension  No significant past surgical history      REVIEW OF SYSTEMS:      Eyes: No eye pain, visual disturbances, or discharge  ENMT:  No difficulty hearing, tinnitus, vertigo; No sinus or throat pain  Neck: No pain or stiffness  Respiratory: No cough, wheezing, chills or hemoptysis  Cardiovascular: No chest pain, palpitations, shortness of breath, dizziness or leg swelling  Gastrointestinal: No abdominal or epigastric pain. No nausea, vomiting or hematemesis; No diarrhea or constipation. No melena or hematochezia.  Genitourinary: No dysuria, frequency, hematuria or incontinence  Neurological: No headaches, memory loss, loss of strength, numbness or tremors  Skin: No itching, burning, rashes or lesions   mid back pain    MEDICATIONS  (STANDING):  aspirin  chewable 81 milliGRAM(s) Oral every 24 hours  atorvastatin 40 milliGRAM(s) Oral at bedtime  carvedilol 25 milliGRAM(s) Oral every 12 hours  chlorhexidine 2% Cloths 1 Application(s) Topical <User Schedule>  cloNIDine 0.3 milliGRAM(s) Oral every 8 hours  clopidogrel Tablet 75 milliGRAM(s) Oral daily  dextrose 5%. 1000 milliLiter(s) (50 mL/Hr) IV Continuous <Continuous>  dextrose 50% Injectable 12.5 Gram(s) IV Push once  dextrose 50% Injectable 25 Gram(s) IV Push once  dextrose 50% Injectable 25 Gram(s) IV Push once  escitalopram 5 milliGRAM(s) Oral every 24 hours  gabapentin 100 milliGRAM(s) Oral <User Schedule>  heparin  Injectable 5000 Unit(s) SubCutaneous every 8 hours  hydrALAZINE 100 milliGRAM(s) Oral every 8 hours  insulin lispro (HumaLOG) corrective regimen sliding scale   SubCutaneous Before meals and at bedtime  isosorbide   mononitrate ER Tablet (IMDUR) 90 milliGRAM(s) Oral every 24 hours  levothyroxine 50 MICROGram(s) Oral daily  lidocaine   Patch 1 Patch Transdermal every 24 hours  losartan 100 milliGRAM(s) Oral every 24 hours  melatonin 5 milliGRAM(s) Oral at bedtime  NIFEdipine XL 90 milliGRAM(s) Oral every 24 hours  polyethylene glycol 3350 17 Gram(s) Oral at bedtime  senna 2 Tablet(s) Oral at bedtime  sevelamer carbonate 800 milliGRAM(s) Oral three times a day with meals    MEDICATIONS  (PRN):  acetaminophen   Tablet .. 650 milliGRAM(s) Oral every 6 hours PRN Mild Pain (1 - 3)  dextrose 40% Gel 15 Gram(s) Oral once PRN Blood Glucose LESS THAN 70 milliGRAM(s)/deciliter  glucagon  Injectable 1 milliGRAM(s) IntraMuscular once PRN Glucose LESS THAN 70 milligrams/deciliter  HYDROmorphone  Injectable 0.5 milliGRAM(s) IV Push every 4 hours PRN Severe Pain (7 - 10)  traMADol 50 milliGRAM(s) Oral every 6 hours PRN Moderate Pain (4 - 6)          Allergies    No Known Allergies    Intolerances        SOCIAL HISTORY:    FAMILY HISTORY:  FH: stroke: father at 66, mother at 85      Vital Signs Last 24 Hrs  T(C): 36 (18 Jan 2020 09:29), Max: 37.1 (17 Jan 2020 20:00)  T(F): 96.8 (18 Jan 2020 09:29), Max: 98.7 (17 Jan 2020 20:00)  HR: 57 (18 Jan 2020 10:09) (54 - 64)  BP: 192/81 (18 Jan 2020 10:09) (125/60 - 221/87)  BP(mean): 116 (18 Jan 2020 10:09) (82 - 125)  RR: 15 (18 Jan 2020 10:09) (11 - 25)  SpO2: 100% (18 Jan 2020 10:09) (95% - 100%)    PHYSICAL EXAM:    General: Well developed; well nourished; in no acute distress  Eyes: PERRL, EOM intact; conjunctiva and sclera clear  Head: Normocephalic; atraumatic  ENMT: No nasal discharge; airway clear  Neck: Supple; non tender; no masses  Respiratory: No wheezes, rales or rhonchi  Cardiovascular: Regular rate and rhythm. S1 and S2 Normal; No murmurs, gallops or rubs  Gastrointestinal: Soft non-tender non-distended; Normal bowel sounds; No hepatosplenomegaly  Genitourinary: No costovertebral angle tenderness  Extremities: Normal range of motion, No clubbing, cyanosis or edema  Vascular: Peripheral pulses palpable 2+ bilaterally  Neurological: Alert and oriented x3  Skin: Warm and dry. No acute rash  Lymph Nodes: No acute cervical adenopathy  Musculoskeletal: Normal gait, tone, without deformities    LABS:                        11.5   4.45  )-----------( 123      ( 18 Jan 2020 05:12 )             36.4     01-18    140  |  99  |  44<H>  ----------------------------<  111<H>  4.1   |  25  |  4.47<H>    Ca    9.4      18 Jan 2020 05:12  Phos  5.4     01-18  Mg     2.3     01-18          Culture Results:   No growth at 2 days. (01-15 @ 21:35)  Culture Results:   Growth in anaerobic bottle: Staphylococcus aureus presumptive Methicillin  resistant  Confirmation to follow within 24 hours  Floor previously notified. (01-14 @ 12:33)  Culture Results:   Growth in aerobic bottle: Methicillin resistant Staphylococcus aureus  Result called to and read back byAsael Cyr RN  01/16/2020 09:59:46  Anaerobic Bottle: No growth to date.  Change in culture status will be immediately reported. (01-14 @ 12:33)    RADIOLOGY & ADDITIONAL STUDIES:

## 2020-01-18 NOTE — PROGRESS NOTE ADULT - ATTENDING COMMENTS
Please see housestaff note for full details.  I have reviewed the case, examined the patient and agree with plan.  69 M HTN DM ESRD PVD carotid stent BRYNN stent with uncontrolled HTN and syncope.   On BP still elevated   /90s  HR 54-64   RR 16   Tm 98.7       NAD  RRR SALAZAR 3/6 RUSB  CTA anteriorly  NT soft + BS    K 4.1   Cr  4.47   Hg  11  plts 123   1. Continue BP control.  2. F/u Renal.  3. F/u structural CT.  4. Continue ASA and plavix for PVD and carotid stent.

## 2020-01-18 NOTE — PROGRESS NOTE ADULT - ASSESSMENT
68 y/o m with PMHx of ESRD on dialysis via AVF TTS, HTN, CAD, BRYNN (s/p stent), IDDM (with peripheral nueropathy), hypothyroidism and CVA cb right eye blindness- patient is s/p carotid artery stent), PAD (s/p bilateral stents) who was BIBEMS to [facility] for syncopal episode and found to have elevated blood pressure. Admitted to CCU for emergent HD and management of hypertensive emergency.  found to have severe AS most likely contributing to Syncopal episode, currently undergoing pre-op TAVR.    # ESRD on HD TTS via LUE AVF  - HD today per regular schedule with 2.0 L UF  - BP at goal on current antihypertensives and UFw/HD   - continue renvela 800 mg po tid w/meals and obtain phos level with bmp   - vancomycin to be administered post-HD on dialysis days

## 2020-01-18 NOTE — PROGRESS NOTE ADULT - ASSESSMENT
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 Admitted to CCU for emergent HD and managment of hypertensive emergency, now off nicardipine drip, with adjustments made to his home medications, pending CT per structural heart recommendations now found to have MRSA bacteremia with no identifiable source.    Cardio  # Hypertensive emergency (BP now stable 140-150s systolic)  - BP on arrival 209/78. Patient admitted in august with similar picture of hypertensive crises, back pain, requiring HD  - Received in ED: Clonidine 0.3mg x1, hydralazine 100mg x1, meclizine 25mg x1, percocet 5mg x1  - 15 minutes into dialysis in CCU, approx 500cc off, when he became unresponsive. Dialysis was held. Followed by episode of ladarius down to HR of 30's and repeat BP systolic 90's. Rapid response was called. Heart rate spontaneously started to increase without medications or management. Patient became more arousable and was able to answer some questions, but was still lethargic  - A-line placed for better blood pressure management on 1/11, removed on 1/16  - c/w hydralazine 100mg q8h  - c/w losartan 100mg q24h  - carvedilol 25mg q12h  - clonidine 0.3mg q8h started  - amlodipine switched to nifedipine xl 60mg, nifedipine administration moved from 4am to 9am  - imdur increased to 90mg q24h  - BP hold parameters for medications: hold for SBP <100    # Hx of CAD, PAD (with stents), BRYNN (with stents), carotid artery stenosis (with stents)  - c/w ASA, plavix, atorvastatin  - pain management consulted for pt's chronic leg pain  - pain management recommendations: Tramadol 25mg q6h for moderate pain, 50mg q6h for severe pain    # Aortic Stenosis  - Grade 3 systolic murmur  - ECHO 8/19: Moderate symmetric left ventricular hypertrophy. Normal right ventricular size and systolic function. Moderate aortic stenosis.  - repeat ECHO 1/13 showed severe aortic stenosis, EF 60-65%  - undergoing evaluation by structural heart to determine intervention options  - f/u CT results    ID  # MRSA bacteremia with no obvious source  - Received 1g Vancomycin (1/15)  - Check Vanc Trough after next dialysis session on 1/17, redose as appropriate  - Send surveillance bcx  - Contact precautions as pt is on HD  - RUE US showing superficial clot in previous IV site    Renal  # ESRD on HD TTS via LUE AVF, in hospital schedule has been MWF so far, per nephro  - last HD 1/15, next HD planned for today 1/17  - Renvela 800mg TID  - daily weights  - renal diet  - strict I/O    Neuro  # Questionable History of CVA  - CT Brain showed microangiopathic ischemic disease. There are again lacunar infarcts in the basal ganglia bilaterally. There is no intracranial hemorrhage or acute transcortical infarct. External carotid artery branch calcifications, as seen in patients on dialysis.   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  - neuro checks  - Avoid sedating medications     Pulm  # Elevated pulmonary artery pressure   ECHO 8/19: Severe pulmonary hypertension, PASP is 72.1 mmHg.    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 Admitted to CCU for emergent HD and managment of hypertensive emergency, with MRSA bacteremia and progressively worsening complaints of neck and back pain    Cardio  # Hypertensive emergency (BP now stable 140-150s systolic)  - BP on arrival 209/78. Patient admitted in august with similar picture of hypertensive crises, back pain, requiring HD  - Received in ED: Clonidine 0.3mg x1, hydralazine 100mg x1, meclizine 25mg x1, percocet 5mg x1  - 15 minutes into dialysis in CCU, approx 500cc off, when he became unresponsive. Dialysis was held. Followed by episode of ladarius down to HR of 30's and repeat BP systolic 90's. Rapid response was called. Heart rate spontaneously started to increase without medications or management. Patient became more arousable and was able to answer some questions, but was still lethargic  - A-line placed for better blood pressure management on 1/11, removed on 1/16  - c/w hydralazine 100mg q8h  - c/w losartan 100mg q24h  - carvedilol 25mg q12h  - clonidine 0.3mg q8h started  - amlodipine switched to nifedipine xl 60mg, nifedipine administration moved from 4am to 9am  - nifedipine increased from 60mg to 90mg  - imdur 90mg q24h  - BP hold parameters for medications: hold for SBP <100    # Hx of CAD, PAD (with stents), BRYNN (with stents), carotid artery stenosis (with stents)  - c/w ASA, plavix, atorvastatin  - pain management consulted for pt's chronic leg pain  - pain management recommendations: 50mg q6h for severe pain  - dilaudid 0.5mg IV for breakthrough pain, they also recommended Dilaudid 1mg PO q6h for severe pain, however there is concern for drug-seeking     # Aortic Stenosis  - Grade 3 systolic murmur  - ECHO 8/19: Moderate symmetric left ventricular hypertrophy. Normal right ventricular size and systolic function. Moderate aortic stenosis.  - repeat ECHO 1/13 showed severe aortic stenosis, EF 60-65%      ID  # MRSA bacteremia with no obvious source  - Received 1g Vancomycin (1/15)  - Send surveillance bcx  - Contact precautions as pt is on HD  - RUE US showing superficial clot in previous IV site  - Recheck vancomycin level after dialysis    Renal  # ESRD on HD TTS via LUE AVF, in hospital schedule has been MWF so far, per nephro  - last HD 1/17, next HD planned for today 1/18  - Renvela 800mg TID  - daily weights  - renal diet  - strict I/O    Neuro  # Questionable History of CVA  - CT Brain showed microangiopathic ischemic disease. There are again lacunar infarcts in the basal ganglia bilaterally. There is no intracranial hemorrhage or acute transcortical infarct. External carotid artery branch calcifications, as seen in patients on dialysis.   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  - neuro checks  - Avoid sedating medications     Pulm  # Elevated pulmonary artery pressure   ECHO 8/19: Severe pulmonary hypertension, PASP is 72.1 mmHg.    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 Admitted to CCU for emergent HD and managment of hypertensive emergency, with MRSA bacteremia and progressively worsening complaints of neck and back pain    Cardio  # Hypertensive emergency (BP now stable 140-150s systolic)  - BP on arrival 209/78. Patient admitted in august with similar picture of hypertensive crises, back pain, requiring HD  - Received in ED: Clonidine 0.3mg x1, hydralazine 100mg x1, meclizine 25mg x1, percocet 5mg x1  - 15 minutes into dialysis in CCU, approx 500cc off, when he became unresponsive. Dialysis was held. Followed by episode of ladarius down to HR of 30's and repeat BP systolic 90's. Rapid response was called. Heart rate spontaneously started to increase without medications or management. Patient became more arousable and was able to answer some questions, but was still lethargic  - A-line placed for better blood pressure management on 1/11, removed on 1/16  - c/w hydralazine 100mg q8h  - c/w losartan 100mg q24h  - carvedilol 25mg q12h  - clonidine 0.3mg q8h started  - amlodipine switched to nifedipine xl 60mg, nifedipine administration moved from 4am to 9am  - nifedipine increased from 60mg to 90mg  - imdur 90mg q24h  - BP hold parameters for medications: hold for SBP <100    # Hx of CAD, PAD (with stents), BRYNN (with stents), carotid artery stenosis (with stents)  - c/w ASA, plavix, atorvastatin  - pain management consulted for pt's chronic leg pain  - pain management recommendations: 50mg q6h for severe pain  - dilaudid 0.5mg IV for breakthrough pain, they also recommended Dilaudid 1mg PO q6h for severe pain, however there is concern for drug-seeking     # Aortic Stenosis  - Grade 3 systolic murmur  - ECHO 8/19: Moderate symmetric left ventricular hypertrophy. Normal right ventricular size and systolic function. Moderate aortic stenosis.  - repeat ECHO 1/13 showed severe aortic stenosis, EF 60-65%      ID  # MRSA bacteremia with no obvious source  - Received 1g Vancomycin (1/15)  - Send surveillance bcx  - Contact precautions as pt is on HD  - RUE US showing superficial clot in previous IV site  - Recheck vancomycin level after dialysis  - Gallium scan pending  - ID recommending MRI spine to rule out spinal abscess    Renal  # ESRD on HD TTS via LUE AVF, in hospital schedule has been MWF so far, per nephro  - last HD 1/17, next HD planned for today 1/18  - Renvela 800mg TID  - daily weights  - renal diet  - strict I/O    Neuro  # Questionable History of CVA  - CT Brain showed microangiopathic ischemic disease. There are again lacunar infarcts in the basal ganglia bilaterally. There is no intracranial hemorrhage or acute transcortical infarct. External carotid artery branch calcifications, as seen in patients on dialysis.   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  - neuro checks  - Avoid sedating medications     Pulm  # Elevated pulmonary artery pressure   ECHO 8/19: Severe pulmonary hypertension, PASP is 72.1 mmHg.    Endo  # Hypothyroidism  - c/w home synthroid 50mcg daily    # IDDM  - A1c 8/19: 7.2%  - c/w moderate ISS    Prophylaxis  F: None  E: Replete per nephro recs  N: DASH/TLC  GI: None  DVT; SQH 5000 U q8h    CODE: FULL

## 2020-01-18 NOTE — PROGRESS NOTE ADULT - SUBJECTIVE AND OBJECTIVE BOX
Patient was seen and evaluated on dialysis.       Vitals:  HR: 63 (01-18-20 @ 18:01)  BP: 153/67 (01-18-20 @ 18:01)  Wt(kg): 57.0    PHYSICAL EXAM  Gen: NAD, ill appearing   Neck: no JVD  Respiratory: CTA B/L  Cardiac: +S1/S2; RRR; systolic murmur with radiation to carotids  Gastrointestinal: soft, NT/ND   Extremities: WWP, no peripheral edema  Neurologic: AAOx3; non focal  access:  Left arm AVF with bruit    Labs:  01-18    140  |  99  |  44<H>  ----------------------------<  111<H>  4.1   |  25  |  4.47<H>    Ca    9.4      18 Jan 2020 05:12  Phos  5.4     01-18  Mg     2.3     01-18      Continue dialysis:   Dialyzer:   180    QB: 400  K bath: 2  Goal UF:    2.0   L   over      180         min  Patient is tolerating the procedure well.   Continue full treatment as prescribed.

## 2020-01-18 NOTE — PROGRESS NOTE ADULT - ATTENDING COMMENTS
dialysis post MRI w marshall today with 2L UF to aid with bp and volume overload, increase to 3L as tolerated. If needed for BP in the am can try Nifedipine 60mig BID instead of 90mg daily.

## 2020-01-18 NOTE — PROGRESS NOTE ADULT - SUBJECTIVE AND OBJECTIVE BOX
--in progress    INTERVAL HPI/OVERNIGHT EVENTS:    SUBJECTIVE: Patient seen and examined at bedside.    VITAL SIGNS:  ICU Vital Signs Last 24 Hrs  T(C): 35.8 (18 Jan 2020 04:30), Max: 37.1 (17 Jan 2020 20:00)  T(F): 96.4 (18 Jan 2020 04:30), Max: 98.7 (17 Jan 2020 20:00)  HR: 60 (18 Jan 2020 08:30) (54 - 64)  BP: 157/67 (18 Jan 2020 08:30) (125/60 - 196/77)  BP(mean): 97 (18 Jan 2020 08:30) (82 - 116)  ABP: --  ABP(mean): --  RR: 20 (18 Jan 2020 08:30) (11 - 25)  SpO2: 99% (18 Jan 2020 08:30) (95% - 100%)        01-17 @ 07:01  -  01-18 @ 07:00  --------------------------------------------------------  IN: 1050 mL / OUT: 3000 mL / NET: -1950 mL      CAPILLARY BLOOD GLUCOSE      POCT Blood Glucose.: 109 mg/dL (18 Jan 2020 06:33)      PHYSICAL EXAM:    Constitutional: NAD  HEENT: NC/AT; PERRL, anicteric sclera; MMM  Neck: supple, no JVD  Cardiovascular: +S1/S2, RRR  Respiratory: CTA B/L, no W/R/R  Gastrointestinal: abdomen soft, NT/ND; no rebound or guarding; +BSx4  Genitourinary: no suprapubic tenderness or fullness  Extremities: WWP; no LE edema; no clubbing or cyanosis  Vascular: 2+ radial, DP/PT and femoral pulses B/L  Dermatologic: normal color and turgor; no visible rashes  Neurological:     MEDICATIONS:  MEDICATIONS  (STANDING):  aspirin  chewable 81 milliGRAM(s) Oral every 24 hours  atorvastatin 40 milliGRAM(s) Oral at bedtime  carvedilol 25 milliGRAM(s) Oral every 12 hours  chlorhexidine 2% Cloths 1 Application(s) Topical <User Schedule>  cloNIDine 0.3 milliGRAM(s) Oral every 8 hours  clopidogrel Tablet 75 milliGRAM(s) Oral daily  dextrose 5%. 1000 milliLiter(s) (50 mL/Hr) IV Continuous <Continuous>  dextrose 50% Injectable 12.5 Gram(s) IV Push once  dextrose 50% Injectable 25 Gram(s) IV Push once  dextrose 50% Injectable 25 Gram(s) IV Push once  escitalopram 5 milliGRAM(s) Oral every 24 hours  gabapentin 100 milliGRAM(s) Oral <User Schedule>  heparin  Injectable 5000 Unit(s) SubCutaneous every 8 hours  hydrALAZINE 100 milliGRAM(s) Oral every 8 hours  insulin lispro (HumaLOG) corrective regimen sliding scale   SubCutaneous Before meals and at bedtime  isosorbide   mononitrate ER Tablet (IMDUR) 60 milliGRAM(s) Oral every 24 hours  levothyroxine 50 MICROGram(s) Oral daily  lidocaine   Patch 1 Patch Transdermal every 24 hours  losartan 100 milliGRAM(s) Oral every 24 hours  melatonin 5 milliGRAM(s) Oral at bedtime  NIFEdipine XL 90 milliGRAM(s) Oral every 24 hours  polyethylene glycol 3350 17 Gram(s) Oral at bedtime  senna 2 Tablet(s) Oral at bedtime  sevelamer carbonate 800 milliGRAM(s) Oral three times a day with meals    MEDICATIONS  (PRN):  acetaminophen   Tablet .. 650 milliGRAM(s) Oral every 6 hours PRN Mild Pain (1 - 3)  dextrose 40% Gel 15 Gram(s) Oral once PRN Blood Glucose LESS THAN 70 milliGRAM(s)/deciliter  glucagon  Injectable 1 milliGRAM(s) IntraMuscular once PRN Glucose LESS THAN 70 milligrams/deciliter  HYDROmorphone  Injectable 0.5 milliGRAM(s) IV Push every 4 hours PRN Severe Pain (7 - 10)  traMADol 50 milliGRAM(s) Oral every 6 hours PRN Moderate Pain (4 - 6)      ALLERGIES:  Allergies    No Known Allergies    Intolerances        LABS:                        11.5   4.45  )-----------( 123      ( 18 Jan 2020 05:12 )             36.4     01-18    140  |  99  |  44<H>  ----------------------------<  111<H>  4.1   |  25  |  4.47<H>    Ca    9.4      18 Jan 2020 05:12  Phos  5.4     01-18  Mg     2.3     01-18            RADIOLOGY & ADDITIONAL TESTS: Reviewed. INTERVAL HPI/OVERNIGHT EVENTS: HD yesterday, with vanco redosed after dialysis. Received 0.5mg Dilaudid early in the evening. Blood pressures labile and climbing to 180-200 systolic.  Nifedipine increased to 90mg.     SUBJECTIVE: Patient seen and examined at bedside. Pt with continued complaints of neck and back pain.     VITAL SIGNS:  ICU Vital Signs Last 24 Hrs  T(C): 35.8 (18 Jan 2020 04:30), Max: 37.1 (17 Jan 2020 20:00)  T(F): 96.4 (18 Jan 2020 04:30), Max: 98.7 (17 Jan 2020 20:00)  HR: 60 (18 Jan 2020 08:30) (54 - 64)  BP: 157/67 (18 Jan 2020 08:30) (125/60 - 196/77)  BP(mean): 97 (18 Jan 2020 08:30) (82 - 116)  ABP: --  ABP(mean): --  RR: 20 (18 Jan 2020 08:30) (11 - 25)  SpO2: 99% (18 Jan 2020 08:30) (95% - 100%)        01-17 @ 07:01  -  01-18 @ 07:00  --------------------------------------------------------  IN: 1050 mL / OUT: 3000 mL / NET: -1950 mL      CAPILLARY BLOOD GLUCOSE      POCT Blood Glucose.: 109 mg/dL (18 Jan 2020 06:33)      PHYSICAL EXAM:    Constitutional: Sitting hunched over, complaining of neck and back pain  HEENT: NC/AT; Right pupil non-reactive to light, left pupil sluggishly reactive to light, anicteric sclera, MMM  Neck: supple; no JVD or thyromegaly  Respiratory: CTA B/L; no W/R/R, no retractions  Cardiac: +S1/S2; RRR; grade 3 early-midsystolic crescendo-decrescendo murmur heard loudest in the R upper sternal border with radiation to carotids c/w aortic stenosis  Gastrointestinal: soft, NT/ND; no rebound or guarding; +BSx4  Extremities: WWP, no clubbing or cyanosis; no peripheral edema, 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 Small ulcer on left thumb, scattered healing scabs on left hand.  Lymphatic: no submandibular or cervical LAD  Neurologic: AAOx3; visual field defect in right eye, slight facial droop in right face, unknown baseline. Motor 5/5 and sensation intact.    MEDICATIONS:  MEDICATIONS  (STANDING):  aspirin  chewable 81 milliGRAM(s) Oral every 24 hours  atorvastatin 40 milliGRAM(s) Oral at bedtime  carvedilol 25 milliGRAM(s) Oral every 12 hours  chlorhexidine 2% Cloths 1 Application(s) Topical <User Schedule>  cloNIDine 0.3 milliGRAM(s) Oral every 8 hours  clopidogrel Tablet 75 milliGRAM(s) Oral daily  dextrose 5%. 1000 milliLiter(s) (50 mL/Hr) IV Continuous <Continuous>  dextrose 50% Injectable 12.5 Gram(s) IV Push once  dextrose 50% Injectable 25 Gram(s) IV Push once  dextrose 50% Injectable 25 Gram(s) IV Push once  escitalopram 5 milliGRAM(s) Oral every 24 hours  gabapentin 100 milliGRAM(s) Oral <User Schedule>  heparin  Injectable 5000 Unit(s) SubCutaneous every 8 hours  hydrALAZINE 100 milliGRAM(s) Oral every 8 hours  insulin lispro (HumaLOG) corrective regimen sliding scale   SubCutaneous Before meals and at bedtime  isosorbide   mononitrate ER Tablet (IMDUR) 60 milliGRAM(s) Oral every 24 hours  levothyroxine 50 MICROGram(s) Oral daily  lidocaine   Patch 1 Patch Transdermal every 24 hours  losartan 100 milliGRAM(s) Oral every 24 hours  melatonin 5 milliGRAM(s) Oral at bedtime  NIFEdipine XL 90 milliGRAM(s) Oral every 24 hours  polyethylene glycol 3350 17 Gram(s) Oral at bedtime  senna 2 Tablet(s) Oral at bedtime  sevelamer carbonate 800 milliGRAM(s) Oral three times a day with meals    MEDICATIONS  (PRN):  acetaminophen   Tablet .. 650 milliGRAM(s) Oral every 6 hours PRN Mild Pain (1 - 3)  dextrose 40% Gel 15 Gram(s) Oral once PRN Blood Glucose LESS THAN 70 milliGRAM(s)/deciliter  glucagon  Injectable 1 milliGRAM(s) IntraMuscular once PRN Glucose LESS THAN 70 milligrams/deciliter  HYDROmorphone  Injectable 0.5 milliGRAM(s) IV Push every 4 hours PRN Severe Pain (7 - 10)  traMADol 50 milliGRAM(s) Oral every 6 hours PRN Moderate Pain (4 - 6)      ALLERGIES:  Allergies    No Known Allergies    Intolerances        LABS:                        11.5   4.45  )-----------( 123      ( 18 Jan 2020 05:12 )             36.4     01-18    140  |  99  |  44<H>  ----------------------------<  111<H>  4.1   |  25  |  4.47<H>    Ca    9.4      18 Jan 2020 05:12  Phos  5.4     01-18  Mg     2.3     01-18            RADIOLOGY & ADDITIONAL TESTS: Reviewed.

## 2020-01-19 LAB
-  CEFAZOLIN: SIGNIFICANT CHANGE UP
-  CLINDAMYCIN: SIGNIFICANT CHANGE UP
-  DAPTOMYCIN: SIGNIFICANT CHANGE UP
-  ERYTHROMYCIN: SIGNIFICANT CHANGE UP
-  LINEZOLID: SIGNIFICANT CHANGE UP
-  OXACILLIN: SIGNIFICANT CHANGE UP
-  PENICILLIN: SIGNIFICANT CHANGE UP
-  RIFAMPIN: SIGNIFICANT CHANGE UP
-  TETRACYCLINE: SIGNIFICANT CHANGE UP
-  TRIMETHOPRIM/SULFAMETHOXAZOLE: SIGNIFICANT CHANGE UP
-  VANCOMYCIN: SIGNIFICANT CHANGE UP
ANION GAP SERPL CALC-SCNC: 18 MMOL/L — HIGH (ref 5–17)
BASOPHILS # BLD AUTO: 0.03 K/UL — SIGNIFICANT CHANGE UP (ref 0–0.2)
BASOPHILS NFR BLD AUTO: 0.6 % — SIGNIFICANT CHANGE UP (ref 0–2)
BUN SERPL-MCNC: 25 MG/DL — HIGH (ref 7–23)
CALCIUM SERPL-MCNC: 10.1 MG/DL — SIGNIFICANT CHANGE UP (ref 8.4–10.5)
CHLORIDE SERPL-SCNC: 95 MMOL/L — LOW (ref 96–108)
CO2 SERPL-SCNC: 24 MMOL/L — SIGNIFICANT CHANGE UP (ref 22–31)
CREAT SERPL-MCNC: 3.55 MG/DL — HIGH (ref 0.5–1.3)
CULTURE RESULTS: SIGNIFICANT CHANGE UP
CULTURE RESULTS: SIGNIFICANT CHANGE UP
EOSINOPHIL # BLD AUTO: 0.13 K/UL — SIGNIFICANT CHANGE UP (ref 0–0.5)
EOSINOPHIL NFR BLD AUTO: 2.4 % — SIGNIFICANT CHANGE UP (ref 0–6)
GLUCOSE SERPL-MCNC: 172 MG/DL — HIGH (ref 70–99)
HCT VFR BLD CALC: 37.7 % — LOW (ref 39–50)
HGB BLD-MCNC: 12.2 G/DL — LOW (ref 13–17)
IMM GRANULOCYTES NFR BLD AUTO: 0.4 % — SIGNIFICANT CHANGE UP (ref 0–1.5)
LYMPHOCYTES # BLD AUTO: 0.96 K/UL — LOW (ref 1–3.3)
LYMPHOCYTES # BLD AUTO: 17.6 % — SIGNIFICANT CHANGE UP (ref 13–44)
MAGNESIUM SERPL-MCNC: 2.1 MG/DL — SIGNIFICANT CHANGE UP (ref 1.6–2.6)
MCHC RBC-ENTMCNC: 28.7 PG — SIGNIFICANT CHANGE UP (ref 27–34)
MCHC RBC-ENTMCNC: 32.4 GM/DL — SIGNIFICANT CHANGE UP (ref 32–36)
MCV RBC AUTO: 88.7 FL — SIGNIFICANT CHANGE UP (ref 80–100)
METHOD TYPE: SIGNIFICANT CHANGE UP
METHOD TYPE: SIGNIFICANT CHANGE UP
MONOCYTES # BLD AUTO: 0.65 K/UL — SIGNIFICANT CHANGE UP (ref 0–0.9)
MONOCYTES NFR BLD AUTO: 11.9 % — SIGNIFICANT CHANGE UP (ref 2–14)
NEUTROPHILS # BLD AUTO: 3.65 K/UL — SIGNIFICANT CHANGE UP (ref 1.8–7.4)
NEUTROPHILS NFR BLD AUTO: 67.1 % — SIGNIFICANT CHANGE UP (ref 43–77)
NRBC # BLD: 0 /100 WBCS — SIGNIFICANT CHANGE UP (ref 0–0)
ORGANISM # SPEC MICROSCOPIC CNT: SIGNIFICANT CHANGE UP
PHOSPHATE SERPL-MCNC: 4 MG/DL — SIGNIFICANT CHANGE UP (ref 2.5–4.5)
PLATELET # BLD AUTO: 122 K/UL — LOW (ref 150–400)
POTASSIUM SERPL-MCNC: 3.6 MMOL/L — SIGNIFICANT CHANGE UP (ref 3.5–5.3)
POTASSIUM SERPL-SCNC: 3.6 MMOL/L — SIGNIFICANT CHANGE UP (ref 3.5–5.3)
RBC # BLD: 4.25 M/UL — SIGNIFICANT CHANGE UP (ref 4.2–5.8)
RBC # FLD: 13.7 % — SIGNIFICANT CHANGE UP (ref 10.3–14.5)
SODIUM SERPL-SCNC: 137 MMOL/L — SIGNIFICANT CHANGE UP (ref 135–145)
SPECIMEN SOURCE: SIGNIFICANT CHANGE UP
WBC # BLD: 5.44 K/UL — SIGNIFICANT CHANGE UP (ref 3.8–10.5)
WBC # FLD AUTO: 5.44 K/UL — SIGNIFICANT CHANGE UP (ref 3.8–10.5)

## 2020-01-19 PROCEDURE — 99291 CRITICAL CARE FIRST HOUR: CPT

## 2020-01-19 PROCEDURE — 99232 SBSQ HOSP IP/OBS MODERATE 35: CPT

## 2020-01-19 RX ORDER — HALOPERIDOL DECANOATE 100 MG/ML
1 INJECTION INTRAMUSCULAR ONCE
Refills: 0 | Status: COMPLETED | OUTPATIENT
Start: 2020-01-19 | End: 2020-01-19

## 2020-01-19 RX ORDER — NIFEDIPINE 30 MG
60 TABLET, EXTENDED RELEASE 24 HR ORAL EVERY 12 HOURS
Refills: 0 | Status: DISCONTINUED | OUTPATIENT
Start: 2020-01-20 | End: 2020-01-22

## 2020-01-19 RX ADMIN — ISOSORBIDE MONONITRATE 90 MILLIGRAM(S): 60 TABLET, EXTENDED RELEASE ORAL at 11:53

## 2020-01-19 RX ADMIN — TRAMADOL HYDROCHLORIDE 50 MILLIGRAM(S): 50 TABLET ORAL at 19:41

## 2020-01-19 RX ADMIN — SEVELAMER CARBONATE 800 MILLIGRAM(S): 2400 POWDER, FOR SUSPENSION ORAL at 11:53

## 2020-01-19 RX ADMIN — HEPARIN SODIUM 5000 UNIT(S): 5000 INJECTION INTRAVENOUS; SUBCUTANEOUS at 22:00

## 2020-01-19 RX ADMIN — Medication 100 MILLIGRAM(S): at 05:39

## 2020-01-19 RX ADMIN — CARVEDILOL PHOSPHATE 25 MILLIGRAM(S): 80 CAPSULE, EXTENDED RELEASE ORAL at 09:49

## 2020-01-19 RX ADMIN — Medication 2: at 12:21

## 2020-01-19 RX ADMIN — Medication 81 MILLIGRAM(S): at 13:29

## 2020-01-19 RX ADMIN — LIDOCAINE 1 PATCH: 4 CREAM TOPICAL at 15:28

## 2020-01-19 RX ADMIN — Medication 90 MILLIGRAM(S): at 09:49

## 2020-01-19 RX ADMIN — Medication 50 MICROGRAM(S): at 05:39

## 2020-01-19 RX ADMIN — Medication 0.3 MILLIGRAM(S): at 21:59

## 2020-01-19 RX ADMIN — TRAMADOL HYDROCHLORIDE 50 MILLIGRAM(S): 50 TABLET ORAL at 06:55

## 2020-01-19 RX ADMIN — SEVELAMER CARBONATE 800 MILLIGRAM(S): 2400 POWDER, FOR SUSPENSION ORAL at 09:49

## 2020-01-19 RX ADMIN — LIDOCAINE 1 PATCH: 4 CREAM TOPICAL at 02:18

## 2020-01-19 RX ADMIN — TRAMADOL HYDROCHLORIDE 50 MILLIGRAM(S): 50 TABLET ORAL at 13:30

## 2020-01-19 RX ADMIN — Medication 4: at 16:20

## 2020-01-19 RX ADMIN — Medication 2: at 06:55

## 2020-01-19 RX ADMIN — Medication 650 MILLIGRAM(S): at 09:59

## 2020-01-19 RX ADMIN — TRAMADOL HYDROCHLORIDE 50 MILLIGRAM(S): 50 TABLET ORAL at 14:59

## 2020-01-19 RX ADMIN — CARVEDILOL PHOSPHATE 25 MILLIGRAM(S): 80 CAPSULE, EXTENDED RELEASE ORAL at 21:59

## 2020-01-19 RX ADMIN — CLOPIDOGREL BISULFATE 75 MILLIGRAM(S): 75 TABLET, FILM COATED ORAL at 11:52

## 2020-01-19 RX ADMIN — Medication 0.3 MILLIGRAM(S): at 05:39

## 2020-01-19 RX ADMIN — LIDOCAINE 1 PATCH: 4 CREAM TOPICAL at 19:00

## 2020-01-19 RX ADMIN — Medication 650 MILLIGRAM(S): at 23:00

## 2020-01-19 RX ADMIN — TRAMADOL HYDROCHLORIDE 50 MILLIGRAM(S): 50 TABLET ORAL at 07:52

## 2020-01-19 RX ADMIN — HEPARIN SODIUM 5000 UNIT(S): 5000 INJECTION INTRAVENOUS; SUBCUTANEOUS at 05:39

## 2020-01-19 RX ADMIN — Medication 650 MILLIGRAM(S): at 10:30

## 2020-01-19 RX ADMIN — Medication 650 MILLIGRAM(S): at 22:03

## 2020-01-19 RX ADMIN — ATORVASTATIN CALCIUM 40 MILLIGRAM(S): 80 TABLET, FILM COATED ORAL at 21:59

## 2020-01-19 RX ADMIN — Medication 0.3 MILLIGRAM(S): at 13:28

## 2020-01-19 RX ADMIN — TRAMADOL HYDROCHLORIDE 50 MILLIGRAM(S): 50 TABLET ORAL at 13:24

## 2020-01-19 RX ADMIN — HEPARIN SODIUM 5000 UNIT(S): 5000 INJECTION INTRAVENOUS; SUBCUTANEOUS at 13:29

## 2020-01-19 RX ADMIN — ESCITALOPRAM OXALATE 5 MILLIGRAM(S): 10 TABLET, FILM COATED ORAL at 21:59

## 2020-01-19 RX ADMIN — TRAMADOL HYDROCHLORIDE 50 MILLIGRAM(S): 50 TABLET ORAL at 20:16

## 2020-01-19 RX ADMIN — LOSARTAN POTASSIUM 100 MILLIGRAM(S): 100 TABLET, FILM COATED ORAL at 19:40

## 2020-01-19 RX ADMIN — SEVELAMER CARBONATE 800 MILLIGRAM(S): 2400 POWDER, FOR SUSPENSION ORAL at 16:22

## 2020-01-19 RX ADMIN — Medication 100 MILLIGRAM(S): at 22:02

## 2020-01-19 RX ADMIN — HALOPERIDOL DECANOATE 1 MILLIGRAM(S): 100 INJECTION INTRAMUSCULAR at 23:15

## 2020-01-19 RX ADMIN — Medication 100 MILLIGRAM(S): at 13:29

## 2020-01-19 RX ADMIN — Medication 5 MILLIGRAM(S): at 22:02

## 2020-01-19 RX ADMIN — CHLORHEXIDINE GLUCONATE 1 APPLICATION(S): 213 SOLUTION TOPICAL at 06:15

## 2020-01-19 NOTE — PROGRESS NOTE ADULT - SUBJECTIVE AND OBJECTIVE BOX
*** NOTE IN PROGRESS ***    INTERVAL HPI/OVERNIGHT EVENTS:    SUBJECTIVE: Patient seen and examined at bedside.    OBJECTIVE:    VITAL SIGNS:  ICU Vital Signs Last 24 Hrs  T(C): 37.2 (19 Jan 2020 07:00), Max: 37.2 (19 Jan 2020 07:00)  T(F): 98.9 (19 Jan 2020 07:00), Max: 98.9 (19 Jan 2020 07:00)  HR: 60 (19 Jan 2020 07:00) (54 - 72)  BP: 170/72 (19 Jan 2020 07:00) (133/60 - 221/87)  BP(mean): 104 (19 Jan 2020 07:00) (87 - 144)  ABP: --  ABP(mean): --  RR: 22 (19 Jan 2020 07:00) (10 - 32)  SpO2: 100% (19 Jan 2020 07:00) (97% - 100%)        01-18 @ 07:01  -  01-19 @ 07:00  --------------------------------------------------------  IN: 250 mL / OUT: 2000 mL / NET: -1750 mL      CAPILLARY BLOOD GLUCOSE      POCT Blood Glucose.: 151 mg/dL (19 Jan 2020 06:46)      PHYSICAL EXAM:    General: NAD  HEENT: NC/AT; PERRL, clear conjunctiva  Neck: supple  Respiratory: CTA b/l  Cardiovascular: +S1/S2; RRR  Abdomen: soft, NT/ND; +BS x4  Extremities: WWP, 2+ peripheral pulses b/l; no LE edema  Skin: normal color and turgor; no rash  Neurological:     MEDICATIONS:  MEDICATIONS  (STANDING):  aspirin  chewable 81 milliGRAM(s) Oral every 24 hours  atorvastatin 40 milliGRAM(s) Oral at bedtime  carvedilol 25 milliGRAM(s) Oral every 12 hours  chlorhexidine 2% Cloths 1 Application(s) Topical <User Schedule>  cloNIDine 0.3 milliGRAM(s) Oral every 8 hours  clopidogrel Tablet 75 milliGRAM(s) Oral daily  dextrose 5%. 1000 milliLiter(s) (50 mL/Hr) IV Continuous <Continuous>  dextrose 50% Injectable 12.5 Gram(s) IV Push once  dextrose 50% Injectable 25 Gram(s) IV Push once  dextrose 50% Injectable 25 Gram(s) IV Push once  escitalopram 5 milliGRAM(s) Oral every 24 hours  gabapentin 100 milliGRAM(s) Oral <User Schedule>  heparin  Injectable 5000 Unit(s) SubCutaneous every 8 hours  hydrALAZINE 100 milliGRAM(s) Oral every 8 hours  insulin lispro (HumaLOG) corrective regimen sliding scale   SubCutaneous Before meals and at bedtime  isosorbide   mononitrate ER Tablet (IMDUR) 90 milliGRAM(s) Oral every 24 hours  levothyroxine 50 MICROGram(s) Oral daily  lidocaine   Patch 1 Patch Transdermal every 24 hours  losartan 100 milliGRAM(s) Oral every 24 hours  melatonin 5 milliGRAM(s) Oral at bedtime  NIFEdipine XL 90 milliGRAM(s) Oral every 24 hours  polyethylene glycol 3350 17 Gram(s) Oral at bedtime  senna 2 Tablet(s) Oral at bedtime  sevelamer carbonate 800 milliGRAM(s) Oral three times a day with meals    MEDICATIONS  (PRN):  acetaminophen   Tablet .. 650 milliGRAM(s) Oral every 6 hours PRN Mild Pain (1 - 3)  dextrose 40% Gel 15 Gram(s) Oral once PRN Blood Glucose LESS THAN 70 milliGRAM(s)/deciliter  glucagon  Injectable 1 milliGRAM(s) IntraMuscular once PRN Glucose LESS THAN 70 milligrams/deciliter  HYDROmorphone  Injectable 0.25 milliGRAM(s) IV Push every 4 hours PRN Severe Pain (7 - 10)  traMADol 50 milliGRAM(s) Oral every 6 hours PRN Moderate Pain (4 - 6)      ALLERGIES:  Allergies    No Known Allergies    Intolerances        LABS:                        12.2   5.44  )-----------( 122      ( 19 Jan 2020 05:31 )             37.7     01-19    137  |  95<L>  |  25<H>  ----------------------------<  172<H>  3.6   |  24  |  3.55<H>    Ca    10.1      19 Jan 2020 05:31  Phos  4.0     01-19  Mg     2.1     01-19            RADIOLOGY & ADDITIONAL TESTS: Reviewed. INTERVAL HPI/OVERNIGHT EVENTS: Vanc level 15.5, dosed 750mg vanc. BP episodes of systolic >180. MRI done showing diffuse disc degeneration and herniations on prelim read    SUBJECTIVE: Patient seen and examined at bedside. Still complaining of diffuse neck, back, and right hip pain. Headache this AM. Eating well. No nausea or vomiting.    OBJECTIVE:    VITAL SIGNS:  ICU Vital Signs Last 24 Hrs  T(C): 37.2 (19 Jan 2020 07:00), Max: 37.2 (19 Jan 2020 07:00)  T(F): 98.9 (19 Jan 2020 07:00), Max: 98.9 (19 Jan 2020 07:00)  HR: 60 (19 Jan 2020 07:00) (54 - 72)  BP: 170/72 (19 Jan 2020 07:00) (133/60 - 221/87)  BP(mean): 104 (19 Jan 2020 07:00) (87 - 144)  ABP: --  ABP(mean): --  RR: 22 (19 Jan 2020 07:00) (10 - 32)  SpO2: 100% (19 Jan 2020 07:00) (97% - 100%)        01-18 @ 07:01  -  01-19 @ 07:00  --------------------------------------------------------  IN: 250 mL / OUT: 2000 mL / NET: -1750 mL      CAPILLARY BLOOD GLUCOSE      POCT Blood Glucose.: 151 mg/dL (19 Jan 2020 06:46)      PHYSICAL EXAM:  Constitutional: Leaning forward, uncomfortable, c/o neck and back pain  HEENT: NC/AT; Right pupil non-reactive to light, left pupil sluggishly reactive to light, anicteric sclera, MMM  Neck: supple; no JVD or thyromegaly  Respiratory: CTA B/L; no W/R/R, no retractions  Cardiac: +S1/S2; RRR; grade 3 early-midsystolic crescendo-decrescendo murmur heard loudest in the R upper sternal border with radiation to carotids c/w aortic stenosis  Gastrointestinal: soft, NT/ND; no rebound or guarding; +BSx4  Extremities: WWP, no clubbing or cyanosis; no peripheral edema, 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 Small ulcer on left thumb, scattered healing scabs on left hand.  Vascular: L AVF bruit intact  Lymphatic: no submandibular or cervical LAD  Neurologic: AAOx3; visual field defect in right eye, slight facial droop in right face, unknown baseline. Motor 5/5 and sensation intact.       MEDICATIONS:  MEDICATIONS  (STANDING):  aspirin  chewable 81 milliGRAM(s) Oral every 24 hours  atorvastatin 40 milliGRAM(s) Oral at bedtime  carvedilol 25 milliGRAM(s) Oral every 12 hours  chlorhexidine 2% Cloths 1 Application(s) Topical <User Schedule>  cloNIDine 0.3 milliGRAM(s) Oral every 8 hours  clopidogrel Tablet 75 milliGRAM(s) Oral daily  dextrose 5%. 1000 milliLiter(s) (50 mL/Hr) IV Continuous <Continuous>  dextrose 50% Injectable 12.5 Gram(s) IV Push once  dextrose 50% Injectable 25 Gram(s) IV Push once  dextrose 50% Injectable 25 Gram(s) IV Push once  escitalopram 5 milliGRAM(s) Oral every 24 hours  gabapentin 100 milliGRAM(s) Oral <User Schedule>  heparin  Injectable 5000 Unit(s) SubCutaneous every 8 hours  hydrALAZINE 100 milliGRAM(s) Oral every 8 hours  insulin lispro (HumaLOG) corrective regimen sliding scale   SubCutaneous Before meals and at bedtime  isosorbide   mononitrate ER Tablet (IMDUR) 90 milliGRAM(s) Oral every 24 hours  levothyroxine 50 MICROGram(s) Oral daily  lidocaine   Patch 1 Patch Transdermal every 24 hours  losartan 100 milliGRAM(s) Oral every 24 hours  melatonin 5 milliGRAM(s) Oral at bedtime  NIFEdipine XL 90 milliGRAM(s) Oral every 24 hours  polyethylene glycol 3350 17 Gram(s) Oral at bedtime  senna 2 Tablet(s) Oral at bedtime  sevelamer carbonate 800 milliGRAM(s) Oral three times a day with meals    MEDICATIONS  (PRN):  acetaminophen   Tablet .. 650 milliGRAM(s) Oral every 6 hours PRN Mild Pain (1 - 3)  dextrose 40% Gel 15 Gram(s) Oral once PRN Blood Glucose LESS THAN 70 milliGRAM(s)/deciliter  glucagon  Injectable 1 milliGRAM(s) IntraMuscular once PRN Glucose LESS THAN 70 milligrams/deciliter  HYDROmorphone  Injectable 0.25 milliGRAM(s) IV Push every 4 hours PRN Severe Pain (7 - 10)  traMADol 50 milliGRAM(s) Oral every 6 hours PRN Moderate Pain (4 - 6)      ALLERGIES:  Allergies    No Known Allergies    Intolerances        LABS:                        12.2   5.44  )-----------( 122      ( 19 Jan 2020 05:31 )             37.7     01-19    137  |  95<L>  |  25<H>  ----------------------------<  172<H>  3.6   |  24  |  3.55<H>    Ca    10.1      19 Jan 2020 05:31  Phos  4.0     01-19  Mg     2.1     01-19            RADIOLOGY & ADDITIONAL TESTS: Reviewed. 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 Admitted to CCU for emergent HD and management of hypertensive emergency. Shortly after CCU arrival, rapid response and stroke code for bradycardia to 30s with BP 220s/90s, while undergoing HD today (15 mins into session) when pt had slow breathing, increased lethargy (was minimally arousable to sternal rub initially). CT head noncon wnl. BP controlled s/p nicardipine gtt and regular HD sessions. Pt complained of progressively worsening complaints of neck and back pain (radiating from hip to foot, similar to pain before, hx of hip surgery). Pain management uptitrated tramadol and psych started lexapro. 1/13: Echo revealing severe LVH, EF 60-65%, severe AS, mild AR, mild MR, moderate TR. pHTN 70mmHg. R brachicephalic IV removed, noted to be indurated without fluctuation or oozing, U/S with R cephalic thrombus. CTS consulted for severe AS. Patient febrile to 101+, found to have MRSA bacteremia, on vancomycin x1 for MRSA bacteremia. EBONI w/o mer vegetation, mod-severe AS. s/p MR lumbar/thoracic spine, couldn't tolerate full scan, pending gallium could not tolearte full scan due to pain. Pending retroperitoneal u/s with doppler to assess renal artery for causes of fluctuant blood pressures on medical therapy/dialysis. Hemodynamically stable and ready for transfer to [facility].    INTERVAL HPI/OVERNIGHT EVENTS: Vanc level 15.5, dosed 750mg vanc. BP episodes of systolic >180. MRI done showing diffuse disc degeneration and herniations on prelim read    SUBJECTIVE: Patient seen and examined at bedside. Still complaining of diffuse neck, back, and right hip pain. Headache this AM. Eating well. No nausea or vomiting.    OBJECTIVE:    VITAL SIGNS:  ICU Vital Signs Last 24 Hrs  T(C): 37.2 (19 Jan 2020 07:00), Max: 37.2 (19 Jan 2020 07:00)  T(F): 98.9 (19 Jan 2020 07:00), Max: 98.9 (19 Jan 2020 07:00)  HR: 60 (19 Jan 2020 07:00) (54 - 72)  BP: 170/72 (19 Jan 2020 07:00) (133/60 - 221/87)  BP(mean): 104 (19 Jan 2020 07:00) (87 - 144)  ABP: --  ABP(mean): --  RR: 22 (19 Jan 2020 07:00) (10 - 32)  SpO2: 100% (19 Jan 2020 07:00) (97% - 100%)        01-18 @ 07:01  -  01-19 @ 07:00  --------------------------------------------------------  IN: 250 mL / OUT: 2000 mL / NET: -1750 mL      CAPILLARY BLOOD GLUCOSE      POCT Blood Glucose.: 151 mg/dL (19 Jan 2020 06:46)      PHYSICAL EXAM:  Constitutional: Leaning forward, uncomfortable, c/o neck and back pain  HEENT: NC/AT; Right pupil non-reactive to light, left pupil sluggishly reactive to light, anicteric sclera, MMM  Neck: supple; no JVD or thyromegaly  Respiratory: CTA B/L; no W/R/R, no retractions  Cardiac: +S1/S2; RRR; grade 3 early-midsystolic crescendo-decrescendo murmur heard loudest in the R upper sternal border with radiation to carotids c/w aortic stenosis  Gastrointestinal: soft, NT/ND; no rebound or guarding; +BSx4  Extremities: WWP, no clubbing or cyanosis; no peripheral edema, 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 Small ulcer on left thumb, scattered healing scabs on left hand.  Vascular: L AVF bruit intact  Lymphatic: no submandibular or cervical LAD  Neurologic: AAOx3; visual field defect in right eye, slight facial droop in right face, unknown baseline. Motor 5/5 and sensation intact.       MEDICATIONS:  MEDICATIONS  (STANDING):  aspirin  chewable 81 milliGRAM(s) Oral every 24 hours  atorvastatin 40 milliGRAM(s) Oral at bedtime  carvedilol 25 milliGRAM(s) Oral every 12 hours  chlorhexidine 2% Cloths 1 Application(s) Topical <User Schedule>  cloNIDine 0.3 milliGRAM(s) Oral every 8 hours  clopidogrel Tablet 75 milliGRAM(s) Oral daily  dextrose 5%. 1000 milliLiter(s) (50 mL/Hr) IV Continuous <Continuous>  dextrose 50% Injectable 12.5 Gram(s) IV Push once  dextrose 50% Injectable 25 Gram(s) IV Push once  dextrose 50% Injectable 25 Gram(s) IV Push once  escitalopram 5 milliGRAM(s) Oral every 24 hours  gabapentin 100 milliGRAM(s) Oral <User Schedule>  heparin  Injectable 5000 Unit(s) SubCutaneous every 8 hours  hydrALAZINE 100 milliGRAM(s) Oral every 8 hours  insulin lispro (HumaLOG) corrective regimen sliding scale   SubCutaneous Before meals and at bedtime  isosorbide   mononitrate ER Tablet (IMDUR) 90 milliGRAM(s) Oral every 24 hours  levothyroxine 50 MICROGram(s) Oral daily  lidocaine   Patch 1 Patch Transdermal every 24 hours  losartan 100 milliGRAM(s) Oral every 24 hours  melatonin 5 milliGRAM(s) Oral at bedtime  NIFEdipine XL 90 milliGRAM(s) Oral every 24 hours  polyethylene glycol 3350 17 Gram(s) Oral at bedtime  senna 2 Tablet(s) Oral at bedtime  sevelamer carbonate 800 milliGRAM(s) Oral three times a day with meals    MEDICATIONS  (PRN):  acetaminophen   Tablet .. 650 milliGRAM(s) Oral every 6 hours PRN Mild Pain (1 - 3)  dextrose 40% Gel 15 Gram(s) Oral once PRN Blood Glucose LESS THAN 70 milliGRAM(s)/deciliter  glucagon  Injectable 1 milliGRAM(s) IntraMuscular once PRN Glucose LESS THAN 70 milligrams/deciliter  HYDROmorphone  Injectable 0.25 milliGRAM(s) IV Push every 4 hours PRN Severe Pain (7 - 10)  traMADol 50 milliGRAM(s) Oral every 6 hours PRN Moderate Pain (4 - 6)      ALLERGIES:  Allergies    No Known Allergies    Intolerances        LABS:                        12.2   5.44  )-----------( 122      ( 19 Jan 2020 05:31 )             37.7     01-19    137  |  95<L>  |  25<H>  ----------------------------<  172<H>  3.6   |  24  |  3.55<H>    Ca    10.1      19 Jan 2020 05:31  Phos  4.0     01-19  Mg     2.1     01-19            RADIOLOGY & ADDITIONAL TESTS: Reviewed. 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 for syncopal episode and found to have elevated blood pressure. Admitted to CCU for emergent HD and management of hypertensive emergency. Shortly after CCU arrival, rapid response and stroke code for bradycardia to 30s with BP 220s/90s, while undergoing HD today (15 mins into session) when pt had slow breathing, increased lethargy (was minimally arousable to sternal rub initially). CT head noncon wnl. BP controlled s/p nicardipine gtt and regular HD sessions. Pt complained of progressively worsening complaints of neck and back pain (radiating from hip to foot, similar to pain before, hx of hip surgery). Pain management uptitrated tramadol and psych started lexapro. 1/13: Echo revealing severe LVH, EF 60-65%, severe AS, mild AR, mild MR, moderate TR. pHTN 70mmHg. R brachicephalic IV removed, noted to be indurated without fluctuation or oozing, U/S with R cephalic thrombus. CTS consulted for severe AS. Patient febrile to 101+, found to have MRSA bacteremia, on vancomycin x1 for MRSA bacteremia. EBONI w/o mer vegetation, mod-severe AS. s/p MR lumbar/thoracic spine, couldn't tolerate full scan, pending gallium could not tolearte full scan due to pain. Pending retroperitoneal u/s with doppler to assess renal artery for causes of fluctuant blood pressures on medical therapy/dialysis. Hemodynamically stable and ready for transfer to [facility].    INTERVAL HPI/OVERNIGHT EVENTS: Vanc level 15.5, dosed 750mg vanc. BP episodes of systolic >180. MRI done showing diffuse disc degeneration and herniations on prelim read    SUBJECTIVE: Patient seen and examined at bedside. Still complaining of diffuse neck, back, and right hip pain. Headache this AM. Eating well. No nausea or vomiting.    OBJECTIVE:    VITAL SIGNS:  ICU Vital Signs Last 24 Hrs  T(C): 37.2 (19 Jan 2020 07:00), Max: 37.2 (19 Jan 2020 07:00)  T(F): 98.9 (19 Jan 2020 07:00), Max: 98.9 (19 Jan 2020 07:00)  HR: 60 (19 Jan 2020 07:00) (54 - 72)  BP: 170/72 (19 Jan 2020 07:00) (133/60 - 221/87)  BP(mean): 104 (19 Jan 2020 07:00) (87 - 144)  ABP: --  ABP(mean): --  RR: 22 (19 Jan 2020 07:00) (10 - 32)  SpO2: 100% (19 Jan 2020 07:00) (97% - 100%)        01-18 @ 07:01  -  01-19 @ 07:00  --------------------------------------------------------  IN: 250 mL / OUT: 2000 mL / NET: -1750 mL      CAPILLARY BLOOD GLUCOSE      POCT Blood Glucose.: 151 mg/dL (19 Jan 2020 06:46)      PHYSICAL EXAM:  Constitutional: Leaning forward, uncomfortable, c/o neck and back pain  HEENT: NC/AT; Right pupil non-reactive to light, right eye strabismus unchanged from admission, left pupil sluggishly reactive to light, anicteric sclera, MMM  Neck: supple; no JVD or thyromegaly  Respiratory: CTA B/L; no W/R/R, no retractions  Cardiac: +S1/S2; RRR; grade 3 early-midsystolic crescendo-decrescendo murmur heard loudest in the R upper sternal border with radiation to carotids c/w aortic stenosis  Gastrointestinal: soft, NT/ND; no rebound or guarding; +BSx4  Extremities: WWP, no clubbing or cyanosis; no peripheral edema, 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 Small ulcer on left thumb, scattered healing scabs on left hand.  Vascular: L AVF bruit intact  Lymphatic: no submandibular or cervical LAD  Neurologic: AAOx3; visual field defect in right eye, slight facial droop in right face, unknown baseline. Motor 5/5 and sensation intact.       MEDICATIONS:  MEDICATIONS  (STANDING):  aspirin  chewable 81 milliGRAM(s) Oral every 24 hours  atorvastatin 40 milliGRAM(s) Oral at bedtime  carvedilol 25 milliGRAM(s) Oral every 12 hours  chlorhexidine 2% Cloths 1 Application(s) Topical <User Schedule>  cloNIDine 0.3 milliGRAM(s) Oral every 8 hours  clopidogrel Tablet 75 milliGRAM(s) Oral daily  dextrose 5%. 1000 milliLiter(s) (50 mL/Hr) IV Continuous <Continuous>  dextrose 50% Injectable 12.5 Gram(s) IV Push once  dextrose 50% Injectable 25 Gram(s) IV Push once  dextrose 50% Injectable 25 Gram(s) IV Push once  escitalopram 5 milliGRAM(s) Oral every 24 hours  gabapentin 100 milliGRAM(s) Oral <User Schedule>  heparin  Injectable 5000 Unit(s) SubCutaneous every 8 hours  hydrALAZINE 100 milliGRAM(s) Oral every 8 hours  insulin lispro (HumaLOG) corrective regimen sliding scale   SubCutaneous Before meals and at bedtime  isosorbide   mononitrate ER Tablet (IMDUR) 90 milliGRAM(s) Oral every 24 hours  levothyroxine 50 MICROGram(s) Oral daily  lidocaine   Patch 1 Patch Transdermal every 24 hours  losartan 100 milliGRAM(s) Oral every 24 hours  melatonin 5 milliGRAM(s) Oral at bedtime  NIFEdipine XL 90 milliGRAM(s) Oral every 24 hours  polyethylene glycol 3350 17 Gram(s) Oral at bedtime  senna 2 Tablet(s) Oral at bedtime  sevelamer carbonate 800 milliGRAM(s) Oral three times a day with meals    MEDICATIONS  (PRN):  acetaminophen   Tablet .. 650 milliGRAM(s) Oral every 6 hours PRN Mild Pain (1 - 3)  dextrose 40% Gel 15 Gram(s) Oral once PRN Blood Glucose LESS THAN 70 milliGRAM(s)/deciliter  glucagon  Injectable 1 milliGRAM(s) IntraMuscular once PRN Glucose LESS THAN 70 milligrams/deciliter  HYDROmorphone  Injectable 0.25 milliGRAM(s) IV Push every 4 hours PRN Severe Pain (7 - 10)  traMADol 50 milliGRAM(s) Oral every 6 hours PRN Moderate Pain (4 - 6)      ALLERGIES:  Allergies    No Known Allergies    Intolerances        LABS:                        12.2   5.44  )-----------( 122      ( 19 Jan 2020 05:31 )             37.7     01-19    137  |  95<L>  |  25<H>  ----------------------------<  172<H>  3.6   |  24  |  3.55<H>    Ca    10.1      19 Jan 2020 05:31  Phos  4.0     01-19  Mg     2.1     01-19            RADIOLOGY & ADDITIONAL TESTS: Reviewed.

## 2020-01-19 NOTE — PROGRESS NOTE ADULT - ATTENDING COMMENTS
Please see housestaff note for full details.  I have reviewed the case, examined the patient and agree with plan.  69 M HTN DM ESRD PVD carotid stent BRYNN stent with uncontrolled HTN and syncope.   BP uncontrolled overnight, now improving.  -1802/ 60-70   HR 50-60s  Tm 98.9  RR 20s         NAD  RRR SALAZAR 3/6 RUSB  CTA anteriorly  NT soft + BS    K 3.6  Cr 3.55  Hg  12  plts 122  1. Continue BP control.  2. F/u Renal.  3. F/u structural CT.  4. Continue ASA and plavix for PVD and carotid stent.

## 2020-01-19 NOTE — PROGRESS NOTE ADULT - SUBJECTIVE AND OBJECTIVE BOX
s/p HD on 1/18 w well tolerated UF 2.0 L  breathing comfortbaly on RA, sat 100%  SBP in 150-170's         Meds:  acetaminophen   Tablet .. 650 every 6 hours PRN  aspirin  chewable 81 every 24 hours  atorvastatin 40 at bedtime  carvedilol 25 every 12 hours  chlorhexidine 2% Cloths 1 <User Schedule>  cloNIDine 0.3 every 8 hours  clopidogrel Tablet 75 daily  dextrose 40% Gel 15 once PRN  dextrose 5%. 1000 <Continuous>  dextrose 50% Injectable 12.5 once  dextrose 50% Injectable 25 once  dextrose 50% Injectable 25 once  escitalopram 5 every 24 hours  gabapentin 100 <User Schedule>  glucagon  Injectable 1 once PRN  heparin  Injectable 5000 every 8 hours  hydrALAZINE 100 every 8 hours  HYDROmorphone  Injectable 0.25 every 4 hours PRN  insulin lispro (HumaLOG) corrective regimen sliding scale  Before meals and at bedtime  isosorbide   mononitrate ER Tablet (IMDUR) 90 every 24 hours  levothyroxine 50 daily  lidocaine   Patch 1 every 24 hours  losartan 100 every 24 hours  melatonin 5 at bedtime  polyethylene glycol 3350 17 at bedtime  senna 2 at bedtime  sevelamer carbonate 800 three times a day with meals  traMADol 50 every 6 hours PRN      T(C): , Max: 37.2 (01-19-20 @ 07:00)  T(F): , Max: 98.9 (01-19-20 @ 07:00)  HR: 61 (01-19-20 @ 11:00)  BP: 173/74 (01-19-20 @ 11:00)  BP(mean): 106 (01-19-20 @ 11:00)  RR: 16 (01-19-20 @ 11:00)  SpO2: 98% (01-19-20 @ 11:00)  Wt(kg): --    01-18 @ 07:01  -  01-19 @ 07:00  --------------------------------------------------------  IN: 250 mL / OUT: 2000 mL / NET: -1750 mL          PHYSICAL EXAM  Gen: NAD, ill appearing   Neck: no JVD  Respiratory: CTA B/L  Cardiac: +S1/S2; RRR; systolic murmur with radiation to carotids  Gastrointestinal: soft, NT/ND   Extremities: WWP, no peripheral edema  Neurologic: AAOx3; non focal  access:  Left arm AVF with bruit      LABS:                        12.2   5.44  )-----------( 122      ( 19 Jan 2020 05:31 )             37.7     01-19    137  |  95<L>  |  25<H>  ----------------------------<  172<H>  3.6   |  24  |  3.55<H>    Ca    10.1      19 Jan 2020 05:31  Phos  4.0     01-19  Mg     2.1     01-19                  RADIOLOGY & ADDITIONAL STUDIES:

## 2020-01-19 NOTE — PROGRESS NOTE ADULT - ASSESSMENT
70 y/o M who is a POOR/UNRELIABLE HISTORIAN with PMHx of ESRD on dialysis via AVF (tues/thurs/sat), HTN, HLD, CAD, BRYNN (s/p stent), IDDM (with peripheral neuropathy), ESRD on dialysis (tues/thurs/sat), hypothryoidism, ?CVA (blind in right eye, little vision) - patient is s/p carotid artery stent), PAD (s/p bilateral stents) who was BIBEMS to [facility] for syncopal episode and found to have elevated blood pressure. Admitted to CCU for emergent HD and managment of hypertensive emergency, with MRSA bacteremia and progressively worsening complaints of neck and back pain    Cardio  # Hypertensive emergency (BP now stable 140-150s systolic)  - BP on arrival 209/78. Patient admitted in august with similar picture of hypertensive crises, back pain, requiring HD  - Received in ED: Clonidine 0.3mg x1, hydralazine 100mg x1, meclizine 25mg x1, percocet 5mg x1  - 15 minutes into dialysis in CCU, approx 500cc off, when he became unresponsive. Dialysis was held. Followed by episode of ladarius down to HR of 30's and repeat BP systolic 90's. Rapid response was called. Heart rate spontaneously started to increase without medications or management. Patient became more arousable and was able to answer some questions, but was still lethargic  - A-line placed for better blood pressure management on 1/11, removed on 1/16  - c/w hydralazine 100mg q8h  - c/w losartan 100mg q24h  - carvedilol 25mg q12h  - clonidine 0.3mg q8h started  - amlodipine switched to nifedipine xl 60mg, nifedipine administration moved from 4am to 9am  - nifedipine increased from 60mg to 90mg  - imdur 90mg q24h  - BP hold parameters for medications: hold for SBP <100    # Hx of CAD, PAD (with stents), BRYNN (with stents), carotid artery stenosis (with stents)  - c/w ASA, plavix, atorvastatin  - pain management consulted for pt's chronic leg pain  - pain management recommendations: 50mg q6h for severe pain  - dilaudid 0.5mg IV for breakthrough pain, they also recommended Dilaudid 1mg PO q6h for severe pain, however there is concern for drug-seeking     # Aortic Stenosis  - Grade 3 systolic murmur  - ECHO 8/19: Moderate symmetric left ventricular hypertrophy. Normal right ventricular size and systolic function. Moderate aortic stenosis.  - repeat ECHO 1/13 showed severe aortic stenosis, EF 60-65%      ID  # MRSA bacteremia with no obvious source  - Received 1g Vancomycin (1/15)  - Send surveillance bcx  - Contact precautions as pt is on HD  - RUE US showing superficial clot in previous IV site  - Recheck vancomycin level after dialysis  - Gallium scan pending  - ID recommending MRI spine to rule out spinal abscess    Renal  # ESRD on HD TTS via LUE AVF, in hospital schedule has been MWF so far, per nephro  - last HD 1/17, next HD planned for today 1/18  - Renvela 800mg TID  - daily weights  - renal diet  - strict I/O    Neuro  # Questionable History of CVA  - CT Brain showed microangiopathic ischemic disease. There are again lacunar infarcts in the basal ganglia bilaterally. There is no intracranial hemorrhage or acute transcortical infarct. External carotid artery branch calcifications, as seen in patients on dialysis.   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  - neuro checks  - Avoid sedating medications     Pulm  # Elevated pulmonary artery pressure   ECHO 8/19: Severe pulmonary hypertension, PASP is 72.1 mmHg.    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 Admitted to CCU for emergent HD and managment of hypertensive emergency, with MRSA bacteremia and progressively worsening complaints of neck and back pain    Cardio  # Hypertensive emergency (BP now stable 140-150s systolic)  - BP on arrival 209/78. Patient admitted in august with similar picture of hypertensive crises, back pain, requiring HD  - Received in ED: Clonidine 0.3mg x1, hydralazine 100mg x1, meclizine 25mg x1, percocet 5mg x1  - 15 minutes into dialysis in CCU, approx 500cc off, when he became unresponsive. Dialysis was held. Followed by episode of ladarius down to HR of 30's and repeat BP systolic 90's. Rapid response was called. Heart rate spontaneously started to increase without medications or management. Patient became more arousable and was able to answer some questions, but was still lethargic  - A-line placed for better blood pressure management on 1/11, removed on 1/16  - c/w hydralazine 100mg q8h  - c/w losartan 100mg q24h  - carvedilol 25mg q12h  - clonidine 0.3mg q8h started  - amlodipine switched to nifedipine xl 60mg, nifedipine administration moved from 4am to 9am  - nifedipine increased from 60mg to 90mg  - imdur 90mg q24h  - BP hold parameters for medications: hold for SBP <100  - U/S: Retroperitoneal with doppler    # Hx of CAD, PAD (with stents), BRYNN (with stents), carotid artery stenosis (with stents)  - c/w ASA, plavix, atorvastatin  - pain management consulted for pt's chronic leg pain  - pain management recommendations: 50mg q6h for severe pain  - dilaudid 0.5mg IV for breakthrough pain, they also recommended Dilaudid 1mg PO q6h for severe pain, however there is concern for drug-seeking     # Aortic Stenosis  - Grade 3 systolic murmur  - ECHO 8/19: Moderate symmetric left ventricular hypertrophy. Normal right ventricular size and systolic function. Moderate aortic stenosis.  - repeat ECHO 1/13 showed severe aortic stenosis, EF 60-65%  - ECHO 1/17: No vegetations, LVEF 55%, mod to severe AS      ID  # MRSA bacteremia with no obvious source  - Received 1g Vancomycin (1/15)  - Send surveillance bcx  - Contact precautions as pt is on HD  - RUE US showing superficial clot in previous IV site  - Recheck vancomycin level after dialysis  - Gallium scan pending  - ID recommending MRI spine to rule out spinal abscess    Renal  # ESRD on HD TTS via LUE AVF, in hospital schedule has been MWF so far, per nephro  - last HD 1/17, next HD planned for today 1/18  - Renvela 800mg TID  - daily weights  - renal diet  - strict I/O    Neuro  # Questionable History of CVA  - CT Brain showed microangiopathic ischemic disease. There are again lacunar infarcts in the basal ganglia bilaterally. There is no intracranial hemorrhage or acute transcortical infarct. External carotid artery branch calcifications, as seen in patients on dialysis.   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  - neuro checks  - Avoid sedating medications     Pulm  # Elevated pulmonary artery pressure   ECHO 8/19: Severe pulmonary hypertension, PASP is 72.1 mmHg.    Endo  # Hypothyroidism  - c/w home synthroid 50mcg daily    # IDDM  - A1c 8/19: 7.2%  - c/w moderate ISS    Prophylaxis  F: None  E: Replete per nephro recs  N: DASH/TLC  GI: None  DVT; SQH 5000 U q8h    CODE: FULL

## 2020-01-19 NOTE — PROGRESS NOTE ADULT - ASSESSMENT
70 y/o m with PMHx of ESRD on dialysis via AVF TTS, HTN, CAD, BRYNN (s/p stent), IDDM (with peripheral nueropathy), hypothyroidism and CVA cb right eye blindness- patient is s/p carotid artery stent), PAD (s/p bilateral stents) who was BIBEMS to [facility] for syncopal episode and found to have elevated blood pressure. Admitted to CCU for emergent HD and management of hypertensive emergency.  found to have severe AS most likely contributing to Syncopal episode, currently undergoing pre-op TAVR.    # ESRD on HD TTS via LUE AVF  - HD on 1/20 with well tolerated 2.0 L UF  - volume status and electrolytes acceptable  - will reassess volume status on 1/20 for possible HD  - continue renvela 800 mg po tid w/meals and obtain phos level with bmp   - vancomycin to be administered post-HD on dialysis days     # Hypertension  - SBP in 150-170's   - carvedilol 25 po q12hr  - clonidine 0.3 po q8hr  - hydralazine 100 mg po q8hr  - losartan 100 mg po qd  - nifedipine 90 mg po qd   - isosorbide 90 mg po qd  - would recommend to increase nifedipine 90 mg po qd to 60 mg po q12hr or isosorbide 90 mg po qd to 120 mg po qd 70 y/o m with PMHx of ESRD on dialysis via AVF TTS, HTN, CAD, BRYNN (s/p stent), IDDM (with peripheral nueropathy), hypothyroidism and CVA cb right eye blindness- patient is s/p carotid artery stent), PAD (s/p bilateral stents) who was BIBEMS to [facility] for syncopal episode and found to have elevated blood pressure. Admitted to CCU for emergent HD and management of hypertensive emergency.  found to have severe AS most likely contributing to Syncopal episode, currently undergoing pre-op TAVR.    # ESRD on HD TTS via LUE AVF  - HD on 1/18 with well tolerated 2.0 L UF  - volume status and electrolytes acceptable  - will reassess volume status on 1/20 for possible HD  - continue renvela 800 mg po tid w/meals and obtain phos level with bmp   - vancomycin to be administered post-HD on dialysis days     # Hypertension  - SBP in 150-170's   - carvedilol 25 po q12hr  - clonidine 0.3 po q8hr  - hydralazine 100 mg po q8hr  - losartan 100 mg po qd  - nifedipine 90 mg po qd   - isosorbide 90 mg po qd  - would recommend to increase nifedipine 90 mg po qd to 60 mg po q12hr or isosorbide 90 mg po qd to 120 mg po qd

## 2020-01-19 NOTE — PROGRESS NOTE ADULT - ATTENDING COMMENTS
s/p HD yesterday with 2L UF, lung clear on exam, minimal O2 requirments. Evaluate for need for HD in am.   For BP can increase Nifedipine to 60mg BID and/or Increase imdur to 120mg s/p HD yesterday with 2L UF, lung clear on exam, minimal O2 requirements. Evaluate for need for HD in am.   For BP can increase Nifedipine to 60mg BID and/or Increase imdur to 120mg

## 2020-01-20 LAB
ANION GAP SERPL CALC-SCNC: 21 MMOL/L — HIGH (ref 5–17)
BUN SERPL-MCNC: 45 MG/DL — HIGH (ref 7–23)
CALCIUM SERPL-MCNC: 10.4 MG/DL — SIGNIFICANT CHANGE UP (ref 8.4–10.5)
CHLORIDE SERPL-SCNC: 96 MMOL/L — SIGNIFICANT CHANGE UP (ref 96–108)
CO2 SERPL-SCNC: 23 MMOL/L — SIGNIFICANT CHANGE UP (ref 22–31)
CREAT SERPL-MCNC: 5.32 MG/DL — HIGH (ref 0.5–1.3)
CULTURE RESULTS: SIGNIFICANT CHANGE UP
GLUCOSE BLDC GLUCOMTR-MCNC: 187 MG/DL — HIGH (ref 70–99)
GLUCOSE BLDC GLUCOMTR-MCNC: 194 MG/DL — HIGH (ref 70–99)
GLUCOSE SERPL-MCNC: 176 MG/DL — HIGH (ref 70–99)
HCT VFR BLD CALC: 37.6 % — LOW (ref 39–50)
HGB BLD-MCNC: 12.4 G/DL — LOW (ref 13–17)
MAGNESIUM SERPL-MCNC: 2.3 MG/DL — SIGNIFICANT CHANGE UP (ref 1.6–2.6)
MCHC RBC-ENTMCNC: 29 PG — SIGNIFICANT CHANGE UP (ref 27–34)
MCHC RBC-ENTMCNC: 33 GM/DL — SIGNIFICANT CHANGE UP (ref 32–36)
MCV RBC AUTO: 87.9 FL — SIGNIFICANT CHANGE UP (ref 80–100)
NRBC # BLD: 0 /100 WBCS — SIGNIFICANT CHANGE UP (ref 0–0)
PHOSPHATE SERPL-MCNC: 3.9 MG/DL — SIGNIFICANT CHANGE UP (ref 2.5–4.5)
PLATELET # BLD AUTO: 169 K/UL — SIGNIFICANT CHANGE UP (ref 150–400)
POTASSIUM SERPL-MCNC: 3.8 MMOL/L — SIGNIFICANT CHANGE UP (ref 3.5–5.3)
POTASSIUM SERPL-SCNC: 3.8 MMOL/L — SIGNIFICANT CHANGE UP (ref 3.5–5.3)
RBC # BLD: 4.28 M/UL — SIGNIFICANT CHANGE UP (ref 4.2–5.8)
RBC # FLD: 13.7 % — SIGNIFICANT CHANGE UP (ref 10.3–14.5)
SODIUM SERPL-SCNC: 140 MMOL/L — SIGNIFICANT CHANGE UP (ref 135–145)
SPECIMEN SOURCE: SIGNIFICANT CHANGE UP
WBC # BLD: 6.81 K/UL — SIGNIFICANT CHANGE UP (ref 3.8–10.5)
WBC # FLD AUTO: 6.81 K/UL — SIGNIFICANT CHANGE UP (ref 3.8–10.5)

## 2020-01-20 PROCEDURE — 99291 CRITICAL CARE FIRST HOUR: CPT

## 2020-01-20 PROCEDURE — 78802 RP LOCLZJ TUM WHBDY 1 D IMG: CPT | Mod: 26

## 2020-01-20 PROCEDURE — 99292 CRITICAL CARE ADDL 30 MIN: CPT | Mod: 25

## 2020-01-20 PROCEDURE — 71045 X-RAY EXAM CHEST 1 VIEW: CPT | Mod: 26

## 2020-01-20 RX ORDER — HYDROMORPHONE HYDROCHLORIDE 2 MG/ML
1 INJECTION INTRAMUSCULAR; INTRAVENOUS; SUBCUTANEOUS ONCE
Refills: 0 | Status: DISCONTINUED | OUTPATIENT
Start: 2020-01-20 | End: 2020-01-20

## 2020-01-20 RX ORDER — ZALEPLON 10 MG
5 CAPSULE ORAL ONCE
Refills: 0 | Status: DISCONTINUED | OUTPATIENT
Start: 2020-01-20 | End: 2020-01-20

## 2020-01-20 RX ORDER — ISOSORBIDE MONONITRATE 60 MG/1
120 TABLET, EXTENDED RELEASE ORAL EVERY 24 HOURS
Refills: 0 | Status: DISCONTINUED | OUTPATIENT
Start: 2020-01-20 | End: 2020-02-04

## 2020-01-20 RX ADMIN — Medication 0.3 MILLIGRAM(S): at 22:05

## 2020-01-20 RX ADMIN — Medication 5 MILLIGRAM(S): at 01:38

## 2020-01-20 RX ADMIN — Medication 0.3 MILLIGRAM(S): at 06:35

## 2020-01-20 RX ADMIN — Medication 100 MILLIGRAM(S): at 14:43

## 2020-01-20 RX ADMIN — SEVELAMER CARBONATE 800 MILLIGRAM(S): 2400 POWDER, FOR SUSPENSION ORAL at 10:16

## 2020-01-20 RX ADMIN — LIDOCAINE 1 PATCH: 4 CREAM TOPICAL at 14:44

## 2020-01-20 RX ADMIN — HYDROMORPHONE HYDROCHLORIDE 1 MILLIGRAM(S): 2 INJECTION INTRAMUSCULAR; INTRAVENOUS; SUBCUTANEOUS at 11:30

## 2020-01-20 RX ADMIN — Medication 650 MILLIGRAM(S): at 05:57

## 2020-01-20 RX ADMIN — Medication 0.3 MILLIGRAM(S): at 14:43

## 2020-01-20 RX ADMIN — HEPARIN SODIUM 5000 UNIT(S): 5000 INJECTION INTRAVENOUS; SUBCUTANEOUS at 14:45

## 2020-01-20 RX ADMIN — SENNA PLUS 2 TABLET(S): 8.6 TABLET ORAL at 22:06

## 2020-01-20 RX ADMIN — Medication 60 MILLIGRAM(S): at 10:17

## 2020-01-20 RX ADMIN — HYDROMORPHONE HYDROCHLORIDE 0.25 MILLIGRAM(S): 2 INJECTION INTRAMUSCULAR; INTRAVENOUS; SUBCUTANEOUS at 23:00

## 2020-01-20 RX ADMIN — HEPARIN SODIUM 5000 UNIT(S): 5000 INJECTION INTRAVENOUS; SUBCUTANEOUS at 05:57

## 2020-01-20 RX ADMIN — CHLORHEXIDINE GLUCONATE 1 APPLICATION(S): 213 SOLUTION TOPICAL at 06:37

## 2020-01-20 RX ADMIN — Medication 1 MILLIGRAM(S): at 11:24

## 2020-01-20 RX ADMIN — Medication 650 MILLIGRAM(S): at 15:03

## 2020-01-20 RX ADMIN — TRAMADOL HYDROCHLORIDE 50 MILLIGRAM(S): 50 TABLET ORAL at 19:35

## 2020-01-20 RX ADMIN — Medication 100 MILLIGRAM(S): at 05:57

## 2020-01-20 RX ADMIN — LIDOCAINE 1 PATCH: 4 CREAM TOPICAL at 03:15

## 2020-01-20 RX ADMIN — CARVEDILOL PHOSPHATE 25 MILLIGRAM(S): 80 CAPSULE, EXTENDED RELEASE ORAL at 22:06

## 2020-01-20 RX ADMIN — Medication 2: at 17:20

## 2020-01-20 RX ADMIN — Medication 650 MILLIGRAM(S): at 16:32

## 2020-01-20 RX ADMIN — CLOPIDOGREL BISULFATE 75 MILLIGRAM(S): 75 TABLET, FILM COATED ORAL at 13:20

## 2020-01-20 RX ADMIN — ESCITALOPRAM OXALATE 5 MILLIGRAM(S): 10 TABLET, FILM COATED ORAL at 22:04

## 2020-01-20 RX ADMIN — Medication 2: at 13:26

## 2020-01-20 RX ADMIN — TRAMADOL HYDROCHLORIDE 50 MILLIGRAM(S): 50 TABLET ORAL at 17:51

## 2020-01-20 RX ADMIN — TRAMADOL HYDROCHLORIDE 50 MILLIGRAM(S): 50 TABLET ORAL at 10:56

## 2020-01-20 RX ADMIN — Medication 650 MILLIGRAM(S): at 07:24

## 2020-01-20 RX ADMIN — HYDROMORPHONE HYDROCHLORIDE 0.25 MILLIGRAM(S): 2 INJECTION INTRAMUSCULAR; INTRAVENOUS; SUBCUTANEOUS at 22:10

## 2020-01-20 RX ADMIN — LIDOCAINE 1 PATCH: 4 CREAM TOPICAL at 19:34

## 2020-01-20 RX ADMIN — Medication 650 MILLIGRAM(S): at 20:49

## 2020-01-20 RX ADMIN — Medication 2: at 22:07

## 2020-01-20 RX ADMIN — Medication 50 MICROGRAM(S): at 05:57

## 2020-01-20 RX ADMIN — Medication 100 MILLIGRAM(S): at 22:04

## 2020-01-20 RX ADMIN — LOSARTAN POTASSIUM 100 MILLIGRAM(S): 100 TABLET, FILM COATED ORAL at 17:20

## 2020-01-20 RX ADMIN — TRAMADOL HYDROCHLORIDE 50 MILLIGRAM(S): 50 TABLET ORAL at 10:18

## 2020-01-20 RX ADMIN — Medication 60 MILLIGRAM(S): at 22:04

## 2020-01-20 RX ADMIN — ISOSORBIDE MONONITRATE 120 MILLIGRAM(S): 60 TABLET, EXTENDED RELEASE ORAL at 14:44

## 2020-01-20 RX ADMIN — HEPARIN SODIUM 5000 UNIT(S): 5000 INJECTION INTRAVENOUS; SUBCUTANEOUS at 22:06

## 2020-01-20 RX ADMIN — Medication 2: at 07:19

## 2020-01-20 RX ADMIN — HYDROMORPHONE HYDROCHLORIDE 1 MILLIGRAM(S): 2 INJECTION INTRAMUSCULAR; INTRAVENOUS; SUBCUTANEOUS at 11:25

## 2020-01-20 RX ADMIN — SEVELAMER CARBONATE 800 MILLIGRAM(S): 2400 POWDER, FOR SUSPENSION ORAL at 17:32

## 2020-01-20 RX ADMIN — Medication 81 MILLIGRAM(S): at 14:43

## 2020-01-20 RX ADMIN — GABAPENTIN 100 MILLIGRAM(S): 400 CAPSULE ORAL at 17:20

## 2020-01-20 RX ADMIN — Medication 5 MILLIGRAM(S): at 22:06

## 2020-01-20 RX ADMIN — ATORVASTATIN CALCIUM 40 MILLIGRAM(S): 80 TABLET, FILM COATED ORAL at 22:06

## 2020-01-20 RX ADMIN — CARVEDILOL PHOSPHATE 25 MILLIGRAM(S): 80 CAPSULE, EXTENDED RELEASE ORAL at 10:17

## 2020-01-20 RX ADMIN — Medication 650 MILLIGRAM(S): at 21:50

## 2020-01-20 NOTE — PROGRESS NOTE ADULT - SUBJECTIVE AND OBJECTIVE BOX
Transfer note from [facility] to [facility]    70 y/o M who is a POOR/UNRELIABLE HISTORIAN with PMHx of ESRD on dialysis via AVF (tues/thurs/sat), HTN, HLD, CAD, BRYNN (s/p stent), IDDM (with peripheral neuropathy), ESRD on dialysis (tues/thurs/sat), hypothryoidism, CVA (blind in R eye, limited vision in L eye) - patient is s/p carotid artery stent), RS (s/p renal artery stent), PAD (s/p bilateral stents) who was BIBEMS to [facility] for syncopal episode and found to have elevated blood pressure. Admitted to CCU for emergent HD and management of hypertensive emergency on 1/11/20. Shortly after CCU arrival, rapid response and stroke code for bradycardia to 30s with BP 220s/90s, while undergoing HD(15 mins into session) when pt had slow breathing, increased lethargy (was minimally arousable to sternal rub initially). CT head noncon wnl. BP controlled s/p nicardipine gtt and regular HD sessions per nephrology. Pt complained of progressively worsening complaints of neck and back pain (radiating from hip to foot, similar to pain before, hx of hip surgery). Pain management uptitrated tramadol and psych started lexapro. 1/13: Echo revealing severe LVH, EF 60-65%, severe AS, mild AR, mild MR, moderate TR. pHTN 70mmHg. R brachicephalic IV removed, noted to be indurated without fluctuation or oozing, U/S with R cephalic thrombus. CTS consulted for severe AS. Patient febrile to 101+, found to have MRSA bacteremia, on vancomycin 1g with levels check for MRSA bacteremia. EBONI w/o mer vegetation, mod-severe AS. s/p MR lumbar/thoracic spine, couldn't tolerate full scan.  Pending retroperitoneal u/s with doppler to assess renal artery for causes of fluctuant blood pressures on medical therapy/dialysis. Hemodynamically stable and ready for transfer to [facility].    INTERVAL HPI/OVERNIGHT EVENTS: Pt underwent gallium scan on 1/20.    SUBJECTIVE: Patient seen and examined at bedside. Continues to complain of back pain.     VITAL SIGNS:  ICU Vital Signs Last 24 Hrs  T(C): 36.2 (20 Jan 2020 06:00), Max: 36.7 (19 Jan 2020 09:24)  T(F): 97.1 (20 Jan 2020 06:00), Max: 98.1 (19 Jan 2020 09:24)  HR: 68 (20 Jan 2020 08:00) (58 - 82)  BP: 148/67 (20 Jan 2020 08:00) (96/51 - 187/80)  BP(mean): 96 (20 Jan 2020 08:00) (71 - 127)  ABP: --  ABP(mean): --  RR: 18 (20 Jan 2020 08:00) (12 - 37)  SpO2: 99% (20 Jan 2020 08:00) (96% - 100%)        01-19 @ 07:01  -  01-20 @ 07:00  --------------------------------------------------------  IN: 0 mL / OUT: 100 mL / NET: -100 mL      CAPILLARY BLOOD GLUCOSE      POCT Blood Glucose.: 193 mg/dL (20 Jan 2020 07:16)      PHYSICAL EXAM:    Constitutional: NAD  HEENT: NC/AT; Right pupil non-reactive to light, right eye strabismus unchanged from admission, left pupil sluggishly reactive to light, anicteric sclera, MMM  Neck: supple; no JVD or thyromegaly  Respiratory: CTA B/L; no W/R/R, no retractions  Cardiac: +S1/S2; RRR; grade 3/6 early-midsystolic crescendo-decrescendo murmur heard loudest in the R upper sternal border with radiation to carotids c/w aortic stenosis  Gastrointestinal: soft, NT/ND; no rebound or guarding; +BSx4  Extremities: WWP, no clubbing or cyanosis; no peripheral edema, 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 Small ulcer on left thumb, scattered healing scabs on left hand.  Vascular: L AVF bruit intact  Lymphatic: no submandibular or cervical LAD  Neurologic: AAOx3; visual field defect in right eye, slight facial droop in right face, unknown baseline. Motor 5/5 and sensation intact.    MEDICATIONS:  MEDICATIONS  (STANDING):  aspirin  chewable 81 milliGRAM(s) Oral every 24 hours  atorvastatin 40 milliGRAM(s) Oral at bedtime  carvedilol 25 milliGRAM(s) Oral every 12 hours  chlorhexidine 2% Cloths 1 Application(s) Topical <User Schedule>  cloNIDine 0.3 milliGRAM(s) Oral every 8 hours  clopidogrel Tablet 75 milliGRAM(s) Oral daily  dextrose 5%. 1000 milliLiter(s) (50 mL/Hr) IV Continuous <Continuous>  dextrose 50% Injectable 12.5 Gram(s) IV Push once  dextrose 50% Injectable 25 Gram(s) IV Push once  dextrose 50% Injectable 25 Gram(s) IV Push once  escitalopram 5 milliGRAM(s) Oral every 24 hours  gabapentin 100 milliGRAM(s) Oral <User Schedule>  heparin  Injectable 5000 Unit(s) SubCutaneous every 8 hours  hydrALAZINE 100 milliGRAM(s) Oral every 8 hours  insulin lispro (HumaLOG) corrective regimen sliding scale   SubCutaneous Before meals and at bedtime  isosorbide   mononitrate ER Tablet (IMDUR) 90 milliGRAM(s) Oral every 24 hours  levothyroxine 50 MICROGram(s) Oral daily  lidocaine   Patch 1 Patch Transdermal every 24 hours  losartan 100 milliGRAM(s) Oral every 24 hours  melatonin 5 milliGRAM(s) Oral at bedtime  NIFEdipine XL 60 milliGRAM(s) Oral every 12 hours  polyethylene glycol 3350 17 Gram(s) Oral at bedtime  senna 2 Tablet(s) Oral at bedtime  sevelamer carbonate 800 milliGRAM(s) Oral three times a day with meals    MEDICATIONS  (PRN):  acetaminophen   Tablet .. 650 milliGRAM(s) Oral every 6 hours PRN Mild Pain (1 - 3)  dextrose 40% Gel 15 Gram(s) Oral once PRN Blood Glucose LESS THAN 70 milliGRAM(s)/deciliter  glucagon  Injectable 1 milliGRAM(s) IntraMuscular once PRN Glucose LESS THAN 70 milligrams/deciliter  HYDROmorphone  Injectable 0.25 milliGRAM(s) IV Push every 4 hours PRN Severe Pain (7 - 10)  traMADol 50 milliGRAM(s) Oral every 6 hours PRN Moderate Pain (4 - 6)      ALLERGIES:  Allergies    No Known Allergies    Intolerances        LABS:                        12.4   6.81  )-----------( 169      ( 20 Jan 2020 06:07 )             37.6     01-20    140  |  96  |  45<H>  ----------------------------<  176<H>  3.8   |  23  |  5.32<H>    Ca    10.4      20 Jan 2020 06:07  Phos  3.9     01-20  Mg     2.3     01-20            RADIOLOGY & ADDITIONAL TESTS: Reviewed.

## 2020-01-20 NOTE — PROGRESS NOTE ADULT - SUBJECTIVE AND OBJECTIVE BOX
O/N Events: KAT, afeb  Subjective:  fells better, NAD and comfortable on RA, volume status and lytes acceptable  CXR with right hemidiaphragm and atelectasis      VITALS  Vital Signs Last 24 Hrs  T(C): 36.2 (20 Jan 2020 06:00), Max: 36.2 (19 Jan 2020 14:35)  T(F): 97.1 (20 Jan 2020 06:00), Max: 97.1 (19 Jan 2020 14:35)  HR: 68 (20 Jan 2020 08:00) (59 - 82)  BP: 148/67 (20 Jan 2020 08:00) (96/51 - 187/80)  BP(mean): 96 (20 Jan 2020 08:00) (71 - 127)  RR: 18 (20 Jan 2020 08:00) (14 - 37)  SpO2: 99% (20 Jan 2020 08:00) (96% - 100%)    PHYSICAL EXAM  Gen: NAD  Neck: no JVD  Respiratory: CTA B/L  Cardiac: +S1/S2; RRR; systolic murmur with radiation to carotids  Gastrointestinal: soft, NT/ND   Extremities: WWP, no peripheral edema  Neurologic: AAOx3; non focal  access:  Left arm AVF with bruit      MEDICATIONS  (STANDING):  aspirin  chewable 81 milliGRAM(s) Oral every 24 hours  atorvastatin 40 milliGRAM(s) Oral at bedtime  carvedilol 25 milliGRAM(s) Oral every 12 hours  chlorhexidine 2% Cloths 1 Application(s) Topical <User Schedule>  cloNIDine 0.3 milliGRAM(s) Oral every 8 hours  clopidogrel Tablet 75 milliGRAM(s) Oral daily  dextrose 5%. 1000 milliLiter(s) (50 mL/Hr) IV Continuous <Continuous>  dextrose 50% Injectable 12.5 Gram(s) IV Push once  dextrose 50% Injectable 25 Gram(s) IV Push once  dextrose 50% Injectable 25 Gram(s) IV Push once  escitalopram 5 milliGRAM(s) Oral every 24 hours  gabapentin 100 milliGRAM(s) Oral <User Schedule>  heparin  Injectable 5000 Unit(s) SubCutaneous every 8 hours  hydrALAZINE 100 milliGRAM(s) Oral every 8 hours  insulin lispro (HumaLOG) corrective regimen sliding scale   SubCutaneous Before meals and at bedtime  isosorbide   mononitrate ER Tablet (IMDUR) 90 milliGRAM(s) Oral every 24 hours  levothyroxine 50 MICROGram(s) Oral daily  lidocaine   Patch 1 Patch Transdermal every 24 hours  losartan 100 milliGRAM(s) Oral every 24 hours  melatonin 5 milliGRAM(s) Oral at bedtime  NIFEdipine XL 60 milliGRAM(s) Oral every 12 hours  polyethylene glycol 3350 17 Gram(s) Oral at bedtime  senna 2 Tablet(s) Oral at bedtime  sevelamer carbonate 800 milliGRAM(s) Oral three times a day with meals    MEDICATIONS  (PRN):  acetaminophen   Tablet .. 650 milliGRAM(s) Oral every 6 hours PRN Mild Pain (1 - 3)  dextrose 40% Gel 15 Gram(s) Oral once PRN Blood Glucose LESS THAN 70 milliGRAM(s)/deciliter  glucagon  Injectable 1 milliGRAM(s) IntraMuscular once PRN Glucose LESS THAN 70 milligrams/deciliter  HYDROmorphone  Injectable 0.25 milliGRAM(s) IV Push every 4 hours PRN Severe Pain (7 - 10)  traMADol 50 milliGRAM(s) Oral every 6 hours PRN Moderate Pain (4 - 6)      LABS                        12.4   6.81  )-----------( 169      ( 20 Jan 2020 06:07 )             37.6     01-20    140  |  96  |  45<H>  ----------------------------<  176<H>  3.8   |  23  |  5.32<H>    Ca    10.4      20 Jan 2020 06:07  Phos  3.9     01-20  Mg     2.3     01-20

## 2020-01-20 NOTE — PROGRESS NOTE ADULT - SUBJECTIVE AND OBJECTIVE BOX
Pain Management Progress Note - [facility] [phone]      HPI: Patient with a history of syncope, stroke, ESRD, hypothyroidism, dizziness, hypertension, CAD, retinal artery occlusion, admitted with emergent hemodialyses and management of hyperintensive urgency. Patient continues to reports pain to his R Hip, R thigh and neck, worse with movement and turning in bed. Patient reporting mild pain relief with Tramadol Po medication regimen.  Axox3, denies n,v, no s/s of oversedation.      Pain is _x__ sharp ____dull ___burning __x_achy ___ Intensity: ___x_ mild __x_mod ___severe     Location ___surgical site _x___cervical _____lumbar ____abd ____upper ext_x___lower ext    Worse with ___x_activity _x___movement _____physical therapy___ Rest    Improved with __x__medication ___x_rest ____physical therapy      hydrALAZINE  cloNIDine  meclizine  morphine  - Injectable  oxycodone    5 mG/acetaminophen 325 mG  chlorhexidine 2% Cloths  aspirin  chewable  losartan  isosorbide   mononitrate ER Tablet (IMDUR)  gabapentin  atorvastatin  clopidogrel Tablet  carvedilol  hydrALAZINE Injectable  sevelamer carbonate  levothyroxine  amLODIPine   Tablet  hydrALAZINE  insulin lispro (HumaLOG) corrective regimen sliding scale  dextrose 5%.  dextrose 40% Gel  dextrose 50% Injectable  glucagon  Injectable  heparin  Injectable  niCARdipine Infusion  niCARdipine Infusion  oxycodone    5 mG/acetaminophen 325 mG  acetaminophen   Tablet ..  lidocaine   Patch  cyclobenzaprine  cloNIDine  amLODIPine   Tablet  gabapentin  carvedilol  traMADol  carvedilol  carvedilol  insulin glargine Injectable (LANTUS)  dextrose 50% Injectable  isosorbide   mononitrate ER Tablet (IMDUR)  BACItracin   Ointment  traMADol  melatonin  NIFEdipine XL  acetaminophen   Tablet ..  traMADol  traMADol  NIFEdipine XL  polyethylene glycol 3350  senna  NIFEdipine XL  nafcillin  IVPB  vancomycin  IVPB  escitalopram  hydrALAZINE  cloNIDine  hydrALAZINE  hydrALAZINE  isosorbide   mononitrate ER Tablet (IMDUR)  isosorbide   mononitrate ER Tablet (IMDUR)  cloNIDine  hydrALAZINE  isosorbide   mononitrate ER Tablet (IMDUR)  carvedilol  gabapentin  losartan  lidocaine   Patch  traMADol  HYDROmorphone  Injectable  fentaNYL    Injectable  midazolam Injectable  vancomycin  IVPB  traMADol  HYDROmorphone  Injectable  traMADol  HYDROmorphone  Injectable  NIFEdipine XL  traMADol  HYDROmorphone  Injectable  isosorbide   mononitrate ER Tablet (IMDUR)  HYDROmorphone  Injectable  LORazepam   Injectable  HYDROmorphone  Injectable  vancomycin  IVPB  NIFEdipine XL  haloperidol    Injectable  zaleplon      ROS: Const:  __-_febrile   Eyes:___ENT:___CV: __-_chest pain  Resp: __-__sob  GI:___nausea -___vomiting __-_abd pain ___npo ___clears x__full diet __bm  :___ Musk: _x__pain _-__spasm  Skin:___ Neuro:  -___sbmiubpq__-_qzcmkvbfz_-__ numbness _x__weakness __x_paresth  Psych:_-_anxiety  Endo:___ Heme:___Allergy:_________, _x__all others reviewed and negative      PAST MEDICAL & SURGICAL HISTORY:  Peripheral neuropathy  Peripheral artery disease: s/p bilateral stents  Anemia of renal disease  End-stage renal disease (ESRD)  Type II diabetes mellitus  Retinal artery occlusion  Hypothyroidism  Low back pain  CAD (coronary artery disease)  H/O renal artery stenosis: s/p L renal stent  Hyperlipidemia  Hypertension  No significant past surgical history      01-20 @ 06:0710 mL/min/1.73M2<L>      Hemoglobin: 12.4 g/dL (01-20 @ 06:07)  Hemoglobin: 12.2 g/dL (01-19 @ 05:31)      T(C): 36.2 (01-20-20 @ 06:00), Max: 36.2 (01-19-20 @ 14:35)  HR: 68 (01-20-20 @ 08:00) (58 - 82)  BP: 148/67 (01-20-20 @ 08:00) (96/51 - 187/80)  RR: 18 (01-20-20 @ 08:00) (12 - 37)  SpO2: 99% (01-20-20 @ 08:00) (96% - 100%)  Wt(kg): --          PHYSICAL EXAM:  Gen Appearance: __-_no acute distress __-_appropriate        Neuro: _x__SILT feet____ EOM Intact Psych: AAOX_3_, __x_mood/affect appropriate        Eyes: _x__conjunctiva WNL  ___x__ Pupils equal and round        ENT: __x_ears and nose atraumatic__x_ Hearing grossly intact        Neck: _x__trachea midline, no visible masses ___thyroid without palpable mass    Resp: _x__Nml WOB____No tactile fremitus ___clear to auscultation    Cardio: _x__extremities free from edema __x__pedal pulses palpable    GI/Abdomen: __x_soft ___x__ Nontender___x___Nondistended_____HSM    Lymphatic: ___no palpable nodes in neck  ___no palpable nodes calves and feet    Skin/Wound: ___Incision, ___Dressing c/d/i,   ____surrounding tissues soft,  ___drain/chest tube present____    Muscular: EHL __4_/5  Gastrocnemius__4_/5    _x__absent clubbing/cyanosis        ASSESSMENT: This is a 69y old Male with a history of syncope, stroke, ESRD, hypothyroidism, dizziness, hypertension, CAD, retinal artery occlusion, admitted with emergent hemodialyses and management of hyperintensive urgency.       Recommended Treatment PLAN:  1. Tramadol 50mg PO q6 PRN severe pain  2. Continue Gabapentin 100mg PO q8  3. Dilaudid 0.25mg Q4h prn breakthrough pain  4. tylenol 650mg Q6h standing  5. x1 lidocaine patch to affected area, 12hours on 12hours off  Plan discussed with Dr. Chu at bedside

## 2020-01-20 NOTE — PROGRESS NOTE ADULT - PROBLEM SELECTOR PLAN 1
# ESRD on HD TTS via LUE AVF  last HD 1/18 with 2 L UF  - HD tmr per reg schedule  - Bp control with current antihypertensives and UF w/HD  - continue renvela 800 mg po tid w/meals and obtain phos level with bmp   - vancomycin to be administered post-HD on dialysis days   ( last trough 15.5 1/18)    Will follow

## 2020-01-20 NOTE — PROGRESS NOTE ADULT - SUBJECTIVE AND OBJECTIVE BOX
INTERVAL HPI/OVERNIGHT EVENTS:    ANTIBIOTICS    MEDICATIONS  (STANDING):  aspirin  chewable 81 milliGRAM(s) Oral every 24 hours  atorvastatin 40 milliGRAM(s) Oral at bedtime  carvedilol 25 milliGRAM(s) Oral every 12 hours  chlorhexidine 2% Cloths 1 Application(s) Topical <User Schedule>  cloNIDine 0.3 milliGRAM(s) Oral every 8 hours  clopidogrel Tablet 75 milliGRAM(s) Oral daily  dextrose 5%. 1000 milliLiter(s) (50 mL/Hr) IV Continuous <Continuous>  dextrose 50% Injectable 12.5 Gram(s) IV Push once  dextrose 50% Injectable 25 Gram(s) IV Push once  dextrose 50% Injectable 25 Gram(s) IV Push once  escitalopram 5 milliGRAM(s) Oral every 24 hours  gabapentin 100 milliGRAM(s) Oral <User Schedule>  heparin  Injectable 5000 Unit(s) SubCutaneous every 8 hours  hydrALAZINE 100 milliGRAM(s) Oral every 8 hours  insulin lispro (HumaLOG) corrective regimen sliding scale   SubCutaneous Before meals and at bedtime  isosorbide   mononitrate ER Tablet (IMDUR) 120 milliGRAM(s) Oral every 24 hours  levothyroxine 50 MICROGram(s) Oral daily  lidocaine   Patch 1 Patch Transdermal every 24 hours  losartan 100 milliGRAM(s) Oral every 24 hours  melatonin 5 milliGRAM(s) Oral at bedtime  NIFEdipine XL 60 milliGRAM(s) Oral every 12 hours  polyethylene glycol 3350 17 Gram(s) Oral at bedtime  senna 2 Tablet(s) Oral at bedtime  sevelamer carbonate 800 milliGRAM(s) Oral three times a day with meals    MEDICATIONS  (PRN):  acetaminophen   Tablet .. 650 milliGRAM(s) Oral every 6 hours PRN Mild Pain (1 - 3)  dextrose 40% Gel 15 Gram(s) Oral once PRN Blood Glucose LESS THAN 70 milliGRAM(s)/deciliter  glucagon  Injectable 1 milliGRAM(s) IntraMuscular once PRN Glucose LESS THAN 70 milligrams/deciliter  HYDROmorphone  Injectable 0.25 milliGRAM(s) IV Push every 4 hours PRN Severe Pain (7 - 10)  traMADol 50 milliGRAM(s) Oral every 6 hours PRN Moderate Pain (4 - 6)      Allergies    No Known Allergies    Intolerances        REVIEW OF SYSTEMS:    Constitutional: No fever, weight loss or fatigue  Eyes: No eye pain, visual disturbances, or discharge  ENMT:  No difficulty hearing, tinnitus, vertigo; No sinus or throat pain  Neck: No pain or stiffness  Respiratory: No cough, wheezing, chills or hemoptysis  Cardiovascular: No chest pain, palpitations, shortness of breath, dizziness or leg swelling  Gastrointestinal: No abdominal or epigastric pain. No nausea, vomiting or hematemesis; No diarrhea or constipation. No melena or hematochezia.  Genitourinary: No dysuria, frequency, hematuria or incontinence  Rectal: No pain, hemorrhoids or incontinence  Neurological: No headaches, memory loss, loss of strength, numbness or tremors  Skin: No itching, burning, rashes or lesions       Vital Signs Last 24 Hrs  T(C): 36.6 (20 Jan 2020 14:32), Max: 36.6 (20 Jan 2020 10:00)  T(F): 97.9 (20 Jan 2020 14:32), Max: 97.9 (20 Jan 2020 10:00)  HR: 66 (20 Jan 2020 13:25) (59 - 82)  BP: 155/69 (20 Jan 2020 13:25) (111/56 - 187/80)  BP(mean): 99 (20 Jan 2020 13:25) (81 - 150)  RR: 16 (20 Jan 2020 13:25) (14 - 33)  SpO2: 99% (20 Jan 2020 13:25) (91% - 100%)    PHYSICAL EXAM:    General: Well developed; well nourished; in no acute distress  Eyes: PERRL, EOM intact; conjunctiva and sclera clear  Head: Normocephalic; atraumatic  ENMT: No nasal discharge; airway clear  Neck: Supple; non tender; no masses  Respiratory: No wheezes, rales or rhonchi  Cardiovascular: Regular rate and rhythm. S1 and S2 Normal; No murmurs, gallops or rubs  Gastrointestinal: Soft non-tender non-distended; Normal bowel sounds; No hepatosplenomegaly  Genitourinary: No costovertebral angle tenderness  Extremities: Normal range of motion, No clubbing, cyanosis or edema  Vascular: Peripheral pulses palpable 2+ bilaterally  Neurological: Alert and oriented x3  Skin: Warm and dry. No acute rash  Lymph Nodes: No acute cervical adenopathy  Musculoskeletal: Normal gait, tone, without deformities    LABS:                        12.4   6.81  )-----------( 169      ( 20 Jan 2020 06:07 )             37.6     01-20    140  |  96  |  45<H>  ----------------------------<  176<H>  3.8   |  23  |  5.32<H>    Ca    10.4      20 Jan 2020 06:07  Phos  3.9     01-20  Mg     2.3     01-20              MICROBIOLOGY:  Culture Results:   No growth at 4 days. (01-15 @ 21:35)  Culture Results:   Growth in anaerobic bottle: Methicillin resistant Staphylococcus aureus  Floor previously notified.  Aerobic Bottle: No growth (01-14 @ 12:33)  Culture Results:   Growth in aerobic bottle: Methicillin resistant Staphylococcus aureus  Result called to and read back byAsael Cyr RN  01/16/2020 09:59:46  Anaerobic Bottle: No growth to date.  Change in culture status will be immediately reported. (01-14 @ 12:33)      RADIOLOGY & ADDITIONAL STUDIES:

## 2020-01-20 NOTE — PROGRESS NOTE ADULT - ASSESSMENT
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 for syncopal episode and found to have elevated blood pressure. Admitted to CCU for emergent HD and managment of hypertensive emergency, with MRSA bacteremia and progressively worsening complaints of neck and back pain    Cardio  # Hypertensive emergency (BP now stable 140-150s systolic)  - BP on arrival 209/78. Patient admitted in august with similar picture of hypertensive crises, back pain, requiring HD  - Received in ED: Clonidine 0.3mg x1, hydralazine 100mg x1, meclizine 25mg x1, percocet 5mg x1  - 15 minutes into dialysis in CCU, approx 500cc off, when he became unresponsive. Dialysis was held. Followed by episode of ladarius down to HR of 30's and repeat BP systolic 90's. Rapid response was called. Heart rate spontaneously started to increase without medications or management. Patient became more arousable and was able to answer some questions, but was still lethargic  - A-line placed for better blood pressure management on 1/11, removed on 1/16  - c/w hydralazine 100mg q8h  - c/w losartan 100mg q24h  - carvedilol 25mg q12h  - clonidine 0.3mg q8h started  - amlodipine switched to nifedipine xl 60mg, nifedipine administration moved from 4am to 9am  - nifedipine 60mg q12h  - imdur 90mg q24h  - BP hold parameters for medications: hold for SBP <100  - U/S: Retroperitoneal with doppler    # Hx of CAD, PAD (with stents), BRYNN (with stents), carotid artery stenosis (with stents)  - c/w ASA, plavix, atorvastatin  - pain management consulted for pt's chronic leg pain  - pain management recommendations: 50mg q6h for severe pain  - dilaudid 0.5mg IV for breakthrough pain, they also recommended Dilaudid 1mg PO q6h for severe pain, however there is concern for drug-seeking     # Aortic Stenosis  - Grade 3 systolic murmur  - ECHO 8/19: Moderate symmetric left ventricular hypertrophy. Normal right ventricular size and systolic function. Moderate aortic stenosis.  - repeat ECHO 1/13 showed severe aortic stenosis, EF 60-65%  - ECHO 1/17: No vegetations, LVEF 55%, mod to severe AS      ID  # MRSA bacteremia with no obvious source  - Started on 1g Vancomycin (1/15 - ___ )  - Send surveillance bcx  - Contact precautions as pt is on HD  - RUE US showing superficial clot in previous IV site  - Recheck vancomycin level after dialysis  - MRI Thoracic and Lumbar spine showing diffuse disc degeneration and herniation   - f/u gallium scan on 1/20      Renal  # ESRD on HD TTS via LUE AVF, in hospital schedule has been MWF so far, per nephro  - last HD 1/17, next HD planned for today 1/18  - Renvela 800mg TID  - daily weights  - renal diet  - strict I/O    Neuro  # Questionable History of CVA  - CT Brain showed microangiopathic ischemic disease. There are again lacunar infarcts in the basal ganglia bilaterally. There is no intracranial hemorrhage or acute transcortical infarct. External carotid artery branch calcifications, as seen in patients on dialysis.   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  - neuro checks  - Avoid sedating medications     Pulm  # Elevated pulmonary artery pressure   ECHO 8/19: Severe pulmonary hypertension, PASP is 72.1 mmHg.    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 Admitted to CCU for emergent HD and management of hypertensive emergency, with MRSA bacteremia and progressively worsening complaints of neck and back pain.    Cardio  # Hypertensive emergency (BP now stable 140-150s systolic)  - BP on arrival 209/78. Patient admitted in august with similar picture of hypertensive crises, back pain, requiring HD  - Received in ED: Clonidine 0.3mg x1, hydralazine 100mg x1, meclizine 25mg x1, percocet 5mg x1  - 15 minutes into dialysis in CCU, approx 500cc off, when he became unresponsive. Dialysis was held. Followed by episode of ladarius down to HR of 30's and repeat BP systolic 90's. Rapid response was called. Heart rate spontaneously started to increase without medications or management. Patient became more arousable and was able to answer some questions, but was still lethargic  - A-line placed for better blood pressure management on 1/11, removed on 1/16  - c/w hydralazine 100mg q8h  - c/w losartan 100mg q24h  - carvedilol 25mg q12h  - clonidine 0.3mg q8h started  - amlodipine switched to nifedipine xl 60mg, nifedipine administration moved from 4am to 9am  - nifedipine 60mg q12h  - imdur 90mg q24h  - BP hold parameters for medications: hold for SBP <100  - U/S: Retroperitoneal with doppler    # Hx of CAD, PAD (with stents), BRYNN (with stents), carotid artery stenosis (with stents)  - c/w ASA, plavix, atorvastatin  - pain management consulted for pt's chronic leg pain  - pain management recommendations: 50mg q6h for severe pain  - dilaudid 0.5mg IV for breakthrough pain, they also recommended Dilaudid 1mg PO q6h for severe pain, however there is concern for drug-seeking     # Aortic Stenosis  - Grade 3 systolic murmur  - ECHO 8/19: Moderate symmetric left ventricular hypertrophy. Normal right ventricular size and systolic function. Moderate aortic stenosis.  - repeat ECHO 1/13 showed severe aortic stenosis, EF 60-65%  - ECHO 1/17: No vegetations, LVEF 55%, mod to severe AS      ID  # MRSA bacteremia with no obvious source  - Started on 1g Vancomycin (1/15 - ___ )  - Send surveillance bcx  - Contact precautions as pt is on HD  - RUE US showing superficial clot in previous IV site  - Recheck vancomycin level after dialysis  - MRI Thoracic and Lumbar spine showing diffuse disc degeneration and herniation   - f/u gallium scan on 1/20      Renal  # ESRD on HD TTS via LUE AVF, in hospital schedule has been MWF so far, per nephro  - last HD 1/17, next HD planned for today 1/18  - Renvela 800mg TID  - daily weights  - renal diet  - strict I/O    Neuro  # Questionable History of CVA  - CT Brain showed microangiopathic ischemic disease. There are again lacunar infarcts in the basal ganglia bilaterally. There is no intracranial hemorrhage or acute transcortical infarct. External carotid artery branch calcifications, as seen in patients on dialysis.   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  - neuro checks  - Avoid sedating medications     Pulm  # Elevated pulmonary artery pressure   ECHO 8/19: Severe pulmonary hypertension, PASP is 72.1 mmHg.    Endo  # Hypothyroidism  - c/w home synthroid 50mcg daily    # IDDM  - A1c 8/19: 7.2%  - c/w moderate ISS    Prophylaxis  F: None  E: Replete per nephro recs  N: DASH/TLC  GI: None  DVT; SQH 5000 U q8h    CODE: FULL

## 2020-01-20 NOTE — PROGRESS NOTE ADULT - ASSESSMENT
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 Admitted to CCU for emergent HD and managment of hypertensive emergency, with MRSA bacteremia and progressively worsening complaints of neck and back pain    Cardio  # Hypertensive emergency (BP now stable 140-150s systolic)  - BP on arrival 209/78. Patient admitted in august with similar picture of hypertensive crises, back pain, requiring HD  - Received in ED: Clonidine 0.3mg x1, hydralazine 100mg x1, meclizine 25mg x1, percocet 5mg x1  - 15 minutes into dialysis in CCU, approx 500cc off, when he became unresponsive. Dialysis was held. Followed by episode of ladarius down to HR of 30's and repeat BP systolic 90's. Rapid response was called. Heart rate spontaneously started to increase without medications or management. Patient became more arousable and was able to answer some questions, but was still lethargic  - A-line placed for better blood pressure management on 1/11, removed on 1/16  - c/w hydralazine 100mg q8h  - c/w losartan 100mg q24h  - carvedilol 25mg q12h  - clonidine 0.3mg q8h started  - amlodipine switched to nifedipine xl 60mg, nifedipine administration moved from 4am to 9am  - nifedipine 60mg q12h  - imdur 90mg q24h  - BP hold parameters for medications: hold for SBP <100  - U/S: Retroperitoneal with doppler    # Hx of CAD, PAD (with stents), BRYNN (with stents), carotid artery stenosis (with stents)  - c/w ASA, plavix, atorvastatin  - pain management consulted for pt's chronic leg pain  - pain management recommendations: 50mg q6h for severe pain  - dilaudid 0.5mg IV for breakthrough pain, they also recommended Dilaudid 1mg PO q6h for severe pain, however there is concern for drug-seeking     # Aortic Stenosis  - Grade 3 systolic murmur  - ECHO 8/19: Moderate symmetric left ventricular hypertrophy. Normal right ventricular size and systolic function. Moderate aortic stenosis.  - repeat ECHO 1/13 showed severe aortic stenosis, EF 60-65%  - ECHO 1/17: No vegetations, LVEF 55%, mod to severe AS      ID  # MRSA bacteremia with no obvious source  - Started on 1g Vancomycin (1/15 - ___ )  - Send surveillance bcx  - Contact precautions as pt is on HD  - RUE US showing superficial clot in previous IV site  - Recheck vancomycin level after dialysis  - MRI Thoracic and Lumbar spine showing diffuse disc degeneration and herniation   - f/u gallium scan on 1/20      Renal  # ESRD on HD TTS via LUE AVF, in hospital schedule has been MWF so far, per nephro  - last HD 1/17, next HD planned for today 1/18  - Renvela 800mg TID  - daily weights  - renal diet  - strict I/O    Neuro  # Questionable History of CVA  - CT Brain showed microangiopathic ischemic disease. There are again lacunar infarcts in the basal ganglia bilaterally. There is no intracranial hemorrhage or acute transcortical infarct. External carotid artery branch calcifications, as seen in patients on dialysis.   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  - neuro checks  - Avoid sedating medications     Pulm  # Elevated pulmonary artery pressure   ECHO 8/19: Severe pulmonary hypertension, PASP is 72.1 mmHg.    Endo  # Hypothyroidism  - c/w home synthroid 50mcg daily    # IDDM  - A1c 8/19: 7.2%  - c/w moderate ISS    Prophylaxis  F: None  E: Replete per nephro recs  N: DASH/TLC  GI: None  DVT; SQH 5000 U q8h    CODE: FULL

## 2020-01-20 NOTE — PROGRESS NOTE ADULT - ATTENDING COMMENTS
On Date: 1/20/20  I have personally provided 91 minutes of critical care time.    This time excludes time spent on teaching and/or separate procedures.  I have reviewed the resident's documentation and I agree with the assessment and plan of care.    The Patient has a Critical Illness needing Critical Care for the following Reasons:  New htn emergency  Worsening sob  Severe aortic stenosis  Unstable hemodynamics    House Staff/Fellow note read, including vitals, physical findings, laboratory data, and radiological reports.   Revisions included below.   Direct personal management at bed side and extensive interpretation of the data.    Plan was outlined and discussed in details with the House Staff/Fellow.    Decision making of high complexity   Risk high of complications, morbidity, and/or mortality    Assessment and Action taken for acute disease activity to reflect the level of care provided:  -Hemodynamic evaluation and support  -ACS assessment and treatment as applicable      -Heart failure assessment and treatment as applicable  -Cardiac Telemetry reviewed  -Medication reconciliation  -Review laboratory data  -EKG reviewed   -Echo reviewed  -Interdisciplinary discussion with IC / EP / HF / CTS teams as needed    My plan includes :  close hemodynamic monitoring and management   ICU Vital Signs Last 24 Hrs  T(C): 36.6 (20 Jan 2020 10:00), Max: 36.6 (20 Jan 2020 10:00)  T(F): 97.9 (20 Jan 2020 10:00), Max: 97.9 (20 Jan 2020 10:00)  HR: 67 (20 Jan 2020 10:00) (59 - 82)  BP: 175/75 (20 Jan 2020 10:00) (96/51 - 187/80)  BP(mean): 108 (20 Jan 2020 10:00) (71 - 127)  ABP: --  ABP(mean): --  RR: 15 (20 Jan 2020 10:00) (14 - 37)  SpO2: 96% (20 Jan 2020 10:00) (96% - 100%)    Monitor for arrhythmias and monitor parameters for organ perfusion  monitor adequacy of oxygenation and ventilation and attempt to wean oxygen

## 2020-01-20 NOTE — PROGRESS NOTE ADULT - SUBJECTIVE AND OBJECTIVE BOX
-note in progress    INTERVAL HPI/OVERNIGHT EVENTS:    SUBJECTIVE: Patient seen and examined at bedside.    VITAL SIGNS:  ICU Vital Signs Last 24 Hrs  T(C): 36.2 (20 Jan 2020 06:00), Max: 36.7 (19 Jan 2020 09:24)  T(F): 97.1 (20 Jan 2020 06:00), Max: 98.1 (19 Jan 2020 09:24)  HR: 68 (20 Jan 2020 08:00) (58 - 82)  BP: 148/67 (20 Jan 2020 08:00) (96/51 - 187/80)  BP(mean): 96 (20 Jan 2020 08:00) (71 - 127)  ABP: --  ABP(mean): --  RR: 18 (20 Jan 2020 08:00) (12 - 37)  SpO2: 99% (20 Jan 2020 08:00) (96% - 100%)        01-19 @ 07:01  -  01-20 @ 07:00  --------------------------------------------------------  IN: 0 mL / OUT: 100 mL / NET: -100 mL      CAPILLARY BLOOD GLUCOSE      POCT Blood Glucose.: 193 mg/dL (20 Jan 2020 07:16)      PHYSICAL EXAM:    Constitutional: NAD  HEENT: NC/AT; PERRL, anicteric sclera; MMM  Neck: supple, no JVD  Cardiovascular: +S1/S2, RRR  Respiratory: CTA B/L, no W/R/R  Gastrointestinal: abdomen soft, NT/ND; no rebound or guarding; +BSx4  Genitourinary: no suprapubic tenderness or fullness  Extremities: WWP; no LE edema; no clubbing or cyanosis  Vascular: 2+ radial, DP/PT and femoral pulses B/L  Dermatologic: normal color and turgor; no visible rashes  Neurological:     MEDICATIONS:  MEDICATIONS  (STANDING):  aspirin  chewable 81 milliGRAM(s) Oral every 24 hours  atorvastatin 40 milliGRAM(s) Oral at bedtime  carvedilol 25 milliGRAM(s) Oral every 12 hours  chlorhexidine 2% Cloths 1 Application(s) Topical <User Schedule>  cloNIDine 0.3 milliGRAM(s) Oral every 8 hours  clopidogrel Tablet 75 milliGRAM(s) Oral daily  dextrose 5%. 1000 milliLiter(s) (50 mL/Hr) IV Continuous <Continuous>  dextrose 50% Injectable 12.5 Gram(s) IV Push once  dextrose 50% Injectable 25 Gram(s) IV Push once  dextrose 50% Injectable 25 Gram(s) IV Push once  escitalopram 5 milliGRAM(s) Oral every 24 hours  gabapentin 100 milliGRAM(s) Oral <User Schedule>  heparin  Injectable 5000 Unit(s) SubCutaneous every 8 hours  hydrALAZINE 100 milliGRAM(s) Oral every 8 hours  insulin lispro (HumaLOG) corrective regimen sliding scale   SubCutaneous Before meals and at bedtime  isosorbide   mononitrate ER Tablet (IMDUR) 90 milliGRAM(s) Oral every 24 hours  levothyroxine 50 MICROGram(s) Oral daily  lidocaine   Patch 1 Patch Transdermal every 24 hours  losartan 100 milliGRAM(s) Oral every 24 hours  melatonin 5 milliGRAM(s) Oral at bedtime  NIFEdipine XL 60 milliGRAM(s) Oral every 12 hours  polyethylene glycol 3350 17 Gram(s) Oral at bedtime  senna 2 Tablet(s) Oral at bedtime  sevelamer carbonate 800 milliGRAM(s) Oral three times a day with meals    MEDICATIONS  (PRN):  acetaminophen   Tablet .. 650 milliGRAM(s) Oral every 6 hours PRN Mild Pain (1 - 3)  dextrose 40% Gel 15 Gram(s) Oral once PRN Blood Glucose LESS THAN 70 milliGRAM(s)/deciliter  glucagon  Injectable 1 milliGRAM(s) IntraMuscular once PRN Glucose LESS THAN 70 milligrams/deciliter  HYDROmorphone  Injectable 0.25 milliGRAM(s) IV Push every 4 hours PRN Severe Pain (7 - 10)  traMADol 50 milliGRAM(s) Oral every 6 hours PRN Moderate Pain (4 - 6)      ALLERGIES:  Allergies    No Known Allergies    Intolerances        LABS:                        12.4   6.81  )-----------( 169      ( 20 Jan 2020 06:07 )             37.6     01-20    140  |  96  |  45<H>  ----------------------------<  176<H>  3.8   |  23  |  5.32<H>    Ca    10.4      20 Jan 2020 06:07  Phos  3.9     01-20  Mg     2.3     01-20            RADIOLOGY & ADDITIONAL TESTS: Reviewed.    ASSESSMENT:    PLAN:    PULMONARY    NEURO    CARDIOVASCULAR    RENAL    ID    GI/FEN    DVT PPx -     LINES -     CODE -     DISPO - continue intensive level of care in CCM/MICU service INTERVAL HPI/OVERNIGHT EVENTS:    SUBJECTIVE: Patient seen and examined at bedside.    VITAL SIGNS:  ICU Vital Signs Last 24 Hrs  T(C): 36.2 (20 Jan 2020 06:00), Max: 36.7 (19 Jan 2020 09:24)  T(F): 97.1 (20 Jan 2020 06:00), Max: 98.1 (19 Jan 2020 09:24)  HR: 68 (20 Jan 2020 08:00) (58 - 82)  BP: 148/67 (20 Jan 2020 08:00) (96/51 - 187/80)  BP(mean): 96 (20 Jan 2020 08:00) (71 - 127)  ABP: --  ABP(mean): --  RR: 18 (20 Jan 2020 08:00) (12 - 37)  SpO2: 99% (20 Jan 2020 08:00) (96% - 100%)        01-19 @ 07:01  -  01-20 @ 07:00  --------------------------------------------------------  IN: 0 mL / OUT: 100 mL / NET: -100 mL      CAPILLARY BLOOD GLUCOSE      POCT Blood Glucose.: 193 mg/dL (20 Jan 2020 07:16)      PHYSICAL EXAM:    Constitutional: NAD  HEENT: NC/AT; PERRL, anicteric sclera; MMM  Neck: supple, no JVD  Cardiovascular: +S1/S2, RRR  Respiratory: CTA B/L, no W/R/R  Gastrointestinal: abdomen soft, NT/ND; no rebound or guarding; +BSx4  Genitourinary: no suprapubic tenderness or fullness  Extremities: WWP; no LE edema; no clubbing or cyanosis  Vascular: 2+ radial, DP/PT and femoral pulses B/L  Dermatologic: normal color and turgor; no visible rashes  Neurological:     MEDICATIONS:  MEDICATIONS  (STANDING):  aspirin  chewable 81 milliGRAM(s) Oral every 24 hours  atorvastatin 40 milliGRAM(s) Oral at bedtime  carvedilol 25 milliGRAM(s) Oral every 12 hours  chlorhexidine 2% Cloths 1 Application(s) Topical <User Schedule>  cloNIDine 0.3 milliGRAM(s) Oral every 8 hours  clopidogrel Tablet 75 milliGRAM(s) Oral daily  dextrose 5%. 1000 milliLiter(s) (50 mL/Hr) IV Continuous <Continuous>  dextrose 50% Injectable 12.5 Gram(s) IV Push once  dextrose 50% Injectable 25 Gram(s) IV Push once  dextrose 50% Injectable 25 Gram(s) IV Push once  escitalopram 5 milliGRAM(s) Oral every 24 hours  gabapentin 100 milliGRAM(s) Oral <User Schedule>  heparin  Injectable 5000 Unit(s) SubCutaneous every 8 hours  hydrALAZINE 100 milliGRAM(s) Oral every 8 hours  insulin lispro (HumaLOG) corrective regimen sliding scale   SubCutaneous Before meals and at bedtime  isosorbide   mononitrate ER Tablet (IMDUR) 90 milliGRAM(s) Oral every 24 hours  levothyroxine 50 MICROGram(s) Oral daily  lidocaine   Patch 1 Patch Transdermal every 24 hours  losartan 100 milliGRAM(s) Oral every 24 hours  melatonin 5 milliGRAM(s) Oral at bedtime  NIFEdipine XL 60 milliGRAM(s) Oral every 12 hours  polyethylene glycol 3350 17 Gram(s) Oral at bedtime  senna 2 Tablet(s) Oral at bedtime  sevelamer carbonate 800 milliGRAM(s) Oral three times a day with meals    MEDICATIONS  (PRN):  acetaminophen   Tablet .. 650 milliGRAM(s) Oral every 6 hours PRN Mild Pain (1 - 3)  dextrose 40% Gel 15 Gram(s) Oral once PRN Blood Glucose LESS THAN 70 milliGRAM(s)/deciliter  glucagon  Injectable 1 milliGRAM(s) IntraMuscular once PRN Glucose LESS THAN 70 milligrams/deciliter  HYDROmorphone  Injectable 0.25 milliGRAM(s) IV Push every 4 hours PRN Severe Pain (7 - 10)  traMADol 50 milliGRAM(s) Oral every 6 hours PRN Moderate Pain (4 - 6)      ALLERGIES:  Allergies    No Known Allergies    Intolerances        LABS:                        12.4   6.81  )-----------( 169      ( 20 Jan 2020 06:07 )             37.6     01-20    140  |  96  |  45<H>  ----------------------------<  176<H>  3.8   |  23  |  5.32<H>    Ca    10.4      20 Jan 2020 06:07  Phos  3.9     01-20  Mg     2.3     01-20            RADIOLOGY & ADDITIONAL TESTS: Reviewed.    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 Admitted to CCU for emergent HD and management of hypertensive emergency on 1/11/20. Shortly after CCU arrival, rapid response and stroke code for bradycardia to 30s with BP 220s/90s, while undergoing HD(15 mins into session) when pt had slow breathing, increased lethargy (was minimally arousable to sternal rub initially). CT head noncon wnl. BP controlled s/p nicardipine gtt and regular HD sessions per nephrology. Pt complained of progressively worsening complaints of neck and back pain (radiating from hip to foot, similar to pain before, hx of hip surgery). Pain management uptitrated tramadol and psych started lexapro. 1/13: Echo revealing severe LVH, EF 60-65%, severe AS, mild AR, mild MR, moderate TR. pHTN 70mmHg. R brachicephalic IV removed, noted to be indurated without fluctuation or oozing, U/S with R cephalic thrombus. CTS consulted for severe AS. Patient febrile to 101+, found to have MRSA bacteremia, on vancomycin 1g with levels check for MRSA bacteremia. EBONI w/o mer vegetation, mod-severe AS. s/p MR lumbar/thoracic spine, couldn't tolerate full scan.  Pending retroperitoneal u/s with doppler to assess renal artery for causes of fluctuant blood pressures on medical therapy/dialysis. Hemodynamically stable and ready for transfer to [facility].    INTERVAL HPI/OVERNIGHT EVENTS: Pt underwent gallium scan on 1/20.    SUBJECTIVE: Patient seen and examined at bedside. Continues to complain of back pain.     VITAL SIGNS:  ICU Vital Signs Last 24 Hrs  T(C): 36.2 (20 Jan 2020 06:00), Max: 36.7 (19 Jan 2020 09:24)  T(F): 97.1 (20 Jan 2020 06:00), Max: 98.1 (19 Jan 2020 09:24)  HR: 68 (20 Jan 2020 08:00) (58 - 82)  BP: 148/67 (20 Jan 2020 08:00) (96/51 - 187/80)  BP(mean): 96 (20 Jan 2020 08:00) (71 - 127)  ABP: --  ABP(mean): --  RR: 18 (20 Jan 2020 08:00) (12 - 37)  SpO2: 99% (20 Jan 2020 08:00) (96% - 100%)        01-19 @ 07:01  -  01-20 @ 07:00  --------------------------------------------------------  IN: 0 mL / OUT: 100 mL / NET: -100 mL      CAPILLARY BLOOD GLUCOSE      POCT Blood Glucose.: 193 mg/dL (20 Jan 2020 07:16)      PHYSICAL EXAM:    Constitutional: NAD  HEENT: NC/AT; Right pupil non-reactive to light, right eye strabismus unchanged from admission, left pupil sluggishly reactive to light, anicteric sclera, MMM  Neck: supple; no JVD or thyromegaly  Respiratory: CTA B/L; no W/R/R, no retractions  Cardiac: +S1/S2; RRR; grade 3/6 early-midsystolic crescendo-decrescendo murmur heard loudest in the R upper sternal border with radiation to carotids c/w aortic stenosis  Gastrointestinal: soft, NT/ND; no rebound or guarding; +BSx4  Extremities: WWP, no clubbing or cyanosis; no peripheral edema, 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 Small ulcer on left thumb, scattered healing scabs on left hand.  Vascular: L AVF bruit intact  Lymphatic: no submandibular or cervical LAD  Neurologic: AAOx3; visual field defect in right eye, slight facial droop in right face, unknown baseline. Motor 5/5 and sensation intact.    MEDICATIONS:  MEDICATIONS  (STANDING):  aspirin  chewable 81 milliGRAM(s) Oral every 24 hours  atorvastatin 40 milliGRAM(s) Oral at bedtime  carvedilol 25 milliGRAM(s) Oral every 12 hours  chlorhexidine 2% Cloths 1 Application(s) Topical <User Schedule>  cloNIDine 0.3 milliGRAM(s) Oral every 8 hours  clopidogrel Tablet 75 milliGRAM(s) Oral daily  dextrose 5%. 1000 milliLiter(s) (50 mL/Hr) IV Continuous <Continuous>  dextrose 50% Injectable 12.5 Gram(s) IV Push once  dextrose 50% Injectable 25 Gram(s) IV Push once  dextrose 50% Injectable 25 Gram(s) IV Push once  escitalopram 5 milliGRAM(s) Oral every 24 hours  gabapentin 100 milliGRAM(s) Oral <User Schedule>  heparin  Injectable 5000 Unit(s) SubCutaneous every 8 hours  hydrALAZINE 100 milliGRAM(s) Oral every 8 hours  insulin lispro (HumaLOG) corrective regimen sliding scale   SubCutaneous Before meals and at bedtime  isosorbide   mononitrate ER Tablet (IMDUR) 90 milliGRAM(s) Oral every 24 hours  levothyroxine 50 MICROGram(s) Oral daily  lidocaine   Patch 1 Patch Transdermal every 24 hours  losartan 100 milliGRAM(s) Oral every 24 hours  melatonin 5 milliGRAM(s) Oral at bedtime  NIFEdipine XL 60 milliGRAM(s) Oral every 12 hours  polyethylene glycol 3350 17 Gram(s) Oral at bedtime  senna 2 Tablet(s) Oral at bedtime  sevelamer carbonate 800 milliGRAM(s) Oral three times a day with meals    MEDICATIONS  (PRN):  acetaminophen   Tablet .. 650 milliGRAM(s) Oral every 6 hours PRN Mild Pain (1 - 3)  dextrose 40% Gel 15 Gram(s) Oral once PRN Blood Glucose LESS THAN 70 milliGRAM(s)/deciliter  glucagon  Injectable 1 milliGRAM(s) IntraMuscular once PRN Glucose LESS THAN 70 milligrams/deciliter  HYDROmorphone  Injectable 0.25 milliGRAM(s) IV Push every 4 hours PRN Severe Pain (7 - 10)  traMADol 50 milliGRAM(s) Oral every 6 hours PRN Moderate Pain (4 - 6)      ALLERGIES:  Allergies    No Known Allergies    Intolerances        LABS:                        12.4   6.81  )-----------( 169      ( 20 Jan 2020 06:07 )             37.6     01-20    140  |  96  |  45<H>  ----------------------------<  176<H>  3.8   |  23  |  5.32<H>    Ca    10.4      20 Jan 2020 06:07  Phos  3.9     01-20  Mg     2.3     01-20            RADIOLOGY & ADDITIONAL TESTS: Reviewed.

## 2020-01-20 NOTE — PROGRESS NOTE ADULT - ASSESSMENT
A/p  70 y/o m with PMHx of ESRD on dialysis via AVF TTS, HTN, CAD, BRYNN (s/p stent), IDDM (with peripheral nueropathy), hypothyroidism and CVA cb right eye blindness- patient is s/p carotid artery stent), PAD (s/p bilateral stents) who was BIBEMS to [facility] for syncopal episode and found to have elevated blood pressure. Admitted to CCU for emergent HD and management of hypertensive emergency.  found to have severe AS most likely contributing to Syncopal episode, currently undergoing pre-op TAVR, hospital course complicated by MRSA bacteremia and unclear source as of yet( negative EBONI, Gallium scan pending)

## 2020-01-21 DIAGNOSIS — I10 ESSENTIAL (PRIMARY) HYPERTENSION: ICD-10-CM

## 2020-01-21 DIAGNOSIS — E03.9 HYPOTHYROIDISM, UNSPECIFIED: ICD-10-CM

## 2020-01-21 DIAGNOSIS — R52 PAIN, UNSPECIFIED: ICD-10-CM

## 2020-01-21 DIAGNOSIS — I25.10 ATHEROSCLEROTIC HEART DISEASE OF NATIVE CORONARY ARTERY WITHOUT ANGINA PECTORIS: ICD-10-CM

## 2020-01-21 DIAGNOSIS — E11.9 TYPE 2 DIABETES MELLITUS WITHOUT COMPLICATIONS: ICD-10-CM

## 2020-01-21 DIAGNOSIS — I35.0 NONRHEUMATIC AORTIC (VALVE) STENOSIS: ICD-10-CM

## 2020-01-21 DIAGNOSIS — E78.5 HYPERLIPIDEMIA, UNSPECIFIED: ICD-10-CM

## 2020-01-21 DIAGNOSIS — R63.8 OTHER SYMPTOMS AND SIGNS CONCERNING FOOD AND FLUID INTAKE: ICD-10-CM

## 2020-01-21 LAB
ALBUMIN SERPL ELPH-MCNC: 4.1 G/DL — SIGNIFICANT CHANGE UP (ref 3.3–5)
ALP SERPL-CCNC: 110 U/L — SIGNIFICANT CHANGE UP (ref 40–120)
ALT FLD-CCNC: 10 U/L — SIGNIFICANT CHANGE UP (ref 10–45)
ANION GAP SERPL CALC-SCNC: 18 MMOL/L — HIGH (ref 5–17)
AST SERPL-CCNC: 16 U/L — SIGNIFICANT CHANGE UP (ref 10–40)
BILIRUB SERPL-MCNC: 0.2 MG/DL — SIGNIFICANT CHANGE UP (ref 0.2–1.2)
BUN SERPL-MCNC: 63 MG/DL — HIGH (ref 7–23)
CALCIUM SERPL-MCNC: 9.6 MG/DL — SIGNIFICANT CHANGE UP (ref 8.4–10.5)
CHLORIDE SERPL-SCNC: 95 MMOL/L — LOW (ref 96–108)
CO2 SERPL-SCNC: 26 MMOL/L — SIGNIFICANT CHANGE UP (ref 22–31)
CREAT SERPL-MCNC: 6.45 MG/DL — HIGH (ref 0.5–1.3)
GLUCOSE BLDC GLUCOMTR-MCNC: 143 MG/DL — HIGH (ref 70–99)
GLUCOSE BLDC GLUCOMTR-MCNC: 155 MG/DL — HIGH (ref 70–99)
GLUCOSE BLDC GLUCOMTR-MCNC: 190 MG/DL — HIGH (ref 70–99)
GLUCOSE BLDC GLUCOMTR-MCNC: 216 MG/DL — HIGH (ref 70–99)
GLUCOSE SERPL-MCNC: 204 MG/DL — HIGH (ref 70–99)
HCT VFR BLD CALC: 35.9 % — LOW (ref 39–50)
HGB BLD-MCNC: 11.5 G/DL — LOW (ref 13–17)
MAGNESIUM SERPL-MCNC: 2.5 MG/DL — SIGNIFICANT CHANGE UP (ref 1.6–2.6)
MCHC RBC-ENTMCNC: 28.3 PG — SIGNIFICANT CHANGE UP (ref 27–34)
MCHC RBC-ENTMCNC: 32 GM/DL — SIGNIFICANT CHANGE UP (ref 32–36)
MCV RBC AUTO: 88.4 FL — SIGNIFICANT CHANGE UP (ref 80–100)
NRBC # BLD: 0 /100 WBCS — SIGNIFICANT CHANGE UP (ref 0–0)
PHOSPHATE SERPL-MCNC: 5.6 MG/DL — HIGH (ref 2.5–4.5)
PLATELET # BLD AUTO: 250 K/UL — SIGNIFICANT CHANGE UP (ref 150–400)
POTASSIUM SERPL-MCNC: 4.3 MMOL/L — SIGNIFICANT CHANGE UP (ref 3.5–5.3)
POTASSIUM SERPL-SCNC: 4.3 MMOL/L — SIGNIFICANT CHANGE UP (ref 3.5–5.3)
PROT SERPL-MCNC: 6.8 G/DL — SIGNIFICANT CHANGE UP (ref 6–8.3)
RBC # BLD: 4.06 M/UL — LOW (ref 4.2–5.8)
RBC # FLD: 13.9 % — SIGNIFICANT CHANGE UP (ref 10.3–14.5)
SODIUM SERPL-SCNC: 139 MMOL/L — SIGNIFICANT CHANGE UP (ref 135–145)
VANCOMYCIN TROUGH SERPL-MCNC: 15.7 UG/ML — SIGNIFICANT CHANGE UP (ref 10–20)
VANCOMYCIN TROUGH SERPL-MCNC: 22.3 UG/ML — HIGH (ref 10–20)
WBC # BLD: 4.73 K/UL — SIGNIFICANT CHANGE UP (ref 3.8–10.5)
WBC # FLD AUTO: 4.73 K/UL — SIGNIFICANT CHANGE UP (ref 3.8–10.5)

## 2020-01-21 PROCEDURE — 99232 SBSQ HOSP IP/OBS MODERATE 35: CPT

## 2020-01-21 PROCEDURE — 99231 SBSQ HOSP IP/OBS SF/LOW 25: CPT

## 2020-01-21 PROCEDURE — 99232 SBSQ HOSP IP/OBS MODERATE 35: CPT | Mod: GC

## 2020-01-21 RX ORDER — ACETAMINOPHEN 500 MG
650 TABLET ORAL EVERY 6 HOURS
Refills: 0 | Status: DISCONTINUED | OUTPATIENT
Start: 2020-01-21 | End: 2020-02-04

## 2020-01-21 RX ORDER — ACETAMINOPHEN 500 MG
650 TABLET ORAL EVERY 6 HOURS
Refills: 0 | Status: DISCONTINUED | OUTPATIENT
Start: 2020-01-21 | End: 2020-01-21

## 2020-01-21 RX ADMIN — Medication 0.3 MILLIGRAM(S): at 21:49

## 2020-01-21 RX ADMIN — HYDROMORPHONE HYDROCHLORIDE 0.25 MILLIGRAM(S): 2 INJECTION INTRAMUSCULAR; INTRAVENOUS; SUBCUTANEOUS at 18:49

## 2020-01-21 RX ADMIN — LIDOCAINE 1 PATCH: 4 CREAM TOPICAL at 18:47

## 2020-01-21 RX ADMIN — LOSARTAN POTASSIUM 100 MILLIGRAM(S): 100 TABLET, FILM COATED ORAL at 18:51

## 2020-01-21 RX ADMIN — TRAMADOL HYDROCHLORIDE 50 MILLIGRAM(S): 50 TABLET ORAL at 21:50

## 2020-01-21 RX ADMIN — Medication 650 MILLIGRAM(S): at 21:50

## 2020-01-21 RX ADMIN — CHLORHEXIDINE GLUCONATE 1 APPLICATION(S): 213 SOLUTION TOPICAL at 06:05

## 2020-01-21 RX ADMIN — Medication 650 MILLIGRAM(S): at 17:30

## 2020-01-21 RX ADMIN — Medication 100 MILLIGRAM(S): at 21:49

## 2020-01-21 RX ADMIN — Medication 81 MILLIGRAM(S): at 12:41

## 2020-01-21 RX ADMIN — ATORVASTATIN CALCIUM 40 MILLIGRAM(S): 80 TABLET, FILM COATED ORAL at 21:49

## 2020-01-21 RX ADMIN — Medication 50 MICROGRAM(S): at 06:04

## 2020-01-21 RX ADMIN — Medication 60 MILLIGRAM(S): at 21:54

## 2020-01-21 RX ADMIN — HEPARIN SODIUM 5000 UNIT(S): 5000 INJECTION INTRAVENOUS; SUBCUTANEOUS at 21:50

## 2020-01-21 RX ADMIN — Medication 100 MILLIGRAM(S): at 14:28

## 2020-01-21 RX ADMIN — POLYETHYLENE GLYCOL 3350 17 GRAM(S): 17 POWDER, FOR SOLUTION ORAL at 21:51

## 2020-01-21 RX ADMIN — CARVEDILOL PHOSPHATE 25 MILLIGRAM(S): 80 CAPSULE, EXTENDED RELEASE ORAL at 12:41

## 2020-01-21 RX ADMIN — Medication 60 MILLIGRAM(S): at 12:40

## 2020-01-21 RX ADMIN — HYDROMORPHONE HYDROCHLORIDE 0.25 MILLIGRAM(S): 2 INJECTION INTRAMUSCULAR; INTRAVENOUS; SUBCUTANEOUS at 14:29

## 2020-01-21 RX ADMIN — TRAMADOL HYDROCHLORIDE 50 MILLIGRAM(S): 50 TABLET ORAL at 22:50

## 2020-01-21 RX ADMIN — HEPARIN SODIUM 5000 UNIT(S): 5000 INJECTION INTRAVENOUS; SUBCUTANEOUS at 06:05

## 2020-01-21 RX ADMIN — Medication 4: at 12:39

## 2020-01-21 RX ADMIN — Medication 100 MILLIGRAM(S): at 06:04

## 2020-01-21 RX ADMIN — HEPARIN SODIUM 5000 UNIT(S): 5000 INJECTION INTRAVENOUS; SUBCUTANEOUS at 14:28

## 2020-01-21 RX ADMIN — LIDOCAINE 1 PATCH: 4 CREAM TOPICAL at 02:20

## 2020-01-21 RX ADMIN — Medication 650 MILLIGRAM(S): at 16:59

## 2020-01-21 RX ADMIN — ESCITALOPRAM OXALATE 5 MILLIGRAM(S): 10 TABLET, FILM COATED ORAL at 21:49

## 2020-01-21 RX ADMIN — HYDROMORPHONE HYDROCHLORIDE 0.25 MILLIGRAM(S): 2 INJECTION INTRAMUSCULAR; INTRAVENOUS; SUBCUTANEOUS at 18:13

## 2020-01-21 RX ADMIN — ISOSORBIDE MONONITRATE 120 MILLIGRAM(S): 60 TABLET, EXTENDED RELEASE ORAL at 12:40

## 2020-01-21 RX ADMIN — SEVELAMER CARBONATE 800 MILLIGRAM(S): 2400 POWDER, FOR SUSPENSION ORAL at 12:41

## 2020-01-21 RX ADMIN — Medication 0.3 MILLIGRAM(S): at 14:20

## 2020-01-21 RX ADMIN — Medication 650 MILLIGRAM(S): at 10:03

## 2020-01-21 RX ADMIN — Medication 5 MILLIGRAM(S): at 21:50

## 2020-01-21 RX ADMIN — HYDROMORPHONE HYDROCHLORIDE 0.25 MILLIGRAM(S): 2 INJECTION INTRAMUSCULAR; INTRAVENOUS; SUBCUTANEOUS at 06:05

## 2020-01-21 RX ADMIN — HYDROMORPHONE HYDROCHLORIDE 0.25 MILLIGRAM(S): 2 INJECTION INTRAMUSCULAR; INTRAVENOUS; SUBCUTANEOUS at 23:56

## 2020-01-21 RX ADMIN — Medication 0.3 MILLIGRAM(S): at 06:04

## 2020-01-21 RX ADMIN — Medication 650 MILLIGRAM(S): at 22:50

## 2020-01-21 RX ADMIN — Medication 650 MILLIGRAM(S): at 10:04

## 2020-01-21 RX ADMIN — LIDOCAINE 1 PATCH: 4 CREAM TOPICAL at 12:39

## 2020-01-21 RX ADMIN — HYDROMORPHONE HYDROCHLORIDE 0.25 MILLIGRAM(S): 2 INJECTION INTRAMUSCULAR; INTRAVENOUS; SUBCUTANEOUS at 12:13

## 2020-01-21 RX ADMIN — CLOPIDOGREL BISULFATE 75 MILLIGRAM(S): 75 TABLET, FILM COATED ORAL at 12:41

## 2020-01-21 RX ADMIN — SEVELAMER CARBONATE 800 MILLIGRAM(S): 2400 POWDER, FOR SUSPENSION ORAL at 07:45

## 2020-01-21 RX ADMIN — SENNA PLUS 2 TABLET(S): 8.6 TABLET ORAL at 21:49

## 2020-01-21 RX ADMIN — SEVELAMER CARBONATE 800 MILLIGRAM(S): 2400 POWDER, FOR SUSPENSION ORAL at 21:50

## 2020-01-21 RX ADMIN — Medication 2: at 21:50

## 2020-01-21 RX ADMIN — Medication 2: at 18:50

## 2020-01-21 RX ADMIN — HYDROMORPHONE HYDROCHLORIDE 0.25 MILLIGRAM(S): 2 INJECTION INTRAMUSCULAR; INTRAVENOUS; SUBCUTANEOUS at 10:02

## 2020-01-21 RX ADMIN — CARVEDILOL PHOSPHATE 25 MILLIGRAM(S): 80 CAPSULE, EXTENDED RELEASE ORAL at 21:49

## 2020-01-21 RX ADMIN — HYDROMORPHONE HYDROCHLORIDE 0.25 MILLIGRAM(S): 2 INJECTION INTRAMUSCULAR; INTRAVENOUS; SUBCUTANEOUS at 06:40

## 2020-01-21 NOTE — PROGRESS NOTE BEHAVIORAL HEALTH - NSBHFUPINTERVALHXFT_PSY_A_CORE
Pt says he is feeling better vis-a-vis depression and anxiety but c/o generalized headache and middle and lower back pain that he really would like pain meds for. Pain is interfering with sleep. Denies any side effects from lexapro but thinks it is helping.

## 2020-01-21 NOTE — PROGRESS NOTE ADULT - PROBLEM SELECTOR PLAN 1
# ESRD on HD TTS via LUE AVF  last HD 1/18 with 2 L UF  - HD today per reg schedule   - Bp control with current antihypertensives and UF w/HD  - continue Renvela 800 mg po tid w/meals and obtain phos level with bmp   - vancomycin to be administered post-HD on dialysis days based on pre-Hd trough( 23 1/21)    Will follow

## 2020-01-21 NOTE — PROGRESS NOTE ADULT - PROBLEM SELECTOR PLAN 6
- Pt with known hx T2D with peripheral neuropathy and ESRD  - Home med lantus 4U at bedtime  - A1C 6.3  - mISS while inpt    #CVA history  - Pt endorses hx CVA with residual R eye blindness, limited vision L eye  - 1/11 stroke code called for lethargy during HD  - CTH with microangiopathic ischemic disease, lacunar infarcts in the basal ganglia BL, and external carotid artery branch calcifications c/w HD pts  - Daily neuro exam

## 2020-01-21 NOTE — PROGRESS NOTE ADULT - SUBJECTIVE AND OBJECTIVE BOX
Hospital Course:  Pt is a 68 yo M with PMH ESRD (HD L AVF T/R/Sat), HTN, HLD, CAD, renal artery stenosis (s/p stent), DM (c/b peripheral neuropathy), hypothyroidism, CVA (R eye blind, L eye limited vision), carotid artery stenosis (s/p stent), peripheral artery disease (s/p BL stents) BIBEMS 1/11 for syncopal episode with elevated BP. Initially admitted to CCU for HTN emergency and emergent HD, during HD rapid response and stroke code called for lethargy, bradycardia (30s), and /90. CTH neg. BP controlled with nicardipine gtt and HD via nephro. Pt with neck and back pain throughout course, similar to baseline with recent hx R hip surgery. Pain management on board with recs for tramadol. Psych on board with recs for lexapro. 1/13 ECHO with LVEF 60-65%, severe LVH, severe AS, mild AR, mild MR, moderate TR, and pHTN 70mmHg. 1/16 with superficial thrombus R cephalic vein. Structural heart consulted for severe AS with recs to optimize BP and bacteremia prior to TAVR consideration. Pt febrile 1/12 with 1/14 Bcx growing MRSA, started on vanc 1g dosed by level with level check post-HD. 1/17 EBONI neg for vegetations. MRI thoracolumbar spine obtained for complaints of back pain, though pt unable to tolerate full scan - c/w degenerative changes T10-11, L3-4, L5-S1; L paracentral disc herniation at C6-7 that may affect C7 nerve root, and disc herniations at several levels without compression of the thoracic spinal cord. Now pending retroperitoneal US to evaluate for cause of fluctuant BPs while on medical therapy and HD.     Subjective:  No acute overnight events. Given tylenol for back pain with relief in symptoms. Pt seen and examined at bedside in no acute distress. Scheduled for HD today. Says he feels better than yesterday. Pain medicine helped overnight. Recent R hip replacement surgery. Still feels weak. Will try to eat more for energy. No other complaints.    REVIEW OF SYSTEMS:    CONSTITUTIONAL: No fevers or chills. +generalized weakness  EYES/ENT: No visual changes;  No vertigo or throat pain   NECK: No pain or stiffness  RESPIRATORY: No cough, wheezing, hemoptysis; No shortness of breath  CARDIOVASCULAR: No chest pain or palpitations  GASTROINTESTINAL: No abdominal or epigastric pain. No nausea, vomiting, or hematemesis; No diarrhea or constipation. No melena or hematochezia.  GENITOURINARY: No dysuria, frequency or hematuria  NEUROLOGICAL: No numbness or weakness  SKIN: +dry skin BL LE with itching  All other review of systems is negative unless indicated above.    VITAL SIGNS:  T(C): 36.8 (01-21-20 @ 05:21), Max: 36.8 (01-21-20 @ 05:21)  T(F): 98.2 (01-21-20 @ 05:21), Max: 98.2 (01-21-20 @ 05:21)  HR: 54 (01-21-20 @ 05:00) (54 - 71)  BP: 121/56 (01-21-20 @ 05:00) (121/56 - 177/131)  BP(mean): 80 (01-21-20 @ 05:00) (80 - 150)  RR: 11 (01-21-20 @ 05:00) (11 - 24)  SpO2: 100% (01-21-20 @ 05:00) (91% - 100%)  Wt(kg): --    PHYSICAL EXAM:  Constitutional: WDWN resting comfortably in bed; NAD  Head: NC/AT  Eyes: R pupil non-reactive, L pupil minimally reactive/sluggish, no vision R eye, minimal vision L eye; anicteric sclera  ENT: no nasal discharge; MMM  Neck: supple; no JVD   Respiratory: CTA B/L; no W/R/R, no retractions  Cardiac: +S1/S2; RRR; +3/6 holosystolic crescendo-decrescendo murmur with radiation to BL carotids  Gastrointestinal: soft, NT/ND; no rebound or guarding; +BSx4  Extremities: WWP, no clubbing or cyanosis; no peripheral edema; LLE partial amputation 3rd toe  Vascular: 2+ radial, PT pulses B/L; L AVF with bruit  Dermatologic: skin warm, dry and intact; BL LE multiple non-infectious appearing ulcerations (size of eraser heads) c/w dry skin  Neurologic: AAOx3; CNII-XII grossly intact, sensation intact and equal UE LE BL, mm strength 5/5 throughout  Psychiatric: mildly apathetic    MEDICATIONS  (STANDING):  aspirin  chewable 81 milliGRAM(s) Oral every 24 hours  atorvastatin 40 milliGRAM(s) Oral at bedtime  carvedilol 25 milliGRAM(s) Oral every 12 hours  chlorhexidine 2% Cloths 1 Application(s) Topical <User Schedule>  cloNIDine 0.3 milliGRAM(s) Oral every 8 hours  clopidogrel Tablet 75 milliGRAM(s) Oral daily  dextrose 5%. 1000 milliLiter(s) (50 mL/Hr) IV Continuous <Continuous>  dextrose 50% Injectable 12.5 Gram(s) IV Push once  dextrose 50% Injectable 25 Gram(s) IV Push once  dextrose 50% Injectable 25 Gram(s) IV Push once  escitalopram 5 milliGRAM(s) Oral every 24 hours  gabapentin 100 milliGRAM(s) Oral <User Schedule>  heparin  Injectable 5000 Unit(s) SubCutaneous every 8 hours  hydrALAZINE 100 milliGRAM(s) Oral every 8 hours  insulin lispro (HumaLOG) corrective regimen sliding scale   SubCutaneous Before meals and at bedtime  isosorbide   mononitrate ER Tablet (IMDUR) 120 milliGRAM(s) Oral every 24 hours  levothyroxine 50 MICROGram(s) Oral daily  lidocaine   Patch 1 Patch Transdermal every 24 hours  losartan 100 milliGRAM(s) Oral every 24 hours  melatonin 5 milliGRAM(s) Oral at bedtime  NIFEdipine XL 60 milliGRAM(s) Oral every 12 hours  polyethylene glycol 3350 17 Gram(s) Oral at bedtime  senna 2 Tablet(s) Oral at bedtime  sevelamer carbonate 800 milliGRAM(s) Oral three times a day with meals    MEDICATIONS  (PRN):  acetaminophen   Tablet .. 650 milliGRAM(s) Oral every 6 hours PRN Mild Pain (1 - 3)  dextrose 40% Gel 15 Gram(s) Oral once PRN Blood Glucose LESS THAN 70 milliGRAM(s)/deciliter  glucagon  Injectable 1 milliGRAM(s) IntraMuscular once PRN Glucose LESS THAN 70 milligrams/deciliter  HYDROmorphone  Injectable 0.25 milliGRAM(s) IV Push every 4 hours PRN Severe Pain (7 - 10)  traMADol 50 milliGRAM(s) Oral every 6 hours PRN Moderate Pain (4 - 6)    Allergies    No Known Allergies    Intolerances    LABS: to be obtained with HD today

## 2020-01-21 NOTE — PROGRESS NOTE ADULT - ASSESSMENT
A/p  70 y/o m with PMHx of ESRD on dialysis via AVF TTS, HTN, CAD, BRYNN (s/p stent), IDDM (with peripheral nueropathy), hypothyroidism and CVA cb right eye blindness- patient is s/p carotid artery stent), PAD (s/p bilateral stents) who was BIBEMS to [facility] for syncopal episode and found to have elevated blood pressure. Admitted to CCU for emergent HD and management of hypertensive emergency.  found to have severe AS most likely contributing to Syncopal episode, currently undergoing pre-op TAVR, hospital course complicated by MRSA bacteremia and unclear source

## 2020-01-21 NOTE — PROGRESS NOTE ADULT - PROBLEM SELECTOR PLAN 4
- Pt febrile 1/12 with 1/14 Bcx growing MRSA, 1/15 started on vanc 1g dosed by level with level check post-HD  - 1/17 EBONI neg for vegetations  - BCx NGTD since 1/15  - Pt afebrile >24h  - HD today, will check labs and post-HD vanc level  - Re-dose vanc accordingly   - f/u official read gallium scan 1/20    #RUE superficial thrombus  - RUE swelling and erythema at IV site 1/16  - RUE doppler with superficial thrombus in cephalic vein  - Continue to monitor

## 2020-01-21 NOTE — PROGRESS NOTE ADULT - SUBJECTIVE AND OBJECTIVE BOX
Infectious Diseases Progress Note:    SUBJECTIVE: Patient seen and examined at bedside. Patient describes ongoing HA and back pain but denies fever, chills, nausea, vomiting, abdominal pain, dysuria, diarrhea.    SYNCOPE    Nutrition, metabolism, and development symptoms  Hypothyroidism  Aortic stenosis  Hyperlipidemia  CAD (coronary artery disease)  Type II diabetes mellitus  Pain  Hypertension  Bacteremia due to Staphylococcus aureus  Adjustment disorder with mixed anxiety and depressed mood  End-stage renal disease (ESRD)      Allergies    No Known Allergies    Intolerances        ANTIBIOTICS/RELEVANT:  antimicrobials    immunologic:      OTHER:  acetaminophen   Tablet .. 650 milliGRAM(s) Oral every 6 hours  aspirin  chewable 81 milliGRAM(s) Oral every 24 hours  atorvastatin 40 milliGRAM(s) Oral at bedtime  carvedilol 25 milliGRAM(s) Oral every 12 hours  chlorhexidine 2% Cloths 1 Application(s) Topical <User Schedule>  cloNIDine 0.3 milliGRAM(s) Oral every 8 hours  clopidogrel Tablet 75 milliGRAM(s) Oral daily  dextrose 40% Gel 15 Gram(s) Oral once PRN  dextrose 5%. 1000 milliLiter(s) IV Continuous <Continuous>  dextrose 50% Injectable 12.5 Gram(s) IV Push once  dextrose 50% Injectable 25 Gram(s) IV Push once  dextrose 50% Injectable 25 Gram(s) IV Push once  escitalopram 5 milliGRAM(s) Oral every 24 hours  gabapentin 100 milliGRAM(s) Oral <User Schedule>  glucagon  Injectable 1 milliGRAM(s) IntraMuscular once PRN  heparin  Injectable 5000 Unit(s) SubCutaneous every 8 hours  hydrALAZINE 100 milliGRAM(s) Oral every 8 hours  HYDROmorphone  Injectable 0.25 milliGRAM(s) IV Push every 4 hours PRN  insulin lispro (HumaLOG) corrective regimen sliding scale   SubCutaneous Before meals and at bedtime  isosorbide   mononitrate ER Tablet (IMDUR) 120 milliGRAM(s) Oral every 24 hours  levothyroxine 50 MICROGram(s) Oral daily  lidocaine   Patch 1 Patch Transdermal every 24 hours  losartan 100 milliGRAM(s) Oral every 24 hours  melatonin 5 milliGRAM(s) Oral at bedtime  NIFEdipine XL 60 milliGRAM(s) Oral every 12 hours  polyethylene glycol 3350 17 Gram(s) Oral at bedtime  senna 2 Tablet(s) Oral at bedtime  sevelamer carbonate 800 milliGRAM(s) Oral three times a day with meals  traMADol 50 milliGRAM(s) Oral every 6 hours PRN      Objective:  Vital Signs Last 24 Hrs  T(C): 36.7 (21 Jan 2020 15:15), Max: 36.8 (21 Jan 2020 05:21)  T(F): 98.1 (21 Jan 2020 15:15), Max: 98.2 (21 Jan 2020 05:21)  HR: 56 (21 Jan 2020 15:15) (54 - 64)  BP: 133/63 (21 Jan 2020 15:15) (121/56 - 194/92)  BP(mean): 80 (21 Jan 2020 05:00) (80 - 100)  RR: 13 (21 Jan 2020 15:15) (11 - 19)  SpO2: 100% (21 Jan 2020 15:15) (95% - 100%)    PHYSICAL EXAM:  Constitutional: Well-developed, well nourished  Eyes: PERRLA, EOMI  Ear/Nose/Throat: no oral lesion, no sinus tenderness on percussion	  Neck:no JVD, no lymphadenopathy, supple  Respiratory: CTA bilateral  Cardiovascular: S1S2, RRR, no murmurs  Gastrointestinal: soft, NTND, (+) BS, no HSM  Extremities: no c/e/e  Skin: no rashes    LABS:                        11.5   4.73  )-----------( 250      ( 21 Jan 2020 15:41 )             35.9     01-21    139  |  95<L>  |  63<H>  ----------------------------<  204<H>  4.3   |  26  |  6.45<H>    Ca    9.6      21 Jan 2020 15:41  Phos  5.6     01-21  Mg     2.5     01-21    TPro  6.8  /  Alb  4.1  /  TBili  0.2  /  DBili  x   /  AST  16  /  ALT  10  /  AlkPhos  110  01-21          MICROBIOLOGY:    Culture - Blood (01.15.20 @ 21:35)    Specimen Source: .Blood Blood    Culture Results:   No growth at 5 days.            RADIOLOGY & ADDITIONAL STUDIES: Reviewed

## 2020-01-21 NOTE — PROGRESS NOTE ADULT - PROBLEM SELECTOR PLAN 3
- Pt with known hx ESRD, L AVF with HD MWF outpt  - Since inpt has been on T/R/Sat schedule, last HD 1/18  - HD today - labs with HD, vanc level post-HD  - Nephro following, f/u  recs  - Renvela 800mg TID  - Daily weights, strict I&O, renal diet  - Avoid nephrotoxic agents, renally dose meds    #Carotid artery stenosis  - s/p stenting  - Management as above    #Pulmonary HTN  - ECHO with findings as above  - Consider pulm consult  - Consider RHC

## 2020-01-21 NOTE — PROGRESS NOTE ADULT - PROBLEM SELECTOR PLAN 1
- On arrival with HTN emergency, 's resolved with nicardipine gtt and HD  - s/p a-line for accurate BP measurements 1/11-1/16  - Now better controlled with hydralazine 100mg TID, losartan 100mg daily, coreg 25mg BID, clonidine 0.3mg TID, nifedipine 60mg BID, imdur 90mg daily  - Will obtain retroperitoneal US with doppler to evaluate for further causes of fluctuant BP  - HTN likely c/b pain and need for HD  - HD also aiding BP control, while inpt was on MWF schedule; return to T/R/Sat schedule today  - Nephro following, f/u recs    #CAD  - Continue home meds  - ASA 81mg, plavix 75mg, atorvastatin 40mg daily    #PAD  - s/p stenting  - Management as above    #Renal artery stenosis  - s/p stenting  - f/u retroperitoneal US with doppler

## 2020-01-21 NOTE — CHART NOTE - NSCHARTNOTEFT_GEN_A_CORE
Admitting Diagnosis:   Patient is a 69y old  Male who presents with a chief complaint of syncope (21 Jan 2020 09:01)      PAST MEDICAL & SURGICAL HISTORY:  Peripheral neuropathy  Peripheral artery disease: s/p bilateral stents  Anemia of renal disease  End-stage renal disease (ESRD)  Type II diabetes mellitus  Retinal artery occlusion  Hypothyroidism  Low back pain  CAD (coronary artery disease)  H/O renal artery stenosis: s/p L renal stent  Hyperlipidemia  Hypertension  No significant past surgical history        PO Intake: Good (%) [   ]  Fair (50-75%) [  50%  ] Poor (<25%) [   ]  GI Issues: +BM 1/20   Pain: Pt with progressively worsening complaints of neck and back pain - being followed by Pain Management   Skin Integrity: No edema / wounds to BL Thumbs, RLE wound - Noted to be resolving     Labs:   01-20    140  |  96  |  45<H>  ----------------------------<  176<H>  3.8   |  23  |  5.32<H>    Ca    10.4      20 Jan 2020 06:07  Phos  3.9     01-20  Mg     2.3     01-20      CAPILLARY BLOOD GLUCOSE      POCT Blood Glucose.: 216 mg/dL (21 Jan 2020 11:50)  POCT Blood Glucose.: 143 mg/dL (21 Jan 2020 07:09)  POCT Blood Glucose.: 194 mg/dL (20 Jan 2020 21:35)  POCT Blood Glucose.: 187 mg/dL (20 Jan 2020 17:04)      Medications:  MEDICATIONS  (STANDING):  acetaminophen   Tablet .. 650 milliGRAM(s) Oral every 6 hours  aspirin  chewable 81 milliGRAM(s) Oral every 24 hours  atorvastatin 40 milliGRAM(s) Oral at bedtime  carvedilol 25 milliGRAM(s) Oral every 12 hours  chlorhexidine 2% Cloths 1 Application(s) Topical <User Schedule>  cloNIDine 0.3 milliGRAM(s) Oral every 8 hours  clopidogrel Tablet 75 milliGRAM(s) Oral daily  dextrose 5%. 1000 milliLiter(s) (50 mL/Hr) IV Continuous <Continuous>  dextrose 50% Injectable 12.5 Gram(s) IV Push once  dextrose 50% Injectable 25 Gram(s) IV Push once  dextrose 50% Injectable 25 Gram(s) IV Push once  escitalopram 5 milliGRAM(s) Oral every 24 hours  gabapentin 100 milliGRAM(s) Oral <User Schedule>  heparin  Injectable 5000 Unit(s) SubCutaneous every 8 hours  hydrALAZINE 100 milliGRAM(s) Oral every 8 hours  insulin lispro (HumaLOG) corrective regimen sliding scale   SubCutaneous Before meals and at bedtime  isosorbide   mononitrate ER Tablet (IMDUR) 120 milliGRAM(s) Oral every 24 hours  levothyroxine 50 MICROGram(s) Oral daily  lidocaine   Patch 1 Patch Transdermal every 24 hours  losartan 100 milliGRAM(s) Oral every 24 hours  melatonin 5 milliGRAM(s) Oral at bedtime  NIFEdipine XL 60 milliGRAM(s) Oral every 12 hours  polyethylene glycol 3350 17 Gram(s) Oral at bedtime  senna 2 Tablet(s) Oral at bedtime  sevelamer carbonate 800 milliGRAM(s) Oral three times a day with meals    MEDICATIONS  (PRN):  dextrose 40% Gel 15 Gram(s) Oral once PRN Blood Glucose LESS THAN 70 milliGRAM(s)/deciliter  glucagon  Injectable 1 milliGRAM(s) IntraMuscular once PRN Glucose LESS THAN 70 milligrams/deciliter  HYDROmorphone  Injectable 0.25 milliGRAM(s) IV Push every 4 hours PRN Severe Pain (7 - 10)  traMADol 50 milliGRAM(s) Oral every 6 hours PRN Moderate Pain (4 - 6)      (1/21) 118.6 pounds  (1/19) 119.9 pounds  (1/16) 123 pounds  (1/14) 132.9 pounds  (1/13) 134.9 pounds / 128.5 pounds  (1/12) 136.6 pounds  (1/11) 131 pounds  5'9''  pounds, %IBW 74% BMI 17.43 -- Based on most recent EMR wt   Wt had Trended down since admission - Question fluids / PO intake / Noted on different units     Nutrition Focused Physical Exam: Completed [   ]  Not Pertinent [   ]  Muscle Wasting- Temporal [   ]  Clavicle/Pectoral [   ]  Shoulder/Deltoid [   ]  Scapula [   ]  Interosseous [   ]  Quadriceps [   ]  Gastrocnemius [   ]  Fat Wasting- Orbital [   ]  Buccal [   ]  Triceps [   ]  Rib [   ]  Suspect [PCM] 2/2 to physical assessment, [poor intake], and [wt loss]; please see malnutrition chart note.    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 for syncopal episode (with possible aspiration of food) and found to have elevated blood pressure BP on arrival 209/78 - Followed by episode of ladarius down (+Rapid response, Heart rate spontaneously started to increase). Now pending retroperitoneal US to evaluate for cause of fluctuant BPs. Pt with EF 60-65%, severe AS on TTE, possible TAVR pending BP and MRSA bacteremia - R/o vertebral osteomyelitis (negative EBONI, Gallium scan pending).     Pt ordered for DASH consCHO no evening snack Renal diet + Neprox2, ASP Precautions order noted 1/11 – prior team had reported episode of aspiration PTA was d/t falling asleep while eating not d/t true dysphagia. RN reports pt consuming 50% of meals – continues to report pt does not want to feed self and wants much assistance from nursing during meals.       Previous Nutrition Diagnosis: Inadequate Oral Intake RT decreased ability to consume meals AEB intake of 25%.   Active [  x ]  Resolved [   ]  Goal: Pt will meet at least 75% of nutrient needs     Recommendations:    Education:     Risk Level: High [   ] Moderate [   ] Low [   ] Admitting Diagnosis:   Patient is a 69y old  Male who presents with a chief complaint of syncope (21 Jan 2020 09:01)      PAST MEDICAL & SURGICAL HISTORY:  Peripheral neuropathy  Peripheral artery disease: s/p bilateral stents  Anemia of renal disease  End-stage renal disease (ESRD)  Type II diabetes mellitus  Retinal artery occlusion  Hypothyroidism  Low back pain  CAD (coronary artery disease)  H/O renal artery stenosis: s/p L renal stent  Hyperlipidemia  Hypertension  No significant past surgical history        PO Intake: Good (%) [   ]  Fair (50-75%) [  50%  ] Poor (<25%) [   ]  GI Issues: +BM 1/20   Pain: Pt with progressively worsening complaints of neck and back pain - being followed by Pain Management   Skin Integrity: No edema / wounds to BL Thumbs, RLE wound - Noted to be resolving     Labs:   01-20    140  |  96  |  45<H>  ----------------------------<  176<H>  3.8   |  23  |  5.32<H>    Ca    10.4      20 Jan 2020 06:07  Phos  3.9     01-20  Mg     2.3     01-20      CAPILLARY BLOOD GLUCOSE      POCT Blood Glucose.: 216 mg/dL (21 Jan 2020 11:50)  POCT Blood Glucose.: 143 mg/dL (21 Jan 2020 07:09)  POCT Blood Glucose.: 194 mg/dL (20 Jan 2020 21:35)  POCT Blood Glucose.: 187 mg/dL (20 Jan 2020 17:04)      Medications:  MEDICATIONS  (STANDING):  acetaminophen   Tablet .. 650 milliGRAM(s) Oral every 6 hours  aspirin  chewable 81 milliGRAM(s) Oral every 24 hours  atorvastatin 40 milliGRAM(s) Oral at bedtime  carvedilol 25 milliGRAM(s) Oral every 12 hours  chlorhexidine 2% Cloths 1 Application(s) Topical <User Schedule>  cloNIDine 0.3 milliGRAM(s) Oral every 8 hours  clopidogrel Tablet 75 milliGRAM(s) Oral daily  dextrose 5%. 1000 milliLiter(s) (50 mL/Hr) IV Continuous <Continuous>  dextrose 50% Injectable 12.5 Gram(s) IV Push once  dextrose 50% Injectable 25 Gram(s) IV Push once  dextrose 50% Injectable 25 Gram(s) IV Push once  escitalopram 5 milliGRAM(s) Oral every 24 hours  gabapentin 100 milliGRAM(s) Oral <User Schedule>  heparin  Injectable 5000 Unit(s) SubCutaneous every 8 hours  hydrALAZINE 100 milliGRAM(s) Oral every 8 hours  insulin lispro (HumaLOG) corrective regimen sliding scale   SubCutaneous Before meals and at bedtime  isosorbide   mononitrate ER Tablet (IMDUR) 120 milliGRAM(s) Oral every 24 hours  levothyroxine 50 MICROGram(s) Oral daily  lidocaine   Patch 1 Patch Transdermal every 24 hours  losartan 100 milliGRAM(s) Oral every 24 hours  melatonin 5 milliGRAM(s) Oral at bedtime  NIFEdipine XL 60 milliGRAM(s) Oral every 12 hours  polyethylene glycol 3350 17 Gram(s) Oral at bedtime  senna 2 Tablet(s) Oral at bedtime  sevelamer carbonate 800 milliGRAM(s) Oral three times a day with meals    MEDICATIONS  (PRN):  dextrose 40% Gel 15 Gram(s) Oral once PRN Blood Glucose LESS THAN 70 milliGRAM(s)/deciliter  glucagon  Injectable 1 milliGRAM(s) IntraMuscular once PRN Glucose LESS THAN 70 milligrams/deciliter  HYDROmorphone  Injectable 0.25 milliGRAM(s) IV Push every 4 hours PRN Severe Pain (7 - 10)  traMADol 50 milliGRAM(s) Oral every 6 hours PRN Moderate Pain (4 - 6)      (1/21) 118.6 pounds  (1/19) 119.9 pounds  (1/16) 123 pounds  (1/14) 132.9 pounds  (1/13) 134.9 pounds / 128.5 pounds  (1/12) 136.6 pounds  (1/11) 131 pounds  5'9''  pounds, %IBW 74% BMI 17.43 -- Based on most recent EMR wt, wt seems low however ? If this low   Wt had Trended down since admission - Question fluids / PO intake / Noted on different units     Nutrition Focused Physical Exam: Attempted NFPE on RD IA + today on F/U; Pt unable to participate for full exam However is noted with Mild wasting to temporal region .    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 Now pending retroperitoneal US to evaluate for cause of fluctuant BPs. Pt with EF 60-65%, severe AS on TTE, possible TAVR pending BP and MRSA bacteremia - R/o vertebral osteomyelitis (negative EBONI, Gallium scan pending).   Pt ordered for DASH consCHO no evening snack Renal diet + Neprox2, ASP Precautions order noted 1/11 – prior team had reported episode of aspiration PTA was d/t falling asleep while eating not d/t true dysphagia. RN reports pt consuming 50% of meals – continues to report pt does not want to feed self and wants much assistance from nursing during meals. Attempted to speak with pt however pt unable to remain focused on visit d/t wanting pain Meds. Pt does report lack of appetite as well as issues eating 2/2 being unable to fed himself.   Please see below for nutritions recommendations. Pending order placed. Recs made with team.       Previous Nutrition Diagnosis: Inadequate Oral Intake RT decreased ability to consume meals AEB intake of 25%.   Active [  x ]  Resolved [   ]  Goal: Pt will meet at least 75% of nutrient needs     Recommendations:  1. Renal Diet + Mech soft to provide ease during meals, Nepro x3 per day as oral nutrition supplements (425kcal/20gm prot per 1 can); Fluids per team.  2. Monitor PO intake/appetite, GI distress, diet tolerance - s/s of issues chewing/swallowing, Skin, GOC labs, weights.  3. Honor pt food preferences as able. Appreciate assistance during meals PRN.   4. Nephrocaps daily.   5. RD to remain available for additional nutrition interventions as needed.     Education: Discussed oral nutrition supplements.     Risk Level: High [   ] Moderate [ x ] Low [   ]

## 2020-01-21 NOTE — PROGRESS NOTE ADULT - ASSESSMENT
69M with PMH ESRD (HD L AVF T/R/Sat), HTN, HLD, CAD, renal artery stenosis (s/p stent), DM (c/b peripheral neuropathy), hypothyroidism, CVA (R eye blind, L eye limited vision), carotid artery stenosis (s/p stent), peripheral artery disease (s/p BL stents) BIBEMS 1/11 for syncopal episode with HTN emergency, course c/b MRSA bacteremia. Now with BCx cleared and return to scheduled HD (T/R/Sat), pending further w/u for fluctuating BP.    #MRSA bacteremia  - Gallium scan  - EBONI  - c/w Vanc (dosed by level given ESRD)    Plan discussed with ID attending.

## 2020-01-21 NOTE — PROGRESS NOTE ADULT - PROBLEM SELECTOR PLAN 2
- Pt with multiple sources of chronic pain likely also contributing to HTN  - RLE pain 2/2 recent R hip replacement - lidocaine patch q24h  - Chronic back pain  - MR thoracolumbar spine c/w degenerative changes T10-11, L3-4, L5-S1; L paracentral disc herniation at C6-7 that may affect C7 nerve root, and disc herniations at several levels without compression of the thoracic spinal cord  - Pain management consulted with recs for tylenol 650mg q6h mild pain, tramadol 50mg q6h moderate pain, and dilaudid 0.25mg IV q4h for severe pain  - Peripheral artery disease and DM related neuropathy likely contributing to pain as well  - Gabapentin 100mg MWF evenings    #Depression  - Pt apathetic on exam  - Psych following with recs for lexapro 5mg daily - Pt with multiple sources of chronic pain likely also contributing to HTN  - RLE pain 2/2 recent R hip replacement - lidocaine patch q24h  - Chronic back pain  - MR thoracolumbar spine c/w degenerative changes T10-11, L3-4, L5-S1; L paracentral disc herniation at C6-7 that may affect C7 nerve root, and disc herniations at several levels without compression of the thoracic spinal cord  - Pain management consulted with recs for tylenol 650mg q6h mild pain, tramadol 50mg q6h moderate pain, and dilaudid 0.25mg IV q4h for severe pain  - Peripheral artery disease and DM related neuropathy likely contributing to pain as well  - Gabapentin 100mg MWF evenings (ESRD on HD dose adjustment)    #Depression  - Pt apathetic on exam  - Psych following with recs for lexapro 5mg daily

## 2020-01-21 NOTE — PROGRESS NOTE ADULT - ATTENDING COMMENTS
On Date 1/21/20  Assessment: Patient personally seen and examined myself, face-to-face, during rounds with the Resident     Note read, including vitals, physical findings, laboratory data, and radiological reports.   Agree with above.

## 2020-01-21 NOTE — PROGRESS NOTE BEHAVIORAL HEALTH - NSBHADMITCOUNSEL_PSY_A_CORE
instructions for management, treatment and follow up/risk factor reduction/risks and benefits of treatment options/client/family/caregiver education/prognosis/diagnostic results/impressions and/or recommended studies/importance of adherence to chosen treatment

## 2020-01-21 NOTE — PROGRESS NOTE ADULT - ATTENDING COMMENTS
I independently performed the key portions of the evaluation and management service provided. I agree with the above history, physical, and plan which I have reviewed and edited where appropriate. I find BP stable. Continue dialysis. Follow vanco levels. See full note. (Patient seen earlier in day.)

## 2020-01-21 NOTE — PROGRESS NOTE ADULT - PROBLEM SELECTOR PLAN 5
- 1/13 ECHO with LVEF 60-65%, severe AS, mild AR, mild MR, moderate TR, pulmonary HTN 70mmHg  - Structural heart consulted with recs for BP control and bacteremia treatment prior to TAVR consideration

## 2020-01-21 NOTE — PROGRESS NOTE ADULT - ASSESSMENT
Pt is a 68 yo M with PMH ESRD (HD L AVF T/R/Sat), HTN, HLD, CAD, renal artery stenosis (s/p stent), DM (c/b peripheral neuropathy), hypothyroidism, CVA (R eye blind, L eye limited vision), carotid artery stenosis (s/p stent), peripheral artery disease (s/p BL stents) BIBEMS 1/11 for syncopal episode with HTN emergency, course c/b MRSA bacteremia. Now with BCx cleared and return to scheduled HD (T/R/Sat), pending further w/u for fluctuating BP.

## 2020-01-21 NOTE — PROGRESS NOTE BEHAVIORAL HEALTH - NSBHFUPINTERVALCCFT_PSY_A_CORE
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 Admitted to CCU for emergent HD and managment of hypertensive emergency, now off nicardipine drip and back on most of his home medications. At first he denied feeling depressed or anxious, but then admitted he had been feeling down due to all his medical problems and being in the hospital. He feels very low in energy and morale. He agreed to trial of lexapro 5 mg and would be OK with therapy/counseling.

## 2020-01-21 NOTE — PROGRESS NOTE BEHAVIORAL HEALTH - AXIS III
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

## 2020-01-21 NOTE — PROGRESS NOTE ADULT - SUBJECTIVE AND OBJECTIVE BOX
Pain Management Progress Note - [facility] [phone]      HPI: Patient with a history of syncope, stroke, ESRD, hypothyroidism, dizziness, hypertension, CAD, retinal artery occlusion, admitted with emergent hemodialyses and management of hyperintensive urgency. Patient reports pain to his neck, and "back of the head" today, worse with movement. Patient reporting pain relief with Tramadol Po.  Axox3, denies n,v, no s/s of oversedation, patient denies any adverse reactions from Tramadol use.        Pain is _x__ sharp ____dull ___burning __x_achy ___ Intensity: ___x_ mild __x_mod ___severe     Location ___surgical site _x___cervical _____lumbar ____abd ____upper ext_x___lower ext    Worse with ___x_activity _x___movement _____physical therapy___ Rest    Improved with __x__medication ___x_rest ____physical therapy      hydrALAZINE  cloNIDine  meclizine  morphine  - Injectable  oxycodone    5 mG/acetaminophen 325 mG  chlorhexidine 2% Cloths  aspirin  chewable  losartan  isosorbide   mononitrate ER Tablet (IMDUR)  gabapentin  atorvastatin  clopidogrel Tablet  carvedilol  hydrALAZINE Injectable  sevelamer carbonate  levothyroxine  amLODIPine   Tablet  hydrALAZINE  insulin lispro (HumaLOG) corrective regimen sliding scale  dextrose 5%.  dextrose 40% Gel  dextrose 50% Injectable  glucagon  Injectable  heparin  Injectable  niCARdipine Infusion  niCARdipine Infusion  oxycodone    5 mG/acetaminophen 325 mG  acetaminophen   Tablet ..  lidocaine   Patch  cyclobenzaprine  cloNIDine  amLODIPine   Tablet  gabapentin  carvedilol  traMADol  carvedilol  carvedilol  insulin glargine Injectable (LANTUS)  dextrose 50% Injectable  isosorbide   mononitrate ER Tablet (IMDUR)  BACItracin   Ointment  traMADol  melatonin  NIFEdipine XL  acetaminophen   Tablet ..  traMADol  traMADol  NIFEdipine XL  polyethylene glycol 3350  senna  NIFEdipine XL  nafcillin  IVPB  vancomycin  IVPB  escitalopram  hydrALAZINE  cloNIDine  hydrALAZINE  hydrALAZINE  isosorbide   mononitrate ER Tablet (IMDUR)  isosorbide   mononitrate ER Tablet (IMDUR)  cloNIDine  hydrALAZINE  isosorbide   mononitrate ER Tablet (IMDUR)  carvedilol  gabapentin  losartan  lidocaine   Patch  traMADol  HYDROmorphone  Injectable  fentaNYL    Injectable  midazolam Injectable  vancomycin  IVPB  traMADol  HYDROmorphone  Injectable  traMADol  HYDROmorphone  Injectable  NIFEdipine XL  traMADol  HYDROmorphone  Injectable  isosorbide   mononitrate ER Tablet (IMDUR)  HYDROmorphone  Injectable  LORazepam   Injectable  HYDROmorphone  Injectable  vancomycin  IVPB  NIFEdipine XL  haloperidol    Injectable  zaleplon  HYDROmorphone  Injectable  LORazepam   Injectable  isosorbide   mononitrate ER Tablet (IMDUR)      ROS: Const:  __-_febrile   Eyes:___ENT:___CV: __-_chest pain  Resp: __-__sob  GI:___nausea -___vomiting __-_abd pain ___npo ___clears x__full diet __bm  :___ Musk: _x__pain _-__spasm  Skin:___ Neuro:  -___vsdzwbyk__-_sjfhspwwg_-__ numbness _x__weakness __x_paresth  Psych:_-_anxiety  Endo:___ Heme:___Allergy:_________, _x__all others reviewed and negative      PAST MEDICAL & SURGICAL HISTORY:  Peripheral neuropathy  Peripheral artery disease: s/p bilateral stents  Anemia of renal disease  End-stage renal disease (ESRD)  Type II diabetes mellitus  Retinal artery occlusion  Hypothyroidism  Low back pain  CAD (coronary artery disease)  H/O renal artery stenosis: s/p L renal stent  Hyperlipidemia  Hypertension      Hemoglobin: 12.4 g/dL (01-20 @ 06:07)        T(C): 36.8 (01-21-20 @ 05:21), Max: 36.8 (01-21-20 @ 05:21)  HR: 54 (01-21-20 @ 05:00) (54 - 71)  BP: 121/56 (01-21-20 @ 05:00) (121/56 - 177/131)  RR: 11 (01-21-20 @ 05:00) (11 - 19)  SpO2: 100% (01-21-20 @ 05:00) (91% - 100%)  Wt(kg): --           PHYSICAL EXAM:  Gen Appearance: __-_no acute distress __-_appropriate        Neuro: _x__SILT feet____ EOM Intact Psych: AAOX_3_, __x_mood/affect appropriate        Eyes: _x__conjunctiva WNL  ___x__ Pupils equal and round        ENT: __x_ears and nose atraumatic__x_ Hearing grossly intact        Neck: _x__trachea midline, no visible masses ___thyroid without palpable mass    Resp: _x__Nml WOB____No tactile fremitus ___clear to auscultation    Cardio: _x__extremities free from edema __x__pedal pulses palpable    GI/Abdomen: __x_soft ___x__ Nontender___x___Nondistended_____HSM    Lymphatic: ___no palpable nodes in neck  ___no palpable nodes calves and feet    Skin/Wound: ___Incision, ___Dressing c/d/i,   ____surrounding tissues soft,  ___drain/chest tube present____    Muscular: EHL __4_/5  Gastrocnemius__4_/5    _x__absent clubbing/cyanosis        ASSESSMENT: This is a 69y old Male with a history of syncope, stroke, ESRD, hypothyroidism, dizziness, hypertension, CAD, retinal artery occlusion, admitted with emergent hemodialyses and management of hyperintensive urgency, pain reporting pain relief with current pain medication regimen.       Recommended Treatment PLAN:  1. Tramadol 50mg PO q6 PRN severe pain  2. Continue Gabapentin 100mg PO q8  3. Dilaudid 0.25mg Q4h prn breakthrough pain  4. tylenol 650mg Q6h standing  5. x1 lidocaine patch to affected area, 12hours on 12hours off  Plan discussed with Dr. Chu at bedside Pain Management Progress Note - [facility] [phone]      HPI: Patient with a history of syncope, stroke, ESRD, hypothyroidism, dizziness, hypertension, CAD, retinal artery occlusion, admitted with emergent hemodialyses and management of hyperintensive urgency. Patient reports pain to his neck, and "back of the head" today, worse with movement. Patient reporting pain relief with Tramadol Po. Patient Axox3, denies n,v, no s/s of oversedation, patient denies any adverse reactions from Tramadol use.        Pain is _x__ sharp ____dull ___burning __x_achy ___ Intensity: ___x_ mild __x_mod ___severe     Location ___surgical site _x___cervical _____lumbar ____abd ____upper ext_x___lower ext    Worse with ___x_activity _x___movement _____physical therapy___ Rest    Improved with __x__medication ___x_rest ____physical therapy      hydrALAZINE  cloNIDine  meclizine  morphine  - Injectable  oxycodone    5 mG/acetaminophen 325 mG  chlorhexidine 2% Cloths  aspirin  chewable  losartan  isosorbide   mononitrate ER Tablet (IMDUR)  gabapentin  atorvastatin  clopidogrel Tablet  carvedilol  hydrALAZINE Injectable  sevelamer carbonate  levothyroxine  amLODIPine   Tablet  hydrALAZINE  insulin lispro (HumaLOG) corrective regimen sliding scale  dextrose 5%.  dextrose 40% Gel  dextrose 50% Injectable  glucagon  Injectable  heparin  Injectable  niCARdipine Infusion  niCARdipine Infusion  oxycodone    5 mG/acetaminophen 325 mG  acetaminophen   Tablet ..  lidocaine   Patch  cyclobenzaprine  cloNIDine  amLODIPine   Tablet  gabapentin  carvedilol  traMADol  carvedilol  carvedilol  insulin glargine Injectable (LANTUS)  dextrose 50% Injectable  isosorbide   mononitrate ER Tablet (IMDUR)  BACItracin   Ointment  traMADol  melatonin  NIFEdipine XL  acetaminophen   Tablet ..  traMADol  traMADol  NIFEdipine XL  polyethylene glycol 3350  senna  NIFEdipine XL  nafcillin  IVPB  vancomycin  IVPB  escitalopram  hydrALAZINE  cloNIDine  hydrALAZINE  hydrALAZINE  isosorbide   mononitrate ER Tablet (IMDUR)  isosorbide   mononitrate ER Tablet (IMDUR)  cloNIDine  hydrALAZINE  isosorbide   mononitrate ER Tablet (IMDUR)  carvedilol  gabapentin  losartan  lidocaine   Patch  traMADol  HYDROmorphone  Injectable  fentaNYL    Injectable  midazolam Injectable  vancomycin  IVPB  traMADol  HYDROmorphone  Injectable  traMADol  HYDROmorphone  Injectable  NIFEdipine XL  traMADol  HYDROmorphone  Injectable  isosorbide   mononitrate ER Tablet (IMDUR)  HYDROmorphone  Injectable  LORazepam   Injectable  HYDROmorphone  Injectable  vancomycin  IVPB  NIFEdipine XL  haloperidol    Injectable  zaleplon  HYDROmorphone  Injectable  LORazepam   Injectable  isosorbide   mononitrate ER Tablet (IMDUR)      ROS: Const:  __-_febrile   Eyes:___ENT:___CV: __-_chest pain  Resp: __-__sob  GI:___nausea -___vomiting __-_abd pain ___npo ___clears x__full diet __bm  :___ Musk: _x__pain _-__spasm  Skin:___ Neuro:  -___mwwrejwf__-_srausldem_-__ numbness _x__weakness __x_paresth  Psych:_-_anxiety  Endo:___ Heme:___Allergy:_________, _x__all others reviewed and negative      PAST MEDICAL & SURGICAL HISTORY:  Peripheral neuropathy  Peripheral artery disease: s/p bilateral stents  Anemia of renal disease  End-stage renal disease (ESRD)  Type II diabetes mellitus  Retinal artery occlusion  Hypothyroidism  Low back pain  CAD (coronary artery disease)  H/O renal artery stenosis: s/p L renal stent  Hyperlipidemia  Hypertension      Hemoglobin: 12.4 g/dL (01-20 @ 06:07)        T(C): 36.8 (01-21-20 @ 05:21), Max: 36.8 (01-21-20 @ 05:21)  HR: 54 (01-21-20 @ 05:00) (54 - 71)  BP: 121/56 (01-21-20 @ 05:00) (121/56 - 177/131)  RR: 11 (01-21-20 @ 05:00) (11 - 19)  SpO2: 100% (01-21-20 @ 05:00) (91% - 100%)  Wt(kg): --           PHYSICAL EXAM:  Gen Appearance: __-_no acute distress __-_appropriate        Neuro: _x__SILT feet____ EOM Intact Psych: AAOX_3_, __x_mood/affect appropriate        Eyes: _x__conjunctiva WNL  ___x__ Pupils equal and round        ENT: __x_ears and nose atraumatic__x_ Hearing grossly intact        Neck: _x__trachea midline, no visible masses ___thyroid without palpable mass    Resp: _x__Nml WOB____No tactile fremitus ___clear to auscultation    Cardio: _x__extremities free from edema __x__pedal pulses palpable    GI/Abdomen: __x_soft ___x__ Nontender___x___Nondistended_____HSM    Lymphatic: ___no palpable nodes in neck  ___no palpable nodes calves and feet    Skin/Wound: ___Incision, ___Dressing c/d/i,   ____surrounding tissues soft,  ___drain/chest tube present____    Muscular: EHL __4_/5  Gastrocnemius__4_/5    _x__absent clubbing/cyanosis        ASSESSMENT: This is a 69y old Male with a history of syncope, stroke, ESRD, hypothyroidism, dizziness, hypertension, CAD, retinal artery occlusion, admitted with emergent hemodialyses and management of hyperintensive urgency, pain reporting pain relief with current pain medication regimen.       Recommended Treatment PLAN:  1. Tramadol 50mg PO q6 PRN severe pain  2. Continue Gabapentin 100mg PO q8  3. Dilaudid 0.25mg Q4h prn breakthrough pain  4. tylenol 650mg Q6h standing  5. x1 lidocaine patch to affected area, 12hours on 12hours off  Plan discussed with Dr. Chu at bedside

## 2020-01-21 NOTE — PROGRESS NOTE BEHAVIORAL HEALTH - SUMMARY
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 Admitted to CCU for emergent HD and managment of hypertensive emergency, now off nicardipine drip and back on most of his home medications. At first he denied feeling depressed or anxious, but then admitted he had been feeling down due to all his medical problems and being in the hospital. He feels very low in energy and morale. He agreed to trial of lexapro 5 mg and would be OK with therapy/counseling.    1)Start lexapro 5 mg daily for depression and anxiety.  2)Supportive therapy.

## 2020-01-21 NOTE — PROGRESS NOTE ADULT - SUBJECTIVE AND OBJECTIVE BOX
O/N Events: KAT  Subjective:  gallium scan negative, plan for HD today bp fluctuating ranging 120-180/80 mmhg  volume status and lytes acceptable     VITALS  Vital Signs Last 24 Hrs  T(C): 36.2 (21 Jan 2020 17:55), Max: 36.8 (21 Jan 2020 05:21)  T(F): 97.1 (21 Jan 2020 17:55), Max: 98.2 (21 Jan 2020 05:21)  HR: 66 (21 Jan 2020 17:55) (54 - 66)  BP: 180/79 (21 Jan 2020 17:55) (121/56 - 194/92)  BP(mean): 113 (21 Jan 2020 17:55) (80 - 113)  RR: 21 (21 Jan 2020 17:55) (11 - 21)  SpO2: 100% (21 Jan 2020 17:55) (95% - 100%)    PHYSICAL EXAM  Gen: NAD  Respiratory: CTA B/L  Cardiac: +S1/S2; RRR; systolic murmur with radiation to carotids  Gastrointestinal: soft, NT/ND   Extremities: WWP, no peripheral edema  Neurologic: AAOx3; non focal  access:  Left arm AVF with bruit    MEDICATIONS  (STANDING):  acetaminophen   Tablet .. 650 milliGRAM(s) Oral every 6 hours  aspirin  chewable 81 milliGRAM(s) Oral every 24 hours  atorvastatin 40 milliGRAM(s) Oral at bedtime  carvedilol 25 milliGRAM(s) Oral every 12 hours  chlorhexidine 2% Cloths 1 Application(s) Topical <User Schedule>  cloNIDine 0.3 milliGRAM(s) Oral every 8 hours  clopidogrel Tablet 75 milliGRAM(s) Oral daily  dextrose 5%. 1000 milliLiter(s) (50 mL/Hr) IV Continuous <Continuous>  dextrose 50% Injectable 12.5 Gram(s) IV Push once  dextrose 50% Injectable 25 Gram(s) IV Push once  dextrose 50% Injectable 25 Gram(s) IV Push once  escitalopram 5 milliGRAM(s) Oral every 24 hours  gabapentin 100 milliGRAM(s) Oral <User Schedule>  heparin  Injectable 5000 Unit(s) SubCutaneous every 8 hours  hydrALAZINE 100 milliGRAM(s) Oral every 8 hours  insulin lispro (HumaLOG) corrective regimen sliding scale   SubCutaneous Before meals and at bedtime  isosorbide   mononitrate ER Tablet (IMDUR) 120 milliGRAM(s) Oral every 24 hours  levothyroxine 50 MICROGram(s) Oral daily  lidocaine   Patch 1 Patch Transdermal every 24 hours  losartan 100 milliGRAM(s) Oral every 24 hours  melatonin 5 milliGRAM(s) Oral at bedtime  NIFEdipine XL 60 milliGRAM(s) Oral every 12 hours  polyethylene glycol 3350 17 Gram(s) Oral at bedtime  senna 2 Tablet(s) Oral at bedtime  sevelamer carbonate 800 milliGRAM(s) Oral three times a day with meals    MEDICATIONS  (PRN):  dextrose 40% Gel 15 Gram(s) Oral once PRN Blood Glucose LESS THAN 70 milliGRAM(s)/deciliter  glucagon  Injectable 1 milliGRAM(s) IntraMuscular once PRN Glucose LESS THAN 70 milligrams/deciliter  HYDROmorphone  Injectable 0.25 milliGRAM(s) IV Push every 4 hours PRN Severe Pain (7 - 10)  traMADol 50 milliGRAM(s) Oral every 6 hours PRN Moderate Pain (4 - 6)      LABS                        11.5   4.73  )-----------( 250      ( 21 Jan 2020 15:41 )             35.9     01-21    139  |  95<L>  |  63<H>  ----------------------------<  204<H>  4.3   |  26  |  6.45<H>    Ca    9.6      21 Jan 2020 15:41  Phos  5.6     01-21  Mg     2.5     01-21    TPro  6.8  /  Alb  4.1  /  TBili  0.2  /  DBili  x   /  AST  16  /  ALT  10  /  AlkPhos  110  01-21    LIVER FUNCTIONS - ( 21 Jan 2020 15:41 )  Alb: 4.1 g/dL / Pro: 6.8 g/dL / ALK PHOS: 110 U/L / ALT: 10 U/L / AST: 16 U/L / GGT: x

## 2020-01-22 LAB
ANION GAP SERPL CALC-SCNC: 18 MMOL/L — HIGH (ref 5–17)
BUN SERPL-MCNC: 37 MG/DL — HIGH (ref 7–23)
CALCIUM SERPL-MCNC: 9.9 MG/DL — SIGNIFICANT CHANGE UP (ref 8.4–10.5)
CHLORIDE SERPL-SCNC: 96 MMOL/L — SIGNIFICANT CHANGE UP (ref 96–108)
CO2 SERPL-SCNC: 24 MMOL/L — SIGNIFICANT CHANGE UP (ref 22–31)
CREAT SERPL-MCNC: 4.92 MG/DL — HIGH (ref 0.5–1.3)
GLUCOSE BLDC GLUCOMTR-MCNC: 114 MG/DL — HIGH (ref 70–99)
GLUCOSE BLDC GLUCOMTR-MCNC: 134 MG/DL — HIGH (ref 70–99)
GLUCOSE BLDC GLUCOMTR-MCNC: 186 MG/DL — HIGH (ref 70–99)
GLUCOSE BLDC GLUCOMTR-MCNC: 216 MG/DL — HIGH (ref 70–99)
GLUCOSE SERPL-MCNC: 152 MG/DL — HIGH (ref 70–99)
HCT VFR BLD CALC: 38.4 % — LOW (ref 39–50)
HGB BLD-MCNC: 12.3 G/DL — LOW (ref 13–17)
MAGNESIUM SERPL-MCNC: 2.3 MG/DL — SIGNIFICANT CHANGE UP (ref 1.6–2.6)
MCHC RBC-ENTMCNC: 28.5 PG — SIGNIFICANT CHANGE UP (ref 27–34)
MCHC RBC-ENTMCNC: 32 GM/DL — SIGNIFICANT CHANGE UP (ref 32–36)
MCV RBC AUTO: 89.1 FL — SIGNIFICANT CHANGE UP (ref 80–100)
NRBC # BLD: 0 /100 WBCS — SIGNIFICANT CHANGE UP (ref 0–0)
PHOSPHATE SERPL-MCNC: 5.1 MG/DL — HIGH (ref 2.5–4.5)
PLATELET # BLD AUTO: 232 K/UL — SIGNIFICANT CHANGE UP (ref 150–400)
POTASSIUM SERPL-MCNC: 4 MMOL/L — SIGNIFICANT CHANGE UP (ref 3.5–5.3)
POTASSIUM SERPL-SCNC: 4 MMOL/L — SIGNIFICANT CHANGE UP (ref 3.5–5.3)
RBC # BLD: 4.31 M/UL — SIGNIFICANT CHANGE UP (ref 4.2–5.8)
RBC # FLD: 14 % — SIGNIFICANT CHANGE UP (ref 10.3–14.5)
SODIUM SERPL-SCNC: 138 MMOL/L — SIGNIFICANT CHANGE UP (ref 135–145)
WBC # BLD: 6.5 K/UL — SIGNIFICANT CHANGE UP (ref 3.8–10.5)
WBC # FLD AUTO: 6.5 K/UL — SIGNIFICANT CHANGE UP (ref 3.8–10.5)

## 2020-01-22 PROCEDURE — 99233 SBSQ HOSP IP/OBS HIGH 50: CPT | Mod: GC

## 2020-01-22 PROCEDURE — 99232 SBSQ HOSP IP/OBS MODERATE 35: CPT

## 2020-01-22 RX ORDER — NIFEDIPINE 30 MG
60 TABLET, EXTENDED RELEASE 24 HR ORAL EVERY 12 HOURS
Refills: 0 | Status: DISCONTINUED | OUTPATIENT
Start: 2020-01-22 | End: 2020-02-04

## 2020-01-22 RX ORDER — VANCOMYCIN HCL 1 G
500 VIAL (EA) INTRAVENOUS ONCE
Refills: 0 | Status: COMPLETED | OUTPATIENT
Start: 2020-01-22 | End: 2020-01-22

## 2020-01-22 RX ORDER — NIFEDIPINE 30 MG
90 TABLET, EXTENDED RELEASE 24 HR ORAL EVERY 12 HOURS
Refills: 0 | Status: DISCONTINUED | OUTPATIENT
Start: 2020-01-22 | End: 2020-01-22

## 2020-01-22 RX ADMIN — Medication 0.3 MILLIGRAM(S): at 21:17

## 2020-01-22 RX ADMIN — Medication 100 MILLIGRAM(S): at 05:00

## 2020-01-22 RX ADMIN — Medication 5 MILLIGRAM(S): at 21:19

## 2020-01-22 RX ADMIN — SEVELAMER CARBONATE 800 MILLIGRAM(S): 2400 POWDER, FOR SUSPENSION ORAL at 11:13

## 2020-01-22 RX ADMIN — LIDOCAINE 1 PATCH: 4 CREAM TOPICAL at 18:19

## 2020-01-22 RX ADMIN — Medication 60 MILLIGRAM(S): at 09:23

## 2020-01-22 RX ADMIN — HEPARIN SODIUM 5000 UNIT(S): 5000 INJECTION INTRAVENOUS; SUBCUTANEOUS at 13:55

## 2020-01-22 RX ADMIN — TRAMADOL HYDROCHLORIDE 50 MILLIGRAM(S): 50 TABLET ORAL at 18:27

## 2020-01-22 RX ADMIN — LIDOCAINE 1 PATCH: 4 CREAM TOPICAL at 23:00

## 2020-01-22 RX ADMIN — Medication 0.3 MILLIGRAM(S): at 13:55

## 2020-01-22 RX ADMIN — POLYETHYLENE GLYCOL 3350 17 GRAM(S): 17 POWDER, FOR SOLUTION ORAL at 21:17

## 2020-01-22 RX ADMIN — CHLORHEXIDINE GLUCONATE 1 APPLICATION(S): 213 SOLUTION TOPICAL at 05:00

## 2020-01-22 RX ADMIN — Medication 100 MILLIGRAM(S): at 13:55

## 2020-01-22 RX ADMIN — HYDROMORPHONE HYDROCHLORIDE 0.25 MILLIGRAM(S): 2 INJECTION INTRAMUSCULAR; INTRAVENOUS; SUBCUTANEOUS at 09:11

## 2020-01-22 RX ADMIN — Medication 50 MICROGRAM(S): at 05:00

## 2020-01-22 RX ADMIN — Medication 650 MILLIGRAM(S): at 11:07

## 2020-01-22 RX ADMIN — ATORVASTATIN CALCIUM 40 MILLIGRAM(S): 80 TABLET, FILM COATED ORAL at 21:17

## 2020-01-22 RX ADMIN — ISOSORBIDE MONONITRATE 120 MILLIGRAM(S): 60 TABLET, EXTENDED RELEASE ORAL at 11:12

## 2020-01-22 RX ADMIN — CARVEDILOL PHOSPHATE 25 MILLIGRAM(S): 80 CAPSULE, EXTENDED RELEASE ORAL at 21:17

## 2020-01-22 RX ADMIN — CLOPIDOGREL BISULFATE 75 MILLIGRAM(S): 75 TABLET, FILM COATED ORAL at 11:13

## 2020-01-22 RX ADMIN — Medication 60 MILLIGRAM(S): at 18:18

## 2020-01-22 RX ADMIN — TRAMADOL HYDROCHLORIDE 50 MILLIGRAM(S): 50 TABLET ORAL at 11:08

## 2020-01-22 RX ADMIN — Medication 4: at 12:36

## 2020-01-22 RX ADMIN — HYDROMORPHONE HYDROCHLORIDE 0.25 MILLIGRAM(S): 2 INJECTION INTRAMUSCULAR; INTRAVENOUS; SUBCUTANEOUS at 00:11

## 2020-01-22 RX ADMIN — TRAMADOL HYDROCHLORIDE 50 MILLIGRAM(S): 50 TABLET ORAL at 05:59

## 2020-01-22 RX ADMIN — HEPARIN SODIUM 5000 UNIT(S): 5000 INJECTION INTRAVENOUS; SUBCUTANEOUS at 21:17

## 2020-01-22 RX ADMIN — HYDROMORPHONE HYDROCHLORIDE 0.25 MILLIGRAM(S): 2 INJECTION INTRAMUSCULAR; INTRAVENOUS; SUBCUTANEOUS at 13:01

## 2020-01-22 RX ADMIN — GABAPENTIN 100 MILLIGRAM(S): 400 CAPSULE ORAL at 17:59

## 2020-01-22 RX ADMIN — Medication 650 MILLIGRAM(S): at 04:43

## 2020-01-22 RX ADMIN — Medication 100 MILLIGRAM(S): at 21:17

## 2020-01-22 RX ADMIN — Medication 650 MILLIGRAM(S): at 18:16

## 2020-01-22 RX ADMIN — Medication 81 MILLIGRAM(S): at 13:55

## 2020-01-22 RX ADMIN — Medication 0.3 MILLIGRAM(S): at 05:00

## 2020-01-22 RX ADMIN — Medication 650 MILLIGRAM(S): at 11:08

## 2020-01-22 RX ADMIN — HYDROMORPHONE HYDROCHLORIDE 0.25 MILLIGRAM(S): 2 INJECTION INTRAMUSCULAR; INTRAVENOUS; SUBCUTANEOUS at 12:48

## 2020-01-22 RX ADMIN — LOSARTAN POTASSIUM 100 MILLIGRAM(S): 100 TABLET, FILM COATED ORAL at 18:18

## 2020-01-22 RX ADMIN — TRAMADOL HYDROCHLORIDE 50 MILLIGRAM(S): 50 TABLET ORAL at 11:13

## 2020-01-22 RX ADMIN — SENNA PLUS 2 TABLET(S): 8.6 TABLET ORAL at 21:17

## 2020-01-22 RX ADMIN — LIDOCAINE 1 PATCH: 4 CREAM TOPICAL at 00:29

## 2020-01-22 RX ADMIN — Medication 650 MILLIGRAM(S): at 05:43

## 2020-01-22 RX ADMIN — LIDOCAINE 1 PATCH: 4 CREAM TOPICAL at 11:14

## 2020-01-22 RX ADMIN — TRAMADOL HYDROCHLORIDE 50 MILLIGRAM(S): 50 TABLET ORAL at 04:59

## 2020-01-22 RX ADMIN — Medication 650 MILLIGRAM(S): at 17:59

## 2020-01-22 RX ADMIN — SEVELAMER CARBONATE 800 MILLIGRAM(S): 2400 POWDER, FOR SUSPENSION ORAL at 07:42

## 2020-01-22 RX ADMIN — ESCITALOPRAM OXALATE 5 MILLIGRAM(S): 10 TABLET, FILM COATED ORAL at 21:18

## 2020-01-22 RX ADMIN — SEVELAMER CARBONATE 800 MILLIGRAM(S): 2400 POWDER, FOR SUSPENSION ORAL at 17:59

## 2020-01-22 RX ADMIN — TRAMADOL HYDROCHLORIDE 50 MILLIGRAM(S): 50 TABLET ORAL at 18:26

## 2020-01-22 RX ADMIN — HEPARIN SODIUM 5000 UNIT(S): 5000 INJECTION INTRAVENOUS; SUBCUTANEOUS at 05:00

## 2020-01-22 RX ADMIN — HYDROMORPHONE HYDROCHLORIDE 0.25 MILLIGRAM(S): 2 INJECTION INTRAMUSCULAR; INTRAVENOUS; SUBCUTANEOUS at 18:19

## 2020-01-22 RX ADMIN — Medication 2: at 18:21

## 2020-01-22 RX ADMIN — HYDROMORPHONE HYDROCHLORIDE 0.25 MILLIGRAM(S): 2 INJECTION INTRAMUSCULAR; INTRAVENOUS; SUBCUTANEOUS at 17:57

## 2020-01-22 RX ADMIN — CARVEDILOL PHOSPHATE 25 MILLIGRAM(S): 80 CAPSULE, EXTENDED RELEASE ORAL at 09:23

## 2020-01-22 RX ADMIN — Medication 100 MILLIGRAM(S): at 12:36

## 2020-01-22 NOTE — PROGRESS NOTE ADULT - SUBJECTIVE AND OBJECTIVE BOX
O/N Events: KAT  Subjective:  complaints of back pain unchanged since admission, SOB much improved satting well on RA but asking for supplemental O2 as makes him feel better   last HD 1/21 w/ 1.4 L UF volume status and lytes are acceptable, bp fluctuates 120-170/60-80 mmhg   vanc trough 23 1/21    VITALS  Vital Signs Last 24 Hrs  T(C): 35.9 (22 Jan 2020 10:00), Max: 36.7 (21 Jan 2020 14:00)  T(F): 96.7 (22 Jan 2020 10:00), Max: 98.1 (21 Jan 2020 15:15)  HR: 58 (22 Jan 2020 09:00) (56 - 72)  BP: 128/60 (22 Jan 2020 09:00) (128/60 - 194/92)  BP(mean): 111 (22 Jan 2020 05:28) (97 - 113)  RR: 16 (22 Jan 2020 09:00) (13 - 21)  SpO2: 99% (22 Jan 2020 09:00) (98% - 100%)    PHYSICAL EXAM  Gen: NAD  Respiratory: CTA B/L  Cardiac: +S1/S2; RRR; systolic murmur with radiation to carotids  Gastrointestinal: soft, NT/ND   Extremities: WWP, no peripheral edema  Neurologic: AAOx3; non focal  access:  Left arm AVF with bruit    MEDICATIONS  (STANDING):  acetaminophen   Tablet .. 650 milliGRAM(s) Oral every 6 hours  aspirin  chewable 81 milliGRAM(s) Oral every 24 hours  atorvastatin 40 milliGRAM(s) Oral at bedtime  carvedilol 25 milliGRAM(s) Oral every 12 hours  chlorhexidine 2% Cloths 1 Application(s) Topical <User Schedule>  cloNIDine 0.3 milliGRAM(s) Oral every 8 hours  clopidogrel Tablet 75 milliGRAM(s) Oral daily  dextrose 5%. 1000 milliLiter(s) (50 mL/Hr) IV Continuous <Continuous>  dextrose 50% Injectable 12.5 Gram(s) IV Push once  dextrose 50% Injectable 25 Gram(s) IV Push once  dextrose 50% Injectable 25 Gram(s) IV Push once  escitalopram 5 milliGRAM(s) Oral every 24 hours  gabapentin 100 milliGRAM(s) Oral <User Schedule>  heparin  Injectable 5000 Unit(s) SubCutaneous every 8 hours  hydrALAZINE 100 milliGRAM(s) Oral every 8 hours  insulin lispro (HumaLOG) corrective regimen sliding scale   SubCutaneous Before meals and at bedtime  isosorbide   mononitrate ER Tablet (IMDUR) 120 milliGRAM(s) Oral every 24 hours  levothyroxine 50 MICROGram(s) Oral daily  lidocaine   Patch 1 Patch Transdermal every 24 hours  losartan 100 milliGRAM(s) Oral every 24 hours  melatonin 5 milliGRAM(s) Oral at bedtime  NIFEdipine XL 60 milliGRAM(s) Oral every 12 hours  polyethylene glycol 3350 17 Gram(s) Oral at bedtime  senna 2 Tablet(s) Oral at bedtime  sevelamer carbonate 800 milliGRAM(s) Oral three times a day with meals    MEDICATIONS  (PRN):  dextrose 40% Gel 15 Gram(s) Oral once PRN Blood Glucose LESS THAN 70 milliGRAM(s)/deciliter  glucagon  Injectable 1 milliGRAM(s) IntraMuscular once PRN Glucose LESS THAN 70 milligrams/deciliter  HYDROmorphone  Injectable 0.25 milliGRAM(s) IV Push every 4 hours PRN Severe Pain (7 - 10)  traMADol 50 milliGRAM(s) Oral every 6 hours PRN Moderate Pain (4 - 6)      LABS                        12.3   6.50  )-----------( 232      ( 22 Jan 2020 07:33 )             38.4     01-22    138  |  96  |  37<H>  ----------------------------<  152<H>  4.0   |  24  |  4.92<H>    Ca    9.9      22 Jan 2020 07:33  Phos  5.1     01-22  Mg     2.3     01-22    TPro  6.8  /  Alb  4.1  /  TBili  0.2  /  DBili  x   /  AST  16  /  ALT  10  /  AlkPhos  110  01-21    LIVER FUNCTIONS - ( 21 Jan 2020 15:41 )  Alb: 4.1 g/dL / Pro: 6.8 g/dL / ALK PHOS: 110 U/L / ALT: 10 U/L / AST: 16 U/L / GGT: x

## 2020-01-22 NOTE — PROGRESS NOTE ADULT - SUBJECTIVE AND OBJECTIVE BOX
OVERNIGHT EVENTS: No acute events over night. Patient received dilaudid x1, tramadol x2, and tylenol x2 for pain control overnight.     SUBJECTIVE / INTERVAL HPI: Patient seen and examined at bedside. States that he continues to experience severe pain, mostly a diffuse headache and generalized back pain this morning. Patient states the pain has been constant since admission. Otherwise, patient reports some continued improvement in his shortness of breath. His biggest concern at present is his inability to feed himself given his finger extremities. Patient also endorsing generalized weakness. Denies fevers/chills, chest pain, abdominal pain, nausea/vomiting.     VITAL SIGNS:  Vital Signs Last 24 Hrs  T(C): 35.9 (22 Jan 2020 10:00), Max: 36.7 (21 Jan 2020 14:00)  T(F): 96.7 (22 Jan 2020 10:00), Max: 98.1 (21 Jan 2020 15:15)  HR: 58 (22 Jan 2020 09:00) (56 - 72)  BP: 128/60 (22 Jan 2020 09:00) (128/60 - 194/92)  BP(mean): 111 (22 Jan 2020 05:28) (97 - 113)  RR: 16 (22 Jan 2020 09:00) (12 - 21)  SpO2: 99% (22 Jan 2020 09:00) (98% - 100%)    PHYSICAL EXAM:    General: WDWN  HEENT: NC/AT; PERRL, anicteric sclera; MMM  Neck: supple  Cardiovascular: +S1/S2; RRR  Respiratory: CTA B/L; no W/R/R  Gastrointestinal: soft, NT/ND; +BSx4  Extremities: WWP; no edema, clubbing or cyanosis  Vascular: 2+ radial, DP/PT pulses B/L  Neurological: AAOx3; no focal deficits    MEDICATIONS:  MEDICATIONS  (STANDING):  acetaminophen   Tablet .. 650 milliGRAM(s) Oral every 6 hours  aspirin  chewable 81 milliGRAM(s) Oral every 24 hours  atorvastatin 40 milliGRAM(s) Oral at bedtime  carvedilol 25 milliGRAM(s) Oral every 12 hours  chlorhexidine 2% Cloths 1 Application(s) Topical <User Schedule>  cloNIDine 0.3 milliGRAM(s) Oral every 8 hours  clopidogrel Tablet 75 milliGRAM(s) Oral daily  dextrose 5%. 1000 milliLiter(s) (50 mL/Hr) IV Continuous <Continuous>  dextrose 50% Injectable 12.5 Gram(s) IV Push once  dextrose 50% Injectable 25 Gram(s) IV Push once  dextrose 50% Injectable 25 Gram(s) IV Push once  escitalopram 5 milliGRAM(s) Oral every 24 hours  gabapentin 100 milliGRAM(s) Oral <User Schedule>  heparin  Injectable 5000 Unit(s) SubCutaneous every 8 hours  hydrALAZINE 100 milliGRAM(s) Oral every 8 hours  insulin lispro (HumaLOG) corrective regimen sliding scale   SubCutaneous Before meals and at bedtime  isosorbide   mononitrate ER Tablet (IMDUR) 120 milliGRAM(s) Oral every 24 hours  levothyroxine 50 MICROGram(s) Oral daily  lidocaine   Patch 1 Patch Transdermal every 24 hours  losartan 100 milliGRAM(s) Oral every 24 hours  melatonin 5 milliGRAM(s) Oral at bedtime  NIFEdipine XL 60 milliGRAM(s) Oral every 12 hours  polyethylene glycol 3350 17 Gram(s) Oral at bedtime  senna 2 Tablet(s) Oral at bedtime  sevelamer carbonate 800 milliGRAM(s) Oral three times a day with meals  vancomycin  IVPB 500 milliGRAM(s) IV Intermittent once    MEDICATIONS  (PRN):  dextrose 40% Gel 15 Gram(s) Oral once PRN Blood Glucose LESS THAN 70 milliGRAM(s)/deciliter  glucagon  Injectable 1 milliGRAM(s) IntraMuscular once PRN Glucose LESS THAN 70 milligrams/deciliter  HYDROmorphone  Injectable 0.25 milliGRAM(s) IV Push every 4 hours PRN Severe Pain (7 - 10)  traMADol 50 milliGRAM(s) Oral every 6 hours PRN Moderate Pain (4 - 6)      ALLERGIES:  Allergies    No Known Allergies    Intolerances        LABS:                        12.3   6.50  )-----------( 232      ( 22 Jan 2020 07:33 )             38.4     01-22    138  |  96  |  37<H>  ----------------------------<  152<H>  4.0   |  24  |  4.92<H>    Ca    9.9      22 Jan 2020 07:33  Phos  5.1     01-22  Mg     2.3     01-22    TPro  6.8  /  Alb  4.1  /  TBili  0.2  /  DBili  x   /  AST  16  /  ALT  10  /  AlkPhos  110  01-21        CAPILLARY BLOOD GLUCOSE      POCT Blood Glucose.: 134 mg/dL (22 Jan 2020 06:48)      RADIOLOGY & ADDITIONAL TESTS: Reviewed. OVERNIGHT EVENTS: No acute events over night. Patient received dilaudid x1, tramadol x2, and tylenol x2 for pain control overnight.     SUBJECTIVE / INTERVAL HPI: Patient seen and examined at bedside. States that he continues to experience severe pain, mostly a diffuse headache and generalized back pain this morning. Patient states the pain has been constant since admission. Otherwise, patient reports some continued improvement in his shortness of breath. His biggest concern at present is his inability to feed himself given his finger extremities. Patient also endorsing generalized weakness. Denies fevers/chills, chest pain, abdominal pain, nausea/vomiting.     VITAL SIGNS:  Vital Signs Last 24 Hrs  T(C): 35.9 (22 Jan 2020 10:00), Max: 36.7 (21 Jan 2020 14:00)  T(F): 96.7 (22 Jan 2020 10:00), Max: 98.1 (21 Jan 2020 15:15)  HR: 58 (22 Jan 2020 09:00) (56 - 72)  BP: 128/60 (22 Jan 2020 09:00) (128/60 - 194/92)  BP(mean): 111 (22 Jan 2020 05:28) (97 - 113)  RR: 16 (22 Jan 2020 09:00) (12 - 21)  SpO2: 99% (22 Jan 2020 09:00) (98% - 100%)    PHYSICAL EXAM:    General: Elderly appearing gentleman sitting in bed, nurse at bedside helping patient with breakfast  HEENT: anicteric sclera; MMM  Neck: supple, no jvd  Cardiovascular: +S1/S2; RRR. 3/6 holosystolic murmur   Respiratory: CTA B/L; no W/R/R  Gastrointestinal: soft, NT/ND; +BSx4  Extremities: WWP; no edema, clubbing or cyanosis. Left AV fistula with bruit. LLE foot with partial amputation of third toe  Vascular: 2+ radial, DP/PT pulses B/L  Neurological: AAOx3; no focal deficits. Motor strength 4/4 in all extremities, though appears to be 2/2 poor effort     MEDICATIONS:  MEDICATIONS  (STANDING):  acetaminophen   Tablet .. 650 milliGRAM(s) Oral every 6 hours  aspirin  chewable 81 milliGRAM(s) Oral every 24 hours  atorvastatin 40 milliGRAM(s) Oral at bedtime  carvedilol 25 milliGRAM(s) Oral every 12 hours  chlorhexidine 2% Cloths 1 Application(s) Topical <User Schedule>  cloNIDine 0.3 milliGRAM(s) Oral every 8 hours  clopidogrel Tablet 75 milliGRAM(s) Oral daily  dextrose 5%. 1000 milliLiter(s) (50 mL/Hr) IV Continuous <Continuous>  dextrose 50% Injectable 12.5 Gram(s) IV Push once  dextrose 50% Injectable 25 Gram(s) IV Push once  dextrose 50% Injectable 25 Gram(s) IV Push once  escitalopram 5 milliGRAM(s) Oral every 24 hours  gabapentin 100 milliGRAM(s) Oral <User Schedule>  heparin  Injectable 5000 Unit(s) SubCutaneous every 8 hours  hydrALAZINE 100 milliGRAM(s) Oral every 8 hours  insulin lispro (HumaLOG) corrective regimen sliding scale   SubCutaneous Before meals and at bedtime  isosorbide   mononitrate ER Tablet (IMDUR) 120 milliGRAM(s) Oral every 24 hours  levothyroxine 50 MICROGram(s) Oral daily  lidocaine   Patch 1 Patch Transdermal every 24 hours  losartan 100 milliGRAM(s) Oral every 24 hours  melatonin 5 milliGRAM(s) Oral at bedtime  NIFEdipine XL 60 milliGRAM(s) Oral every 12 hours  polyethylene glycol 3350 17 Gram(s) Oral at bedtime  senna 2 Tablet(s) Oral at bedtime  sevelamer carbonate 800 milliGRAM(s) Oral three times a day with meals  vancomycin  IVPB 500 milliGRAM(s) IV Intermittent once    MEDICATIONS  (PRN):  dextrose 40% Gel 15 Gram(s) Oral once PRN Blood Glucose LESS THAN 70 milliGRAM(s)/deciliter  glucagon  Injectable 1 milliGRAM(s) IntraMuscular once PRN Glucose LESS THAN 70 milligrams/deciliter  HYDROmorphone  Injectable 0.25 milliGRAM(s) IV Push every 4 hours PRN Severe Pain (7 - 10)  traMADol 50 milliGRAM(s) Oral every 6 hours PRN Moderate Pain (4 - 6)      ALLERGIES:  Allergies    No Known Allergies    Intolerances        LABS:                        12.3   6.50  )-----------( 232      ( 22 Jan 2020 07:33 )             38.4     01-22    138  |  96  |  37<H>  ----------------------------<  152<H>  4.0   |  24  |  4.92<H>    Ca    9.9      22 Jan 2020 07:33  Phos  5.1     01-22  Mg     2.3     01-22    TPro  6.8  /  Alb  4.1  /  TBili  0.2  /  DBili  x   /  AST  16  /  ALT  10  /  AlkPhos  110  01-21        CAPILLARY BLOOD GLUCOSE      POCT Blood Glucose.: 134 mg/dL (22 Jan 2020 06:48)      RADIOLOGY & ADDITIONAL TESTS: Reviewed.

## 2020-01-22 NOTE — PROGRESS NOTE ADULT - PROBLEM SELECTOR PLAN 6
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

## 2020-01-22 NOTE — PROGRESS NOTE ADULT - ASSESSMENT
69M with PMH ESRD (HD L AVF T/R/Sat), HTN, HLD, CAD, renal artery stenosis (s/p stent), DM (c/b peripheral neuropathy), hypothyroidism, CVA (R eye blind, L eye limited vision), carotid artery stenosis (s/p stent), peripheral artery disease (s/p BL stents) BIBEMS 1/11 for syncopal episode with HTN emergency, course c/b MRSA bacteremia. Now with BCx cleared and return to scheduled HD (T/R/Sat), pending further w/u for fluctuating BP.    #MRSA bacteremia  - Gallium scan negative  - EBONI without signs of vegetation  - c/w Vanc (dosed by level given ESRD)    ****************** INCOMPLETE NOTE ************************** 69M with PMH ESRD (HD L AVF T/R/Sat), HTN, HLD, CAD, renal artery stenosis (s/p stent), DM (c/b peripheral neuropathy), hypothyroidism, CVA (R eye blind, L eye limited vision), carotid artery stenosis (s/p stent), peripheral artery disease (s/p BL stents) BIBEMS 1/11 for syncopal episode with HTN emergency, course c/b MRSA bacteremia. Now with BCx cleared and return to scheduled HD (T/R/Sat), pending further w/u for fluctuating BP.    #MRSA bacteremia  - Gallium scan negative  - EBONI without signs of vegetation  - c/w Vanc (dosed by level given ESRD) 4 a total of 4 weeks    - ID  will sign off. Please reconsult if additional questions arise.       Plan discussed with ID attending.

## 2020-01-22 NOTE — PROGRESS NOTE ADULT - SUBJECTIVE AND OBJECTIVE BOX
Pain Management Progress Note - [facility] [phone]      HPI: Patient with a history of syncope, stroke, ESRD, hypothyroidism, dizziness, hypertension, CAD, retinal artery occlusion, admitted with emergent hemodialyses and management of hyperintensive urgency. Patient reports pain to his neck, abdomen and back today, worsened with movement. Patient reporting pain relief with Tramadol Po. Patient Axox3, denies n,v, no s/s of oversedation, patient denies any adverse reactions from Tramadol use. Reviewed pain medication with patient at bedside.        Pain is _x__ sharp ____dull ___burning __x_achy ___ Intensity: ___x_ mild __x_mod ___severe     Location ___surgical site _x___cervical __c___lumbar ____abd ____upper ext_x___lower ext    Worse with ___x_activity _x___movement _____physical therapy___ Rest    Improved with __x__medication ___x_rest ____physical therapy    hydrALAZINE  cloNIDine  morphine  - Injectable  oxycodone    5 mG/acetaminophen 325 mG  chlorhexidine 2% Cloths  aspirin  chewable  losartan  isosorbide   mononitrate ER Tablet (IMDUR)  gabapentin  atorvastatin  clopidogrel Tablet  carvedilol  hydrALAZINE Injectable  sevelamer carbonate  levothyroxine  amLODIPine   Tablet  hydrALAZINE  insulin lispro (HumaLOG) corrective regimen sliding scale  dextrose 5%.  dextrose 40% Gel  dextrose 50% Injectable  glucagon  Injectable  heparin  Injectable  niCARdipine Infusion  niCARdipine Infusion  oxycodone    5 mG/acetaminophen 325 mG  acetaminophen   Tablet ..  lidocaine   Patch  cyclobenzaprine  cloNIDine  amLODIPine   Tablet  gabapentin  carvedilol  traMADol  carvedilol  carvedilol  insulin glargine Injectable (LANTUS)  dextrose 50% Injectable  isosorbide   mononitrate ER Tablet (IMDUR)  BACItracin   Ointment  traMADol  melatonin  NIFEdipine XL  acetaminophen   Tablet ..  traMADol  traMADol  NIFEdipine XL  polyethylene glycol 3350  senna  NIFEdipine XL  nafcillin  IVPB  vancomycin  IVPB  escitalopram  hydrALAZINE  cloNIDine  hydrALAZINE  hydrALAZINE  isosorbide   mononitrate ER Tablet (IMDUR)  isosorbide   mononitrate ER Tablet (IMDUR)  cloNIDine  hydrALAZINE  isosorbide   mononitrate ER Tablet (IMDUR)  carvedilol  gabapentin  losartan  lidocaine   Patch  traMADol  HYDROmorphone  Injectable  fentaNYL    Injectable  midazolam Injectable  vancomycin  IVPB  traMADol  HYDROmorphone  Injectable  traMADol  HYDROmorphone  Injectable  NIFEdipine XL  traMADol  HYDROmorphone  Injectable  isosorbide   mononitrate ER Tablet (IMDUR)  HYDROmorphone  Injectable  LORazepam   Injectable  HYDROmorphone  Injectable  vancomycin  IVPB  NIFEdipine XL  haloperidol    Injectable  zaleplon  HYDROmorphone  Injectable  LORazepam   Injectable  isosorbide   mononitrate ER Tablet (IMDUR)  acetaminophen   Tablet ..  acetaminophen   Tablet ..  vancomycin  IVPB      ROS: Const:  __-_febrile   Eyes:___ENT:___CV: __-_chest pain  Resp: __-__sob  GI:___nausea -___vomiting __-_abd pain ___npo ___clears x__full diet __bm  :___ Musk: _x__pain _-__spasm  Skin:___ Neuro:  -___argmktvd__-_jfnpykrmh_-__ numbness _x__weakness __x_paresth  Psych:_-_anxiety  Endo:___ Heme:___Allergy:_________, _x__all others reviewed and negative      PAST MEDICAL & SURGICAL HISTORY:  Peripheral neuropathy  Peripheral artery disease: s/p bilateral stents  Anemia of renal disease  End-stage renal disease (ESRD)  Type II diabetes mellitus  Retinal artery occlusion  Hypothyroidism  Low back pain  CAD (coronary artery disease)  H/O renal artery stenosis: s/p L renal stent  Hyperlipidemia  Hypertension      01-22 @ 07:3311 mL/min/1.73M2<L>      01-21 @ 15:418 mL/min/1.73M2<L>      Hemoglobin: 12.3 g/dL (01-22 @ 07:33)  Hemoglobin: 11.5 g/dL (01-21 @ 15:41)        T(C): 35.9 (01-22-20 @ 10:00), Max: 36.7 (01-21-20 @ 14:00)  HR: 58 (01-22-20 @ 09:00) (56 - 72)  BP: 128/60 (01-22-20 @ 09:00) (128/60 - 194/92)  RR: 16 (01-22-20 @ 09:00) (12 - 21)  SpO2: 99% (01-22-20 @ 09:00) (95% - 100%)  Wt(kg): --         PHYSICAL EXAM:  Gen Appearance: __-_no acute distress __-_appropriate        Neuro: _x__SILT feet____ EOM Intact Psych: AAOX_3_, __x_mood/affect appropriate        Eyes: _x__conjunctiva WNL  ___x__ Pupils equal and round        ENT: __x_ears and nose atraumatic__x_ Hearing grossly intact        Neck: _x__trachea midline, no visible masses ___thyroid without palpable mass    Resp: _x__Nml WOB____No tactile fremitus ___clear to auscultation    Cardio: _x__extremities free from edema __x__pedal pulses palpable    GI/Abdomen: __x_soft ___x__ Nontender___x___Nondistended_____HSM    Lymphatic: ___no palpable nodes in neck  ___no palpable nodes calves and feet    Skin/Wound: ___Incision, ___Dressing c/d/i,   ____surrounding tissues soft,  ___drain/chest tube present____    Muscular: EHL __4_/5  Gastrocnemius__4_/5    _x__absent clubbing/cyanosis        ASSESSMENT: This is a 69y old Male with a history of syncope, stroke, ESRD, hypothyroidism, dizziness, hypertension, CAD, retinal artery occlusion, admitted with emergent hemodialyses and management of hyperintensive urgency, pain reporting pain relief with current pain medication regimen.       Recommended Treatment PLAN:  1. Tramadol 50mg PO q6 PRN severe pain  2. Continue Gabapentin 100mg PO q8  3. Dilaudid 0.25mg Q4h prn breakthrough pain  4. tylenol 650mg Q6h standing  5. x1 lidocaine patch to affected area, 12hours on 12hours off  Plan discussed with Dr. Chu

## 2020-01-22 NOTE — PROGRESS NOTE ADULT - ASSESSMENT
A/p  68 y/o m with PMHx of ESRD on dialysis via AVF TTS, HTN, CAD, BRYNN (s/p stent), IDDM (with peripheral nueropathy), hypothyroidism and CVA cb right eye blindness- patient is s/p carotid artery stent), PAD (s/p bilateral stents) who was BIBEMS to [facility] for syncopal episode and found to have elevated blood pressure. Admitted to CCU for emergent HD and management of hypertensive emergency.  found to have severe AS most likely contributing to Syncopal episode, currently undergoing pre-op TAVR, hospital course complicated by MRSA bacteremia without clear source

## 2020-01-22 NOTE — PROGRESS NOTE ADULT - PROBLEM SELECTOR PLAN 1
# ESRD on HD TTS via LUE AVF  last HD 1/21 with 1.4 L UF  - HD tmr  - Bp control with current antihypertensives and UF w/HD  - continue Renvela 800 mg po tid w/meals and obtain phos level with bmp   - H&H at goal   - vancomycin to be administered post-HD on dialysis days based on pre-Hd trough    Will follow

## 2020-01-22 NOTE — PROGRESS NOTE ADULT - ATTENDING COMMENTS
I independently performed the key portions of the evaluation and management service provided. I agree with the above history, physical, and plan which I have reviewed and edited where appropriate. I find ESRD, bacteremia, HTN, AS. Continue dialysis. Follow vanco levels.  See full note. (Patient seen earlier in day.)

## 2020-01-22 NOTE — PROGRESS NOTE ADULT - PROBLEM SELECTOR PLAN 4
- Pt febrile 1/12 with 1/14 Bcx growing MRSA, 1/15 started on vanc 1g dosed by level with level check post-HD and re-dosing vanc after  - EBONI neg for vegetations, Gallium scan negative as well  - BCx NGTD since 1/15  - Pt afebrile >24h  - Re-dose vanc accordingly   - f/u IR recommendations about duration of vanc treatment (pt can continue to get with dialysis)    #RUE superficial thrombus  - RUE swelling and erythema at IV site 1/16  - RUE doppler with superficial thrombus in cephalic vein  - Continue to monitor

## 2020-01-22 NOTE — PROGRESS NOTE ADULT - SUBJECTIVE AND OBJECTIVE BOX
Infectious Diseases Progress Note:    SUBJECTIVE: Patient seen and examined at bedside. Patient endorses continued HA and back pain but denies fever, chills, nausea, vomiting, abdominal pain, dysuria, diarrhea.    SYNCOPE    Nutrition, metabolism, and development symptoms  Hypothyroidism  Aortic stenosis  Hyperlipidemia  CAD (coronary artery disease)  Type II diabetes mellitus  Pain  Hypertension  Bacteremia due to Staphylococcus aureus  Adjustment disorder with mixed anxiety and depressed mood  End-stage renal disease (ESRD)      Allergies    No Known Allergies    Intolerances        ANTIBIOTICS/RELEVANT:  antimicrobials    immunologic:      OTHER:  acetaminophen   Tablet .. 650 milliGRAM(s) Oral every 6 hours  aspirin  chewable 81 milliGRAM(s) Oral every 24 hours  atorvastatin 40 milliGRAM(s) Oral at bedtime  carvedilol 25 milliGRAM(s) Oral every 12 hours  chlorhexidine 2% Cloths 1 Application(s) Topical <User Schedule>  cloNIDine 0.3 milliGRAM(s) Oral every 8 hours  clopidogrel Tablet 75 milliGRAM(s) Oral daily  dextrose 40% Gel 15 Gram(s) Oral once PRN  dextrose 5%. 1000 milliLiter(s) IV Continuous <Continuous>  dextrose 50% Injectable 12.5 Gram(s) IV Push once  dextrose 50% Injectable 25 Gram(s) IV Push once  dextrose 50% Injectable 25 Gram(s) IV Push once  escitalopram 5 milliGRAM(s) Oral every 24 hours  gabapentin 100 milliGRAM(s) Oral <User Schedule>  glucagon  Injectable 1 milliGRAM(s) IntraMuscular once PRN  heparin  Injectable 5000 Unit(s) SubCutaneous every 8 hours  hydrALAZINE 100 milliGRAM(s) Oral every 8 hours  HYDROmorphone  Injectable 0.25 milliGRAM(s) IV Push every 4 hours PRN  insulin lispro (HumaLOG) corrective regimen sliding scale   SubCutaneous Before meals and at bedtime  isosorbide   mononitrate ER Tablet (IMDUR) 120 milliGRAM(s) Oral every 24 hours  levothyroxine 50 MICROGram(s) Oral daily  lidocaine   Patch 1 Patch Transdermal every 24 hours  losartan 100 milliGRAM(s) Oral every 24 hours  melatonin 5 milliGRAM(s) Oral at bedtime  NIFEdipine XL 60 milliGRAM(s) Oral every 12 hours  polyethylene glycol 3350 17 Gram(s) Oral at bedtime  senna 2 Tablet(s) Oral at bedtime  sevelamer carbonate 800 milliGRAM(s) Oral three times a day with meals  traMADol 50 milliGRAM(s) Oral every 6 hours PRN      Objective:  Vital Signs Last 24 Hrs  T(C): 35.9 (22 Jan 2020 14:00), Max: 36.2 (21 Jan 2020 17:45)  T(F): 96.6 (22 Jan 2020 14:00), Max: 97.2 (21 Jan 2020 17:45)  HR: 61 (22 Jan 2020 13:00) (58 - 72)  BP: 190/81 (22 Jan 2020 13:00) (128/60 - 190/81)  BP(mean): 116 (22 Jan 2020 13:00) (97 - 116)  RR: 16 (22 Jan 2020 13:00) (15 - 21)  SpO2: 98% (22 Jan 2020 13:00) (98% - 100%)    PHYSICAL EXAM:  Constitutional: Well-developed, well nourished  Eyes: PERRLA, EOMI  Ear/Nose/Throat: no oral lesion, no sinus tenderness on percussion	  Neck:no JVD, no lymphadenopathy, supple  Respiratory: CTA bilateral  Cardiovascular: S1S2, RRR, holosystolic murmur  Gastrointestinal: soft, NTND, (+) BS, no HSM  Extremities: no c/e/e  Skin: no rashes    LABS:                        12.3   6.50  )-----------( 232      ( 22 Jan 2020 07:33 )             38.4     01-22    138  |  96  |  37<H>  ----------------------------<  152<H>  4.0   |  24  |  4.92<H>    Ca    9.9      22 Jan 2020 07:33  Phos  5.1     01-22  Mg     2.3     01-22    TPro  6.8  /  Alb  4.1  /  TBili  0.2  /  DBili  x   /  AST  16  /  ALT  10  /  AlkPhos  110  01-21          MICROBIOLOGY:  Culture - Blood (01.15.20 @ 21:35)    Specimen Source: .Blood Blood    Culture Results:   No growth at 5 days.              RADIOLOGY & ADDITIONAL STUDIES:  EBONI w/Doppler (01.17.20 @ 16:18) >    CONCLUSIONS:     1. Normal left and right ventricular size and function.   2. Left ventricular hypertrophy.   3. Biatrial enlargement.   4. No LA/RA/NICHO/RAA thrombus seen.   5. Mild mitral regurgitation.   6. Moderate-to-severe aortic stenosis.   7. Mild aortic regurgitation.   8. No pericardial effusion.   9. No EBONI evidence of vegetations.    < end of copied text >  < from: EBONI w/Doppler (01.17.20 @ 16:18) >

## 2020-01-22 NOTE — PROGRESS NOTE ADULT - PROBLEM SELECTOR PLAN 1
- On arrival with HTN emergency, 's resolved with nicardipine gtt and HD  - s/p a-line for accurate BP measurements 1/11-1/16  - Now better controlled with hydralazine 100mg TID, losartan 100mg daily, coreg 25mg BID, clonidine 0.3mg TID, nifedipine 60mg BID, imdur 90mg daily. Will consider increasing nifedipine to 90mg bid  - Will obtain retroperitoneal US with doppler to evaluate for further causes of fluctuant BP  - HTN likely c/b pain and need for HD  - HD also aiding BP control, while inpt was on MWF schedule; returning to T/R/Sat schedule (next dialysis 1/23)  - Nephro following, f/u recs    #CAD  - Continue home meds  - ASA 81mg, plavix 75mg, atorvastatin 40mg daily    #PAD  - s/p stenting  - Management as above    #Renal artery stenosis  - s/p stenting  - f/u retroperitoneal US with doppler

## 2020-01-22 NOTE — PROGRESS NOTE ADULT - PROBLEM SELECTOR PLAN 5
- 1/13 ECHO with LVEF 60-65%, severe AS, mild AR, mild MR, moderate TR, pulmonary HTN 70mmHg  - Structural heart consulted with recs for BP control and bacteremia treatment prior to TAVR consideration, will not be done on this admission

## 2020-01-22 NOTE — PROGRESS NOTE ADULT - PROBLEM SELECTOR PLAN 3
- Pt with known hx ESRD, L AVF with HD T/Th/Sa outpt  - HD tomorrow - labs with HD, vanc level post-HD  - Nephro following, f/u  recs  - Renvela 800mg TID  - Daily weights, strict I&O, renal diet  - Avoid nephrotoxic agents, renally dose meds    #Carotid artery stenosis  - s/p stenting  - Management as above    #Pulmonary HTN  - ECHO with findings as above

## 2020-01-22 NOTE — PROGRESS NOTE ADULT - ATTENDING COMMENTS
On Date 1/22/20  Assessment: Patient personally seen and examined myself, face-to-face, during rounds with the Resident     Note read, including vitals, physical findings, laboratory data, and radiological reports.   Agree with above.

## 2020-01-22 NOTE — PROGRESS NOTE ADULT - PROBLEM SELECTOR PLAN 2
- Pt with multiple sources of chronic pain likely also contributing to HTN  - RLE pain 2/2 recent R hip replacement - lidocaine patch q24h  - Chronic back pain  - MR thoracolumbar spine c/w degenerative changes T10-11, L3-4, L5-S1; L paracentral disc herniation at C6-7 that may affect C7 nerve root, and disc herniations at several levels without compression of the thoracic spinal cord  - Pain management consulted with recs for tylenol 650mg q6h mild pain, tramadol 50mg q6h moderate pain, and dilaudid 0.25mg IV q4h for severe pain  - Peripheral artery disease and DM related neuropathy likely contributing to pain as well  - Gabapentin 100mg MWF evenings (ESRD on HD dose adjustment)    #Depression  - Pt apathetic on exam  - Psych following with recs for lexapro 5mg daily

## 2020-01-23 LAB
ANION GAP SERPL CALC-SCNC: 20 MMOL/L — HIGH (ref 5–17)
BUN SERPL-MCNC: 54 MG/DL — HIGH (ref 7–23)
CALCIUM SERPL-MCNC: 10.1 MG/DL — SIGNIFICANT CHANGE UP (ref 8.4–10.5)
CHLORIDE SERPL-SCNC: 92 MMOL/L — LOW (ref 96–108)
CK MB CFR SERPL CALC: 3 NG/ML — SIGNIFICANT CHANGE UP (ref 0–6.7)
CK MB CFR SERPL CALC: 3.3 NG/ML — SIGNIFICANT CHANGE UP (ref 0–6.7)
CO2 SERPL-SCNC: 25 MMOL/L — SIGNIFICANT CHANGE UP (ref 22–31)
CREAT SERPL-MCNC: 5.99 MG/DL — HIGH (ref 0.5–1.3)
CULTURE RESULTS: SIGNIFICANT CHANGE UP
GLUCOSE BLDC GLUCOMTR-MCNC: 120 MG/DL — HIGH (ref 70–99)
GLUCOSE BLDC GLUCOMTR-MCNC: 149 MG/DL — HIGH (ref 70–99)
GLUCOSE BLDC GLUCOMTR-MCNC: 151 MG/DL — HIGH (ref 70–99)
GLUCOSE BLDC GLUCOMTR-MCNC: 178 MG/DL — HIGH (ref 70–99)
GLUCOSE BLDC GLUCOMTR-MCNC: 188 MG/DL — HIGH (ref 70–99)
GLUCOSE BLDC GLUCOMTR-MCNC: 194 MG/DL — HIGH (ref 70–99)
GLUCOSE SERPL-MCNC: 218 MG/DL — HIGH (ref 70–99)
HCT VFR BLD CALC: 37.2 % — LOW (ref 39–50)
HGB BLD-MCNC: 12.3 G/DL — LOW (ref 13–17)
MAGNESIUM SERPL-MCNC: 2.4 MG/DL — SIGNIFICANT CHANGE UP (ref 1.6–2.6)
MCHC RBC-ENTMCNC: 28.8 PG — SIGNIFICANT CHANGE UP (ref 27–34)
MCHC RBC-ENTMCNC: 33.1 GM/DL — SIGNIFICANT CHANGE UP (ref 32–36)
MCV RBC AUTO: 87.1 FL — SIGNIFICANT CHANGE UP (ref 80–100)
NRBC # BLD: 0 /100 WBCS — SIGNIFICANT CHANGE UP (ref 0–0)
ORGANISM # SPEC MICROSCOPIC CNT: SIGNIFICANT CHANGE UP
PHOSPHATE SERPL-MCNC: 5.3 MG/DL — HIGH (ref 2.5–4.5)
PLATELET # BLD AUTO: 276 K/UL — SIGNIFICANT CHANGE UP (ref 150–400)
POTASSIUM SERPL-MCNC: 4.3 MMOL/L — SIGNIFICANT CHANGE UP (ref 3.5–5.3)
POTASSIUM SERPL-SCNC: 4.3 MMOL/L — SIGNIFICANT CHANGE UP (ref 3.5–5.3)
RBC # BLD: 4.27 M/UL — SIGNIFICANT CHANGE UP (ref 4.2–5.8)
RBC # FLD: 14 % — SIGNIFICANT CHANGE UP (ref 10.3–14.5)
SODIUM SERPL-SCNC: 137 MMOL/L — SIGNIFICANT CHANGE UP (ref 135–145)
SPECIMEN SOURCE: SIGNIFICANT CHANGE UP
TROPONIN T SERPL-MCNC: 0.2 NG/ML — CRITICAL HIGH (ref 0–0.01)
TROPONIN T SERPL-MCNC: 0.21 NG/ML — CRITICAL HIGH (ref 0–0.01)
VANCOMYCIN FLD-MCNC: 24.9 UG/ML — SIGNIFICANT CHANGE UP
WBC # BLD: 6.96 K/UL — SIGNIFICANT CHANGE UP (ref 3.8–10.5)
WBC # FLD AUTO: 6.96 K/UL — SIGNIFICANT CHANGE UP (ref 3.8–10.5)

## 2020-01-23 PROCEDURE — 90935 HEMODIALYSIS ONE EVALUATION: CPT | Mod: GC

## 2020-01-23 PROCEDURE — 99232 SBSQ HOSP IP/OBS MODERATE 35: CPT

## 2020-01-23 PROCEDURE — 99223 1ST HOSP IP/OBS HIGH 75: CPT

## 2020-01-23 RX ADMIN — CLOPIDOGREL BISULFATE 75 MILLIGRAM(S): 75 TABLET, FILM COATED ORAL at 14:57

## 2020-01-23 RX ADMIN — Medication 2: at 22:24

## 2020-01-23 RX ADMIN — HYDROMORPHONE HYDROCHLORIDE 0.25 MILLIGRAM(S): 2 INJECTION INTRAMUSCULAR; INTRAVENOUS; SUBCUTANEOUS at 22:39

## 2020-01-23 RX ADMIN — Medication 650 MILLIGRAM(S): at 22:13

## 2020-01-23 RX ADMIN — Medication 650 MILLIGRAM(S): at 15:57

## 2020-01-23 RX ADMIN — Medication 0.3 MILLIGRAM(S): at 21:13

## 2020-01-23 RX ADMIN — Medication 100 MILLIGRAM(S): at 06:04

## 2020-01-23 RX ADMIN — HYDROMORPHONE HYDROCHLORIDE 0.25 MILLIGRAM(S): 2 INJECTION INTRAMUSCULAR; INTRAVENOUS; SUBCUTANEOUS at 18:37

## 2020-01-23 RX ADMIN — HYDROMORPHONE HYDROCHLORIDE 0.25 MILLIGRAM(S): 2 INJECTION INTRAMUSCULAR; INTRAVENOUS; SUBCUTANEOUS at 22:24

## 2020-01-23 RX ADMIN — ESCITALOPRAM OXALATE 5 MILLIGRAM(S): 10 TABLET, FILM COATED ORAL at 21:13

## 2020-01-23 RX ADMIN — POLYETHYLENE GLYCOL 3350 17 GRAM(S): 17 POWDER, FOR SOLUTION ORAL at 21:14

## 2020-01-23 RX ADMIN — Medication 81 MILLIGRAM(S): at 14:57

## 2020-01-23 RX ADMIN — TRAMADOL HYDROCHLORIDE 50 MILLIGRAM(S): 50 TABLET ORAL at 08:28

## 2020-01-23 RX ADMIN — Medication 650 MILLIGRAM(S): at 04:00

## 2020-01-23 RX ADMIN — HYDROMORPHONE HYDROCHLORIDE 0.25 MILLIGRAM(S): 2 INJECTION INTRAMUSCULAR; INTRAVENOUS; SUBCUTANEOUS at 18:22

## 2020-01-23 RX ADMIN — Medication 0.3 MILLIGRAM(S): at 06:04

## 2020-01-23 RX ADMIN — Medication 650 MILLIGRAM(S): at 21:13

## 2020-01-23 RX ADMIN — HEPARIN SODIUM 5000 UNIT(S): 5000 INJECTION INTRAVENOUS; SUBCUTANEOUS at 14:56

## 2020-01-23 RX ADMIN — HYDROMORPHONE HYDROCHLORIDE 0.25 MILLIGRAM(S): 2 INJECTION INTRAMUSCULAR; INTRAVENOUS; SUBCUTANEOUS at 11:30

## 2020-01-23 RX ADMIN — Medication 650 MILLIGRAM(S): at 03:30

## 2020-01-23 RX ADMIN — TRAMADOL HYDROCHLORIDE 50 MILLIGRAM(S): 50 TABLET ORAL at 16:45

## 2020-01-23 RX ADMIN — TRAMADOL HYDROCHLORIDE 50 MILLIGRAM(S): 50 TABLET ORAL at 15:45

## 2020-01-23 RX ADMIN — Medication 50 MICROGRAM(S): at 06:04

## 2020-01-23 RX ADMIN — HEPARIN SODIUM 5000 UNIT(S): 5000 INJECTION INTRAVENOUS; SUBCUTANEOUS at 06:04

## 2020-01-23 RX ADMIN — Medication 100 MILLIGRAM(S): at 15:44

## 2020-01-23 RX ADMIN — LIDOCAINE 1 PATCH: 4 CREAM TOPICAL at 14:57

## 2020-01-23 RX ADMIN — Medication 2: at 18:26

## 2020-01-23 RX ADMIN — Medication 60 MILLIGRAM(S): at 18:21

## 2020-01-23 RX ADMIN — Medication 100 MILLIGRAM(S): at 21:13

## 2020-01-23 RX ADMIN — HYDROMORPHONE HYDROCHLORIDE 0.25 MILLIGRAM(S): 2 INJECTION INTRAMUSCULAR; INTRAVENOUS; SUBCUTANEOUS at 02:37

## 2020-01-23 RX ADMIN — Medication 650 MILLIGRAM(S): at 10:42

## 2020-01-23 RX ADMIN — SENNA PLUS 2 TABLET(S): 8.6 TABLET ORAL at 21:14

## 2020-01-23 RX ADMIN — CARVEDILOL PHOSPHATE 25 MILLIGRAM(S): 80 CAPSULE, EXTENDED RELEASE ORAL at 21:14

## 2020-01-23 RX ADMIN — Medication 650 MILLIGRAM(S): at 14:57

## 2020-01-23 RX ADMIN — Medication 5 MILLIGRAM(S): at 21:14

## 2020-01-23 RX ADMIN — HYDROMORPHONE HYDROCHLORIDE 0.25 MILLIGRAM(S): 2 INJECTION INTRAMUSCULAR; INTRAVENOUS; SUBCUTANEOUS at 02:22

## 2020-01-23 RX ADMIN — Medication 650 MILLIGRAM(S): at 09:42

## 2020-01-23 RX ADMIN — HYDROMORPHONE HYDROCHLORIDE 0.25 MILLIGRAM(S): 2 INJECTION INTRAMUSCULAR; INTRAVENOUS; SUBCUTANEOUS at 11:15

## 2020-01-23 RX ADMIN — CARVEDILOL PHOSPHATE 25 MILLIGRAM(S): 80 CAPSULE, EXTENDED RELEASE ORAL at 09:45

## 2020-01-23 RX ADMIN — HYDROMORPHONE HYDROCHLORIDE 0.25 MILLIGRAM(S): 2 INJECTION INTRAMUSCULAR; INTRAVENOUS; SUBCUTANEOUS at 07:02

## 2020-01-23 RX ADMIN — HEPARIN SODIUM 5000 UNIT(S): 5000 INJECTION INTRAVENOUS; SUBCUTANEOUS at 21:14

## 2020-01-23 RX ADMIN — Medication 60 MILLIGRAM(S): at 06:04

## 2020-01-23 RX ADMIN — LOSARTAN POTASSIUM 100 MILLIGRAM(S): 100 TABLET, FILM COATED ORAL at 18:21

## 2020-01-23 RX ADMIN — HYDROMORPHONE HYDROCHLORIDE 0.25 MILLIGRAM(S): 2 INJECTION INTRAMUSCULAR; INTRAVENOUS; SUBCUTANEOUS at 07:30

## 2020-01-23 RX ADMIN — ATORVASTATIN CALCIUM 40 MILLIGRAM(S): 80 TABLET, FILM COATED ORAL at 21:13

## 2020-01-23 RX ADMIN — SEVELAMER CARBONATE 800 MILLIGRAM(S): 2400 POWDER, FOR SUSPENSION ORAL at 08:27

## 2020-01-23 RX ADMIN — Medication 0.3 MILLIGRAM(S): at 14:57

## 2020-01-23 RX ADMIN — TRAMADOL HYDROCHLORIDE 50 MILLIGRAM(S): 50 TABLET ORAL at 09:28

## 2020-01-23 RX ADMIN — LIDOCAINE 1 PATCH: 4 CREAM TOPICAL at 19:01

## 2020-01-23 RX ADMIN — CHLORHEXIDINE GLUCONATE 1 APPLICATION(S): 213 SOLUTION TOPICAL at 06:57

## 2020-01-23 RX ADMIN — SEVELAMER CARBONATE 800 MILLIGRAM(S): 2400 POWDER, FOR SUSPENSION ORAL at 18:21

## 2020-01-23 RX ADMIN — ISOSORBIDE MONONITRATE 120 MILLIGRAM(S): 60 TABLET, EXTENDED RELEASE ORAL at 14:57

## 2020-01-23 NOTE — PROGRESS NOTE ADULT - SUBJECTIVE AND OBJECTIVE BOX
OVERNIGHT EVENTS: No acute events overnight.     SUBJECTIVE / INTERVAL HPI: Patient seen and examined at bedside. Patient continues to endorse his chronic lower back pain and also endorses chronic shortness of breath. Otherwise, patient with no complaints though on review of systems, fixates on being in pain and requesting additional pain medications.     VITAL SIGNS:  Vital Signs Last 24 Hrs  T(C): 36.4 (23 Jan 2020 11:00), Max: 36.6 (23 Jan 2020 09:30)  T(F): 97.6 (23 Jan 2020 11:00), Max: 97.9 (23 Jan 2020 09:30)  HR: 61 (23 Jan 2020 11:00) (58 - 71)  BP: 157/70 (23 Jan 2020 11:00) (133/61 - 194/83)  BP(mean): 88 (23 Jan 2020 08:17) (88 - 119)  RR: 16 (23 Jan 2020 08:17) (16 - 29)  SpO2: 96% (23 Jan 2020 08:17) (96% - 100%)    PHYSICAL EXAM:    General: Elderly appearing gentleman sleeping in bed, easily arousable, appears comfortable though endorsing 10/10 pain  HEENT: anicteric sclera; MMM  Neck: supple, no jvd  Cardiovascular: +S1/S2; RRR. 3/6 holosystolic murmur   Respiratory: CTA B/L; no W/R/R  Gastrointestinal: soft, NT/ND; +BSx4  Extremities: WWP; no edema, clubbing or cyanosis. Left AV fistula with bruit. LLE foot with partial amputation of third toe  Vascular: 2+ radial, DP/PT pulses B/L  Neurological: AAOx3; no focal deficits. Motor strength 4/4 in all extremities, though appears to be 2/2 poor effort  HEENT: NC/AT; PERRL, anicteric sclera; MMM  Neck: supple  Cardiovascular: +S1/S2; RRR  Respiratory: CTA B/L; no W/R/R  Gastrointestinal: soft, NT/ND; +BSx4  Extremities: WWP; no edema, clubbing or cyanosis  Vascular: 2+ radial, DP/PT pulses B/L  Neurological: AAOx3; no focal deficits    MEDICATIONS:  MEDICATIONS  (STANDING):  acetaminophen   Tablet .. 650 milliGRAM(s) Oral every 6 hours  aspirin  chewable 81 milliGRAM(s) Oral every 24 hours  atorvastatin 40 milliGRAM(s) Oral at bedtime  carvedilol 25 milliGRAM(s) Oral every 12 hours  chlorhexidine 2% Cloths 1 Application(s) Topical <User Schedule>  cloNIDine 0.3 milliGRAM(s) Oral every 8 hours  clopidogrel Tablet 75 milliGRAM(s) Oral daily  dextrose 5%. 1000 milliLiter(s) (50 mL/Hr) IV Continuous <Continuous>  dextrose 50% Injectable 12.5 Gram(s) IV Push once  dextrose 50% Injectable 25 Gram(s) IV Push once  dextrose 50% Injectable 25 Gram(s) IV Push once  escitalopram 5 milliGRAM(s) Oral every 24 hours  gabapentin 100 milliGRAM(s) Oral <User Schedule>  heparin  Injectable 5000 Unit(s) SubCutaneous every 8 hours  hydrALAZINE 100 milliGRAM(s) Oral every 8 hours  insulin lispro (HumaLOG) corrective regimen sliding scale   SubCutaneous Before meals and at bedtime  isosorbide   mononitrate ER Tablet (IMDUR) 120 milliGRAM(s) Oral every 24 hours  levothyroxine 50 MICROGram(s) Oral daily  lidocaine   Patch 1 Patch Transdermal every 24 hours  losartan 100 milliGRAM(s) Oral every 24 hours  melatonin 5 milliGRAM(s) Oral at bedtime  NIFEdipine XL 60 milliGRAM(s) Oral every 12 hours  polyethylene glycol 3350 17 Gram(s) Oral at bedtime  senna 2 Tablet(s) Oral at bedtime  sevelamer carbonate 800 milliGRAM(s) Oral three times a day with meals    MEDICATIONS  (PRN):  dextrose 40% Gel 15 Gram(s) Oral once PRN Blood Glucose LESS THAN 70 milliGRAM(s)/deciliter  glucagon  Injectable 1 milliGRAM(s) IntraMuscular once PRN Glucose LESS THAN 70 milligrams/deciliter  HYDROmorphone  Injectable 0.25 milliGRAM(s) IV Push every 4 hours PRN Severe Pain (7 - 10)  traMADol 50 milliGRAM(s) Oral every 6 hours PRN Moderate Pain (4 - 6)      ALLERGIES:  Allergies    No Known Allergies    Intolerances        LABS:                        12.3   6.96  )-----------( 276      ( 23 Jan 2020 11:46 )             37.2     01-23    137  |  92<L>  |  54<H>  ----------------------------<  218<H>  4.3   |  25  |  5.99<H>    Ca    10.1      23 Jan 2020 11:46  Phos  5.3     01-23  Mg     2.4     01-23    TPro  6.8  /  Alb  4.1  /  TBili  0.2  /  DBili  x   /  AST  16  /  ALT  10  /  AlkPhos  110  01-21        CAPILLARY BLOOD GLUCOSE      POCT Blood Glucose.: 149 mg/dL (23 Jan 2020 13:09)      RADIOLOGY & ADDITIONAL TESTS: Reviewed.

## 2020-01-23 NOTE — PROGRESS NOTE ADULT - ASSESSMENT
I independently performed the key portions of the evaluation and management service provided. I agree with the above history, physical, and plan which I have reviewed and edited where appropriate. I find seen on dialysis. Continue dialysis. See full note. (Patient seen earlier in day.)

## 2020-01-23 NOTE — PROGRESS NOTE ADULT - ASSESSMENT
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 partial amputation of 3rd toe 11/2018 who was BIBEMS to [facility] for syncopal event during dialysis and found to have elevated blood pressure with possible aspiration of food since patient was eating at the time of the episode.  In the ED, patient received Clonidine and Hydralazine for /78 then admitted to the CCU where Hemodialysis was initiated during which patient became unresponsive 15minutes into the session.  His vitals showed bradycardia in the 30's, hypotension with sbp 90's and blood glucose 160.  A rapid response was called and patient's heart rate returned to normal sinus before intervention and patient was more responsive but noted to be lethargic with right sided facial droop.  Subsequently, a stroke code was called.  CT head 1/11/20 showed microangiopathic ischemic disease, lacunar infarcts in the basal ganglia bilaterally; no intracranial hemorrhage, acute transcortical infarct, or calvarial fracture; no significant interval change since 8/8/2019.  Repeat CT head showed no change from prior imaging. Patient has since transitioned from Nicardipine to home po antihypertensives.  TTE done 1/13/20 showed severe AS, ADOLFO 0.67, MG 38, Mild AR, Mild MR.  The Structural Team was consulted to evaluate for possible TAVR.  On 1/18/20, Infectious disease was consulted for MRSA bacteremia and patient was started on vancomycin to continue for 4 weeks.    Plan:  Problem 1: severe AS on TTE 1/13/20  -CT congenital heart and CT angio abd/pelvis completed.  -Still needs Bedside PFTs prior to intervention   -continue coreg 25mg q12  -continue atorvastatin 80mg hs  -keep euvolemic- ESRD on HD scheduled for today  -continue vancomycin for 4 weeks for MRSA with levels per ID  -Will need to treat infection before any TAVR as the prosthetic valve will be at high risk for infection   -Dr. Mcfarlane/Dr. Gasca to evaluate for possible BAV given the MRSA bacteremia during this hospitalization      Problem 2: Bacteremia   - Pt with intermittent fevers, no leukocytosis  -Blood cultures 1/14 positive for MRSA. Blood culture 1/15/20 with no growth to date, remains on contact isolation  -continue vancomycin for 4 weeks for MRSA with levels per ID  -Will need to treat infection prior to TAVR to prevent infection to new valve    Problem 2: severe Hypertension sbp 200's  -SBP maintained under 200 with current BP meds  - continue coreg 25mg q12, Nifedipine XL 60mg q12h,, Imdur 120mg daily, Hydralazine 100mg q8h, Losartan 100mg daily, clonidine 0.3mg q8h    Problem 3: IDDM  -HgA1C 6.3  -continue consistent carb diet  -continue insulin sliding scale.   -may need long acting insulin to address peak blood glucose >200 in the morning     Problem 4: left carotid stent  -carotid duplex showed No hemodynamically significant stenosis in bilateral CCA and ICA  -continue plavix and aspirin    I have reviewed clinical labs tests and reports, radiology tests and reports, as well as old patient medical records, and discussed with the referring physician.

## 2020-01-23 NOTE — PROGRESS NOTE ADULT - ATTENDING COMMENTS
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  In the ED, patient received Clonidine and Hydralazine for /78 then admitted to the CCU where Hemodialysis was initiated during which patient became unresponsive 15minutes into the session.  His vitals showed bradycardia in the 30's, hypotension with sbp 90's and blood glucose 160.  A rapid response was called and patient's heart rate returned to normal sinus before intervention and patient was more responsive but noted to be lethargic with right sided facial droop.  Subsequently, a stroke code was called.  CT head 1/11/20 showed microangiopathic ischemic disease, lacunar infarcts in the basal ganglia bilaterally; no intracranial hemorrhage, acute transcortical infarct, or calvarial fracture; no significant interval change since 8/8/2019.  Repeat CT head showed no change from prior imaging. Patient has since transitioned from Nicardipine to home po antihypertensives.  TTE done 1/13/20 showed severe AS, ADOLFO 0.67, MG 38, Mild AR, Mild MR.  The Structural Team was consulted to evaluate for aortic stenosis. Hospitalization complicated by MRSA bacteremia on IV abx. EBONI did not revealed IE however assessment of AV revealed moderate to severe AS with ADOLFO >1cm2. pt remains severely deconditioned (nonambulatory) now with active infection on IV abx, CTA revealed severe PAD making TF TAVR difficult dispite infection a challenge. syncope in setting of AS, active infection and HD. would treat conservatively at this time. no intervention at this time. continue IV abx, f/u as outpt.

## 2020-01-23 NOTE — PROGRESS NOTE ADULT - PROBLEM SELECTOR PLAN 5
- 1/13 ECHO with LVEF 60-65%, severe AS, mild AR, mild MR, moderate TR, pulmonary HTN 70mmHg  - Structural heart consulted. TAVR will not be done on this admission as would be at very high risk with bacteremia still being treated. However, evaluating patient for potential BAV

## 2020-01-23 NOTE — PROGRESS NOTE ADULT - ATTENDING COMMENTS
On Date 1/23/20  Assessment: Patient personally seen and examined myself, face-to-face, during rounds with the Resident     Note read, including vitals, physical findings, laboratory data, and radiological reports.   Agree with above.

## 2020-01-23 NOTE — PROGRESS NOTE ADULT - PROBLEM SELECTOR PLAN 2
- Pt with multiple sources of chronic pain likely also contributing to HTN  - RLE pain 2/2 recent R hip replacement - lidocaine patch q24h  - Chronic back pain  - MR thoracolumbar spine c/w degenerative changes T10-11, L3-4, L5-S1; L paracentral disc herniation at C6-7 that may affect C7 nerve root, and disc herniations at several levels without compression of the thoracic spinal cord  - Pain management consulted with recs for tylenol 650mg q6h mild pain, tramadol 50mg q6h moderate pain, and dilaudid 0.25mg IV q4h for severe pain  - Peripheral artery disease and DM related neuropathy likely contributing to pain as well  - Gabapentin 100mg MWF evenings (ESRD on HD dose adjustment)    #Depression  - Psych following with recs for lexapro 5mg daily

## 2020-01-23 NOTE — PROGRESS NOTE ADULT - ASSESSMENT
Pt is a 70 yo M with PMH ESRD (HD L AVF T/R/Sat), HTN, HLD, CAD, renal artery stenosis (s/p stent), DM (c/b peripheral neuropathy), hypothyroidism, CVA (R eye blind, L eye limited vision), carotid artery stenosis (s/p stent), peripheral artery disease (s/p BL stents) BIBEMS 1/11 for syncopal episode with HTN emergency, course c/b MRSA bacteremia. Now with BCx cleared and return to scheduled HD (T/R/Sat), pending further w/u for fluctuating BP.

## 2020-01-23 NOTE — PROGRESS NOTE ADULT - SUBJECTIVE AND OBJECTIVE BOX
Patient discussed on morning rounds with       Operation / Date:     SUBJECTIVE ASSESSMENT:  69y Male         Vital Signs Last 24 Hrs  T(C): 36.6 (23 Jan 2020 09:30), Max: 36.6 (23 Jan 2020 09:30)  T(F): 97.9 (23 Jan 2020 09:30), Max: 97.9 (23 Jan 2020 09:30)  HR: 65 (23 Jan 2020 08:17) (58 - 71)  BP: 133/61 (23 Jan 2020 08:17) (133/61 - 194/83)  BP(mean): 88 (23 Jan 2020 08:17) (88 - 119)  RR: 16 (23 Jan 2020 08:17) (16 - 29)  SpO2: 96% (23 Jan 2020 08:17) (96% - 100%)  I&O's Detail      CHEST TUBE:  Yes/No. AIR LEAKS: Yes/No. Suction / H2O SEAL.   MARLIN DRAIN:  Yes/No.  EPICARDIAL WIRES: Yes/No.  TIE DOWNS: Yes/No.  TORRES: Yes/No.    PHYSICAL EXAM:    General:     Neurological:    Cardiovascular:    Respiratory:    Gastrointestinal:    Extremities:    Vascular:    Incision Sites:    LABS:                        12.3   6.50  )-----------( 232      ( 22 Jan 2020 07:33 )             38.4       COUMADIN:  Yes/No. REASON: .        01-22    138  |  96  |  37<H>  ----------------------------<  152<H>  4.0   |  24  |  4.92<H>    Ca    9.9      22 Jan 2020 07:33  Phos  5.1     01-22  Mg     2.3     01-22    TPro  6.8  /  Alb  4.1  /  TBili  0.2  /  DBili  x   /  AST  16  /  ALT  10  /  AlkPhos  110  01-21          MEDICATIONS  (STANDING):  acetaminophen   Tablet .. 650 milliGRAM(s) Oral every 6 hours  aspirin  chewable 81 milliGRAM(s) Oral every 24 hours  atorvastatin 40 milliGRAM(s) Oral at bedtime  carvedilol 25 milliGRAM(s) Oral every 12 hours  chlorhexidine 2% Cloths 1 Application(s) Topical <User Schedule>  cloNIDine 0.3 milliGRAM(s) Oral every 8 hours  clopidogrel Tablet 75 milliGRAM(s) Oral daily  dextrose 5%. 1000 milliLiter(s) (50 mL/Hr) IV Continuous <Continuous>  dextrose 50% Injectable 12.5 Gram(s) IV Push once  dextrose 50% Injectable 25 Gram(s) IV Push once  dextrose 50% Injectable 25 Gram(s) IV Push once  escitalopram 5 milliGRAM(s) Oral every 24 hours  gabapentin 100 milliGRAM(s) Oral <User Schedule>  heparin  Injectable 5000 Unit(s) SubCutaneous every 8 hours  hydrALAZINE 100 milliGRAM(s) Oral every 8 hours  insulin lispro (HumaLOG) corrective regimen sliding scale   SubCutaneous Before meals and at bedtime  isosorbide   mononitrate ER Tablet (IMDUR) 120 milliGRAM(s) Oral every 24 hours  levothyroxine 50 MICROGram(s) Oral daily  lidocaine   Patch 1 Patch Transdermal every 24 hours  losartan 100 milliGRAM(s) Oral every 24 hours  melatonin 5 milliGRAM(s) Oral at bedtime  NIFEdipine XL 60 milliGRAM(s) Oral every 12 hours  polyethylene glycol 3350 17 Gram(s) Oral at bedtime  senna 2 Tablet(s) Oral at bedtime  sevelamer carbonate 800 milliGRAM(s) Oral three times a day with meals    MEDICATIONS  (PRN):  dextrose 40% Gel 15 Gram(s) Oral once PRN Blood Glucose LESS THAN 70 milliGRAM(s)/deciliter  glucagon  Injectable 1 milliGRAM(s) IntraMuscular once PRN Glucose LESS THAN 70 milligrams/deciliter  HYDROmorphone  Injectable 0.25 milliGRAM(s) IV Push every 4 hours PRN Severe Pain (7 - 10)  traMADol 50 milliGRAM(s) Oral every 6 hours PRN Moderate Pain (4 - 6)        RADIOLOGY & ADDITIONAL TESTS: Patient discussed on morning rounds with Dr. Mcfarlane/Dr. Gasca     Operation / Date:  AS/ TAVR vs BAV    SUBJECTIVE ASSESSMENT:  69year old male who stated he was very sleepy but now needs his pain medication which he received each morning.  Patient admits difficulty breathing but once repositioned to sit upright, he reported it was better.  Denies  arm pain, jaw pain, vomiting, dizziness, syncope, diarrhea, dysuria. LE edema.      Vital Signs Last 24 Hrs  T(C): 36.6 (23 Jan 2020 09:30), Max: 36.6 (23 Jan 2020 09:30)  T(F): 97.9 (23 Jan 2020 09:30), Max: 97.9 (23 Jan 2020 09:30)  HR: 65 (23 Jan 2020 08:17) (58 - 71)  BP: 133/61 (23 Jan 2020 08:17) (133/61 - 194/83)  BP(mean): 88 (23 Jan 2020 08:17) (88 - 119)  RR: 16 (23 Jan 2020 08:17) (16 - 29)  SpO2: 96% (23 Jan 2020 08:17) (96% - 100%)  I&O's Detail    CHEST TUBE:  No.   MARLIN DRAIN:  No.  EPICARDIAL WIRES: No.  TIE DOWNS: No.  TORRES: No.      PHYSICAL EXAM:    General: Patient seen bedside sleeping requiring this PA to gently shake to wake him after not responding to calling his name multiple times    Neurological: Alert and oriented x3, no gross deficits appreciated       Cardiovascular: RRR, S1S2, 4/6 systolic murmur radiated to B/L carotids      Respiratory: equal breath sounds,no rales , no rhonchi, no wheeze    Gastrointestinal: soft, nontender, positive bowel sounds    Extremities: moves all, no edema    Vascular:  palpable b/l radial, B/L DP R>L, B/L PT, LUE AVF with thrill bruit    Skin:  B/L LE vascular hyperpigmentation healed ulcers LLE, healed Left 3rd amputated digit     LABS:                        12.3   6.50  )-----------( 232      ( 22 Jan 2020 07:33 )             38.4       COUMADIN:o. REASON: .    01-22    138  |  96  |  37<H>  ----------------------------<  152<H>  4.0   |  24  |  4.92<H>    Ca    9.9      22 Jan 2020 07:33  Phos  5.1     01-22  Mg     2.3     01-22    TPro  6.8  /  Alb  4.1  /  TBili  0.2  /  DBili  x   /  AST  16  /  ALT  10  /  AlkPhos  110  01-21      MEDICATIONS  (STANDING):  acetaminophen   Tablet .. 650 milliGRAM(s) Oral every 6 hours  aspirin  chewable 81 milliGRAM(s) Oral every 24 hours  atorvastatin 40 milliGRAM(s) Oral at bedtime  carvedilol 25 milliGRAM(s) Oral every 12 hours  chlorhexidine 2% Cloths 1 Application(s) Topical <User Schedule>  cloNIDine 0.3 milliGRAM(s) Oral every 8 hours  clopidogrel Tablet 75 milliGRAM(s) Oral daily  dextrose 5%. 1000 milliLiter(s) (50 mL/Hr) IV Continuous <Continuous>  dextrose 50% Injectable 12.5 Gram(s) IV Push once  dextrose 50% Injectable 25 Gram(s) IV Push once  dextrose 50% Injectable 25 Gram(s) IV Push once  escitalopram 5 milliGRAM(s) Oral every 24 hours  gabapentin 100 milliGRAM(s) Oral <User Schedule>  heparin  Injectable 5000 Unit(s) SubCutaneous every 8 hours  hydrALAZINE 100 milliGRAM(s) Oral every 8 hours  insulin lispro (HumaLOG) corrective regimen sliding scale   SubCutaneous Before meals and at bedtime  isosorbide   mononitrate ER Tablet (IMDUR) 120 milliGRAM(s) Oral every 24 hours  levothyroxine 50 MICROGram(s) Oral daily  lidocaine   Patch 1 Patch Transdermal every 24 hours  losartan 100 milliGRAM(s) Oral every 24 hours  melatonin 5 milliGRAM(s) Oral at bedtime  NIFEdipine XL 60 milliGRAM(s) Oral every 12 hours  polyethylene glycol 3350 17 Gram(s) Oral at bedtime  senna 2 Tablet(s) Oral at bedtime  sevelamer carbonate 800 milliGRAM(s) Oral three times a day with meals    MEDICATIONS  (PRN):  dextrose 40% Gel 15 Gram(s) Oral once PRN Blood Glucose LESS THAN 70 milliGRAM(s)/deciliter  glucagon  Injectable 1 milliGRAM(s) IntraMuscular once PRN Glucose LESS THAN 70 milligrams/deciliter  HYDROmorphone  Injectable 0.25 milliGRAM(s) IV Push every 4 hours PRN Severe Pain (7 - 10)  traMADol 50 milliGRAM(s) Oral every 6 hours PRN Moderate Pain (4 - 6)        RADIOLOGY & ADDITIONAL TESTS:  < from: CT Heart Congenital w/ IV Cont (01.16.20 @ 10:55) >  Impression:     1. Please note this study was not optimized for the evaluation of the coronary arteries.  2. Less than 25% stenosis in the Left Main due to calcific plaque  3.  Calcific plaques in the mid LAD, large D1, and OM2 where significant stenosis cannot be excluded.  4.  Nonobstructive plaques in the proximal/distal LAD, proximal LCX and throughout the RCA.   5.  Mitral annular calcification noted.   6.  Calcified trileaflet aortic valve.   7,  Atherosclerotic aorta.     < end of copied text >    < from: CT Angio Abdomen and Pelvis w/ IV Cont (01.16.20 @ 10:56) >  1.  Aorta and arteries: Aortic and arterial measurements as above. Extensive atherosclerosis in the descending thoracic aorta and abdominal aorta, with diffuse luminal irregularity and ulcerative plaques.  2.  Mild cardiomegaly. 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 Severe three-vessel coronary calcifications. Morphologic tricuspid aortic valve with thickening and severe calcification in the valvular leaflets, suggestion of partially fused left and right coronary cusps.  3.  Dilated pulmonary artery, which can be seen in pulmonary hypertension.  4.  No acute abdominopelvic abnormality.    < end of copied text >    < from: NM Inflammatory Loc Wholebody Gallium, Single Day (01.20.20 @ 13:29) >  Impression: No source of infection identified.    < end of copied text >  < from: EBONI w/Doppler (01.17.20 @ 16:18) >  CONCLUSIONS:     1. Normal left and right ventricular size and function.   2. Left ventricular hypertrophy.   3. Biatrial enlargement.   4. No LA/RA/NICHO/RAA thrombus seen.   5. Mild mitral regurgitation.   6. Moderate-to-severe aortic stenosis.   7. Mild aortic regurgitation.   8. No pericardial effusion.   9. No EBONI evidence of vegetations.    < end of copied text >      Culture Results:   Specimen Source: .Blood Blood (01.15.20 @ 21:35)  No growth at 5 days. (01.15.20 @ 21:35)

## 2020-01-23 NOTE — PROGRESS NOTE ADULT - PROBLEM SELECTOR PLAN 1
- On arrival with HTN emergency, 's resolved with nicardipine gtt and HD  - s/p a-line for accurate BP measurements 1/11-1/16  - Now better controlled with hydralazine 100mg TID, losartan 100mg daily, coreg 25mg BID, clonidine 0.3mg TID, nifedipine 60mg BID, imdur 120mg daily. If necessary, will consider increasing nifedipine to 90mg bid though patient's bps overall have improved aside from some individual outliers ine the 190s  - F/u renal US with doppler to evaluate for BRYNN  - HTN likely c/b pain and need for HD  - HD also aiding BP control, back to T/Th/Sa schedule (dialyzed today)  - Nephro following, f/u recs    #CAD  - Continue home meds  - ASA 81mg, plavix 75mg, atorvastatin 40mg daily    #PAD  - s/p stenting  - Management as above    #Renal artery stenosis  - s/p stenting  - f/u retroperitoneal US with doppler

## 2020-01-23 NOTE — PROGRESS NOTE ADULT - PROBLEM SELECTOR PLAN 4
- Pt febrile 1/12 with 1/14 Bcx growing MRSA, 1/15 started on vanc 1g dosed by level with level check post-HD and re-dosing vanc after  - Vanc level today 24.9, held vanc   - EBONI neg for vegetations, Gallium scan negative as well  - BCx NGTD since 1/15  - Pt afebrile >24h  - ID recommending vanc with dialysis for 4 weeks (until Feb 12th)    #RUE superficial thrombus  - RUE swelling and erythema at IV site 1/16  - RUE doppler with superficial thrombus in cephalic vein  - Continue to monitor

## 2020-01-23 NOTE — PROGRESS NOTE ADULT - PROBLEM SELECTOR PLAN 3
- Pt with known hx ESRD, L AVF with HD T/Th/Sa outpt  - HD today  - Nephro following, f/u  recs  - Renvela 800mg TID  - Daily weights, strict I&O, renal diet  - Avoid nephrotoxic agents, renally dose meds    #Carotid artery stenosis  - s/p stenting  - Management as above    #Pulmonary HTN  - ECHO with findings as above

## 2020-01-24 ENCOUNTER — TRANSCRIPTION ENCOUNTER (OUTPATIENT)
Age: 70
End: 2020-01-24

## 2020-01-24 LAB
ANION GAP SERPL CALC-SCNC: 22 MMOL/L — HIGH (ref 5–17)
BUN SERPL-MCNC: 33 MG/DL — HIGH (ref 7–23)
CALCIUM SERPL-MCNC: 11 MG/DL — HIGH (ref 8.4–10.5)
CHLORIDE SERPL-SCNC: 96 MMOL/L — SIGNIFICANT CHANGE UP (ref 96–108)
CK MB CFR SERPL CALC: 3.3 NG/ML — SIGNIFICANT CHANGE UP (ref 0–6.7)
CO2 SERPL-SCNC: 23 MMOL/L — SIGNIFICANT CHANGE UP (ref 22–31)
CREAT SERPL-MCNC: 4.58 MG/DL — HIGH (ref 0.5–1.3)
GLUCOSE BLDC GLUCOMTR-MCNC: 168 MG/DL — HIGH (ref 70–99)
GLUCOSE BLDC GLUCOMTR-MCNC: 218 MG/DL — HIGH (ref 70–99)
GLUCOSE BLDC GLUCOMTR-MCNC: 322 MG/DL — HIGH (ref 70–99)
GLUCOSE BLDC GLUCOMTR-MCNC: 98 MG/DL — SIGNIFICANT CHANGE UP (ref 70–99)
GLUCOSE SERPL-MCNC: 113 MG/DL — HIGH (ref 70–99)
HCT VFR BLD CALC: 43.2 % — SIGNIFICANT CHANGE UP (ref 39–50)
HGB BLD-MCNC: 14.1 G/DL — SIGNIFICANT CHANGE UP (ref 13–17)
MAGNESIUM SERPL-MCNC: 2.4 MG/DL — SIGNIFICANT CHANGE UP (ref 1.6–2.6)
MCHC RBC-ENTMCNC: 29.1 PG — SIGNIFICANT CHANGE UP (ref 27–34)
MCHC RBC-ENTMCNC: 32.6 GM/DL — SIGNIFICANT CHANGE UP (ref 32–36)
MCV RBC AUTO: 89.1 FL — SIGNIFICANT CHANGE UP (ref 80–100)
NRBC # BLD: 0 /100 WBCS — SIGNIFICANT CHANGE UP (ref 0–0)
PHOSPHATE SERPL-MCNC: 5.3 MG/DL — HIGH (ref 2.5–4.5)
PLATELET # BLD AUTO: 265 K/UL — SIGNIFICANT CHANGE UP (ref 150–400)
POTASSIUM SERPL-MCNC: 4.3 MMOL/L — SIGNIFICANT CHANGE UP (ref 3.5–5.3)
POTASSIUM SERPL-SCNC: 4.3 MMOL/L — SIGNIFICANT CHANGE UP (ref 3.5–5.3)
RBC # BLD: 4.85 M/UL — SIGNIFICANT CHANGE UP (ref 4.2–5.8)
RBC # FLD: 14.3 % — SIGNIFICANT CHANGE UP (ref 10.3–14.5)
SODIUM SERPL-SCNC: 141 MMOL/L — SIGNIFICANT CHANGE UP (ref 135–145)
TROPONIN T SERPL-MCNC: 0.2 NG/ML — CRITICAL HIGH (ref 0–0.01)
VANCOMYCIN FLD-MCNC: 24.3 UG/ML — SIGNIFICANT CHANGE UP
WBC # BLD: 5.7 K/UL — SIGNIFICANT CHANGE UP (ref 3.8–10.5)
WBC # FLD AUTO: 5.7 K/UL — SIGNIFICANT CHANGE UP (ref 3.8–10.5)

## 2020-01-24 PROCEDURE — 99232 SBSQ HOSP IP/OBS MODERATE 35: CPT

## 2020-01-24 PROCEDURE — 93321 DOPPLER ECHO F-UP/LMTD STD: CPT | Mod: 26

## 2020-01-24 PROCEDURE — 93308 TTE F-UP OR LMTD: CPT | Mod: 26

## 2020-01-24 RX ORDER — TRAMADOL HYDROCHLORIDE 50 MG/1
50 TABLET ORAL EVERY 6 HOURS
Refills: 0 | Status: DISCONTINUED | OUTPATIENT
Start: 2020-01-24 | End: 2020-01-30

## 2020-01-24 RX ORDER — MIRTAZAPINE 45 MG/1
15 TABLET, ORALLY DISINTEGRATING ORAL AT BEDTIME
Refills: 0 | Status: DISCONTINUED | OUTPATIENT
Start: 2020-01-24 | End: 2020-02-04

## 2020-01-24 RX ORDER — HYDROMORPHONE HYDROCHLORIDE 2 MG/ML
0.25 INJECTION INTRAMUSCULAR; INTRAVENOUS; SUBCUTANEOUS EVERY 4 HOURS
Refills: 0 | Status: DISCONTINUED | OUTPATIENT
Start: 2020-01-24 | End: 2020-01-28

## 2020-01-24 RX ADMIN — HYDROMORPHONE HYDROCHLORIDE 0.25 MILLIGRAM(S): 2 INJECTION INTRAMUSCULAR; INTRAVENOUS; SUBCUTANEOUS at 15:00

## 2020-01-24 RX ADMIN — Medication 650 MILLIGRAM(S): at 21:35

## 2020-01-24 RX ADMIN — Medication 0.3 MILLIGRAM(S): at 14:59

## 2020-01-24 RX ADMIN — Medication 8: at 17:11

## 2020-01-24 RX ADMIN — HYDROMORPHONE HYDROCHLORIDE 0.25 MILLIGRAM(S): 2 INJECTION INTRAMUSCULAR; INTRAVENOUS; SUBCUTANEOUS at 10:44

## 2020-01-24 RX ADMIN — Medication 650 MILLIGRAM(S): at 21:57

## 2020-01-24 RX ADMIN — SEVELAMER CARBONATE 800 MILLIGRAM(S): 2400 POWDER, FOR SUSPENSION ORAL at 12:19

## 2020-01-24 RX ADMIN — HEPARIN SODIUM 5000 UNIT(S): 5000 INJECTION INTRAVENOUS; SUBCUTANEOUS at 14:59

## 2020-01-24 RX ADMIN — Medication 0.3 MILLIGRAM(S): at 06:33

## 2020-01-24 RX ADMIN — ATORVASTATIN CALCIUM 40 MILLIGRAM(S): 80 TABLET, FILM COATED ORAL at 22:14

## 2020-01-24 RX ADMIN — TRAMADOL HYDROCHLORIDE 50 MILLIGRAM(S): 50 TABLET ORAL at 13:20

## 2020-01-24 RX ADMIN — CHLORHEXIDINE GLUCONATE 1 APPLICATION(S): 213 SOLUTION TOPICAL at 06:25

## 2020-01-24 RX ADMIN — SEVELAMER CARBONATE 800 MILLIGRAM(S): 2400 POWDER, FOR SUSPENSION ORAL at 08:43

## 2020-01-24 RX ADMIN — Medication 650 MILLIGRAM(S): at 09:53

## 2020-01-24 RX ADMIN — ISOSORBIDE MONONITRATE 120 MILLIGRAM(S): 60 TABLET, EXTENDED RELEASE ORAL at 12:19

## 2020-01-24 RX ADMIN — Medication 100 MILLIGRAM(S): at 06:33

## 2020-01-24 RX ADMIN — Medication 5 MILLIGRAM(S): at 22:16

## 2020-01-24 RX ADMIN — SEVELAMER CARBONATE 800 MILLIGRAM(S): 2400 POWDER, FOR SUSPENSION ORAL at 17:11

## 2020-01-24 RX ADMIN — CARVEDILOL PHOSPHATE 25 MILLIGRAM(S): 80 CAPSULE, EXTENDED RELEASE ORAL at 22:15

## 2020-01-24 RX ADMIN — LOSARTAN POTASSIUM 100 MILLIGRAM(S): 100 TABLET, FILM COATED ORAL at 17:12

## 2020-01-24 RX ADMIN — TRAMADOL HYDROCHLORIDE 50 MILLIGRAM(S): 50 TABLET ORAL at 12:32

## 2020-01-24 RX ADMIN — HEPARIN SODIUM 5000 UNIT(S): 5000 INJECTION INTRAVENOUS; SUBCUTANEOUS at 22:15

## 2020-01-24 RX ADMIN — SENNA PLUS 2 TABLET(S): 8.6 TABLET ORAL at 22:16

## 2020-01-24 RX ADMIN — LIDOCAINE 1 PATCH: 4 CREAM TOPICAL at 14:58

## 2020-01-24 RX ADMIN — Medication 100 MILLIGRAM(S): at 22:16

## 2020-01-24 RX ADMIN — Medication 50 MICROGRAM(S): at 06:33

## 2020-01-24 RX ADMIN — POLYETHYLENE GLYCOL 3350 17 GRAM(S): 17 POWDER, FOR SOLUTION ORAL at 22:16

## 2020-01-24 RX ADMIN — Medication 650 MILLIGRAM(S): at 14:58

## 2020-01-24 RX ADMIN — HYDROMORPHONE HYDROCHLORIDE 0.25 MILLIGRAM(S): 2 INJECTION INTRAMUSCULAR; INTRAVENOUS; SUBCUTANEOUS at 15:15

## 2020-01-24 RX ADMIN — ESCITALOPRAM OXALATE 5 MILLIGRAM(S): 10 TABLET, FILM COATED ORAL at 22:15

## 2020-01-24 RX ADMIN — Medication 650 MILLIGRAM(S): at 03:05

## 2020-01-24 RX ADMIN — Medication 60 MILLIGRAM(S): at 06:33

## 2020-01-24 RX ADMIN — Medication 4: at 12:19

## 2020-01-24 RX ADMIN — Medication 2: at 22:16

## 2020-01-24 RX ADMIN — HYDROMORPHONE HYDROCHLORIDE 0.25 MILLIGRAM(S): 2 INJECTION INTRAMUSCULAR; INTRAVENOUS; SUBCUTANEOUS at 06:33

## 2020-01-24 RX ADMIN — CLOPIDOGREL BISULFATE 75 MILLIGRAM(S): 75 TABLET, FILM COATED ORAL at 12:19

## 2020-01-24 RX ADMIN — Medication 650 MILLIGRAM(S): at 04:05

## 2020-01-24 RX ADMIN — HYDROMORPHONE HYDROCHLORIDE 0.25 MILLIGRAM(S): 2 INJECTION INTRAMUSCULAR; INTRAVENOUS; SUBCUTANEOUS at 11:00

## 2020-01-24 RX ADMIN — Medication 650 MILLIGRAM(S): at 15:58

## 2020-01-24 RX ADMIN — Medication 650 MILLIGRAM(S): at 10:45

## 2020-01-24 RX ADMIN — LIDOCAINE 1 PATCH: 4 CREAM TOPICAL at 02:03

## 2020-01-24 RX ADMIN — HEPARIN SODIUM 5000 UNIT(S): 5000 INJECTION INTRAVENOUS; SUBCUTANEOUS at 06:33

## 2020-01-24 RX ADMIN — GABAPENTIN 100 MILLIGRAM(S): 400 CAPSULE ORAL at 17:11

## 2020-01-24 RX ADMIN — CARVEDILOL PHOSPHATE 25 MILLIGRAM(S): 80 CAPSULE, EXTENDED RELEASE ORAL at 09:53

## 2020-01-24 RX ADMIN — MIRTAZAPINE 15 MILLIGRAM(S): 45 TABLET, ORALLY DISINTEGRATING ORAL at 22:16

## 2020-01-24 RX ADMIN — Medication 0.3 MILLIGRAM(S): at 22:15

## 2020-01-24 RX ADMIN — Medication 100 MILLIGRAM(S): at 14:59

## 2020-01-24 RX ADMIN — Medication 60 MILLIGRAM(S): at 17:11

## 2020-01-24 RX ADMIN — HYDROMORPHONE HYDROCHLORIDE 0.25 MILLIGRAM(S): 2 INJECTION INTRAMUSCULAR; INTRAVENOUS; SUBCUTANEOUS at 06:50

## 2020-01-24 RX ADMIN — Medication 81 MILLIGRAM(S): at 14:58

## 2020-01-24 NOTE — PROGRESS NOTE ADULT - PROBLEM SELECTOR PLAN 1
- On arrival with HTN emergency, 's resolved with nicardipine gtt and HD  - Now better controlled with hydralazine 100mg TID, losartan 100mg daily, coreg 25mg BID, clonidine 0.3mg TID, nifedipine 60mg BID, imdur 120mg daily  - F/u renal US with doppler to evaluate for BRYNN  - HTN likely c/b pain and need for HD  - Nephro following, f/u recs    #CAD  - Continue home meds  - ASA 81mg, plavix 75mg, atorvastatin 40mg daily  - Patient reporting chest pain (along with generalized pain) starting yesterday, EKG and troponins unchanged. Limited echo w/o any evidence of ischemic heart disease. Does not appear to be ischemic in nature    #PAD  - s/p stenting  - Management as above    #Renal artery stenosis  - s/p stenting  - f/u retroperitoneal US with doppler

## 2020-01-24 NOTE — PROGRESS NOTE ADULT - PROBLEM SELECTOR PLAN 3
- Pt with known hx ESRD, L AVF with HD T/Th/Sa outpt  - HD tomorrow  - Nephro following, f/u  recs  - Renvela 800mg TID  - Daily weights, strict I&O, renal diet  - Avoid nephrotoxic agents, renally dose meds    #Carotid artery stenosis  - s/p stenting  - Management as above    #Pulmonary HTN  - ECHO with findings as above

## 2020-01-24 NOTE — PROGRESS NOTE ADULT - SUBJECTIVE AND OBJECTIVE BOX
CC: SYNCOPE    INTERVAL HISTORY:    * Stable on dialysis. * BP labile. * Fever improved.     ROS: No chest pain. No shortness of breath. No nausea.    PAST MEDICAL & SURGICAL HISTORY:  Peripheral neuropathy  Peripheral artery disease: s/p bilateral stents  Anemia of renal disease  End-stage renal disease (ESRD)  Type II diabetes mellitus  Retinal artery occlusion  Hypothyroidism  Low back pain  CAD (coronary artery disease)  H/O renal artery stenosis: s/p L renal stent  Hyperlipidemia  Hypertension  No significant past surgical history    PHYSICAL EXAM:  T(C): 36.1 (2020 17:41), Max: 36.7 (2020 05:15)  HR: 56 (2020 17:15)  BP: 179/84 (2020 17:15) (119/53 - 179/84)  RR: 17 (2020 17:15)  SpO2: 100% (2020 17:15)      2020 07:01  -  2020 07:00  --------------------------------------------------------  IN:    Other: 600 mL  Total IN: 600 mL    OUT:    Other: 2900 mL  Total OUT: 2900 mL    Total NET: -2300 mL      2020 07:01  -  2020 20:59  --------------------------------------------------------  IN:    Oral Fluid: 200 mL  Total IN: 200 mL    OUT:  Total OUT: 0 mL    Total NET: 200 mL        Weight     Appearance: alert, pleasant, INAD.  ENT: oral mucosa moist, no pallor/cyanosis.  Neck: no JVD visible.  Cardiac: no rubs. SALAZAR Extremities: NO EDEMA.  Skin: no rashes.  Extremities (digits): no clubbing or cyanosis.  Respratory effort: no access muscle use. Lungs: CLEAR TO AUSCULTATION.  Abdomen: soft. nontender. no masses.  Psych affect: not depressed.    MEDICATIONS  (STANDING):  acetaminophen   Tablet .. 650 milliGRAM(s) Oral every 6 hours  aspirin  chewable 81 milliGRAM(s) Oral every 24 hours  atorvastatin 40 milliGRAM(s) Oral at bedtime  carvedilol 25 milliGRAM(s) Oral every 12 hours  chlorhexidine 2% Cloths 1 Application(s) Topical <User Schedule>  cloNIDine 0.3 milliGRAM(s) Oral every 8 hours  clopidogrel Tablet 75 milliGRAM(s) Oral daily  dextrose 5%. 1000 milliLiter(s) (50 mL/Hr) IV Continuous <Continuous>  dextrose 50% Injectable 12.5 Gram(s) IV Push once  dextrose 50% Injectable 25 Gram(s) IV Push once  dextrose 50% Injectable 25 Gram(s) IV Push once  escitalopram 5 milliGRAM(s) Oral every 24 hours  gabapentin 100 milliGRAM(s) Oral <User Schedule>  heparin  Injectable 5000 Unit(s) SubCutaneous every 8 hours  hydrALAZINE 100 milliGRAM(s) Oral every 8 hours  insulin lispro (HumaLOG) corrective regimen sliding scale   SubCutaneous Before meals and at bedtime  isosorbide   mononitrate ER Tablet (IMDUR) 120 milliGRAM(s) Oral every 24 hours  levothyroxine 50 MICROGram(s) Oral daily  lidocaine   Patch 1 Patch Transdermal every 24 hours  losartan 100 milliGRAM(s) Oral every 24 hours  melatonin 5 milliGRAM(s) Oral at bedtime  mirtazapine 15 milliGRAM(s) Oral at bedtime  NIFEdipine XL 60 milliGRAM(s) Oral every 12 hours  polyethylene glycol 3350 17 Gram(s) Oral at bedtime  senna 2 Tablet(s) Oral at bedtime  sevelamer carbonate 800 milliGRAM(s) Oral three times a day with meals    MEDICATIONS  (PRN):  dextrose 40% Gel 15 Gram(s) Oral once PRN Blood Glucose LESS THAN 70 milliGRAM(s)/deciliter  glucagon  Injectable 1 milliGRAM(s) IntraMuscular once PRN Glucose LESS THAN 70 milligrams/deciliter  HYDROmorphone  Injectable 0.25 milliGRAM(s) IV Push every 4 hours PRN Severe Pain (7 - 10)  traMADol 50 milliGRAM(s) Oral every 6 hours PRN Moderate Pain (4 - 6)    DATA:  141    |  96     |  33<H>  ----------------------------<  113<H>  Ca:11.0<H> (2020 07:25)  4.3     |  23     |  4.58<H>      eGFR if Non : 12 <L>  eGFR if : 14 <L>        SCr 4.58 [2020 07:25]  SCr 5.99 [2020 11:46]  SCr 4.92 [2020 07:33]  SCr 6.45 [2020 15:41]  SCr 5.32 [2020 06:07]                          14.1   5.70  )-----------( 265      ( 2020 07:25 )             43.2     Phos:5.3 mg/dL<H> M.4 mg/dL PTH:-- Uric acid:-- Serum Osm:--  Ferritin:-- Iron:-- TIBC:-- Tsat:--  B12:-- TSH:-- (2020 07:25)

## 2020-01-24 NOTE — PROGRESS NOTE ADULT - ATTENDING COMMENTS
On Date 1/24/20  Assessment: Patient personally seen and examined myself, face-to-face, during rounds with the Resident     Note read, including vitals, physical findings, laboratory data, and radiological reports.   Agree with above.

## 2020-01-24 NOTE — PROGRESS NOTE ADULT - SUBJECTIVE AND OBJECTIVE BOX
OVERNIGHT EVENTS: Patient with severe 10/10 chest pain yesterday afternoon, described as crushing in nature. EKG performed, unchanged from prior. Chest pain persisted in the evening, repeat EKG unchanged. Troponins elevated to .21 (has been at this level earlier in admission likely given his ESRD), eventually peaked at .22 and then downtrended. CKMB wnl. Chest pain temporarily improved without any intervention.    SUBJECTIVE / INTERVAL HPI: Patient seen and examined at bedside. Continues to endorse chest pain when specifically asked about it, stating it is worse than yesterday. Patient also stating yes to all questions on review of systems, including headache, shortness of breath, abdominal pain, nausea, and extremity pain.     VITAL SIGNS:  Vital Signs Last 24 Hrs  T(C): 36 (24 Jan 2020 09:57), Max: 36.7 (24 Jan 2020 05:15)  T(F): 96.8 (24 Jan 2020 09:57), Max: 98.1 (24 Jan 2020 05:15)  HR: 61 (24 Jan 2020 12:26) (59 - 70)  BP: 176/74 (24 Jan 2020 12:26) (119/53 - 176/74)  BP(mean): 106 (24 Jan 2020 12:26) (76 - 107)  RR: 15 (24 Jan 2020 12:26) (15 - 20)  SpO2: 99% (24 Jan 2020 12:26) (98% - 100%)    PHYSICAL EXAM:    General: Elderly male resting comfortably in bed, does not appear to be in any distress though endorsing severe pain  HEENT: anicteric sclera; MMM  Neck: supple, no jvd  Cardiovascular: +S1/S2; RRR. 3/6 holosystolic murmur   Respiratory: CTA B/L; no W/R/R  Gastrointestinal: soft, NT/ND; +BSx4  Extremities: WWP; no edema, clubbing or cyanosis. Left AV fistula with bruit. LLE foot with partial amputation of third toe  Vascular: 2+ radial, DP/PT pulses B/L  Neurological: AAOx3; no focal deficits  HEENT: NC/AT; PERRL, anicteric sclera; MMM  Neck: supple  Cardiovascular: +S1/S2; RRR  Respiratory: CTA B/L; no W/R/R  Gastrointestinal: soft, NT/ND; +BSx4  Extremities: WWP; no edema, clubbing or cyanosis  Vascular: 2+ radial, DP/PT pulses B/L  Neurological: AAOx3; no focal deficits    MEDICATIONS:  MEDICATIONS  (STANDING):  acetaminophen   Tablet .. 650 milliGRAM(s) Oral every 6 hours  aspirin  chewable 81 milliGRAM(s) Oral every 24 hours  atorvastatin 40 milliGRAM(s) Oral at bedtime  carvedilol 25 milliGRAM(s) Oral every 12 hours  chlorhexidine 2% Cloths 1 Application(s) Topical <User Schedule>  cloNIDine 0.3 milliGRAM(s) Oral every 8 hours  clopidogrel Tablet 75 milliGRAM(s) Oral daily  dextrose 5%. 1000 milliLiter(s) (50 mL/Hr) IV Continuous <Continuous>  dextrose 50% Injectable 12.5 Gram(s) IV Push once  dextrose 50% Injectable 25 Gram(s) IV Push once  dextrose 50% Injectable 25 Gram(s) IV Push once  escitalopram 5 milliGRAM(s) Oral every 24 hours  gabapentin 100 milliGRAM(s) Oral <User Schedule>  heparin  Injectable 5000 Unit(s) SubCutaneous every 8 hours  hydrALAZINE 100 milliGRAM(s) Oral every 8 hours  insulin lispro (HumaLOG) corrective regimen sliding scale   SubCutaneous Before meals and at bedtime  isosorbide   mononitrate ER Tablet (IMDUR) 120 milliGRAM(s) Oral every 24 hours  levothyroxine 50 MICROGram(s) Oral daily  lidocaine   Patch 1 Patch Transdermal every 24 hours  losartan 100 milliGRAM(s) Oral every 24 hours  melatonin 5 milliGRAM(s) Oral at bedtime  mirtazapine 15 milliGRAM(s) Oral at bedtime  NIFEdipine XL 60 milliGRAM(s) Oral every 12 hours  polyethylene glycol 3350 17 Gram(s) Oral at bedtime  senna 2 Tablet(s) Oral at bedtime  sevelamer carbonate 800 milliGRAM(s) Oral three times a day with meals    MEDICATIONS  (PRN):  dextrose 40% Gel 15 Gram(s) Oral once PRN Blood Glucose LESS THAN 70 milliGRAM(s)/deciliter  glucagon  Injectable 1 milliGRAM(s) IntraMuscular once PRN Glucose LESS THAN 70 milligrams/deciliter  HYDROmorphone  Injectable 0.25 milliGRAM(s) IV Push every 4 hours PRN Severe Pain (7 - 10)  traMADol 50 milliGRAM(s) Oral every 6 hours PRN Moderate Pain (4 - 6)      ALLERGIES:  Allergies    No Known Allergies    Intolerances        LABS:                        14.1   5.70  )-----------( 265      ( 24 Jan 2020 07:25 )             43.2     01-24    141  |  96  |  33<H>  ----------------------------<  113<H>  4.3   |  23  |  4.58<H>    Ca    11.0<H>      24 Jan 2020 07:25  Phos  5.3     01-24  Mg     2.4     01-24          CAPILLARY BLOOD GLUCOSE      POCT Blood Glucose.: 218 mg/dL (24 Jan 2020 11:52)      RADIOLOGY & ADDITIONAL TESTS: Reviewed.

## 2020-01-24 NOTE — PROGRESS NOTE ADULT - ASSESSMENT
Pt is a 68 yo M with PMH ESRD (HD L AVF T/R/Sat), HTN, HLD, CAD, renal artery stenosis (s/p stent), DM (c/b peripheral neuropathy), hypothyroidism, CVA (R eye blind, L eye limited vision), carotid artery stenosis (s/p stent), peripheral artery disease (s/p BL stents) BIBEMS 1/11 for syncopal episode with HTN emergency, course c/b MRSA bacteremia. Now with BCx cleared and return to scheduled HD (T/R/Sat), awaiting optimization of vanc dosing prior to discharge.

## 2020-01-24 NOTE — PROGRESS NOTE ADULT - PROBLEM SELECTOR PLAN 5
- 1/13 ECHO with LVEF 60-65%, severe AS, mild AR, mild MR, moderate TR, pulmonary HTN 70mmHg  - Structural heart consulted. TAVR/ BAV will not be done this admission given pts poor candidacy (high risk of infection of prosthetic valve) and deconditioned state (pt non-ambulatory at present). Outpt f/u

## 2020-01-24 NOTE — DISCHARGE NOTE NURSING/CASE MANAGEMENT/SOCIAL WORK - PATIENT PORTAL LINK FT
You can access the FollowMyHealth Patient Portal offered by [facility] by registering at the following website: [email or web address]. By joining [facility]’s FollowMyHealth portal, you will also be able to view your health information using other applications (apps) compatible with our system.

## 2020-01-24 NOTE — DISCHARGE NOTE NURSING/CASE MANAGEMENT/SOCIAL WORK - NSDCCRNAME_GEN_ALL_CORE_FT
[facility],  [address] [place] [address]  [facility],  [address], [address])  [place] [id number]

## 2020-01-24 NOTE — PROGRESS NOTE ADULT - PROBLEM SELECTOR PLAN 2
- Pt with multiple sources of chronic pain likely also contributing to HTN  - RLE pain 2/2 recent R hip replacement - lidocaine patch q24h  - Chronic back pain  - MR thoracolumbar spine c/w degenerative changes T10-11, L3-4, L5-S1; L paracentral disc herniation at C6-7 that may affect C7 nerve root, and disc herniations at several levels without compression of the thoracic spinal cord  - Pain management consulted with recs for tylenol 650mg q6h mild pain, tramadol 50mg q6h moderate pain, and dilaudid 0.25mg IV q4h for severe pain  - Peripheral artery disease and DM related neuropathy likely contributing to pain as well  - Gabapentin 100mg MWF evenings (ESRD on HD dose adjustment)    #Depression  - Psych following with recs for lexapro 5mg daily  - Also started mirtazapine on 1/24 to help poor appetite

## 2020-01-24 NOTE — PROGRESS NOTE ADULT - PROBLEM SELECTOR PLAN 4
- Pt febrile 1/12 with 1/14 Bcx growing MRSA, 1/15 started on vanc 1g dosed by level with level check post-HD and re-dosing vanc after  - Vanc level 24.9 yesterday, held vanc. Will dose following vanc level attained pre-dialysis tomorrow  - EBONI neg for vegetations, Gallium scan negative as well  - BCx NGTD since 1/15  - ID recommending vanc with dialysis for 4 weeks (until Feb 12th)    #RUE superficial thrombus  - RUE swelling and erythema at IV site 1/16  - RUE doppler with superficial thrombus in cephalic vein  - Continue to monitor

## 2020-01-24 NOTE — PROGRESS NOTE ADULT - ASSESSMENT
ESRD, staph bacteremia. HTN better controlled.    Suggest:    1. Cont HD TIW.  2. Follow vanco levels.  3. Continue BP meds.     Please call with any questions.    Zachery Llamas MD, FACP, FASN | kidney.[facility]  Nephrology, Hypertension, and Internal Medicine  Mobile: [phone] (Daytime Hours Only)  Office/Answering Service: [phone]  Asst. Prof. of Medicine, [employer] at [facility] - Nephrology at [address]  [address], [address], [place], [place]

## 2020-01-25 LAB
ANION GAP SERPL CALC-SCNC: 20 MMOL/L — HIGH (ref 5–17)
BUN SERPL-MCNC: 51 MG/DL — HIGH (ref 7–23)
CALCIUM SERPL-MCNC: 10.1 MG/DL — SIGNIFICANT CHANGE UP (ref 8.4–10.5)
CHLORIDE SERPL-SCNC: 94 MMOL/L — LOW (ref 96–108)
CO2 SERPL-SCNC: 23 MMOL/L — SIGNIFICANT CHANGE UP (ref 22–31)
CREAT SERPL-MCNC: 6.8 MG/DL — HIGH (ref 0.5–1.3)
GLUCOSE BLDC GLUCOMTR-MCNC: 101 MG/DL — HIGH (ref 70–99)
GLUCOSE BLDC GLUCOMTR-MCNC: 128 MG/DL — HIGH (ref 70–99)
GLUCOSE BLDC GLUCOMTR-MCNC: 183 MG/DL — HIGH (ref 70–99)
GLUCOSE BLDC GLUCOMTR-MCNC: 220 MG/DL — HIGH (ref 70–99)
GLUCOSE SERPL-MCNC: 255 MG/DL — HIGH (ref 70–99)
HCT VFR BLD CALC: 38.2 % — LOW (ref 39–50)
HGB BLD-MCNC: 12.3 G/DL — LOW (ref 13–17)
MAGNESIUM SERPL-MCNC: 2.4 MG/DL — SIGNIFICANT CHANGE UP (ref 1.6–2.6)
MCHC RBC-ENTMCNC: 28.5 PG — SIGNIFICANT CHANGE UP (ref 27–34)
MCHC RBC-ENTMCNC: 32.2 GM/DL — SIGNIFICANT CHANGE UP (ref 32–36)
MCV RBC AUTO: 88.4 FL — SIGNIFICANT CHANGE UP (ref 80–100)
NRBC # BLD: 0 /100 WBCS — SIGNIFICANT CHANGE UP (ref 0–0)
PHOSPHATE SERPL-MCNC: 6.1 MG/DL — HIGH (ref 2.5–4.5)
PLATELET # BLD AUTO: 291 K/UL — SIGNIFICANT CHANGE UP (ref 150–400)
POTASSIUM SERPL-MCNC: 4.3 MMOL/L — SIGNIFICANT CHANGE UP (ref 3.5–5.3)
POTASSIUM SERPL-SCNC: 4.3 MMOL/L — SIGNIFICANT CHANGE UP (ref 3.5–5.3)
RBC # BLD: 4.32 M/UL — SIGNIFICANT CHANGE UP (ref 4.2–5.8)
RBC # FLD: 14.4 % — SIGNIFICANT CHANGE UP (ref 10.3–14.5)
SODIUM SERPL-SCNC: 137 MMOL/L — SIGNIFICANT CHANGE UP (ref 135–145)
VANCOMYCIN FLD-MCNC: 12.3 UG/ML — SIGNIFICANT CHANGE UP
VANCOMYCIN FLD-MCNC: 22.3 UG/ML — SIGNIFICANT CHANGE UP
WBC # BLD: 5.72 K/UL — SIGNIFICANT CHANGE UP (ref 3.8–10.5)
WBC # FLD AUTO: 5.72 K/UL — SIGNIFICANT CHANGE UP (ref 3.8–10.5)

## 2020-01-25 PROCEDURE — 99232 SBSQ HOSP IP/OBS MODERATE 35: CPT

## 2020-01-25 RX ORDER — VANCOMYCIN HCL 1 G
500 VIAL (EA) INTRAVENOUS ONCE
Refills: 0 | Status: DISCONTINUED | OUTPATIENT
Start: 2020-01-25 | End: 2020-01-25

## 2020-01-25 RX ORDER — VANCOMYCIN HCL 1 G
500 VIAL (EA) INTRAVENOUS ONCE
Refills: 0 | Status: COMPLETED | OUTPATIENT
Start: 2020-01-25 | End: 2020-01-25

## 2020-01-25 RX ADMIN — Medication 0.3 MILLIGRAM(S): at 21:20

## 2020-01-25 RX ADMIN — MIRTAZAPINE 15 MILLIGRAM(S): 45 TABLET, ORALLY DISINTEGRATING ORAL at 21:15

## 2020-01-25 RX ADMIN — LIDOCAINE 1 PATCH: 4 CREAM TOPICAL at 02:07

## 2020-01-25 RX ADMIN — Medication 81 MILLIGRAM(S): at 13:35

## 2020-01-25 RX ADMIN — HYDROMORPHONE HYDROCHLORIDE 0.25 MILLIGRAM(S): 2 INJECTION INTRAMUSCULAR; INTRAVENOUS; SUBCUTANEOUS at 22:42

## 2020-01-25 RX ADMIN — Medication 100 MILLIGRAM(S): at 05:57

## 2020-01-25 RX ADMIN — CARVEDILOL PHOSPHATE 25 MILLIGRAM(S): 80 CAPSULE, EXTENDED RELEASE ORAL at 09:32

## 2020-01-25 RX ADMIN — Medication 60 MILLIGRAM(S): at 19:49

## 2020-01-25 RX ADMIN — CLOPIDOGREL BISULFATE 75 MILLIGRAM(S): 75 TABLET, FILM COATED ORAL at 10:55

## 2020-01-25 RX ADMIN — Medication 650 MILLIGRAM(S): at 20:00

## 2020-01-25 RX ADMIN — ATORVASTATIN CALCIUM 40 MILLIGRAM(S): 80 TABLET, FILM COATED ORAL at 21:15

## 2020-01-25 RX ADMIN — ESCITALOPRAM OXALATE 5 MILLIGRAM(S): 10 TABLET, FILM COATED ORAL at 21:16

## 2020-01-25 RX ADMIN — Medication 5 MILLIGRAM(S): at 21:19

## 2020-01-25 RX ADMIN — LIDOCAINE 1 PATCH: 4 CREAM TOPICAL at 19:45

## 2020-01-25 RX ADMIN — Medication 650 MILLIGRAM(S): at 14:36

## 2020-01-25 RX ADMIN — Medication 60 MILLIGRAM(S): at 05:58

## 2020-01-25 RX ADMIN — ISOSORBIDE MONONITRATE 120 MILLIGRAM(S): 60 TABLET, EXTENDED RELEASE ORAL at 09:32

## 2020-01-25 RX ADMIN — CHLORHEXIDINE GLUCONATE 1 APPLICATION(S): 213 SOLUTION TOPICAL at 05:59

## 2020-01-25 RX ADMIN — CARVEDILOL PHOSPHATE 25 MILLIGRAM(S): 80 CAPSULE, EXTENDED RELEASE ORAL at 21:20

## 2020-01-25 RX ADMIN — Medication 4: at 11:58

## 2020-01-25 RX ADMIN — HYDROMORPHONE HYDROCHLORIDE 0.25 MILLIGRAM(S): 2 INJECTION INTRAMUSCULAR; INTRAVENOUS; SUBCUTANEOUS at 17:22

## 2020-01-25 RX ADMIN — HYDROMORPHONE HYDROCHLORIDE 0.25 MILLIGRAM(S): 2 INJECTION INTRAMUSCULAR; INTRAVENOUS; SUBCUTANEOUS at 23:12

## 2020-01-25 RX ADMIN — Medication 100 MILLIGRAM(S): at 21:20

## 2020-01-25 RX ADMIN — Medication 650 MILLIGRAM(S): at 09:30

## 2020-01-25 RX ADMIN — Medication 0.3 MILLIGRAM(S): at 05:56

## 2020-01-25 RX ADMIN — Medication 650 MILLIGRAM(S): at 19:49

## 2020-01-25 RX ADMIN — Medication 650 MILLIGRAM(S): at 10:15

## 2020-01-25 RX ADMIN — HEPARIN SODIUM 5000 UNIT(S): 5000 INJECTION INTRAVENOUS; SUBCUTANEOUS at 13:33

## 2020-01-25 RX ADMIN — LIDOCAINE 1 PATCH: 4 CREAM TOPICAL at 14:36

## 2020-01-25 RX ADMIN — LOSARTAN POTASSIUM 100 MILLIGRAM(S): 100 TABLET, FILM COATED ORAL at 19:49

## 2020-01-25 RX ADMIN — Medication 2: at 17:19

## 2020-01-25 RX ADMIN — HYDROMORPHONE HYDROCHLORIDE 0.25 MILLIGRAM(S): 2 INJECTION INTRAMUSCULAR; INTRAVENOUS; SUBCUTANEOUS at 12:55

## 2020-01-25 RX ADMIN — Medication 650 MILLIGRAM(S): at 03:33

## 2020-01-25 RX ADMIN — HEPARIN SODIUM 5000 UNIT(S): 5000 INJECTION INTRAVENOUS; SUBCUTANEOUS at 05:57

## 2020-01-25 RX ADMIN — Medication 650 MILLIGRAM(S): at 03:03

## 2020-01-25 RX ADMIN — Medication 650 MILLIGRAM(S): at 15:13

## 2020-01-25 RX ADMIN — HYDROMORPHONE HYDROCHLORIDE 0.25 MILLIGRAM(S): 2 INJECTION INTRAMUSCULAR; INTRAVENOUS; SUBCUTANEOUS at 11:58

## 2020-01-25 RX ADMIN — HYDROMORPHONE HYDROCHLORIDE 0.25 MILLIGRAM(S): 2 INJECTION INTRAMUSCULAR; INTRAVENOUS; SUBCUTANEOUS at 18:15

## 2020-01-25 RX ADMIN — SENNA PLUS 2 TABLET(S): 8.6 TABLET ORAL at 21:15

## 2020-01-25 RX ADMIN — POLYETHYLENE GLYCOL 3350 17 GRAM(S): 17 POWDER, FOR SOLUTION ORAL at 21:14

## 2020-01-25 RX ADMIN — Medication 50 MICROGRAM(S): at 05:57

## 2020-01-25 RX ADMIN — HEPARIN SODIUM 5000 UNIT(S): 5000 INJECTION INTRAVENOUS; SUBCUTANEOUS at 21:15

## 2020-01-25 NOTE — PROGRESS NOTE ADULT - ASSESSMENT
A/p  68 y/o m with PMHx of ESRD on dialysis via AVF TTS, HTN, CAD, BRYNN (s/p stent), IDDM (with peripheral nueropathy), hypothyroidism and CVA cb right eye blindness- patient is s/p carotid artery stent), PAD (s/p bilateral stents) who was BIBEMS to [facility] for syncopal episode and found to have elevated blood pressure. Admitted to CCU for emergent HD and management of hypertensive emergency.  found to have severe AS most likely contributing to Syncopal episode, currently undergoing pre-op TAVR, hospital course complicated by MRSA bacteremia without clear source

## 2020-01-25 NOTE — PROGRESS NOTE ADULT - PROBLEM SELECTOR PLAN 4
- Pt febrile 1/12 with 1/14 Bcx growing MRSA, 1/15 started on vanc 1g dosed by level with level check post-HD and re-dosing vanc after  -Vanc level high on last dialysis day, vanc not given. Will dose following vanc level attained pre-dialysis today  - EBONI neg for vegetations, Gallium scan negative as well  - BCx NGTD since 1/15  - ID recommending vanc with dialysis for 4 weeks (until Feb 12th)    #RUE superficial thrombus  - RUE swelling and erythema at IV site 1/16  - RUE doppler with superficial thrombus in cephalic vein  - Continue to monitor

## 2020-01-25 NOTE — PROGRESS NOTE ADULT - SUBJECTIVE AND OBJECTIVE BOX
O/N Events: KAT  Subjective:  complaints of CP and Back pain, chronic and unchanged, no am labs, for HD today  volume status euvolemic     VITALS  Vital Signs Last 24 Hrs  T(C): 36 (25 Jan 2020 09:54), Max: 36.3 (25 Jan 2020 05:10)  T(F): 96.8 (25 Jan 2020 09:54), Max: 97.3 (25 Jan 2020 05:10)  HR: 63 (25 Jan 2020 09:35) (56 - 66)  BP: 119/60 (25 Jan 2020 09:35) (106/52 - 179/84)  BP(mean): 86 (25 Jan 2020 09:35) (75 - 121)  RR: 20 (25 Jan 2020 09:35) (12 - 20)  SpO2: 96% (25 Jan 2020 09:35) (96% - 100%)    PHYSICAL EXAM  Gen: NAD  Respiratory: CTA B/L  Cardiac: +S1/S2; RRR; systolic murmur with radiation to carotids  Gastrointestinal: soft, NT/ND   Extremities: WWP, no peripheral edema  Neurologic: AAOx3; non focal  access:  Left arm AVF with bruit    MEDICATIONS  (STANDING):  acetaminophen   Tablet .. 650 milliGRAM(s) Oral every 6 hours  aspirin  chewable 81 milliGRAM(s) Oral every 24 hours  atorvastatin 40 milliGRAM(s) Oral at bedtime  carvedilol 25 milliGRAM(s) Oral every 12 hours  chlorhexidine 2% Cloths 1 Application(s) Topical <User Schedule>  cloNIDine 0.3 milliGRAM(s) Oral every 8 hours  clopidogrel Tablet 75 milliGRAM(s) Oral daily  dextrose 5%. 1000 milliLiter(s) (50 mL/Hr) IV Continuous <Continuous>  dextrose 50% Injectable 12.5 Gram(s) IV Push once  dextrose 50% Injectable 25 Gram(s) IV Push once  dextrose 50% Injectable 25 Gram(s) IV Push once  escitalopram 5 milliGRAM(s) Oral every 24 hours  gabapentin 100 milliGRAM(s) Oral <User Schedule>  heparin  Injectable 5000 Unit(s) SubCutaneous every 8 hours  hydrALAZINE 100 milliGRAM(s) Oral every 8 hours  insulin lispro (HumaLOG) corrective regimen sliding scale   SubCutaneous Before meals and at bedtime  isosorbide   mononitrate ER Tablet (IMDUR) 120 milliGRAM(s) Oral every 24 hours  levothyroxine 50 MICROGram(s) Oral daily  lidocaine   Patch 1 Patch Transdermal every 24 hours  losartan 100 milliGRAM(s) Oral every 24 hours  melatonin 5 milliGRAM(s) Oral at bedtime  mirtazapine 15 milliGRAM(s) Oral at bedtime  NIFEdipine XL 60 milliGRAM(s) Oral every 12 hours  polyethylene glycol 3350 17 Gram(s) Oral at bedtime  senna 2 Tablet(s) Oral at bedtime  sevelamer carbonate 800 milliGRAM(s) Oral three times a day with meals    MEDICATIONS  (PRN):  dextrose 40% Gel 15 Gram(s) Oral once PRN Blood Glucose LESS THAN 70 milliGRAM(s)/deciliter  glucagon  Injectable 1 milliGRAM(s) IntraMuscular once PRN Glucose LESS THAN 70 milligrams/deciliter  HYDROmorphone  Injectable 0.25 milliGRAM(s) IV Push every 4 hours PRN Severe Pain (7 - 10)  traMADol 50 milliGRAM(s) Oral every 6 hours PRN Moderate Pain (4 - 6)      LABS                        14.1   5.70  )-----------( 265      ( 24 Jan 2020 07:25 )             43.2     01-24    141  |  96  |  33<H>  ----------------------------<  113<H>  4.3   |  23  |  4.58<H>    Ca    11.0<H>      24 Jan 2020 07:25  Phos  5.3     01-24  Mg     2.4     01-24      CARDIAC MARKERS ( 24 Jan 2020 07:25 )  x     / 0.20 ng/mL / 32 U/L / x     / 3.3 ng/mL  CARDIAC MARKERS ( 23 Jan 2020 22:42 )  x     / 0.21 ng/mL / 27 U/L / x     / 3.0 ng/mL  CARDIAC MARKERS ( 23 Jan 2020 19:40 )  x     / 0.20 ng/mL / 35 U/L / x     / 3.3 ng/mL

## 2020-01-25 NOTE — PROGRESS NOTE ADULT - PROBLEM SELECTOR PLAN 1
# ESRD on HD TTS via LUE AVF  last HD 1/23 with 1.4 L UF  - HD today per reg schedule   - Bp at goal with current antihypertensives and UF w/HD  - continue Renvela 800 mg po tid w/meals and obtain phos level with bmp   - H&H at goal   - vancomycin to be administered post-HD on dialysis days based on pre-Hd trough  ( trough 24 1/24)  Will follow

## 2020-01-25 NOTE — PROGRESS NOTE ADULT - ASSESSMENT
Pt is a 70 yo M with PMH ESRD (HD L AVF T/R/Sat), HTN, HLD, CAD, renal artery stenosis (s/p stent), DM (c/b peripheral neuropathy), hypothyroidism, CVA (R eye blind, L eye limited vision), carotid artery stenosis (s/p stent), peripheral artery disease (s/p BL stents) BIBEMS 1/11 for syncopal episode with HTN emergency, course c/b MRSA bacteremia. Now with BCx cleared and return to scheduled HD (T/R/Sat), awaiting optimization of vanc dosing prior to discharge to [facility].

## 2020-01-25 NOTE — PROGRESS NOTE ADULT - SUBJECTIVE AND OBJECTIVE BOX
OVERNIGHT EVENTS: No acute events overnight.     SUBJECTIVE / INTERVAL HPI: Patient seen and examined at bedside. States that he is in severe pain, primarily located in his neck and upper back. Patient asking for IV pain medication. On review of systems, patient also endorsing a headache and shortness of breath (unchanged from the last several days.) He denies chest pain, abdominal pain, nausea/vomiting.     VITAL SIGNS:  Vital Signs Last 24 Hrs  T(C): 36.3 (25 Jan 2020 05:10), Max: 36.3 (25 Jan 2020 05:10)  T(F): 97.3 (25 Jan 2020 05:10), Max: 97.3 (25 Jan 2020 05:10)  HR: 61 (25 Jan 2020 05:10) (56 - 66)  BP: 125/60 (25 Jan 2020 05:10) (106/52 - 179/84)  BP(mean): 87 (25 Jan 2020 05:10) (75 - 121)  RR: 13 (25 Jan 2020 05:10) (12 - 17)  SpO2: 97% (25 Jan 2020 01:00) (96% - 100%)    PHYSICAL EXAM:    General: Elderly male resting comfortably in bed, does not appear to be in any distress though endorsing severe pain  HEENT: anicteric sclera; MMM  Neck: supple, no jvd  Cardiovascular: +S1/S2; RRR. 3/6 holosystolic murmur   Respiratory: CTA B/L; no W/R/R  Gastrointestinal: soft, NT/ND; +BSx4  Extremities: WWP; no edema, clubbing or cyanosis. Left AV fistula with bruit. LLE foot with partial amputation of third toe  Vascular: 2+ radial, DP/PT pulses B/L  Neurological: AAOx3; no focal deficits (4/5 strength in all extremities)      MEDICATIONS:  MEDICATIONS  (STANDING):  acetaminophen   Tablet .. 650 milliGRAM(s) Oral every 6 hours  aspirin  chewable 81 milliGRAM(s) Oral every 24 hours  atorvastatin 40 milliGRAM(s) Oral at bedtime  carvedilol 25 milliGRAM(s) Oral every 12 hours  chlorhexidine 2% Cloths 1 Application(s) Topical <User Schedule>  cloNIDine 0.3 milliGRAM(s) Oral every 8 hours  clopidogrel Tablet 75 milliGRAM(s) Oral daily  dextrose 5%. 1000 milliLiter(s) (50 mL/Hr) IV Continuous <Continuous>  dextrose 50% Injectable 12.5 Gram(s) IV Push once  dextrose 50% Injectable 25 Gram(s) IV Push once  dextrose 50% Injectable 25 Gram(s) IV Push once  escitalopram 5 milliGRAM(s) Oral every 24 hours  gabapentin 100 milliGRAM(s) Oral <User Schedule>  heparin  Injectable 5000 Unit(s) SubCutaneous every 8 hours  hydrALAZINE 100 milliGRAM(s) Oral every 8 hours  insulin lispro (HumaLOG) corrective regimen sliding scale   SubCutaneous Before meals and at bedtime  isosorbide   mononitrate ER Tablet (IMDUR) 120 milliGRAM(s) Oral every 24 hours  levothyroxine 50 MICROGram(s) Oral daily  lidocaine   Patch 1 Patch Transdermal every 24 hours  losartan 100 milliGRAM(s) Oral every 24 hours  melatonin 5 milliGRAM(s) Oral at bedtime  mirtazapine 15 milliGRAM(s) Oral at bedtime  NIFEdipine XL 60 milliGRAM(s) Oral every 12 hours  polyethylene glycol 3350 17 Gram(s) Oral at bedtime  senna 2 Tablet(s) Oral at bedtime  sevelamer carbonate 800 milliGRAM(s) Oral three times a day with meals    MEDICATIONS  (PRN):  dextrose 40% Gel 15 Gram(s) Oral once PRN Blood Glucose LESS THAN 70 milliGRAM(s)/deciliter  glucagon  Injectable 1 milliGRAM(s) IntraMuscular once PRN Glucose LESS THAN 70 milligrams/deciliter  HYDROmorphone  Injectable 0.25 milliGRAM(s) IV Push every 4 hours PRN Severe Pain (7 - 10)  traMADol 50 milliGRAM(s) Oral every 6 hours PRN Moderate Pain (4 - 6)      ALLERGIES:  Allergies    No Known Allergies    Intolerances        LABS:                        14.1   5.70  )-----------( 265      ( 24 Jan 2020 07:25 )             43.2     01-24    141  |  96  |  33<H>  ----------------------------<  113<H>  4.3   |  23  |  4.58<H>    Ca    11.0<H>      24 Jan 2020 07:25  Phos  5.3     01-24  Mg     2.4     01-24          CAPILLARY BLOOD GLUCOSE      POCT Blood Glucose.: 128 mg/dL (25 Jan 2020 07:20)      RADIOLOGY & ADDITIONAL TESTS: Reviewed.

## 2020-01-25 NOTE — PROGRESS NOTE ADULT - PROBLEM SELECTOR PLAN 1
- On arrival with HTN emergency, 's resolved with nicardipine gtt and HD  - Now better controlled with hydralazine 100mg TID, losartan 100mg daily, coreg 25mg BID, clonidine 0.3mg TID, nifedipine 60mg BID, imdur 120mg daily  - F/u renal US with doppler to evaluate for BRYNN  - HTN likely c/b pain and need for HD  - Nephro following, f/u recs  -Has been better controlled last few nights with highs of SBPs in the 170s (high of 190s earlier in the week)    #CAD  - Continue home meds  - ASA 81mg, plavix 75mg, atorvastatin 40mg daily    #PAD  - s/p stenting  - Management as above    #Renal artery stenosis  - s/p stenting  - f/u retroperitoneal US with doppler

## 2020-01-26 LAB
ANION GAP SERPL CALC-SCNC: 22 MMOL/L — HIGH (ref 5–17)
BUN SERPL-MCNC: 35 MG/DL — HIGH (ref 7–23)
CALCIUM SERPL-MCNC: 11.1 MG/DL — HIGH (ref 8.4–10.5)
CHLORIDE SERPL-SCNC: 94 MMOL/L — LOW (ref 96–108)
CO2 SERPL-SCNC: 22 MMOL/L — SIGNIFICANT CHANGE UP (ref 22–31)
CREAT SERPL-MCNC: 4.98 MG/DL — HIGH (ref 0.5–1.3)
GLUCOSE BLDC GLUCOMTR-MCNC: 143 MG/DL — HIGH (ref 70–99)
GLUCOSE BLDC GLUCOMTR-MCNC: 204 MG/DL — HIGH (ref 70–99)
GLUCOSE BLDC GLUCOMTR-MCNC: 210 MG/DL — HIGH (ref 70–99)
GLUCOSE BLDC GLUCOMTR-MCNC: 243 MG/DL — HIGH (ref 70–99)
GLUCOSE SERPL-MCNC: 161 MG/DL — HIGH (ref 70–99)
HCT VFR BLD CALC: 45 % — SIGNIFICANT CHANGE UP (ref 39–50)
HGB BLD-MCNC: 14.6 G/DL — SIGNIFICANT CHANGE UP (ref 13–17)
MAGNESIUM SERPL-MCNC: 2.4 MG/DL — SIGNIFICANT CHANGE UP (ref 1.6–2.6)
MCHC RBC-ENTMCNC: 28.9 PG — SIGNIFICANT CHANGE UP (ref 27–34)
MCHC RBC-ENTMCNC: 32.4 GM/DL — SIGNIFICANT CHANGE UP (ref 32–36)
MCV RBC AUTO: 88.9 FL — SIGNIFICANT CHANGE UP (ref 80–100)
NRBC # BLD: 0 /100 WBCS — SIGNIFICANT CHANGE UP (ref 0–0)
PHOSPHATE SERPL-MCNC: 5.7 MG/DL — HIGH (ref 2.5–4.5)
PLATELET # BLD AUTO: 253 K/UL — SIGNIFICANT CHANGE UP (ref 150–400)
POTASSIUM SERPL-MCNC: 4 MMOL/L — SIGNIFICANT CHANGE UP (ref 3.5–5.3)
POTASSIUM SERPL-SCNC: 4 MMOL/L — SIGNIFICANT CHANGE UP (ref 3.5–5.3)
RBC # BLD: 5.06 M/UL — SIGNIFICANT CHANGE UP (ref 4.2–5.8)
RBC # FLD: 14.6 % — HIGH (ref 10.3–14.5)
SODIUM SERPL-SCNC: 138 MMOL/L — SIGNIFICANT CHANGE UP (ref 135–145)
VANCOMYCIN FLD-MCNC: 27 UG/ML
VANCOMYCIN FLD-MCNC: 28.9 UG/ML
WBC # BLD: 10.49 K/UL — SIGNIFICANT CHANGE UP (ref 3.8–10.5)
WBC # FLD AUTO: 10.49 K/UL — SIGNIFICANT CHANGE UP (ref 3.8–10.5)

## 2020-01-26 PROCEDURE — 76775 US EXAM ABDO BACK WALL LIM: CPT | Mod: 26,59

## 2020-01-26 PROCEDURE — 93976 VASCULAR STUDY: CPT | Mod: 26

## 2020-01-26 PROCEDURE — 99232 SBSQ HOSP IP/OBS MODERATE 35: CPT

## 2020-01-26 RX ADMIN — ESCITALOPRAM OXALATE 5 MILLIGRAM(S): 10 TABLET, FILM COATED ORAL at 21:43

## 2020-01-26 RX ADMIN — Medication 650 MILLIGRAM(S): at 08:46

## 2020-01-26 RX ADMIN — Medication 50 MICROGRAM(S): at 05:59

## 2020-01-26 RX ADMIN — Medication 5 MILLIGRAM(S): at 21:44

## 2020-01-26 RX ADMIN — TRAMADOL HYDROCHLORIDE 50 MILLIGRAM(S): 50 TABLET ORAL at 04:00

## 2020-01-26 RX ADMIN — Medication 100 MILLIGRAM(S): at 05:59

## 2020-01-26 RX ADMIN — LOSARTAN POTASSIUM 100 MILLIGRAM(S): 100 TABLET, FILM COATED ORAL at 17:50

## 2020-01-26 RX ADMIN — Medication 0.3 MILLIGRAM(S): at 17:50

## 2020-01-26 RX ADMIN — HYDROMORPHONE HYDROCHLORIDE 0.25 MILLIGRAM(S): 2 INJECTION INTRAMUSCULAR; INTRAVENOUS; SUBCUTANEOUS at 04:30

## 2020-01-26 RX ADMIN — Medication 60 MILLIGRAM(S): at 17:50

## 2020-01-26 RX ADMIN — ATORVASTATIN CALCIUM 40 MILLIGRAM(S): 80 TABLET, FILM COATED ORAL at 21:43

## 2020-01-26 RX ADMIN — HYDROMORPHONE HYDROCHLORIDE 0.25 MILLIGRAM(S): 2 INJECTION INTRAMUSCULAR; INTRAVENOUS; SUBCUTANEOUS at 03:59

## 2020-01-26 RX ADMIN — SENNA PLUS 2 TABLET(S): 8.6 TABLET ORAL at 22:08

## 2020-01-26 RX ADMIN — Medication 650 MILLIGRAM(S): at 02:00

## 2020-01-26 RX ADMIN — CLOPIDOGREL BISULFATE 75 MILLIGRAM(S): 75 TABLET, FILM COATED ORAL at 11:05

## 2020-01-26 RX ADMIN — HEPARIN SODIUM 5000 UNIT(S): 5000 INJECTION INTRAVENOUS; SUBCUTANEOUS at 13:56

## 2020-01-26 RX ADMIN — CARVEDILOL PHOSPHATE 25 MILLIGRAM(S): 80 CAPSULE, EXTENDED RELEASE ORAL at 08:51

## 2020-01-26 RX ADMIN — HYDROMORPHONE HYDROCHLORIDE 0.25 MILLIGRAM(S): 2 INJECTION INTRAMUSCULAR; INTRAVENOUS; SUBCUTANEOUS at 11:05

## 2020-01-26 RX ADMIN — Medication 81 MILLIGRAM(S): at 14:32

## 2020-01-26 RX ADMIN — Medication 650 MILLIGRAM(S): at 14:32

## 2020-01-26 RX ADMIN — Medication 650 MILLIGRAM(S): at 15:15

## 2020-01-26 RX ADMIN — Medication 650 MILLIGRAM(S): at 21:43

## 2020-01-26 RX ADMIN — Medication 650 MILLIGRAM(S): at 22:39

## 2020-01-26 RX ADMIN — MIRTAZAPINE 15 MILLIGRAM(S): 45 TABLET, ORALLY DISINTEGRATING ORAL at 21:42

## 2020-01-26 RX ADMIN — Medication 60 MILLIGRAM(S): at 06:00

## 2020-01-26 RX ADMIN — HEPARIN SODIUM 5000 UNIT(S): 5000 INJECTION INTRAVENOUS; SUBCUTANEOUS at 06:00

## 2020-01-26 RX ADMIN — CHLORHEXIDINE GLUCONATE 1 APPLICATION(S): 213 SOLUTION TOPICAL at 06:00

## 2020-01-26 RX ADMIN — HEPARIN SODIUM 5000 UNIT(S): 5000 INJECTION INTRAVENOUS; SUBCUTANEOUS at 21:44

## 2020-01-26 RX ADMIN — Medication 650 MILLIGRAM(S): at 01:31

## 2020-01-26 RX ADMIN — CARVEDILOL PHOSPHATE 25 MILLIGRAM(S): 80 CAPSULE, EXTENDED RELEASE ORAL at 21:43

## 2020-01-26 RX ADMIN — ISOSORBIDE MONONITRATE 120 MILLIGRAM(S): 60 TABLET, EXTENDED RELEASE ORAL at 11:05

## 2020-01-26 RX ADMIN — TRAMADOL HYDROCHLORIDE 50 MILLIGRAM(S): 50 TABLET ORAL at 03:05

## 2020-01-26 RX ADMIN — Medication 4: at 17:05

## 2020-01-26 RX ADMIN — LIDOCAINE 1 PATCH: 4 CREAM TOPICAL at 14:31

## 2020-01-26 RX ADMIN — Medication 4: at 21:42

## 2020-01-26 RX ADMIN — Medication 650 MILLIGRAM(S): at 09:30

## 2020-01-26 RX ADMIN — Medication 4: at 12:05

## 2020-01-26 RX ADMIN — TRAMADOL HYDROCHLORIDE 50 MILLIGRAM(S): 50 TABLET ORAL at 13:15

## 2020-01-26 RX ADMIN — LIDOCAINE 1 PATCH: 4 CREAM TOPICAL at 19:10

## 2020-01-26 RX ADMIN — Medication 0.3 MILLIGRAM(S): at 05:59

## 2020-01-26 RX ADMIN — TRAMADOL HYDROCHLORIDE 50 MILLIGRAM(S): 50 TABLET ORAL at 13:56

## 2020-01-26 RX ADMIN — LIDOCAINE 1 PATCH: 4 CREAM TOPICAL at 01:41

## 2020-01-26 RX ADMIN — HYDROMORPHONE HYDROCHLORIDE 0.25 MILLIGRAM(S): 2 INJECTION INTRAMUSCULAR; INTRAVENOUS; SUBCUTANEOUS at 11:42

## 2020-01-26 NOTE — PROGRESS NOTE ADULT - PROBLEM SELECTOR PLAN 2
Noted to have multiple sources of chronic pain likely also contributing to HTN  - RLE pain 2/2 recent R hip replacement - lidocaine patch q24h  - Chronic back pain  - MR thoracolumbar spine c/w degenerative changes T10-11, L3-4, L5-S1; L paracentral disc herniation at C6-7 that may affect C7 nerve root, and disc herniations at several levels without compression of the thoracic spinal cord  - Pain management consulted previously during course with the following regimen: tylenol 650mg q6h mild pain, tramadol 50mg q6h moderate pain, and dilaudid 0.25mg IV q4h for severe pain  - Peripheral artery disease and DM related neuropathy likely contributing to pain as well  - Gabapentin 100mg MWF evenings (ESRD on HD dose adjustment)    #Depression  - Psych following with recs for lexapro 5mg daily  - c/w mirtazapine for poor appetite

## 2020-01-26 NOTE — PROGRESS NOTE ADULT - PROBLEM SELECTOR PLAN 3
Hx of ESRD, L AVF with HD T/Th/S, last HD yesterday 2.6L off  - Nephro following, f/u  recs  - Renvela 800mg TID  - c/w Daily weights, strict I&O, renal diet  - Avoid nephrotoxic agents, renally dose meds    #Carotid artery stenosis  - s/p stenting  - Management as above    #Pulmonary HTN  - ECHO with findings as above

## 2020-01-26 NOTE — PROGRESS NOTE ADULT - PROBLEM SELECTOR PLAN 6
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

## 2020-01-26 NOTE — PROGRESS NOTE ADULT - PROBLEM SELECTOR PLAN 4
Course complicated by fevers with BCX growing MRSA,EBONI neg for vegetations, Gallium scan negative, no clear source. ID has recommended 4 week course  - c/w vanc 1g dosed by level with level check post-HD and re-dosing vanc after, given 500mg yesterday afternoon, though preHD level at 22, will repeat level today and pre/post HD levels tomorrow w/ HD  - BCx NGTD since 1/15, Remains afebrile    #RUE superficial thrombus  - RUE swelling and erythema at IV site 1/16  - RUE doppler with superficial thrombus in cephalic vein, possible source of infection, though remains unclear from work up.  - Continue to monitor

## 2020-01-26 NOTE — PROGRESS NOTE ADULT - SUBJECTIVE AND OBJECTIVE BOX
O/N Events: KAT  Subjective:  tolerated HD well 1/25 w/2 L UF, volume status and lytes noted and acceptable, Bp at goal     VITALS  Vital Signs Last 24 Hrs  T(C): 36.2 (26 Jan 2020 05:23), Max: 37.1 (25 Jan 2020 21:47)  T(F): 97.1 (26 Jan 2020 05:23), Max: 98.8 (25 Jan 2020 21:47)  HR: 66 (26 Jan 2020 09:00) (62 - 95)  BP: 126/60 (26 Jan 2020 09:00) (57/30 - 146/76)  BP(mean): 92 (26 Jan 2020 05:25) (46 - 101)  RR: 18 (26 Jan 2020 09:00) (11 - 23)  SpO2: 95% (26 Jan 2020 09:00) (95% - 100%)    PHYSICAL EXAM  Gen: NAD  Respiratory: CTA B/L  Cardiac: +S1/S2; RRR; systolic murmur with radiation to carotids  Gastrointestinal: soft, NT/ND   Extremities: WWP, no peripheral edema  Neurologic: AAOx3; non focal  access:  Left arm AVF with bruit    MEDICATIONS  (STANDING):  acetaminophen   Tablet .. 650 milliGRAM(s) Oral every 6 hours  aspirin  chewable 81 milliGRAM(s) Oral every 24 hours  atorvastatin 40 milliGRAM(s) Oral at bedtime  carvedilol 25 milliGRAM(s) Oral every 12 hours  chlorhexidine 2% Cloths 1 Application(s) Topical <User Schedule>  cloNIDine 0.3 milliGRAM(s) Oral every 8 hours  clopidogrel Tablet 75 milliGRAM(s) Oral daily  dextrose 5%. 1000 milliLiter(s) (50 mL/Hr) IV Continuous <Continuous>  dextrose 50% Injectable 12.5 Gram(s) IV Push once  dextrose 50% Injectable 25 Gram(s) IV Push once  dextrose 50% Injectable 25 Gram(s) IV Push once  escitalopram 5 milliGRAM(s) Oral every 24 hours  gabapentin 100 milliGRAM(s) Oral <User Schedule>  heparin  Injectable 5000 Unit(s) SubCutaneous every 8 hours  hydrALAZINE 100 milliGRAM(s) Oral every 8 hours  insulin lispro (HumaLOG) corrective regimen sliding scale   SubCutaneous Before meals and at bedtime  isosorbide   mononitrate ER Tablet (IMDUR) 120 milliGRAM(s) Oral every 24 hours  levothyroxine 50 MICROGram(s) Oral daily  lidocaine   Patch 1 Patch Transdermal every 24 hours  losartan 100 milliGRAM(s) Oral every 24 hours  melatonin 5 milliGRAM(s) Oral at bedtime  mirtazapine 15 milliGRAM(s) Oral at bedtime  NIFEdipine XL 60 milliGRAM(s) Oral every 12 hours  polyethylene glycol 3350 17 Gram(s) Oral at bedtime  senna 2 Tablet(s) Oral at bedtime  sevelamer carbonate 800 milliGRAM(s) Oral three times a day with meals    MEDICATIONS  (PRN):  dextrose 40% Gel 15 Gram(s) Oral once PRN Blood Glucose LESS THAN 70 milliGRAM(s)/deciliter  glucagon  Injectable 1 milliGRAM(s) IntraMuscular once PRN Glucose LESS THAN 70 milligrams/deciliter  HYDROmorphone  Injectable 0.25 milliGRAM(s) IV Push every 4 hours PRN Severe Pain (7 - 10)  traMADol 50 milliGRAM(s) Oral every 6 hours PRN Moderate Pain (4 - 6)      LABS                        14.6   10.49 )-----------( 253      ( 26 Jan 2020 07:45 )             45.0     01-26    138  |  94<L>  |  35<H>  ----------------------------<  161<H>  4.0   |  22  |  4.98<H>    Ca    11.1<H>      26 Jan 2020 07:45  Phos  5.7     01-26  Mg     2.4     01-26

## 2020-01-26 NOTE — PROGRESS NOTE ADULT - PROBLEM SELECTOR PLAN 5
Hx of AS:   ECHO with LVEF 60-65%, severe AS, mild AR, mild MR, moderate TR, pulmonary HTN 70mmHg  - Structural heart consulted: TAVR/ BAV will not be done this admission in setting of MRSA bacteremia- (high risk of infection of prosthetic valve) and deconditioned state (pt non-ambulatory at present)  -Patient to follow up outpatient

## 2020-01-26 NOTE — PROGRESS NOTE ADULT - ASSESSMENT
A/p  70 y/o m with PMHx of ESRD on dialysis via AVF TTS, HTN, CAD, BRYNN (s/p stent), IDDM (with peripheral nueropathy), hypothyroidism and CVA cb right eye blindness- patient is s/p carotid artery stent), PAD (s/p bilateral stents) who was BIBEMS to [facility] for syncopal episode and found to have elevated blood pressure. Admitted to CCU for emergent HD and management of hypertensive emergency.  found to have severe AS most likely contributing to Syncopal episode, currently undergoing pre-op TAVR, hospital course complicated by MRSA bacteremia without clear source

## 2020-01-26 NOTE — PROGRESS NOTE ADULT - PROBLEM SELECTOR PLAN 1
# ESRD on HD TTS via LUE AVF  last HD 1/25 with 2 L UF  - HD 1/28 per reg schedule, will assess tmr for additional session if needed  - Bp at goal with current antihypertensives and UF w/HD  - continue Renvela 800 mg po tid w/meals and obtain phos level with bmp   - H&H at goal   - vancomycin to be administered post-HD on dialysis days based on pre-Hd trough  received 500 mg of vancomycin IV after the post HD trough 1/25 however most likely inaccurate as the level was drawn immediately post HD, please check a random level today   Will follow

## 2020-01-26 NOTE — PROGRESS NOTE ADULT - PROBLEM SELECTOR PLAN 1
Admitted with HTN emergency s/p nicardipine gtt and HD.   Current regimen: hydralazine 100mg TID, losartan 100mg daily, coreg 25mg BID, clonidine 0.3mg TID, nifedipine 60mg BID, imdur 120mg daily  - F/u renal US with doppler to evaluate for BRYNN  - Nephro following, f/u recs  -Noted drop yesterday for which clonidine and hydralazine held, BP have remained stable with SBP <140, will decrease clonidine dose    #CAD  - Continue home meds  - ASA 81mg, plavix 75mg, atorvastatin 40mg daily    #PAD  - s/p stenting  - Management as above    #Renal artery stenosis  - s/p stenting  - f/u retroperitoneal US with doppler

## 2020-01-26 NOTE — PROGRESS NOTE ADULT - SUBJECTIVE AND OBJECTIVE BOX
PROGRESS NOTE    CC: hypertensive emergency  Overnight Events: KAT  Interval History: Reports some generalized weakness, no chest pain or shortness of breath.   ROS: As above.    OBJECTIVE  Vitals:  T(C): 36.2 (01-26-20 @ 05:23), Max: 37.1 (01-25-20 @ 21:47)  HR: 66 (01-26-20 @ 09:00) (62 - 95)  BP: 126/60 (01-26-20 @ 09:00) (57/30 - 146/76)  RR: 18 (01-26-20 @ 09:00) (11 - 23)  SpO2: 95% (01-26-20 @ 09:00) (95% - 100%)  Wt(kg): --    I/O:  I&O's Summary    25 Jan 2020 07:01  -  26 Jan 2020 07:00  --------------------------------------------------------  IN: 1227 mL / OUT: 2750 mL / NET: -1523 mL        PHYSICAL EXAM:  Appearance: NAD. Speaking in full sentences.   HEENT: PERRL. No pallor noted.  Conjunctiva clear b/l. Moist oral mucosa.  Cardiovascular: RRR with no murmurs.  Respiratory: Lungs CTAB.   Gastrointestinal:  Soft, nontender. Non-distended. Non-rigid.	  Extremities: No edema b/l. No erythema b/l. LE WWP b/l.  Vascular: DP present.  Neurologic:  Alert and awake. Moving all extremities. Following commands. Making eye contact.  	  LABS:                        14.6   10.49 )-----------( 253      ( 26 Jan 2020 07:45 )             45.0     01-26    138  |  94<L>  |  35<H>  ----------------------------<  161<H>  4.0   |  22  |  4.98<H>    Ca    11.1<H>      26 Jan 2020 07:45  Phos  5.7     01-26  Mg     2.4     01-26            RADIOLOGY & ADDITIONAL TESTS:  Reviewed .    MEDICATIONS  (STANDING):  acetaminophen   Tablet .. 650 milliGRAM(s) Oral every 6 hours  aspirin  chewable 81 milliGRAM(s) Oral every 24 hours  atorvastatin 40 milliGRAM(s) Oral at bedtime  carvedilol 25 milliGRAM(s) Oral every 12 hours  chlorhexidine 2% Cloths 1 Application(s) Topical <User Schedule>  cloNIDine 0.3 milliGRAM(s) Oral every 8 hours  clopidogrel Tablet 75 milliGRAM(s) Oral daily  dextrose 5%. 1000 milliLiter(s) (50 mL/Hr) IV Continuous <Continuous>  dextrose 50% Injectable 12.5 Gram(s) IV Push once  dextrose 50% Injectable 25 Gram(s) IV Push once  dextrose 50% Injectable 25 Gram(s) IV Push once  escitalopram 5 milliGRAM(s) Oral every 24 hours  gabapentin 100 milliGRAM(s) Oral <User Schedule>  heparin  Injectable 5000 Unit(s) SubCutaneous every 8 hours  hydrALAZINE 100 milliGRAM(s) Oral every 8 hours  insulin lispro (HumaLOG) corrective regimen sliding scale   SubCutaneous Before meals and at bedtime  isosorbide   mononitrate ER Tablet (IMDUR) 120 milliGRAM(s) Oral every 24 hours  levothyroxine 50 MICROGram(s) Oral daily  lidocaine   Patch 1 Patch Transdermal every 24 hours  losartan 100 milliGRAM(s) Oral every 24 hours  melatonin 5 milliGRAM(s) Oral at bedtime  mirtazapine 15 milliGRAM(s) Oral at bedtime  NIFEdipine XL 60 milliGRAM(s) Oral every 12 hours  polyethylene glycol 3350 17 Gram(s) Oral at bedtime  senna 2 Tablet(s) Oral at bedtime  sevelamer carbonate 800 milliGRAM(s) Oral three times a day with meals    MEDICATIONS  (PRN):  dextrose 40% Gel 15 Gram(s) Oral once PRN Blood Glucose LESS THAN 70 milliGRAM(s)/deciliter  glucagon  Injectable 1 milliGRAM(s) IntraMuscular once PRN Glucose LESS THAN 70 milligrams/deciliter  HYDROmorphone  Injectable 0.25 milliGRAM(s) IV Push every 4 hours PRN Severe Pain (7 - 10)  traMADol 50 milliGRAM(s) Oral every 6 hours PRN Moderate Pain (4 - 6)      ASSESSMENT:    PLAN: PROGRESS NOTE    CC: hypertensive emergency  Overnight Events: KAT  Interval History: Reports some generalized weakness, no chest pain or shortness of breath.   ROS: As above.    OBJECTIVE  Vitals:  T(C): 36.2 (01-26-20 @ 05:23), Max: 37.1 (01-25-20 @ 21:47)  HR: 66 (01-26-20 @ 09:00) (62 - 95)  BP: 126/60 (01-26-20 @ 09:00) (57/30 - 146/76)  RR: 18 (01-26-20 @ 09:00) (11 - 23)  SpO2: 95% (01-26-20 @ 09:00) (95% - 100%)  Wt(kg): --    I/O:  I&O's Summary    25 Jan 2020 07:01  -  26 Jan 2020 07:00  --------------------------------------------------------  IN: 1227 mL / OUT: 2750 mL / NET: -1523 mL        PHYSICAL EXAM:  Appearance: NAD. Speaking in full sentences.   HEENT:  Conjunctiva clear b/l. Moist oral mucosa.  Cardiovascular: RRR, 3/6 systolic murmur along rusb and lusb as well as radiation to apex  Respiratory: Lungs CTAB.   Gastrointestinal:  Soft, nontender. Non-distended. Non-rigid.	  Extremities: No edema b/l. No erythema b/l. LE WWP b/l.  Neurologic:  Alert and awake. Moving all extremities. Following commands. Making eye contact.  	  LABS:                        14.6   10.49 )-----------( 253      ( 26 Jan 2020 07:45 )             45.0     01-26    138  |  94<L>  |  35<H>  ----------------------------<  161<H>  4.0   |  22  |  4.98<H>    Ca    11.1<H>      26 Jan 2020 07:45  Phos  5.7     01-26  Mg     2.4     01-26            RADIOLOGY & ADDITIONAL TESTS:  Reviewed .    MEDICATIONS  (STANDING):  acetaminophen   Tablet .. 650 milliGRAM(s) Oral every 6 hours  aspirin  chewable 81 milliGRAM(s) Oral every 24 hours  atorvastatin 40 milliGRAM(s) Oral at bedtime  carvedilol 25 milliGRAM(s) Oral every 12 hours  chlorhexidine 2% Cloths 1 Application(s) Topical <User Schedule>  cloNIDine 0.3 milliGRAM(s) Oral every 8 hours  clopidogrel Tablet 75 milliGRAM(s) Oral daily  dextrose 5%. 1000 milliLiter(s) (50 mL/Hr) IV Continuous <Continuous>  dextrose 50% Injectable 12.5 Gram(s) IV Push once  dextrose 50% Injectable 25 Gram(s) IV Push once  dextrose 50% Injectable 25 Gram(s) IV Push once  escitalopram 5 milliGRAM(s) Oral every 24 hours  gabapentin 100 milliGRAM(s) Oral <User Schedule>  heparin  Injectable 5000 Unit(s) SubCutaneous every 8 hours  hydrALAZINE 100 milliGRAM(s) Oral every 8 hours  insulin lispro (HumaLOG) corrective regimen sliding scale   SubCutaneous Before meals and at bedtime  isosorbide   mononitrate ER Tablet (IMDUR) 120 milliGRAM(s) Oral every 24 hours  levothyroxine 50 MICROGram(s) Oral daily  lidocaine   Patch 1 Patch Transdermal every 24 hours  losartan 100 milliGRAM(s) Oral every 24 hours  melatonin 5 milliGRAM(s) Oral at bedtime  mirtazapine 15 milliGRAM(s) Oral at bedtime  NIFEdipine XL 60 milliGRAM(s) Oral every 12 hours  polyethylene glycol 3350 17 Gram(s) Oral at bedtime  senna 2 Tablet(s) Oral at bedtime  sevelamer carbonate 800 milliGRAM(s) Oral three times a day with meals    MEDICATIONS  (PRN):  dextrose 40% Gel 15 Gram(s) Oral once PRN Blood Glucose LESS THAN 70 milliGRAM(s)/deciliter  glucagon  Injectable 1 milliGRAM(s) IntraMuscular once PRN Glucose LESS THAN 70 milligrams/deciliter  HYDROmorphone  Injectable 0.25 milliGRAM(s) IV Push every 4 hours PRN Severe Pain (7 - 10)  traMADol 50 milliGRAM(s) Oral every 6 hours PRN Moderate Pain (4 - 6)

## 2020-01-26 NOTE — PROGRESS NOTE ADULT - ASSESSMENT
69M PMH ESRD (HD T/TH/S), HTN, HLD, CAD, renal artery stenosis (s/p stent), DM (c/b peripheral neuropathy), hypothyroidism, CVA (R eye blind, L eye limited vision), carotid artery stenosis (s/p stent), peripheral artery disease (s/p BL stents) presented initially with syncopal episode in the setting of HTN emergency and admitted to CCU. Course c/b MRSA bacteremia without clear source and to complete 4 week course of vancomycin. Patient pending HTN optimization and vanco dosing optimization for dispo to [facility].

## 2020-01-27 LAB
ANION GAP SERPL CALC-SCNC: 23 MMOL/L — HIGH (ref 5–17)
BUN SERPL-MCNC: 66 MG/DL — HIGH (ref 7–23)
CALCIUM SERPL-MCNC: 10.6 MG/DL — HIGH (ref 8.4–10.5)
CHLORIDE SERPL-SCNC: 93 MMOL/L — LOW (ref 96–108)
CO2 SERPL-SCNC: 22 MMOL/L — SIGNIFICANT CHANGE UP (ref 22–31)
CREAT SERPL-MCNC: 6.82 MG/DL — HIGH (ref 0.5–1.3)
GLUCOSE BLDC GLUCOMTR-MCNC: 153 MG/DL — HIGH (ref 70–99)
GLUCOSE BLDC GLUCOMTR-MCNC: 159 MG/DL — HIGH (ref 70–99)
GLUCOSE BLDC GLUCOMTR-MCNC: 261 MG/DL — HIGH (ref 70–99)
GLUCOSE BLDC GLUCOMTR-MCNC: 265 MG/DL — HIGH (ref 70–99)
GLUCOSE BLDC GLUCOMTR-MCNC: 283 MG/DL — HIGH (ref 70–99)
GLUCOSE SERPL-MCNC: 164 MG/DL — HIGH (ref 70–99)
HCT VFR BLD CALC: 43.2 % — SIGNIFICANT CHANGE UP (ref 39–50)
HGB BLD-MCNC: 14 G/DL — SIGNIFICANT CHANGE UP (ref 13–17)
MAGNESIUM SERPL-MCNC: 2.5 MG/DL — SIGNIFICANT CHANGE UP (ref 1.6–2.6)
MCHC RBC-ENTMCNC: 28.6 PG — SIGNIFICANT CHANGE UP (ref 27–34)
MCHC RBC-ENTMCNC: 32.4 GM/DL — SIGNIFICANT CHANGE UP (ref 32–36)
MCV RBC AUTO: 88.3 FL — SIGNIFICANT CHANGE UP (ref 80–100)
NRBC # BLD: 0 /100 WBCS — SIGNIFICANT CHANGE UP (ref 0–0)
PLATELET # BLD AUTO: 264 K/UL — SIGNIFICANT CHANGE UP (ref 150–400)
POTASSIUM SERPL-MCNC: 4.7 MMOL/L — SIGNIFICANT CHANGE UP (ref 3.5–5.3)
POTASSIUM SERPL-SCNC: 4.7 MMOL/L — SIGNIFICANT CHANGE UP (ref 3.5–5.3)
RBC # BLD: 4.89 M/UL — SIGNIFICANT CHANGE UP (ref 4.2–5.8)
RBC # FLD: 14.6 % — HIGH (ref 10.3–14.5)
SODIUM SERPL-SCNC: 138 MMOL/L — SIGNIFICANT CHANGE UP (ref 135–145)
VANCOMYCIN FLD-MCNC: 24.9 UG/ML — SIGNIFICANT CHANGE UP
WBC # BLD: 13.23 K/UL — HIGH (ref 3.8–10.5)
WBC # FLD AUTO: 13.23 K/UL — HIGH (ref 3.8–10.5)

## 2020-01-27 PROCEDURE — 99232 SBSQ HOSP IP/OBS MODERATE 35: CPT

## 2020-01-27 PROCEDURE — 99233 SBSQ HOSP IP/OBS HIGH 50: CPT | Mod: GC

## 2020-01-27 RX ORDER — PREGABALIN 225 MG/1
100 CAPSULE ORAL EVERY 24 HOURS
Refills: 0 | Status: COMPLETED | OUTPATIENT
Start: 2020-01-27 | End: 2020-02-02

## 2020-01-27 RX ORDER — HYDRALAZINE HCL 50 MG
75 TABLET ORAL EVERY 8 HOURS
Refills: 0 | Status: DISCONTINUED | OUTPATIENT
Start: 2020-01-27 | End: 2020-02-02

## 2020-01-27 RX ADMIN — Medication 60 MILLIGRAM(S): at 06:07

## 2020-01-27 RX ADMIN — TRAMADOL HYDROCHLORIDE 50 MILLIGRAM(S): 50 TABLET ORAL at 16:55

## 2020-01-27 RX ADMIN — LIDOCAINE 1 PATCH: 4 CREAM TOPICAL at 01:29

## 2020-01-27 RX ADMIN — Medication 650 MILLIGRAM(S): at 18:14

## 2020-01-27 RX ADMIN — HEPARIN SODIUM 5000 UNIT(S): 5000 INJECTION INTRAVENOUS; SUBCUTANEOUS at 21:33

## 2020-01-27 RX ADMIN — TRAMADOL HYDROCHLORIDE 50 MILLIGRAM(S): 50 TABLET ORAL at 08:33

## 2020-01-27 RX ADMIN — Medication 2: at 12:05

## 2020-01-27 RX ADMIN — SEVELAMER CARBONATE 800 MILLIGRAM(S): 2400 POWDER, FOR SUSPENSION ORAL at 08:33

## 2020-01-27 RX ADMIN — Medication 75 MILLIGRAM(S): at 14:48

## 2020-01-27 RX ADMIN — CARVEDILOL PHOSPHATE 25 MILLIGRAM(S): 80 CAPSULE, EXTENDED RELEASE ORAL at 11:00

## 2020-01-27 RX ADMIN — HEPARIN SODIUM 5000 UNIT(S): 5000 INJECTION INTRAVENOUS; SUBCUTANEOUS at 06:08

## 2020-01-27 RX ADMIN — LOSARTAN POTASSIUM 100 MILLIGRAM(S): 100 TABLET, FILM COATED ORAL at 18:14

## 2020-01-27 RX ADMIN — Medication 650 MILLIGRAM(S): at 04:50

## 2020-01-27 RX ADMIN — HEPARIN SODIUM 5000 UNIT(S): 5000 INJECTION INTRAVENOUS; SUBCUTANEOUS at 14:48

## 2020-01-27 RX ADMIN — MIRTAZAPINE 15 MILLIGRAM(S): 45 TABLET, ORALLY DISINTEGRATING ORAL at 21:34

## 2020-01-27 RX ADMIN — Medication 5 MILLIGRAM(S): at 21:32

## 2020-01-27 RX ADMIN — CARVEDILOL PHOSPHATE 25 MILLIGRAM(S): 80 CAPSULE, EXTENDED RELEASE ORAL at 21:33

## 2020-01-27 RX ADMIN — CLOPIDOGREL BISULFATE 75 MILLIGRAM(S): 75 TABLET, FILM COATED ORAL at 11:00

## 2020-01-27 RX ADMIN — HYDROMORPHONE HYDROCHLORIDE 0.25 MILLIGRAM(S): 2 INJECTION INTRAMUSCULAR; INTRAVENOUS; SUBCUTANEOUS at 14:48

## 2020-01-27 RX ADMIN — Medication 650 MILLIGRAM(S): at 19:14

## 2020-01-27 RX ADMIN — Medication 6: at 21:33

## 2020-01-27 RX ADMIN — Medication 2: at 07:48

## 2020-01-27 RX ADMIN — Medication 650 MILLIGRAM(S): at 12:00

## 2020-01-27 RX ADMIN — SENNA PLUS 2 TABLET(S): 8.6 TABLET ORAL at 21:32

## 2020-01-27 RX ADMIN — TRAMADOL HYDROCHLORIDE 50 MILLIGRAM(S): 50 TABLET ORAL at 15:55

## 2020-01-27 RX ADMIN — PREGABALIN 100 MICROGRAM(S): 225 CAPSULE ORAL at 18:21

## 2020-01-27 RX ADMIN — GABAPENTIN 100 MILLIGRAM(S): 400 CAPSULE ORAL at 18:14

## 2020-01-27 RX ADMIN — Medication 81 MILLIGRAM(S): at 14:48

## 2020-01-27 RX ADMIN — Medication 0.3 MILLIGRAM(S): at 06:08

## 2020-01-27 RX ADMIN — Medication 60 MILLIGRAM(S): at 18:14

## 2020-01-27 RX ADMIN — Medication 50 MICROGRAM(S): at 06:08

## 2020-01-27 RX ADMIN — Medication 650 MILLIGRAM(S): at 04:14

## 2020-01-27 RX ADMIN — HYDROMORPHONE HYDROCHLORIDE 0.25 MILLIGRAM(S): 2 INJECTION INTRAMUSCULAR; INTRAVENOUS; SUBCUTANEOUS at 15:03

## 2020-01-27 RX ADMIN — Medication 100 MILLIGRAM(S): at 06:07

## 2020-01-27 RX ADMIN — CHLORHEXIDINE GLUCONATE 1 APPLICATION(S): 213 SOLUTION TOPICAL at 06:09

## 2020-01-27 RX ADMIN — Medication 6: at 18:24

## 2020-01-27 RX ADMIN — ATORVASTATIN CALCIUM 40 MILLIGRAM(S): 80 TABLET, FILM COATED ORAL at 21:33

## 2020-01-27 RX ADMIN — Medication 650 MILLIGRAM(S): at 11:00

## 2020-01-27 RX ADMIN — ISOSORBIDE MONONITRATE 120 MILLIGRAM(S): 60 TABLET, EXTENDED RELEASE ORAL at 11:00

## 2020-01-27 RX ADMIN — TRAMADOL HYDROCHLORIDE 50 MILLIGRAM(S): 50 TABLET ORAL at 09:33

## 2020-01-27 RX ADMIN — SEVELAMER CARBONATE 800 MILLIGRAM(S): 2400 POWDER, FOR SUSPENSION ORAL at 18:14

## 2020-01-27 RX ADMIN — ESCITALOPRAM OXALATE 5 MILLIGRAM(S): 10 TABLET, FILM COATED ORAL at 21:32

## 2020-01-27 RX ADMIN — Medication 0.3 MILLIGRAM(S): at 18:13

## 2020-01-27 NOTE — PROGRESS NOTE ADULT - PROBLEM SELECTOR PLAN 1
# ESRD on HD TTS via LUE AVF  last HD 1/25 with 2 L UF  - HD 1/28 per reg schedule  - Bp at goal with current antihypertensives and UF w/HD  - continue Renvela 800 mg po tid w/meals and obtain phos level with bmp   - H&H at goal   - vancomycin to be administered post-HD on dialysis days based on pre-Hd trough  last random trough 1/26 ~ 28, please obtain pre HD trough tomorrow)  Will follow

## 2020-01-27 NOTE — PROGRESS NOTE ADULT - SUBJECTIVE AND OBJECTIVE BOX
O/N Events: KAT  Subjective:  bp fluctuates from 100-160 systolic/ Clonidine titrated down to 0.3 mg BID, NAD satting 96% on RA    VITALS  Vital Signs Last 24 Hrs  T(C): 36.9 (27 Jan 2020 12:10), Max: 36.9 (27 Jan 2020 12:10)  T(F): 98.4 (27 Jan 2020 12:10), Max: 98.4 (27 Jan 2020 12:10)  HR: 62 (27 Jan 2020 11:00) (62 - 90)  BP: 139/65 (27 Jan 2020 11:00) (88/55 - 162/72)  BP(mean): 94 (27 Jan 2020 11:00) (69 - 106)  RR: 15 (27 Jan 2020 11:00) (13 - 20)  SpO2: 99% (27 Jan 2020 11:00) (95% - 99%)    PHYSICAL EXAM  Gen: NAD  Respiratory: CTA B/L  Cardiac: +S1/S2; RRR; systolic murmur present   Gastrointestinal: soft, NT/ND   Extremities: WWP, no peripheral edema  Neurologic: AAOx3; non focal  access:  Left arm AVF with bruit    MEDICATIONS  (STANDING):  acetaminophen   Tablet .. 650 milliGRAM(s) Oral every 6 hours  aspirin  chewable 81 milliGRAM(s) Oral every 24 hours  atorvastatin 40 milliGRAM(s) Oral at bedtime  carvedilol 25 milliGRAM(s) Oral every 12 hours  chlorhexidine 2% Cloths 1 Application(s) Topical <User Schedule>  cloNIDine 0.3 milliGRAM(s) Oral every 12 hours  clopidogrel Tablet 75 milliGRAM(s) Oral daily  cyanocobalamin Injectable 100 MICROGram(s) IntraMuscular every 24 hours  dextrose 5%. 1000 milliLiter(s) (50 mL/Hr) IV Continuous <Continuous>  dextrose 50% Injectable 12.5 Gram(s) IV Push once  dextrose 50% Injectable 25 Gram(s) IV Push once  dextrose 50% Injectable 25 Gram(s) IV Push once  escitalopram 5 milliGRAM(s) Oral every 24 hours  gabapentin 100 milliGRAM(s) Oral <User Schedule>  heparin  Injectable 5000 Unit(s) SubCutaneous every 8 hours  hydrALAZINE 75 milliGRAM(s) Oral every 8 hours  insulin lispro (HumaLOG) corrective regimen sliding scale   SubCutaneous Before meals and at bedtime  isosorbide   mononitrate ER Tablet (IMDUR) 120 milliGRAM(s) Oral every 24 hours  levothyroxine 50 MICROGram(s) Oral daily  lidocaine   Patch 1 Patch Transdermal every 24 hours  losartan 100 milliGRAM(s) Oral every 24 hours  melatonin 5 milliGRAM(s) Oral at bedtime  mirtazapine 15 milliGRAM(s) Oral at bedtime  NIFEdipine XL 60 milliGRAM(s) Oral every 12 hours  polyethylene glycol 3350 17 Gram(s) Oral at bedtime  senna 2 Tablet(s) Oral at bedtime  sevelamer carbonate 800 milliGRAM(s) Oral three times a day with meals    MEDICATIONS  (PRN):  dextrose 40% Gel 15 Gram(s) Oral once PRN Blood Glucose LESS THAN 70 milliGRAM(s)/deciliter  glucagon  Injectable 1 milliGRAM(s) IntraMuscular once PRN Glucose LESS THAN 70 milligrams/deciliter  HYDROmorphone  Injectable 0.25 milliGRAM(s) IV Push every 4 hours PRN Severe Pain (7 - 10)  traMADol 50 milliGRAM(s) Oral every 6 hours PRN Moderate Pain (4 - 6)      LABS                        14.0   13.23 )-----------( 264      ( 27 Jan 2020 11:02 )             43.2     01-27    138  |  93<L>  |  66<H>  ----------------------------<  164<H>  4.7   |  22  |  6.82<H>    Ca    10.6<H>      27 Jan 2020 11:02  Phos  5.7     01-26  Mg     2.5     01-27

## 2020-01-27 NOTE — PROGRESS NOTE ADULT - ASSESSMENT
69M PMH ESRD (HD T/TH/S), HTN, HLD, CAD, renal artery stenosis (s/p stent), DM (c/b peripheral neuropathy), hypothyroidism, CVA (R eye blind, L eye limited vision), carotid artery stenosis (s/p stent), peripheral artery disease (s/p BL stents) presented initially with syncopal episode in the setting of HTN emergency and admitted to CCU. Course c/b MRSA bacteremia without clear source and to complete 4 week course of vancomycin. Patient pending HTN optimization and vanco dosing optimization for dispo to [facility].

## 2020-01-27 NOTE — PROGRESS NOTE ADULT - PROBLEM SELECTOR PLAN 3
Hx of ESRD, L AVF with HD T/Th/S, last HD 1/25  - Nephro following, f/u  recs  - Renvela 800mg TID  - c/w Daily weights, strict I&O, renal diet  - Avoid nephrotoxic agents, renally dose meds    #Carotid artery stenosis  - s/p stenting  - Management as above    #Pulmonary HTN  - ECHO with findings as above

## 2020-01-27 NOTE — PROGRESS NOTE ADULT - SUBJECTIVE AND OBJECTIVE BOX
OVERNIGHT EVENTS: Patient with blood pressure in the 100s/60s yesterday evening, with evening hydralazine subsequently held.     SUBJECTIVE / INTERVAL HPI: Patient seen and examined at bedside. States that he continues to have severe neck and upper back pain this morning, and is asking for IV pain medications. Patient also complaining of his chronic shortness of breath. Otherwise, denies fevers/chills, chest pain, abdominal pain, nausea/vomiting. Endorses generalized weakness.     VITAL SIGNS:  Vital Signs Last 24 Hrs  T(C): 35.9 (27 Jan 2020 10:08), Max: 36.7 (26 Jan 2020 15:24)  T(F): 96.7 (27 Jan 2020 10:08), Max: 98 (26 Jan 2020 15:24)  HR: 64 (27 Jan 2020 08:30) (64 - 90)  BP: 102/53 (27 Jan 2020 08:30) (88/55 - 162/72)  BP(mean): 74 (27 Jan 2020 08:30) (69 - 106)  RR: 15 (27 Jan 2020 08:30) (13 - 20)  SpO2: 98% (27 Jan 2020 08:30) (95% - 99%)    PHYSICAL EXAM:    General: Elderly male resting comfortably in bed, does not appear to be in any distress though endorsing severe pain  HEENT: anicteric sclera; MMM  Neck: supple, no jvd  Cardiovascular: +S1/S2; RRR. 3/6 holosystolic murmur   Respiratory: CTA B/L; no W/R/R  Gastrointestinal: soft, NT/ND; +BSx4  Extremities: WWP; no edema, clubbing or cyanosis. Left AV fistula with bruit. 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 650 milliGRAM(s) Oral every 6 hours  aspirin  chewable 81 milliGRAM(s) Oral every 24 hours  atorvastatin 40 milliGRAM(s) Oral at bedtime  carvedilol 25 milliGRAM(s) Oral every 12 hours  chlorhexidine 2% Cloths 1 Application(s) Topical <User Schedule>  cloNIDine 0.3 milliGRAM(s) Oral every 12 hours  clopidogrel Tablet 75 milliGRAM(s) Oral daily  cyanocobalamin Injectable 100 MICROGram(s) IntraMuscular every 24 hours  dextrose 5%. 1000 milliLiter(s) (50 mL/Hr) IV Continuous <Continuous>  dextrose 50% Injectable 12.5 Gram(s) IV Push once  dextrose 50% Injectable 25 Gram(s) IV Push once  dextrose 50% Injectable 25 Gram(s) IV Push once  escitalopram 5 milliGRAM(s) Oral every 24 hours  gabapentin 100 milliGRAM(s) Oral <User Schedule>  heparin  Injectable 5000 Unit(s) SubCutaneous every 8 hours  hydrALAZINE 75 milliGRAM(s) Oral every 8 hours  insulin lispro (HumaLOG) corrective regimen sliding scale   SubCutaneous Before meals and at bedtime  isosorbide   mononitrate ER Tablet (IMDUR) 120 milliGRAM(s) Oral every 24 hours  levothyroxine 50 MICROGram(s) Oral daily  lidocaine   Patch 1 Patch Transdermal every 24 hours  losartan 100 milliGRAM(s) Oral every 24 hours  melatonin 5 milliGRAM(s) Oral at bedtime  mirtazapine 15 milliGRAM(s) Oral at bedtime  NIFEdipine XL 60 milliGRAM(s) Oral every 12 hours  polyethylene glycol 3350 17 Gram(s) Oral at bedtime  senna 2 Tablet(s) Oral at bedtime  sevelamer carbonate 800 milliGRAM(s) Oral three times a day with meals    MEDICATIONS  (PRN):  dextrose 40% Gel 15 Gram(s) Oral once PRN Blood Glucose LESS THAN 70 milliGRAM(s)/deciliter  glucagon  Injectable 1 milliGRAM(s) IntraMuscular once PRN Glucose LESS THAN 70 milligrams/deciliter  HYDROmorphone  Injectable 0.25 milliGRAM(s) IV Push every 4 hours PRN Severe Pain (7 - 10)  traMADol 50 milliGRAM(s) Oral every 6 hours PRN Moderate Pain (4 - 6)      ALLERGIES:  Allergies    No Known Allergies    Intolerances        LABS:                        14.6   10.49 )-----------( 253      ( 26 Jan 2020 07:45 )             45.0     01-26    138  |  94<L>  |  35<H>  ----------------------------<  161<H>  4.0   |  22  |  4.98<H>    Ca    11.1<H>      26 Jan 2020 07:45  Phos  5.7     01-26  Mg     2.4     01-26          CAPILLARY BLOOD GLUCOSE      POCT Blood Glucose.: 159 mg/dL (27 Jan 2020 07:01)      RADIOLOGY & ADDITIONAL TESTS: Reviewed.

## 2020-01-27 NOTE — PROGRESS NOTE ADULT - PROBLEM SELECTOR PLAN 1
Admitted with HTN emergency s/p nicardipine gtt and HD, now with occasional low bps (100s/60s)  Current regimen: hydralazine 75 mg TID (lowered from 100mg TID), losartan 100mg daily, coreg 25mg BID, clonidine 0.3mg BID (lowered from 0.3 TID), nifedipine 60mg BID, imdur 120mg daily  - Renal US with doppler showing poor visualization of renal arteries, unable to r/o BRYNN  -Nephro following, f/u recs  -Continuing to monitor, low bp over the last few days likely also in the setting of poor po intake    #CAD  - Continue home meds  - ASA 81mg, plavix 75mg, atorvastatin 40mg daily    #PAD  - s/p stenting  - Management as above    #Renal artery stenosis  - s/p stenting  - Renal US with doppler with poor visualization of renal arteries, unable to r/o BRYNN although pt's htn better controlled over last few days

## 2020-01-27 NOTE — PROGRESS NOTE ADULT - ASSESSMENT
A/p  70 y/o m with PMHx of ESRD on dialysis via AVF TTS, HTN, CAD, BRYNN (s/p stent), IDDM (with peripheral nueropathy), hypothyroidism and CVA cb right eye blindness- patient is s/p carotid artery stent), PAD (s/p bilateral stents) who was BIBEMS to [facility] for syncopal episode and found to have elevated blood pressure. Admitted to CCU for emergent HD and management of hypertensive emergency.  found to have severe AS most likely contributing to Syncopal episode, currently undergoing pre-op TAVR, hospital course complicated by MRSA bacteremia without clear source

## 2020-01-27 NOTE — PROGRESS NOTE ADULT - PROBLEM SELECTOR PLAN 4
Course complicated by fevers with BCX growing MRSA,EBONI neg for vegetations, Gallium scan negative, no clear source. ID has recommended 4 week course  - c/w vanc dosed by level with level check post-HD and re-dosing vanc after, last dose was 500mg on 1/25 with vanc level supratherapeutic at 27-29 day after  - Will see if linezolid would be possible alternative given difficulty of dispo with complex vanc dosing regimen  - BCx NGTD since 1/15, Remains afebrile    #RUE superficial thrombus  - RUE swelling and erythema at IV site 1/16  - RUE doppler with superficial thrombus in cephalic vein  - Continue to monitor, no longer swollen/erythematous

## 2020-01-27 NOTE — PROGRESS NOTE ADULT - ATTENDING COMMENTS
[id number] I independently performed the key portions of the evaluation and management service provided. I agree with the above history, physical, and plan which I have reviewed and edited where appropriate. I find ESRD, fever improved.  Cont HD. Follow vanco levels. See full note. (Patient seen earlier in day.)

## 2020-01-28 ENCOUNTER — TRANSCRIPTION ENCOUNTER (OUTPATIENT)
Age: 70
End: 2020-01-28

## 2020-01-28 LAB
ANION GAP SERPL CALC-SCNC: 26 MMOL/L — HIGH (ref 5–17)
APTT BLD: 37.6 SEC — HIGH (ref 27.5–36.3)
BUN SERPL-MCNC: 92 MG/DL — HIGH (ref 7–23)
CALCIUM SERPL-MCNC: 10.1 MG/DL — SIGNIFICANT CHANGE UP (ref 8.4–10.5)
CHLORIDE SERPL-SCNC: 92 MMOL/L — LOW (ref 96–108)
CK MB CFR SERPL CALC: 2 NG/ML — SIGNIFICANT CHANGE UP (ref 0–6.7)
CK MB CFR SERPL CALC: 2 NG/ML — SIGNIFICANT CHANGE UP (ref 0–6.7)
CK SERPL-CCNC: 26 U/L — LOW (ref 30–200)
CK SERPL-CCNC: 27 U/L — LOW (ref 30–200)
CO2 SERPL-SCNC: 21 MMOL/L — LOW (ref 22–31)
CREAT SERPL-MCNC: 8.4 MG/DL — HIGH (ref 0.5–1.3)
GLUCOSE BLDC GLUCOMTR-MCNC: 174 MG/DL — HIGH (ref 70–99)
GLUCOSE BLDC GLUCOMTR-MCNC: 186 MG/DL — HIGH (ref 70–99)
GLUCOSE BLDC GLUCOMTR-MCNC: 260 MG/DL — HIGH (ref 70–99)
GLUCOSE BLDC GLUCOMTR-MCNC: 304 MG/DL — HIGH (ref 70–99)
GLUCOSE SERPL-MCNC: 274 MG/DL — HIGH (ref 70–99)
HCT VFR BLD CALC: 39.1 % — SIGNIFICANT CHANGE UP (ref 39–50)
HGB BLD-MCNC: 12.7 G/DL — LOW (ref 13–17)
MAGNESIUM SERPL-MCNC: 2.7 MG/DL — HIGH (ref 1.6–2.6)
MCHC RBC-ENTMCNC: 28.3 PG — SIGNIFICANT CHANGE UP (ref 27–34)
MCHC RBC-ENTMCNC: 32.5 GM/DL — SIGNIFICANT CHANGE UP (ref 32–36)
MCV RBC AUTO: 87.1 FL — SIGNIFICANT CHANGE UP (ref 80–100)
NRBC # BLD: 0 /100 WBCS — SIGNIFICANT CHANGE UP (ref 0–0)
PHOSPHATE SERPL-MCNC: 7.5 MG/DL — HIGH (ref 2.5–4.5)
PLATELET # BLD AUTO: 269 K/UL — SIGNIFICANT CHANGE UP (ref 150–400)
POTASSIUM SERPL-MCNC: 4.9 MMOL/L — SIGNIFICANT CHANGE UP (ref 3.5–5.3)
POTASSIUM SERPL-SCNC: 4.9 MMOL/L — SIGNIFICANT CHANGE UP (ref 3.5–5.3)
RBC # BLD: 4.49 M/UL — SIGNIFICANT CHANGE UP (ref 4.2–5.8)
RBC # FLD: 14.9 % — HIGH (ref 10.3–14.5)
SODIUM SERPL-SCNC: 139 MMOL/L — SIGNIFICANT CHANGE UP (ref 135–145)
TROPONIN T SERPL-MCNC: 0.25 NG/ML — CRITICAL HIGH (ref 0–0.01)
TROPONIN T SERPL-MCNC: 0.27 NG/ML — CRITICAL HIGH (ref 0–0.01)
VANCOMYCIN FLD-MCNC: 21.5 UG/ML — SIGNIFICANT CHANGE UP
WBC # BLD: 11.09 K/UL — HIGH (ref 3.8–10.5)
WBC # FLD AUTO: 11.09 K/UL — HIGH (ref 3.8–10.5)

## 2020-01-28 PROCEDURE — 90935 HEMODIALYSIS ONE EVALUATION: CPT | Mod: GC

## 2020-01-28 PROCEDURE — 99232 SBSQ HOSP IP/OBS MODERATE 35: CPT

## 2020-01-28 RX ORDER — ATORVASTATIN CALCIUM 80 MG/1
1 TABLET, FILM COATED ORAL
Qty: 0 | Refills: 0 | DISCHARGE
Start: 2020-01-28

## 2020-01-28 RX ORDER — HEPARIN SODIUM 5000 [USP'U]/ML
5000 INJECTION INTRAVENOUS; SUBCUTANEOUS
Qty: 0 | Refills: 0 | DISCHARGE
Start: 2020-01-28

## 2020-01-28 RX ORDER — MIRTAZAPINE 45 MG/1
1 TABLET, ORALLY DISINTEGRATING ORAL
Qty: 0 | Refills: 0 | DISCHARGE
Start: 2020-01-28

## 2020-01-28 RX ORDER — VANCOMYCIN HCL 1 G
500 VIAL (EA) INTRAVENOUS
Qty: 1500 | Refills: 0
Start: 2020-01-28

## 2020-01-28 RX ORDER — LOSARTAN POTASSIUM 100 MG/1
1 TABLET, FILM COATED ORAL
Qty: 0 | Refills: 0 | DISCHARGE

## 2020-01-28 RX ORDER — CARVEDILOL PHOSPHATE 80 MG/1
1 CAPSULE, EXTENDED RELEASE ORAL
Qty: 0 | Refills: 0 | DISCHARGE
Start: 2020-01-28

## 2020-01-28 RX ORDER — SEVELAMER CARBONATE 2400 MG/1
1 POWDER, FOR SUSPENSION ORAL
Qty: 0 | Refills: 0 | DISCHARGE
Start: 2020-01-28

## 2020-01-28 RX ORDER — ASPIRIN/CALCIUM CARB/MAGNESIUM 324 MG
1 TABLET ORAL
Qty: 0 | Refills: 0 | DISCHARGE
Start: 2020-01-28

## 2020-01-28 RX ORDER — LEVOTHYROXINE SODIUM 125 MCG
1 TABLET ORAL
Qty: 0 | Refills: 0 | DISCHARGE
Start: 2020-01-28

## 2020-01-28 RX ORDER — GABAPENTIN 400 MG/1
1 CAPSULE ORAL
Qty: 0 | Refills: 0 | DISCHARGE
Start: 2020-01-28

## 2020-01-28 RX ORDER — NIFEDIPINE 30 MG
1 TABLET, EXTENDED RELEASE 24 HR ORAL
Qty: 0 | Refills: 0 | DISCHARGE
Start: 2020-01-28

## 2020-01-28 RX ORDER — LEVOTHYROXINE SODIUM 125 MCG
1 TABLET ORAL
Qty: 0 | Refills: 0 | DISCHARGE

## 2020-01-28 RX ORDER — ENOXAPARIN SODIUM 100 MG/ML
3 INJECTION SUBCUTANEOUS
Qty: 100 | Refills: 0
Start: 2020-01-28 | End: 2020-02-26

## 2020-01-28 RX ORDER — HEPARIN SODIUM 5000 [USP'U]/ML
700 INJECTION INTRAVENOUS; SUBCUTANEOUS
Qty: 25000 | Refills: 0 | Status: DISCONTINUED | OUTPATIENT
Start: 2020-01-28 | End: 2020-01-29

## 2020-01-28 RX ORDER — SENNA PLUS 8.6 MG/1
2 TABLET ORAL
Qty: 0 | Refills: 0 | DISCHARGE
Start: 2020-01-28

## 2020-01-28 RX ORDER — CLOPIDOGREL BISULFATE 75 MG/1
1 TABLET, FILM COATED ORAL
Qty: 0 | Refills: 0 | DISCHARGE

## 2020-01-28 RX ORDER — TRAMADOL HYDROCHLORIDE 50 MG/1
1 TABLET ORAL
Qty: 0 | Refills: 0 | DISCHARGE
Start: 2020-01-28

## 2020-01-28 RX ORDER — ESCITALOPRAM OXALATE 10 MG/1
1 TABLET, FILM COATED ORAL
Qty: 0 | Refills: 0 | DISCHARGE
Start: 2020-01-28

## 2020-01-28 RX ORDER — HYDRALAZINE HCL 50 MG
3 TABLET ORAL
Qty: 0 | Refills: 0 | DISCHARGE
Start: 2020-01-28

## 2020-01-28 RX ORDER — ASPIRIN/CALCIUM CARB/MAGNESIUM 324 MG
1 TABLET ORAL
Qty: 0 | Refills: 0 | DISCHARGE

## 2020-01-28 RX ORDER — CLOPIDOGREL BISULFATE 75 MG/1
1 TABLET, FILM COATED ORAL
Qty: 0 | Refills: 0 | DISCHARGE
Start: 2020-01-28

## 2020-01-28 RX ORDER — INSULIN GLARGINE 100 [IU]/ML
3 INJECTION, SOLUTION SUBCUTANEOUS AT BEDTIME
Refills: 0 | Status: DISCONTINUED | OUTPATIENT
Start: 2020-01-28 | End: 2020-02-03

## 2020-01-28 RX ORDER — HYDROMORPHONE HYDROCHLORIDE 2 MG/ML
0.25 INJECTION INTRAMUSCULAR; INTRAVENOUS; SUBCUTANEOUS ONCE
Refills: 0 | Status: DISCONTINUED | OUTPATIENT
Start: 2020-01-28 | End: 2020-01-28

## 2020-01-28 RX ORDER — AMLODIPINE BESYLATE 2.5 MG/1
1 TABLET ORAL
Qty: 0 | Refills: 0 | DISCHARGE

## 2020-01-28 RX ORDER — POLYETHYLENE GLYCOL 3350 17 G/17G
17 POWDER, FOR SOLUTION ORAL
Qty: 0 | Refills: 0 | DISCHARGE
Start: 2020-01-28

## 2020-01-28 RX ORDER — ISOSORBIDE MONONITRATE 60 MG/1
1 TABLET, EXTENDED RELEASE ORAL
Qty: 0 | Refills: 0 | DISCHARGE
Start: 2020-01-28

## 2020-01-28 RX ORDER — ACETAMINOPHEN 500 MG
2 TABLET ORAL
Qty: 0 | Refills: 0 | DISCHARGE
Start: 2020-01-28

## 2020-01-28 RX ADMIN — TRAMADOL HYDROCHLORIDE 50 MILLIGRAM(S): 50 TABLET ORAL at 18:50

## 2020-01-28 RX ADMIN — CHLORHEXIDINE GLUCONATE 1 APPLICATION(S): 213 SOLUTION TOPICAL at 05:30

## 2020-01-28 RX ADMIN — Medication 60 MILLIGRAM(S): at 17:48

## 2020-01-28 RX ADMIN — Medication 75 MILLIGRAM(S): at 05:29

## 2020-01-28 RX ADMIN — SEVELAMER CARBONATE 800 MILLIGRAM(S): 2400 POWDER, FOR SUSPENSION ORAL at 18:56

## 2020-01-28 RX ADMIN — MIRTAZAPINE 15 MILLIGRAM(S): 45 TABLET, ORALLY DISINTEGRATING ORAL at 21:30

## 2020-01-28 RX ADMIN — Medication 650 MILLIGRAM(S): at 12:45

## 2020-01-28 RX ADMIN — SENNA PLUS 2 TABLET(S): 8.6 TABLET ORAL at 21:31

## 2020-01-28 RX ADMIN — Medication 650 MILLIGRAM(S): at 00:34

## 2020-01-28 RX ADMIN — Medication 650 MILLIGRAM(S): at 01:20

## 2020-01-28 RX ADMIN — PREGABALIN 100 MICROGRAM(S): 225 CAPSULE ORAL at 17:49

## 2020-01-28 RX ADMIN — HEPARIN SODIUM 5000 UNIT(S): 5000 INJECTION INTRAVENOUS; SUBCUTANEOUS at 05:29

## 2020-01-28 RX ADMIN — Medication 0.2 MILLIGRAM(S): at 21:36

## 2020-01-28 RX ADMIN — INSULIN GLARGINE 3 UNIT(S): 100 INJECTION, SOLUTION SUBCUTANEOUS at 21:31

## 2020-01-28 RX ADMIN — Medication 8: at 17:50

## 2020-01-28 RX ADMIN — HYDROMORPHONE HYDROCHLORIDE 0.25 MILLIGRAM(S): 2 INJECTION INTRAMUSCULAR; INTRAVENOUS; SUBCUTANEOUS at 11:51

## 2020-01-28 RX ADMIN — Medication 650 MILLIGRAM(S): at 05:30

## 2020-01-28 RX ADMIN — HEPARIN SODIUM 7 UNIT(S)/HR: 5000 INJECTION INTRAVENOUS; SUBCUTANEOUS at 21:30

## 2020-01-28 RX ADMIN — Medication 2: at 07:47

## 2020-01-28 RX ADMIN — Medication 50 MICROGRAM(S): at 05:29

## 2020-01-28 RX ADMIN — HYDROMORPHONE HYDROCHLORIDE 0.25 MILLIGRAM(S): 2 INJECTION INTRAMUSCULAR; INTRAVENOUS; SUBCUTANEOUS at 23:42

## 2020-01-28 RX ADMIN — Medication 650 MILLIGRAM(S): at 17:49

## 2020-01-28 RX ADMIN — Medication 60 MILLIGRAM(S): at 05:29

## 2020-01-28 RX ADMIN — HEPARIN SODIUM 5000 UNIT(S): 5000 INJECTION INTRAVENOUS; SUBCUTANEOUS at 14:58

## 2020-01-28 RX ADMIN — Medication 5 MILLIGRAM(S): at 21:31

## 2020-01-28 RX ADMIN — Medication 650 MILLIGRAM(S): at 18:50

## 2020-01-28 RX ADMIN — Medication 75 MILLIGRAM(S): at 14:58

## 2020-01-28 RX ADMIN — Medication 650 MILLIGRAM(S): at 06:22

## 2020-01-28 RX ADMIN — CLOPIDOGREL BISULFATE 75 MILLIGRAM(S): 75 TABLET, FILM COATED ORAL at 14:57

## 2020-01-28 RX ADMIN — TRAMADOL HYDROCHLORIDE 50 MILLIGRAM(S): 50 TABLET ORAL at 17:50

## 2020-01-28 RX ADMIN — ESCITALOPRAM OXALATE 5 MILLIGRAM(S): 10 TABLET, FILM COATED ORAL at 21:29

## 2020-01-28 RX ADMIN — Medication 75 MILLIGRAM(S): at 21:29

## 2020-01-28 RX ADMIN — LOSARTAN POTASSIUM 100 MILLIGRAM(S): 100 TABLET, FILM COATED ORAL at 17:49

## 2020-01-28 RX ADMIN — Medication 6: at 22:07

## 2020-01-28 RX ADMIN — HYDROMORPHONE HYDROCHLORIDE 0.25 MILLIGRAM(S): 2 INJECTION INTRAMUSCULAR; INTRAVENOUS; SUBCUTANEOUS at 22:29

## 2020-01-28 RX ADMIN — SEVELAMER CARBONATE 800 MILLIGRAM(S): 2400 POWDER, FOR SUSPENSION ORAL at 09:20

## 2020-01-28 RX ADMIN — ATORVASTATIN CALCIUM 40 MILLIGRAM(S): 80 TABLET, FILM COATED ORAL at 21:30

## 2020-01-28 RX ADMIN — Medication 650 MILLIGRAM(S): at 23:43

## 2020-01-28 RX ADMIN — Medication 0.3 MILLIGRAM(S): at 05:29

## 2020-01-28 RX ADMIN — ISOSORBIDE MONONITRATE 120 MILLIGRAM(S): 60 TABLET, EXTENDED RELEASE ORAL at 14:58

## 2020-01-28 RX ADMIN — LIDOCAINE 1 PATCH: 4 CREAM TOPICAL at 14:59

## 2020-01-28 RX ADMIN — Medication 2: at 14:59

## 2020-01-28 RX ADMIN — LIDOCAINE 1 PATCH: 4 CREAM TOPICAL at 18:56

## 2020-01-28 RX ADMIN — HYDROMORPHONE HYDROCHLORIDE 0.25 MILLIGRAM(S): 2 INJECTION INTRAMUSCULAR; INTRAVENOUS; SUBCUTANEOUS at 11:36

## 2020-01-28 RX ADMIN — CARVEDILOL PHOSPHATE 25 MILLIGRAM(S): 80 CAPSULE, EXTENDED RELEASE ORAL at 21:31

## 2020-01-28 RX ADMIN — SEVELAMER CARBONATE 800 MILLIGRAM(S): 2400 POWDER, FOR SUSPENSION ORAL at 14:58

## 2020-01-28 RX ADMIN — Medication 650 MILLIGRAM(S): at 13:50

## 2020-01-28 RX ADMIN — Medication 81 MILLIGRAM(S): at 14:58

## 2020-01-28 NOTE — CHART NOTE - NSCHARTNOTEFT_GEN_A_CORE
Admitting Diagnosis:   Patient is a 69y old  Male who presents with a chief complaint of syncope (27 Jan 2020 13:01)      PAST MEDICAL & SURGICAL HISTORY:  Peripheral neuropathy  Peripheral artery disease: s/p bilateral stents  Anemia of renal disease  End-stage renal disease (ESRD)  Type II diabetes mellitus  Retinal artery occlusion  Hypothyroidism  Low back pain  CAD (coronary artery disease)  H/O renal artery stenosis: s/p L renal stent  Hyperlipidemia  Hypertension  No significant past surgical history        GI Issues: +BM 1/27 large formed   Pain: Chronic Pain management consulted previously during course   Skin Integrity: No edema / L Thumb Wound, RLE Scabs, LLE wound    Labs:   01-27    138  |  93<L>  |  66<H>  ----------------------------<  164<H>  4.7   |  22  |  6.82<H>    Ca    10.6<H>      27 Jan 2020 11:02  Mg     2.5     01-27      CAPILLARY BLOOD GLUCOSE      POCT Blood Glucose.: 186 mg/dL (28 Jan 2020 07:16)  POCT Blood Glucose.: 265 mg/dL (27 Jan 2020 21:28)  POCT Blood Glucose.: 261 mg/dL (27 Jan 2020 18:20)  POCT Blood Glucose.: 283 mg/dL (27 Jan 2020 17:12)  POCT Blood Glucose.: 153 mg/dL (27 Jan 2020 11:52)      Medications:  MEDICATIONS  (STANDING):  acetaminophen   Tablet .. 650 milliGRAM(s) Oral every 6 hours  aspirin  chewable 81 milliGRAM(s) Oral every 24 hours  atorvastatin 40 milliGRAM(s) Oral at bedtime  carvedilol 25 milliGRAM(s) Oral every 12 hours  chlorhexidine 2% Cloths 1 Application(s) Topical <User Schedule>  cloNIDine 0.2 milliGRAM(s) Oral every 12 hours  clopidogrel Tablet 75 milliGRAM(s) Oral daily  cyanocobalamin Injectable 100 MICROGram(s) IntraMuscular every 24 hours  dextrose 5%. 1000 milliLiter(s) (50 mL/Hr) IV Continuous <Continuous>  dextrose 50% Injectable 12.5 Gram(s) IV Push once  dextrose 50% Injectable 25 Gram(s) IV Push once  dextrose 50% Injectable 25 Gram(s) IV Push once  escitalopram 5 milliGRAM(s) Oral every 24 hours  gabapentin 100 milliGRAM(s) Oral <User Schedule>  heparin  Injectable 5000 Unit(s) SubCutaneous every 8 hours  hydrALAZINE 75 milliGRAM(s) Oral every 8 hours  insulin lispro (HumaLOG) corrective regimen sliding scale   SubCutaneous Before meals and at bedtime  isosorbide   mononitrate ER Tablet (IMDUR) 120 milliGRAM(s) Oral every 24 hours  levothyroxine 50 MICROGram(s) Oral daily  lidocaine   Patch 1 Patch Transdermal every 24 hours  losartan 100 milliGRAM(s) Oral every 24 hours  melatonin 5 milliGRAM(s) Oral at bedtime  mirtazapine 15 milliGRAM(s) Oral at bedtime  NIFEdipine XL 60 milliGRAM(s) Oral every 12 hours  polyethylene glycol 3350 17 Gram(s) Oral at bedtime  senna 2 Tablet(s) Oral at bedtime  sevelamer carbonate 800 milliGRAM(s) Oral three times a day with meals    MEDICATIONS  (PRN):  dextrose 40% Gel 15 Gram(s) Oral once PRN Blood Glucose LESS THAN 70 milliGRAM(s)/deciliter  glucagon  Injectable 1 milliGRAM(s) IntraMuscular once PRN Glucose LESS THAN 70 milligrams/deciliter  HYDROmorphone  Injectable 0.25 milliGRAM(s) IV Push every 4 hours PRN Severe Pain (7 - 10)  traMADol 50 milliGRAM(s) Oral every 6 hours PRN Moderate Pain (4 - 6)    (1/28) 124.3 pounds   (1/27) 128.9 pounds   (1/21) 118.6 pounds  (1/19) 119.9 pounds  (1/16) 123 pounds  (1/14) 132.9 pounds  (1/13) 134.9 pounds / 128.5 pounds  (1/12) 136.6 pounds  (1/11) 131 pounds  5'9''  pounds, %IBW 77% BMI 18.31 -- Based on most recent EMR wt, wt seems low however ? If this low   Wt had Trended down since admission - Question fluids / PO intake / Noted on different units     Nutrition Focused Physical Exam: Attempted NFPE on RD IA + last F/U; Pt unable to participate for full exam However is noted with Mild wasting to temporal region.    Estimated energy needs:   IBW used for EER; Adjusted for HD, fluids per team vs 1000ml + UOP  2190-2555kcal/day  30-35kcal/kg  88 gm prot/day  1.5gm/kg       Subjective:  68yo M, poor unreliable historian, PMHx of ESRD on dialysis via AVF (tues/thurs/sat), HTN, HLD, CAD, BRYNN (s/p stent), IDDM A1c 8/19: 7.2% (with peripheral nueropathy), hypothyroidism, ?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 for syncopal episode (with possible aspiration of food) and found to have elevated blood pressure BP on arrival 209/78 - Followed by episode of ladarius down (+Rapid response, Heart rate spontaneously started to increase). Now pending retroperitoneal US to evaluate for cause of fluctuant BPs. Pt with EF 60-65%, severe AS on TTE, possible TAVR pending BP and MRSA bacteremia - R/o vertebral osteomyelitis (negative EBONI and Gallium scan, source unclear); will not be done this admission. Pt is pending d/c to [facility].    Pt ordered for Mech Soft Renal diet + Neprox3, ASP Precautions order noted 1/11 – prior team had reported episode of aspiration PTA was d/t falling asleep while eating not d/t true dysphagia. Issues with feeding had been reported prior – reported pt had not wanted to fed himself and needed much assistance, RN reports pt sat up to consume breakfast today and fed himself.   Please see below for nutrition recommendations. Pending order placed. Recs made with team.       Previous Nutrition Diagnosis: Inadequate Oral Intake RT decreased ability to consume meals AEB intake of 25%.   Active [  x ]  Resolved [   ]  Goal: Pt will meet at least 75% of nutrient needs     Recommendations:  1. Renal Diet + Mech soft to provide ease during meals, Nepro x3 per day as oral nutrition supplements (425kcal/20gm prot per 1 can); Fluids per team.  2. Monitor PO intake/appetite, GI distress, diet tolerance - s/s of issues chewing/swallowing, Skin, GOC labs, weights.  3. Honor pt food preferences as able. Appreciate assistance during meals PRN.   4. Nephrocaps daily.   5. RD to remain available for additional nutrition interventions as needed.     Education: Discussed oral nutrition supplements.     Risk Level: High [   ] Moderate [ x ] Low [   ]. Admitting Diagnosis:   Patient is a 69y old  Male who presents with a chief complaint of syncope (27 Jan 2020 13:01)      PAST MEDICAL & SURGICAL HISTORY:  Peripheral neuropathy  Peripheral artery disease: s/p bilateral stents  Anemia of renal disease  End-stage renal disease (ESRD)  Type II diabetes mellitus  Retinal artery occlusion  Hypothyroidism  Low back pain  CAD (coronary artery disease)  H/O renal artery stenosis: s/p L renal stent  Hyperlipidemia  Hypertension  No significant past surgical history        GI Issues: +BM 1/27 large formed   Pain: Chronic Pain management consulted previously during course   Skin Integrity: No edema / L Thumb Wound, RLE Scabs, LLE wound    Labs:   01-27    138  |  93<L>  |  66<H>  ----------------------------<  164<H>  4.7   |  22  |  6.82<H>    Ca    10.6<H>      27 Jan 2020 11:02  Mg     2.5     01-27      CAPILLARY BLOOD GLUCOSE      POCT Blood Glucose.: 186 mg/dL (28 Jan 2020 07:16)  POCT Blood Glucose.: 265 mg/dL (27 Jan 2020 21:28)  POCT Blood Glucose.: 261 mg/dL (27 Jan 2020 18:20)  POCT Blood Glucose.: 283 mg/dL (27 Jan 2020 17:12)  POCT Blood Glucose.: 153 mg/dL (27 Jan 2020 11:52)      Medications:  MEDICATIONS  (STANDING):  acetaminophen   Tablet .. 650 milliGRAM(s) Oral every 6 hours  aspirin  chewable 81 milliGRAM(s) Oral every 24 hours  atorvastatin 40 milliGRAM(s) Oral at bedtime  carvedilol 25 milliGRAM(s) Oral every 12 hours  chlorhexidine 2% Cloths 1 Application(s) Topical <User Schedule>  cloNIDine 0.2 milliGRAM(s) Oral every 12 hours  clopidogrel Tablet 75 milliGRAM(s) Oral daily  cyanocobalamin Injectable 100 MICROGram(s) IntraMuscular every 24 hours  dextrose 5%. 1000 milliLiter(s) (50 mL/Hr) IV Continuous <Continuous>  dextrose 50% Injectable 12.5 Gram(s) IV Push once  dextrose 50% Injectable 25 Gram(s) IV Push once  dextrose 50% Injectable 25 Gram(s) IV Push once  escitalopram 5 milliGRAM(s) Oral every 24 hours  gabapentin 100 milliGRAM(s) Oral <User Schedule>  heparin  Injectable 5000 Unit(s) SubCutaneous every 8 hours  hydrALAZINE 75 milliGRAM(s) Oral every 8 hours  insulin lispro (HumaLOG) corrective regimen sliding scale   SubCutaneous Before meals and at bedtime  isosorbide   mononitrate ER Tablet (IMDUR) 120 milliGRAM(s) Oral every 24 hours  levothyroxine 50 MICROGram(s) Oral daily  lidocaine   Patch 1 Patch Transdermal every 24 hours  losartan 100 milliGRAM(s) Oral every 24 hours  melatonin 5 milliGRAM(s) Oral at bedtime  mirtazapine 15 milliGRAM(s) Oral at bedtime  NIFEdipine XL 60 milliGRAM(s) Oral every 12 hours  polyethylene glycol 3350 17 Gram(s) Oral at bedtime  senna 2 Tablet(s) Oral at bedtime  sevelamer carbonate 800 milliGRAM(s) Oral three times a day with meals    MEDICATIONS  (PRN):  dextrose 40% Gel 15 Gram(s) Oral once PRN Blood Glucose LESS THAN 70 milliGRAM(s)/deciliter  glucagon  Injectable 1 milliGRAM(s) IntraMuscular once PRN Glucose LESS THAN 70 milligrams/deciliter  HYDROmorphone  Injectable 0.25 milliGRAM(s) IV Push every 4 hours PRN Severe Pain (7 - 10)  traMADol 50 milliGRAM(s) Oral every 6 hours PRN Moderate Pain (4 - 6)    (1/28) 124.3 pounds   (1/27) 128.9 pounds   (1/21) 118.6 pounds  (1/19) 119.9 pounds  (1/16) 123 pounds  (1/14) 132.9 pounds  (1/13) 134.9 pounds / 128.5 pounds  (1/12) 136.6 pounds  (1/11) 131 pounds  5'9''  pounds, %IBW 77% BMI 18.31 -- Based on most recent EMR wt, wt seems low however ? If this low   Wt had Trended down since admission - Question fluids / PO intake / Noted on different units     Per physical assessment: Muscle Wasting- Temporal [ Mild ]  Clavicle/Pectoral [ Mild ]  Shoulder/Deltoid [ Mild ]  Scapula [ Mild ]    Estimated energy needs:   IBW used for EER; Adjusted for HD, fluids per team vs 1000ml + UOP  2190-2555kcal/day 30-35kcal/kg   gm prot/day 1.2-1.5gm/kg       Subjective:  70yo M, poor unreliable historian, PMHx of ESRD on dialysis via AVF (tues/thurs/sat), HTN, HLD, CAD, BRYNN (s/p stent), IDDM A1c 8/19: 7.2% (with peripheral nueropathy), hypothyroidism, ?CVA (blind in right eye, little vision) - patient is s/p carotid artery stent), PAD (s/p bilateral stents) who was BIBEMS to [facility] for syncopal episode (with possible aspiration of food) and found to have elevated blood pressure BP on arrival 209/78 - Followed by episode of ladarius down (+Rapid response, Heart rate spontaneously started to increase). Now pending retroperitoneal US to evaluate for cause of fluctuant BPs. Pt with EF 60-65%, severe AS on TTE, possible TAVR pending BP and MRSA bacteremia - R/o vertebral osteomyelitis (negative EBONI and Gallium scan, source unclear); will not be done this admission. Pt is pending d/c to [facility].    Pt ordered for [facility] Soft Renal diet + Neprox3, ASP Precautions order noted 1/11 – prior team had reported episode of aspiration PTA was d/t falling asleep while eating not d/t true dysphagia. Issues with feeding had been reported prior – reported pt had not wanted to fed himself and needed much assistance, RN reports pt sat up to consume breakfast today and fed himself. Pt visited, wife at bedside - Attempted to speak with pt however pt unable to remain focused on visit d/t wanting pain Meds, wife with little information to contribute. Breakfast at bedside was untouched, however pt reprots consuming 2 Nepro at breakfast today, reports not eating solid meal as he did not like items provided. Intake of 2 nepro provides ~850kcal, 40gm prot.   Please see below for nutrition recommendations. Pending order placed. Recs made with team.     Previous Nutrition Diagnosis: Inadequate Oral Intake RT decreased ability to consume meals AEB intake of 25%.   Active [  x ]  Resolved [   ]  Goal: Pt will meet at least 75% of nutrient needs     Recommendations:  1. Renal, Consistent Carbohydrate (evening snack) Diet + [facility] soft to provide ease during meals, Nepro x3 per day as oral nutrition supplements (425kcal/20gm prot per 1 can); Fluids per team. Honor pt food preferences as able. Appreciate assistance during meals PRN.   2. Monitor PO intake/appetite; Monitor need for increasing use of oral nutrition supplements. Pt with zero intake of breakfast however reports d/t not liking meal, pt did however consume Nepro x2 during breakfast - should pt continue to refuse PO however be willing to drink nepro would consider to increase use to 4 per day, will provide in total 1700kcal/80gm prot to meet 78% lower end protein needs / 91% lower end prot needs.   3. CLOSELY monitor for diet tolerance- Should pt be noted with s/s of issues swallowing, recommend NPO/FEESST.   4. Nephrocaps daily.   5. Monitor GI distress, Skin, GOC labs, weights.  6. RD to remain available for additional nutrition interventions as needed.     Education: Encouraged PO intake during meals & reviewed importance.     Risk Level: High [   ] Moderate [ x ] Low [   ].

## 2020-01-28 NOTE — DISCHARGE NOTE PROVIDER - HOSPITAL COURSE
Pt is a 70 yo M with PMH ESRD (HD L AVF T/Th/Sat), HTN, HLD, CAD, renal artery stenosis (s/p stent), DM (c/b peripheral neuropathy), hypothyroidism, CVA (R eye blind, L eye limited vision), carotid artery stenosis (s/p stent), peripheral artery disease (s/p BL stents) BIBEMS 1/11 for syncopal episode with elevated BP. Initially admitted to CCU for HTN emergency and emergent HD, during HD rapid response and stroke code called for lethargy, bradycardia (30s), and /90. CTH negative. BP controlled with nicardipine gtt and hemodialysis. Pt with neck and back pain throughout course, similar to baseline with recent hx R hip surgery. Pain management on board with recommendations for pain regimen made. Psych recommended lexapro. ECHO performed showing LVEF 60-65%, severe LVH, severe AS, mild AR, mild MR, moderate TR, and pHTN 70mmHg. Patient with superficial thrombus R cephalic vein, now improved. Structural heart consulted for severe AS, stating that patient is a poor candidate for surgical intervention at this point. Pt febrile 1/12 with 1/14 Bcx growing MRSA, started on vanc dosed by level. Blood cultures cleared and patient since remained afebrile. 1/17 EBONI neg for vegetations. MRI thoracolumbar spine obtained for complaints of back pain, though pt unable to tolerate full scan - c/w degenerative changes T10-11, L3-4, L5-S1; L paracentral disc herniation at C6-7 that may affect C7 nerve root, and disc herniations at several levels without compression of the thoracic spinal cord. Retroperitoneal US ordered to evaluate fluctuant BPs, but also limited by patient's immobility and unable to definitively rule out renal artery stenosis. Patient's blood pressure regimen optimized and infectious disease recommending 4 weeks of vancomycin course. Patient ready for safe discharge back to his nursing home facility, with follow up with the primary doctor at his facility and the nephrologist at his dialysis center. Pt is a 70 yo M with PMH ESRD (HD L AVF T/Th/Sat), HTN, HLD, CAD, renal artery stenosis (s/p stent), DM (c/b peripheral neuropathy), hypothyroidism, CVA (R eye blind, L eye limited vision), carotid artery stenosis (s/p stent), peripheral artery disease (s/p BL stents) BIBEMS 1/11 for syncopal episode with elevated BP. Initially admitted to CCU for HTN emergency and emergent HD, during HD rapid response and stroke code called for lethargy, bradycardia (30s), and /90. CTH negative. BP controlled with nicardipine gtt and hemodialysis. Pt with neck and back pain throughout course, similar to baseline with recent hx R hip surgery. Pain management on board with recommendations for pain regimen made. Psych recommended lexapro. ECHO performed showing LVEF 60-65%, severe LVH, severe AS, mild AR, mild MR, moderate TR, and pHTN 70mmHg. Patient with superficial thrombus R cephalic vein, now improved. Structural heart consulted for severe AS, stating that patient is a poor candidate for surgical intervention at this point. Pt febrile 1/12 with 1/14 Bcx growing MRSA, started on vanc dosed by level. Blood cultures cleared and patient since remained afebrile. 1/17 EBONI neg for vegetations. MRI thoracolumbar spine obtained for complaints of back pain, though pt unable to tolerate full scan - c/w degenerative changes T10-11, L3-4, L5-S1; L paracentral disc herniation at C6-7 that may affect C7 nerve root, and disc herniations at several levels without compression of the thoracic spinal cord. Retroperitoneal US ordered to evaluate fluctuant BPs, but also limited by patient's immobility and unable to definitively rule out renal artery stenosis. Patient's blood pressure regimen optimized and infectious disease recommending 4 weeks of vancomycin course. Patient ready for safe discharge back to his nursing home facility, with follow up with the primary doctor at his facility and the nephrologist at his dialysis center.         Problem List:    1) MRSA bacteremia: Patient with MRSA bacteremia, blood cultures have cleared after initiation of Vancomycin. As per ID recommendations, will continue for total of 4 weeks. Patient will need 500mg of Vancomycin on 1/30/2020, 2/4/ 2020, and 2/8/2020. He will also need labs drawn on 2/4 (please give 500mg vancomycin on 2/6 if the level is less than 15) and labs drawn on 2/11 (please give 500mg vancomycin on 2/13 if the level is less than 15)        2) Hypertensive emergency: Admitted for hypertensive emergency, initially on nicardipine drip. Blood pressure now managed with fluid removal from HD and following regimen: clonidine 0.2mg bid, hydralazine 75mg TID, Nifedipine XL 60mg bid, Imdur 120mg every 24 hours, Coreg 25mg bid, losartan 100mg daily. For all these medications, will need to hold them if patient's systolic blood pressure <100 or heart rate <60.         3) Moderate-severe aortic stenosis: Patient with ECHO with LVEF 60-65%, severe AS, mild AR, mild MR, moderate TR, pulmonary HTN 70mmHg. Structural heart consulted: TAVR/ BAV will not be done this admission in setting of MRSA bacteremia- (high risk of infection of prosthetic valve) and deconditioned state (pt non-ambulatory at present). Patient to follow up with Dr. Mcfarlane.         4) Back pain: Patient with chronic back pain. MR thoracolumbar spine c/w degenerative changes T10-11, L3-4, L5-S1; L paracentral disc herniation at C6-7 that may affect C7 nerve root, and disc herniations at several levels without compression of the thoracic spinal cord. Pain management saw pt and gave regimen of dilaudid, tylenol, and tramadol. Can continue with Tylenol 650mg q6 hours and Tramadol 50mg q6 hours, both as needed.        5) Depression: Continue lexapro and mirtazapine         6) Diabetes: Continue home lantus 4 units and insulin sliding scale        7) Hyperlipidemia: Continue atorvastatin 40mg daily         8) Hypothyroidism: Continue Synthroid 50mcg daily Pt is a 68 yo M with PMH ESRD (HD L AVF T/Th/Sat), HTN, HLD, CAD, renal artery stenosis (s/p stent), DM (c/b peripheral neuropathy), hypothyroidism, CVA (R eye blind, L eye limited vision), carotid artery stenosis (s/p stent), peripheral artery disease (s/p BL stents) BIBEMS 1/11 for syncopal episode with elevated BP. Initially admitted to CCU for HTN emergency and emergent HD, during HD rapid response and stroke code called for lethargy, bradycardia (30s), and /90. CTH negative. BP controlled with nicardipine gtt and hemodialysis. Pt with neck and back pain throughout course, similar to baseline with recent hx R hip surgery. Pain management on board with recommendations for pain regimen made. Psych recommended lexapro. ECHO performed showing LVEF 60-65%, severe LVH, severe AS, mild AR, mild MR, moderate TR, and pHTN 70mmHg. Patient with superficial thrombus R cephalic vein, now improved. Structural heart consulted for severe AS, stating that patient is a poor candidate for surgical intervention at this point. Pt febrile 1/12 with 1/14 Bcx growing MRSA, started on vanc dosed by level. Blood cultures cleared and patient since remained afebrile. 1/17 EBONI neg for vegetations. MRI thoracolumbar spine obtained for complaints of back pain, though pt unable to tolerate full scan - c/w degenerative changes T10-11, L3-4, L5-S1; L paracentral disc herniation at C6-7 that may affect C7 nerve root, and disc herniations at several levels without compression of the thoracic spinal cord. Retroperitoneal US ordered to evaluate fluctuant BPs, but also limited by patient's immobility and unable to definitively rule out renal artery stenosis. Patient's blood pressure regimen optimized and infectious disease recommending 4 weeks of vancomycin course. Patient ready for safe discharge back to his nursing home facility, with follow up with the primary doctor at his facility and the nephrologist at his dialysis center.         Problem List:    1) MRSA bacteremia: Patient with MRSA bacteremia, blood cultures have cleared after initiation of Vancomycin. As per ID recommendations, will continue for total of 4 weeks. Patient will need 500mg of Vancomycin on 1/30/2020, 2/4/ 2020, and 2/8/2020. He will also need labs drawn on 2/4 (please give 500mg vancomycin on 2/6 if the level is less than 15) and labs drawn on 2/11 (please give 500mg vancomycin on 2/13 if the level is less than 15)        2) Hypertensive emergency: Admitted for hypertensive emergency, initially on nicardipine drip. Blood pressure now managed with fluid removal from HD and following regimen: clonidine 0.2mg bid, hydralazine 75mg TID, Nifedipine XL 60mg bid, Imdur 120mg every 24 hours, Coreg 25mg bid, losartan 100mg daily. For all these medications, will need to hold them if patient's systolic blood pressure <100 or heart rate <60.         3) Moderate-severe aortic stenosis: Patient with ECHO with LVEF 60-65%, severe AS, mild AR, mild MR, moderate TR, pulmonary HTN 70mmHg. Structural heart consulted: TAVR/ BAV will not be done this admission in setting of MRSA bacteremia- (high risk of infection of prosthetic valve) and deconditioned state (pt non-ambulatory at present). Patient to follow up with Dr. Mcfarlane.         4) Back pain: Patient with chronic back pain. MR thoracolumbar spine c/w degenerative changes T10-11, L3-4, L5-S1; L paracentral disc herniation at C6-7 that may affect C7 nerve root, and disc herniations at several levels without compression of the thoracic spinal cord. Pain management saw pt and gave regimen of dilaudid, tylenol, and tramadol. Can continue with Tylenol 650mg q6 hours and Tramadol 50mg q6 hours, both as needed.        5) Depression: Continue lexapro and mirtazapine         6) Diabetes: Continue lantus 3 units daily and insulin sliding scale as needed        7) Hyperlipidemia: Continue atorvastatin 40mg daily         8) Hypothyroidism: Continue Synthroid 50mcg daily Pt is a 68 yo M with PMH ESRD (HD L AVF T/Th/Sat), HTN, HLD, CAD, renal artery stenosis (s/p stent), DM (c/b peripheral neuropathy), hypothyroidism, CVA (R eye blind, L eye limited vision), carotid artery stenosis (s/p stent), peripheral artery disease (s/p BL stents) BIBEMS 1/11 for syncopal episode with elevated BP. Initially admitted to CCU for HTN emergency and emergent HD, during HD rapid response and stroke code called for lethargy, bradycardia (30s), and /90. CTH negative. BP controlled with nicardipine gtt and hemodialysis. Pt with neck and back pain throughout course, similar to baseline with recent hx R hip surgery. Pain management on board with recommendations for pain regimen made. Psych recommended lexapro. ECHO performed showing LVEF 60-65%, severe LVH, severe AS, mild AR, mild MR, moderate TR, and pHTN 70mmHg. Patient with superficial thrombus R cephalic vein, now improved. Structural heart consulted for severe AS, stating that patient is a poor candidate for surgical intervention at this point. Pt febrile 1/12 with 1/14 Bcx growing MRSA, started on vanc dosed by level. Blood cultures cleared and patient since remained afebrile. 1/17 EBONI neg for vegetations. MRI thoracolumbar spine obtained for complaints of back pain, though pt unable to tolerate full scan - c/w degenerative changes T10-11, L3-4, L5-S1; L paracentral disc herniation at C6-7 that may affect C7 nerve root, and disc herniations at several levels without compression of the thoracic spinal cord. Retroperitoneal US ordered to evaluate fluctuant BPs, but also limited by patient's immobility and unable to definitively rule out renal artery stenosis. Patient's blood pressure regimen optimized and infectious disease recommending 4 weeks of vancomycin course. Patient ready for safe discharge back to his nursing home facility, with follow up with the primary doctor at his facility and the nephrologist at his dialysis center.         Problem List:    1) MRSA bacteremia: Patient with MRSA bacteremia, blood cultures have cleared after initiation of Vancomycin. As per ID recommendations, will continue for total of 4 weeks. Patient will need 500mg of Vancomycin on 2/6/ 2020, 2/8/2020, and 2/11/2020. He will also need labs drawn on 2/8 (please hold vancomycin for 2/11 if vanc level is greater than 20.)        2) Hypertensive emergency: Admitted for hypertensive emergency, initially on nicardipine drip. Blood pressure now managed with fluid removal from HD and following regimen: clonidine 0.1mg bid, hydralazine 100mg TID, Nifedipine XL 60mg bid, Imdur 120mg every 24 hours, Coreg 25mg bid, losartan 100mg daily. For all these medications, will need to hold them if patient's systolic blood pressure <100 or heart rate <60.         3) Moderate-severe aortic stenosis: Patient with ECHO with LVEF 60-65%, severe AS, mild AR, mild MR, moderate TR, pulmonary HTN 70mmHg. Structural heart consulted: TAVR/ BAV will not be done this admission in setting of MRSA bacteremia- (high risk of infection of prosthetic valve) and deconditioned state (pt non-ambulatory at present). Patient to follow up with Dr. Mcfarlane.         4) Back pain: Patient with chronic back pain. MR thoracolumbar spine c/w degenerative changes T10-11, L3-4, L5-S1; L paracentral disc herniation at C6-7 that may affect C7 nerve root, and disc herniations at several levels without compression of the thoracic spinal cord. Pain management saw pt and gave regimen of dilaudid, tylenol, and tramadol. Can continue with Tylenol 650mg q6 hours and Tramadol 50mg q6 hours, both as needed.        5) Depression: Continue lexapro and mirtazapine         6) Diabetes: Continue lantus 3 units daily and insulin sliding scale as needed        7) Hyperlipidemia: Continue atorvastatin 40mg daily         8) Hypothyroidism: Continue Synthroid 50mcg daily

## 2020-01-28 NOTE — PROGRESS NOTE ADULT - PROBLEM SELECTOR PLAN 1
# ESRD on HD TTS via LUE AVF  last HD 1/25 with 2 L UF  - HD today per reg schedule   - Bp at goal with current antihypertensives and UF w/HD  - continue Renvela 800 mg po tid w/meals   - H&H at goal   possible discharge today after HD  Will follow.

## 2020-01-28 NOTE — DISCHARGE NOTE PROVIDER - CARE PROVIDER_API CALL
Daniel Mcfarlane)  Cardiology; Interventional Cardiology  [address], [address] [place] [address]  Phone: [phone]  Fax: [phone]  Scheduled Appointment: 03/02/2020 11:00 AM

## 2020-01-28 NOTE — PROGRESS NOTE ADULT - PROBLEM SELECTOR PLAN 1
Admitted with HTN emergency s/p nicardipine gtt and HD, now with occasional low bps (100s/60s)  Current regimen: hydralazine 75 mg TID (lowered from 100mg TID), losartan 100mg daily, coreg 25mg BID, clonidine 0.2mg BID (lowered from 0.3 BID), nifedipine 60mg BID, imdur 120mg daily  - Renal US with doppler showing poor visualization of renal arteries, unable to r/o BRYNN  -Nephro following, f/u recs  -Continuing to monitor, low bp over the last few days likely also in the setting of poor po intake    #CAD  - Continue home meds  - ASA 81mg, plavix 75mg, atorvastatin 40mg daily    #PAD  - s/p stenting  - Management as above    #Renal artery stenosis  - s/p stenting  - Renal US with doppler with poor visualization of renal arteries, unable to r/o BRYNN although pt's htn better controlled over last few days Admitted with HTN emergency s/p nicardipine gtt and HD, now with occasional low bps (100s/60s)  Current regimen: hydralazine 75 mg TID (lowered from 100mg TID), losartan 100mg daily, coreg 25mg BID, clonidine 0.2mg BID (lowered from 0.3 BID), nifedipine 60mg BID, imdur 120mg daily  - Renal US with doppler showing poor visualization of renal arteries, unable to r/o BRYNN  -Nephro following, f/u recs  -Continuing to monitor    #CAD  - Continue home meds  - ASA 81mg, plavix 75mg, atorvastatin 40mg daily    #PAD  - s/p stenting  - Management as above    #Renal artery stenosis  - s/p stenting  - Renal US with doppler with poor visualization of renal arteries, unable to r/o BRYNN although pt's htn better controlled over last few days

## 2020-01-28 NOTE — CHART NOTE - NSCHARTNOTEFT_GEN_A_CORE
Patient seen and examined at bedside. Patient is s/p HD today. A few hours after HD patient started complaining of chest pain, which he notes diffusely on his chest and down his L arm. Also noted to worsen with deep breathing, arm movements b/l, and noted to be reproducible on exam. However, it is important to note that the patient is unfortunately not the best historian, and is in constant pain throughout the hospital course (pain management was consulted as well - for a patient on percocet at home, is now on dilaudid and tramadol 50 q6h). EKG revealed new T wave inversions in V2, V3. Troponin T 0.25, elevated from prior 0.19-0.22.     #NSTEMI  New/worsening chest pain w/ TWI V2, V3 in setting of troponinemia  CARMITA Score = 139 points; 14% probability of death from admission to 6 months  ROMINA Risk Score for UA/NSTEMI = 6 points; 41% risk at 14 days of all-cause mortality, new or recurrent MI, or severe recurrent ischemia requiring urgent revascularization    Plan as below:  - Serial EKGs  - Trend troponin T, CKMB, CK  - PTT to initiate heparin gtt  - C/w ASA 81 mg and plavix 75 mg  - monitor chest pain  - supplemental O2 via NC  - holding nitroglycerin in setting of severe aortic stenosis, will give dilaudid 0.25 - 0.5 mg IV PRN for pain control    Case discussed with fellow on call and attending

## 2020-01-28 NOTE — CHART NOTE - NSCHARTNOTEFT_GEN_A_CORE
Informed by nursing that patient was complaining of chest pain at about 6:30pm. Had recently gotten tylenol and tramadol for his chronic back pain. Patient unable to describe the pain well, continually stating that it is "bad" and rating it a 10/10. Most recent VS with pt afebrile, HR 65, /55, RR 12 satting at 100% on room air. Physical exam unremarkable other than chronic 3/6 holosystolic murmur. EKG ordered showing new T wave inversions in V2 and V3. Confirmed on repeat EKG. Cardiac fellow and attending informed and as per recommendations, will attain cardiac enzymes. Will also perform ekgs every 2 hours. Will attain ptt and start heparin drip as well.

## 2020-01-28 NOTE — PROGRESS NOTE ADULT - PROBLEM SELECTOR PLAN 6
Hx DM2 with peripheral neuropathy and ESRD, A1c at 6.3  - Home med lantus 4U at bedtime  - Mod ISS    #CVA history  - Pt endorses hx CVA with residual R eye blindness, limited vision L eye  -Stroke code initially called on admission for lethargy/change in mental status in setting of hypertensive emergency  - CTH with microangiopathic ischemic disease, lacunar infarcts in the basal ganglia BL, and external carotid artery branch calcifications c/w HD pts  - Daily neuro exam, has remained unremarkable though limited by patient's unwillingness to cooperate

## 2020-01-28 NOTE — PROGRESS NOTE ADULT - PROBLEM SELECTOR PLAN 4
Course complicated by fevers with BCX growing MRSA,EBONI neg for vegetations, Gallium scan negative, no clear source. ID has recommended 4 week course  - c/w vanc dosed by level with level check post-HD and re-dosing vanc after, last dose was 500mg on 1/25 with vanc level supratherapeutic at 27-29 day after  - Will see if linezolid would be possible alternative given difficulty of dispo with complex vanc dosing regimen  - BCx NGTD since 1/15, Remains afebrile    #RUE superficial thrombus  - RUE swelling and erythema at IV site 1/16  - RUE doppler with superficial thrombus in cephalic vein  - Continue to monitor, no longer swollen/erythematous Course complicated by fevers with BCX growing MRSA,EBONI neg for vegetations, Gallium scan negative, no clear source. ID has recommended 4 week course  - c/w vanc dosed by level with level check post-HD and re-dosing vanc after  - Pt to receive 500mg of vanc on 1/30, 2/4, and 2/8 with once weekly vanc level check outpt  - BCx NGTD since 1/15, Remains afebrile    #RUE superficial thrombus  - RUE swelling and erythema at IV site 1/16  - RUE doppler with superficial thrombus in cephalic vein  - Continue to monitor, no longer swollen/erythematous

## 2020-01-28 NOTE — PROGRESS NOTE ADULT - ASSESSMENT
A/p  70 y/o m with PMHx of ESRD on dialysis via AVF TTS, HTN, CAD, BRYNN (s/p stent), IDDM (with peripheral nueropathy), hypothyroidism and CVA cb right eye blindness- patient is s/p carotid artery stent), PAD (s/p bilateral stents) who was BIBEMS to [facility] for syncopal episode and found to have elevated blood pressure. Admitted to CCU for emergent HD and management of hypertensive emergency.  found to have severe AS most likely contributing to Syncopal episode, currently undergoing pre-op TAVR, hospital course complicated by MRSA bacteremia without clear source

## 2020-01-28 NOTE — PROGRESS NOTE ADULT - SUBJECTIVE AND OBJECTIVE BOX
O/N Events: KAT  Subjective:  chronic back pain, no new complaints, due for HD w lab check  bp at goal    VITALS  Vital Signs Last 24 Hrs  T(C): 35.8 (28 Jan 2020 11:15), Max: 37.1 (27 Jan 2020 18:15)  T(F): 96.4 (28 Jan 2020 11:15), Max: 98.7 (27 Jan 2020 18:15)  HR: 61 (28 Jan 2020 11:15) (58 - 76)  BP: 117/52 (28 Jan 2020 11:15) (92/50 - 149/65)  BP(mean): 52 (28 Jan 2020 08:27) (52 - 94)  RR: 17 (28 Jan 2020 11:15) (12 - 18)  SpO2: 100% (28 Jan 2020 11:15) (97% - 100%)    PHYSICAL EXAM  Gen: NAD  Respiratory: CTA B/L  Cardiac: +S1/S2; RRR; systolic murmur present   Gastrointestinal: soft, NT/ND   Extremities: WWP, no peripheral edema  Neurologic: AAOx3; non focal  access:  Left arm AVF with bruit    MEDICATIONS  (STANDING):  acetaminophen   Tablet .. 650 milliGRAM(s) Oral every 6 hours  aspirin  chewable 81 milliGRAM(s) Oral every 24 hours  atorvastatin 40 milliGRAM(s) Oral at bedtime  carvedilol 25 milliGRAM(s) Oral every 12 hours  chlorhexidine 2% Cloths 1 Application(s) Topical <User Schedule>  cloNIDine 0.2 milliGRAM(s) Oral every 12 hours  clopidogrel Tablet 75 milliGRAM(s) Oral daily  cyanocobalamin Injectable 100 MICROGram(s) IntraMuscular every 24 hours  dextrose 5%. 1000 milliLiter(s) (50 mL/Hr) IV Continuous <Continuous>  dextrose 50% Injectable 12.5 Gram(s) IV Push once  dextrose 50% Injectable 25 Gram(s) IV Push once  dextrose 50% Injectable 25 Gram(s) IV Push once  escitalopram 5 milliGRAM(s) Oral every 24 hours  gabapentin 100 milliGRAM(s) Oral <User Schedule>  heparin  Injectable 5000 Unit(s) SubCutaneous every 8 hours  hydrALAZINE 75 milliGRAM(s) Oral every 8 hours  insulin lispro (HumaLOG) corrective regimen sliding scale   SubCutaneous Before meals and at bedtime  isosorbide   mononitrate ER Tablet (IMDUR) 120 milliGRAM(s) Oral every 24 hours  levothyroxine 50 MICROGram(s) Oral daily  lidocaine   Patch 1 Patch Transdermal every 24 hours  losartan 100 milliGRAM(s) Oral every 24 hours  melatonin 5 milliGRAM(s) Oral at bedtime  mirtazapine 15 milliGRAM(s) Oral at bedtime  NIFEdipine XL 60 milliGRAM(s) Oral every 12 hours  polyethylene glycol 3350 17 Gram(s) Oral at bedtime  senna 2 Tablet(s) Oral at bedtime  sevelamer carbonate 800 milliGRAM(s) Oral three times a day with meals    MEDICATIONS  (PRN):  dextrose 40% Gel 15 Gram(s) Oral once PRN Blood Glucose LESS THAN 70 milliGRAM(s)/deciliter  glucagon  Injectable 1 milliGRAM(s) IntraMuscular once PRN Glucose LESS THAN 70 milligrams/deciliter  HYDROmorphone  Injectable 0.25 milliGRAM(s) IV Push every 4 hours PRN Severe Pain (7 - 10)  traMADol 50 milliGRAM(s) Oral every 6 hours PRN Moderate Pain (4 - 6)      LABS                        12.7   11.09 )-----------( 269      ( 28 Jan 2020 11:28 )             39.1     01-28    139  |  92<L>  |  x   ----------------------------<  274<H>  4.9   |  21<L>  |  8.40<H>    Ca    10.1      28 Jan 2020 11:28  Phos  7.5     01-28  Mg     2.7     01-28

## 2020-01-28 NOTE — PROGRESS NOTE ADULT - ATTENDING COMMENTS
On Date 1/28/20  Assessment: Patient personally seen and examined myself, face-to-face, during rounds with the Resident     Note read, including vitals, physical findings, laboratory data, and radiological reports.   Agree with above.

## 2020-01-28 NOTE — PROGRESS NOTE ADULT - PROBLEM SELECTOR PLAN 5
Hx of AS: ECHO with LVEF 60-65%, severe AS, mild AR, mild MR, moderate TR, pulmonary HTN 70mmHg  - Structural heart consulted: TAVR/ BAV will not be done this admission in setting of MRSA bacteremia- (high risk of infection of prosthetic valve) and deconditioned state (pt non-ambulatory at present)  -Patient to follow up outpatient Hx of AS: ECHO with LVEF 60-65%, severe AS, mild AR, mild MR, moderate TR, pulmonary HTN 70mmHg  - Structural heart consulted: TAVR/ BAV will not be done this admission in setting of MRSA bacteremia- (high risk of infection of prosthetic valve) and deconditioned state (pt non-ambulatory at present)  -Patient to follow up outpatient, has appt with Dr. Mcfarlane

## 2020-01-28 NOTE — PROGRESS NOTE ADULT - ASSESSMENT
69M PMH ESRD (HD T/TH/S), HTN, HLD, CAD, renal artery stenosis (s/p stent), DM (c/b peripheral neuropathy), hypothyroidism, CVA (R eye blind, L eye limited vision), carotid artery stenosis (s/p stent), peripheral artery disease (s/p BL stents) presented initially with syncopal episode in the setting of HTN emergency and admitted to CCU. Course c/b MRSA bacteremia without clear source and to complete 4 week course of vancomycin. Patient now with HTN optimization and vanco dosing optimization, awaiting return to long term nursing facility. 69M PMH ESRD (HD T/TH/S), HTN, HLD, CAD, renal artery stenosis (s/p stent), DM (c/b peripheral neuropathy), hypothyroidism, CVA (R eye blind, L eye limited vision), carotid artery stenosis (s/p stent), peripheral artery disease (s/p BL stents) presented initially with syncopal episode in the setting of HTN emergency and admitted to CCU. Course c/b MRSA bacteremia without clear source and to complete 4 week course of vancomycin. Patient now with HTN optimization and vanco dosing optimization, awaiting return to long term nursing facility as medically ready for discharge.

## 2020-01-28 NOTE — DISCHARGE NOTE PROVIDER - NSDCCPCAREPLAN_GEN_ALL_CORE_FT
PRINCIPAL DISCHARGE DIAGNOSIS  Diagnosis: Hypertension  Assessment and Plan of Treatment: You came to the hospital because you had very high blood pressure. Initially, you were started on an IV drip to help control your blood pressure. We then worked to adjust your blood pressure medication regimen which along with dialysis helped keep your blood pressure under control. Please continue to take the clonidine, hydralazine, losartan, nifedipine, imdur, and carvedilol as prescribed. The doctor at your living facility will help manage your blood pressure.      SECONDARY DISCHARGE DIAGNOSES  Diagnosis: Bacteremia due to Staphylococcus aureus  Assessment and Plan of Treatment: While in the hospital, you were found to have a fever and your blood showed that you had a bacteria called MRSA in your blood. We did some imaging but were unable to find the source of this infection. Repeat blood cultures showed you no longer have this bacteria in your blood. Our infectious disease team saw you and recommend that you get Vancomycin for four weeks, which you will get at dialysis.    Diagnosis: Aortic stenosis  Assessment and Plan of Treatment: An echocardiogram done at the hospital showed that you have moderate to severe aortic stenosis. The structural heart team saw you and determined that you are not a candidate for any surgical intervention given your current health condition. Please follow up with Dr. Mcfarlane.    Diagnosis: Pain  Assessment and Plan of Treatment: You have chronic back pain and an MRI that was done showed chronic degenerative changes and some disc herniations. Pain management saw you and suggested a regimen to control your pain. Please continue to take tylenol and tramadol as needed.    Diagnosis: CAD (coronary artery disease)  Assessment and Plan of Treatment: You have a known history of coronary artery disease. Please continue to take you aspirin, clopidogrel, carvedilol, losartan, and atorvastatin.    Diagnosis: Type II diabetes mellitus  Assessment and Plan of Treatment: Please take 3 units of lantus daily for your diabetes, with correction using an insulin sliding scale as needed.    Diagnosis: Hypothyroidism  Assessment and Plan of Treatment: You have a known history of hypothyroidism. Please continue to take your Synthroid. PRINCIPAL DISCHARGE DIAGNOSIS  Diagnosis: Hypertension  Assessment and Plan of Treatment: You came to the hospital because you had very high blood pressure. Initially, you were started on an IV drip to help control your blood pressure. We then worked to adjust your blood pressure medication regimen which along with dialysis helped keep your blood pressure under control. Please continue to take the clonidine, hydralazine, losartan, nifedipine, imdur, and carvedilol as prescribed. The doctor at your living facility will help manage your blood pressure.      SECONDARY DISCHARGE DIAGNOSES  Diagnosis: Bacteremia due to Staphylococcus aureus  Assessment and Plan of Treatment: While in the hospital, you were found to have a fever and your blood showed that you had a bacteria called MRSA in your blood. We did some imaging but were unable to find the source of this infection. Repeat blood cultures showed you no longer have this bacteria in your blood. Our infectious disease team saw you and recommend that you get Vancomycin for four weeks, which you will get at dialysis.    Diagnosis: Aortic stenosis  Assessment and Plan of Treatment: An echocardiogram done at the hospital showed that you have moderate to severe aortic stenosis. The structural heart team saw you and determined that you are not a candidate for any surgical intervention given your current health condition. Please follow up with Dr. Mcfarlane.    Diagnosis: Pain  Assessment and Plan of Treatment: You have chronic back pain and an MRI that was done showed chronic degenerative changes and some disc herniations. Pain management saw you and suggested a regimen to control your pain. Please continue to take tylenol and tramadol as needed.    Diagnosis: CAD (coronary artery disease)  Assessment and Plan of Treatment: You have a known history of coronary artery disease. Please continue to take you aspirin, clopidogrel, carvedilol, losartan, and atorvastatin.    Diagnosis: Type II diabetes mellitus  Assessment and Plan of Treatment: Please take 2 units of lantus daily for your diabetes, with correction using an insulin sliding scale as needed.    Diagnosis: Hypothyroidism  Assessment and Plan of Treatment: You have a known history of hypothyroidism. Please continue to take your Synthroid.

## 2020-01-28 NOTE — DISCHARGE NOTE PROVIDER - CARE PROVIDERS DIRECT ADDRESSES
[email or web address]

## 2020-01-28 NOTE — PROGRESS NOTE ADULT - ATTENDING COMMENTS
I independently performed the key portions of the evaluation and management service provided. I agree with the above history, physical, and plan which I have reviewed and edited where appropriate. I find seen at HD. Continue HD. See full note. (Patient seen earlier in day.)

## 2020-01-28 NOTE — DISCHARGE NOTE PROVIDER - NSDCMRMEDTOKEN_GEN_ALL_CORE_FT
amLODIPine 10 mg oral tablet: 1 tab(s) orally once a day  Aspirin Enteric Coated 81 mg oral delayed release tablet: 1 tab(s) orally once a day  atorvastatin 40 mg oral tablet: 1 tab(s) orally once a day (at bedtime)  cloNIDine 0.3 mg oral tablet: 1 tab(s) orally 3 times a day  Coreg 25 mg oral tablet: 1 tab(s) orally every 12 hours  Flexeril 5 mg oral tablet: 1 tab(s) orally once a day  gabapentin 100 mg oral capsule: 1 cap(s) orally 3 times a day  hydrALAZINE 100 mg oral tablet: 1 tab(s) orally every 8 hours  isosorbide mononitrate 60 mg oral tablet, extended release: 1 tab(s) orally once a day  Lantus Solostar Pen 100 units/mL subcutaneous solution: 4 unit(s) subcutaneous once a day (at bedtime)   levothyroxine 50 mcg (0.05 mg) oral tablet: 1 tab(s) orally once a day  losartan 100 mg oral tablet: 1 tab(s) orally once a day  Plavix 75 mg oral tablet: 1 tab(s) orally once a day  Renvela 800 mg oral tablet: 1 tab(s) orally 3 times a day (with meals) acetaminophen 325 mg oral tablet: 2 tab(s) orally every 6 hours  aspirin 81 mg oral tablet, chewable: 1 tab(s) orally every 24 hours  atorvastatin 40 mg oral tablet: 1 tab(s) orally once a day (at bedtime)  carvedilol 25 mg oral tablet: 1 tab(s) orally every 12 hours  Please hold for SBP &lt;100 and HR &lt;60  cloNIDine 0.2 mg oral tablet: 1 tab(s) orally every 12 hours  Please hold for SBP &lt;100 and HR &lt;60  clopidogrel 75 mg oral tablet: 1 tab(s) orally once a day  escitalopram 5 mg oral tablet: 1 tab(s) orally every 24 hours  Flexeril 5 mg oral tablet: 1 tab(s) orally once a day  gabapentin 100 mg oral capsule: 1 cap(s) orally   heparin: 5000 unit(s) subcutaneous every 8 hours  hydrALAZINE 25 mg oral tablet: 3 tab(s) orally every 8 hours  Please hold for SBP &lt;100 and HR &lt;60  isosorbide mononitrate 120 mg oral tablet, extended release: 1 tab(s) orally every 24 hours  Please hold for SBP &lt;100 and HR &lt;60  lab draw: Please draw random vancomycin level before hemodialysis on 2/4/2020 and 2/11/2020  Lantus Solostar Pen 100 units/mL subcutaneous solution: 4 unit(s) subcutaneous once a day (at bedtime)   levothyroxine 50 mcg (0.05 mg) oral tablet: 1 tab(s) orally once a day  losartan 100 mg oral tablet: 1 tab(s) orally once a day  Please hold for SBP &lt;100 and HR &lt;60  mirtazapine 15 mg oral tablet: 1 tab(s) orally once a day (at bedtime)  NIFEdipine 60 mg oral tablet, extended release: 1 tab(s) orally every 12 hours  Please hold for SBP &lt;100 and HR &lt;60  polyethylene glycol 3350 oral powder for reconstitution: 17 gram(s) orally once a day (at bedtime)  senna oral tablet: 2 tab(s) orally once a day (at bedtime)  sevelamer carbonate 800 mg oral tablet: 1 tab(s) orally 3 times a day (with meals)  traMADol 50 mg oral tablet: 1 tab(s) orally every 6 hours, As needed, Moderate Pain (4 - 6)  vancomycin 500 mg/100 mL-D5% intravenous solution: 500 milliliter(s) intravenous once after hemodialysis on 1/30/2020, once after hemodialysis on 2/4/2020, and once after hemodialysis on 2/8/2020    Give additional 500mg dose on 2/6/2020, if 2/4/2020 vancomycin level &lt;15  Give additional 500mg dose on 2/12/2020, if 2/11/2020 vancomycin level &lt;15 acetaminophen 325 mg oral tablet: 2 tab(s) orally every 6 hours  aspirin 81 mg oral tablet, chewable: 1 tab(s) orally every 24 hours  atorvastatin 40 mg oral tablet: 1 tab(s) orally once a day (at bedtime)  carvedilol 25 mg oral tablet: 1 tab(s) orally every 12 hours  Please hold for SBP &lt;100 and HR &lt;60  cloNIDine 0.2 mg oral tablet: 1 tab(s) orally every 12 hours  Please hold for SBP &lt;100 and HR &lt;60  clopidogrel 75 mg oral tablet: 1 tab(s) orally once a day  escitalopram 5 mg oral tablet: 1 tab(s) orally every 24 hours  Flexeril 5 mg oral tablet: 1 tab(s) orally once a day  gabapentin 100 mg oral capsule: 1 cap(s) orally   heparin: 5000 unit(s) subcutaneous every 8 hours  hydrALAZINE 25 mg oral tablet: 3 tab(s) orally every 8 hours  Please hold for SBP &lt;100 and HR &lt;60  isosorbide mononitrate 120 mg oral tablet, extended release: 1 tab(s) orally every 24 hours  Please hold for SBP &lt;100 and HR &lt;60  lab draw: Please draw random vancomycin level before hemodialysis on 2/4/2020 and 2/11/2020  levothyroxine 50 mcg (0.05 mg) oral tablet: 1 tab(s) orally once a day  losartan 100 mg oral tablet: 1 tab(s) orally once a day  Please hold for SBP &lt;100 and HR &lt;60  mirtazapine 15 mg oral tablet: 1 tab(s) orally once a day (at bedtime)  NIFEdipine 60 mg oral tablet, extended release: 1 tab(s) orally every 12 hours  Please hold for SBP &lt;100 and HR &lt;60  polyethylene glycol 3350 oral powder for reconstitution: 17 gram(s) orally once a day (at bedtime)  senna oral tablet: 2 tab(s) orally once a day (at bedtime)  sevelamer carbonate 800 mg oral tablet: 1 tab(s) orally 3 times a day (with meals)  traMADol 50 mg oral tablet: 1 tab(s) orally every 6 hours, As needed, Moderate Pain (4 - 6)  vancomycin 500 mg/100 mL-D5% intravenous solution: 500 milliliter(s) intravenous once after hemodialysis on 1/30/2020, once after hemodialysis on 2/4/2020, and once after hemodialysis on 2/8/2020    Give additional 500mg dose on 2/6/2020, if 2/4/2020 vancomycin level &lt;15  Give additional 500mg dose on 2/12/2020, if 2/11/2020 vancomycin level &lt;15 acetaminophen 325 mg oral tablet: 2 tab(s) orally every 6 hours  aspirin 81 mg oral tablet, chewable: 1 tab(s) orally every 24 hours  atorvastatin 40 mg oral tablet: 1 tab(s) orally once a day (at bedtime)  carvedilol 25 mg oral tablet: 1 tab(s) orally every 12 hours  Please hold for SBP &lt;100 and HR &lt;60  cloNIDine 0.1 mg oral tablet: 1 tab(s) orally 2 times a day  clopidogrel 75 mg oral tablet: 1 tab(s) orally once a day  escitalopram 5 mg oral tablet: 1 tab(s) orally every 24 hours  Flexeril 5 mg oral tablet: 1 tab(s) orally once a day  gabapentin 100 mg oral capsule: 1 cap(s) orally   heparin: 5000 unit(s) subcutaneous every 8 hours  hydrALAZINE 100 mg oral tablet: 1 tab(s) orally every 8 hours  insulin glargine: 2 unit(s) subcutaneous once a day  isosorbide mononitrate 120 mg oral tablet, extended release: 1 tab(s) orally every 24 hours  Please hold for SBP &lt;100 and HR &lt;60  levothyroxine 50 mcg (0.05 mg) oral tablet: 1 tab(s) orally once a day  losartan 100 mg oral tablet: 1 tab(s) orally once a day  Please hold for SBP &lt;100 and HR &lt;60  mirtazapine 15 mg oral tablet: 1 tab(s) orally once a day (at bedtime)  NIFEdipine 60 mg oral tablet, extended release: 1 tab(s) orally every 12 hours  Please hold for SBP &lt;100 and HR &lt;60  polyethylene glycol 3350 oral powder for reconstitution: 17 gram(s) orally once a day (at bedtime)  ranolazine 500 mg oral tablet, extended release: 1 tab(s) orally 2 times a day  senna oral tablet: 2 tab(s) orally once a day (at bedtime)  sevelamer carbonate 800 mg oral tablet: 1 tab(s) orally 3 times a day (with meals)  simethicone 80 mg oral tablet, chewable: 1 tab(s) orally every 6 hours, As needed, Upset Stomach  traMADol 50 mg oral tablet: 1 tab(s) orally every 6 hours, As needed, Moderate Pain (4 - 6)  vancomycin 500 mg intravenous injection: 500 milligram(s) intravenously Tuesday, Thursday, Saturday   Vancomycin Level February 8th 2020 if vancomycin level greater than 20 hold dose :

## 2020-01-28 NOTE — PROGRESS NOTE ADULT - SUBJECTIVE AND OBJECTIVE BOX
OVERNIGHT EVENTS: Patient with systolic blood pressure of 110 last night. Patient's scheduled coreg was given but the hydralazine was held.     SUBJECTIVE / INTERVAL HPI: Patient seen and examined at bedside. States that he continues to have 10/10 neck and upper back pain. Otherwise, patient endorsing generalized weakness. Denies fevers/chills, headache, chest pain, abdominal pain, nausea/vomiting.     VITAL SIGNS:  Vital Signs Last 24 Hrs  T(C): 35.7 (28 Jan 2020 14:05), Max: 37.1 (27 Jan 2020 18:15)  T(F): 96.2 (28 Jan 2020 14:05), Max: 98.7 (27 Jan 2020 18:15)  HR: 68 (28 Jan 2020 14:05) (58 - 76)  BP: 146/56 (28 Jan 2020 14:05) (92/50 - 149/65)  BP(mean): 52 (28 Jan 2020 08:27) (52 - 94)  RR: 16 (28 Jan 2020 14:05) (12 - 17)  SpO2: 100% (28 Jan 2020 14:05) (98% - 100%)    PHYSICAL EXAM:    General: Elderly male resting comfortably in bed, does not appear to be in any distress though endorsing severe pain  HEENT: anicteric sclera; MMM  Neck: supple, no jvd  Cardiovascular: +S1/S2; RRR. 3/6 holosystolic murmur   Respiratory: CTA B/L; no W/R/R  Gastrointestinal: soft, NT/ND; +BSx4  Extremities: WWP; no edema, clubbing or cyanosis. Left AV fistula with bruit. 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 650 milliGRAM(s) Oral every 6 hours  aspirin  chewable 81 milliGRAM(s) Oral every 24 hours  atorvastatin 40 milliGRAM(s) Oral at bedtime  carvedilol 25 milliGRAM(s) Oral every 12 hours  chlorhexidine 2% Cloths 1 Application(s) Topical <User Schedule>  cloNIDine 0.2 milliGRAM(s) Oral every 12 hours  clopidogrel Tablet 75 milliGRAM(s) Oral daily  cyanocobalamin Injectable 100 MICROGram(s) IntraMuscular every 24 hours  dextrose 5%. 1000 milliLiter(s) (50 mL/Hr) IV Continuous <Continuous>  dextrose 50% Injectable 12.5 Gram(s) IV Push once  dextrose 50% Injectable 25 Gram(s) IV Push once  dextrose 50% Injectable 25 Gram(s) IV Push once  escitalopram 5 milliGRAM(s) Oral every 24 hours  gabapentin 100 milliGRAM(s) Oral <User Schedule>  heparin  Injectable 5000 Unit(s) SubCutaneous every 8 hours  hydrALAZINE 75 milliGRAM(s) Oral every 8 hours  insulin lispro (HumaLOG) corrective regimen sliding scale   SubCutaneous Before meals and at bedtime  isosorbide   mononitrate ER Tablet (IMDUR) 120 milliGRAM(s) Oral every 24 hours  levothyroxine 50 MICROGram(s) Oral daily  lidocaine   Patch 1 Patch Transdermal every 24 hours  losartan 100 milliGRAM(s) Oral every 24 hours  melatonin 5 milliGRAM(s) Oral at bedtime  mirtazapine 15 milliGRAM(s) Oral at bedtime  NIFEdipine XL 60 milliGRAM(s) Oral every 12 hours  polyethylene glycol 3350 17 Gram(s) Oral at bedtime  senna 2 Tablet(s) Oral at bedtime  sevelamer carbonate 800 milliGRAM(s) Oral three times a day with meals    MEDICATIONS  (PRN):  dextrose 40% Gel 15 Gram(s) Oral once PRN Blood Glucose LESS THAN 70 milliGRAM(s)/deciliter  glucagon  Injectable 1 milliGRAM(s) IntraMuscular once PRN Glucose LESS THAN 70 milligrams/deciliter  HYDROmorphone  Injectable 0.25 milliGRAM(s) IV Push every 4 hours PRN Severe Pain (7 - 10)  traMADol 50 milliGRAM(s) Oral every 6 hours PRN Moderate Pain (4 - 6)      ALLERGIES:  Allergies    No Known Allergies    Intolerances        LABS:                        12.7   11.09 )-----------( 269      ( 28 Jan 2020 11:28 )             39.1     01-28    139  |  92<L>  |  92<H>  ----------------------------<  274<H>  4.9   |  21<L>  |  8.40<H>    Ca    10.1      28 Jan 2020 11:28  Phos  7.5     01-28  Mg     2.7     01-28          CAPILLARY BLOOD GLUCOSE      POCT Blood Glucose.: 174 mg/dL (28 Jan 2020 14:53)      RADIOLOGY & ADDITIONAL TESTS: Reviewed.

## 2020-01-29 PROBLEM — Z00.00 ENCOUNTER FOR PREVENTIVE HEALTH EXAMINATION: Status: ACTIVE | Noted: 2020-01-29

## 2020-01-29 LAB
ANION GAP SERPL CALC-SCNC: 21 MMOL/L — HIGH (ref 5–17)
APTT BLD: 62 SEC — HIGH (ref 27.5–36.3)
APTT BLD: 70.6 SEC — HIGH (ref 27.5–36.3)
APTT BLD: 75.2 SEC — HIGH (ref 27.5–36.3)
BUN SERPL-MCNC: 59 MG/DL — HIGH (ref 7–23)
CALCIUM SERPL-MCNC: 9.9 MG/DL — SIGNIFICANT CHANGE UP (ref 8.4–10.5)
CHLORIDE SERPL-SCNC: 93 MMOL/L — LOW (ref 96–108)
CK MB CFR SERPL CALC: 1.7 NG/ML — SIGNIFICANT CHANGE UP (ref 0–6.7)
CK MB CFR SERPL CALC: 1.8 NG/ML — SIGNIFICANT CHANGE UP (ref 0–6.7)
CK SERPL-CCNC: 24 U/L — LOW (ref 30–200)
CK SERPL-CCNC: 26 U/L — LOW (ref 30–200)
CO2 SERPL-SCNC: 25 MMOL/L — SIGNIFICANT CHANGE UP (ref 22–31)
CREAT SERPL-MCNC: 5.7 MG/DL — HIGH (ref 0.5–1.3)
GLUCOSE BLDC GLUCOMTR-MCNC: 143 MG/DL — HIGH (ref 70–99)
GLUCOSE BLDC GLUCOMTR-MCNC: 160 MG/DL — HIGH (ref 70–99)
GLUCOSE BLDC GLUCOMTR-MCNC: 205 MG/DL — HIGH (ref 70–99)
GLUCOSE BLDC GLUCOMTR-MCNC: 275 MG/DL — HIGH (ref 70–99)
GLUCOSE SERPL-MCNC: 173 MG/DL — HIGH (ref 70–99)
HCT VFR BLD CALC: 41.1 % — SIGNIFICANT CHANGE UP (ref 39–50)
HGB BLD-MCNC: 12.9 G/DL — LOW (ref 13–17)
MAGNESIUM SERPL-MCNC: 2.7 MG/DL — HIGH (ref 1.6–2.6)
MCHC RBC-ENTMCNC: 28.6 PG — SIGNIFICANT CHANGE UP (ref 27–34)
MCHC RBC-ENTMCNC: 31.4 GM/DL — LOW (ref 32–36)
MCV RBC AUTO: 91.1 FL — SIGNIFICANT CHANGE UP (ref 80–100)
NRBC # BLD: 0 /100 WBCS — SIGNIFICANT CHANGE UP (ref 0–0)
PHOSPHATE SERPL-MCNC: 4.9 MG/DL — HIGH (ref 2.5–4.5)
PLATELET # BLD AUTO: 210 K/UL — SIGNIFICANT CHANGE UP (ref 150–400)
POTASSIUM SERPL-MCNC: 3.9 MMOL/L — SIGNIFICANT CHANGE UP (ref 3.5–5.3)
POTASSIUM SERPL-SCNC: 3.9 MMOL/L — SIGNIFICANT CHANGE UP (ref 3.5–5.3)
RBC # BLD: 4.51 M/UL — SIGNIFICANT CHANGE UP (ref 4.2–5.8)
RBC # FLD: 14.7 % — HIGH (ref 10.3–14.5)
SODIUM SERPL-SCNC: 139 MMOL/L — SIGNIFICANT CHANGE UP (ref 135–145)
TROPONIN T SERPL-MCNC: 0.25 NG/ML — CRITICAL HIGH (ref 0–0.01)
TROPONIN T SERPL-MCNC: 0.28 NG/ML — CRITICAL HIGH (ref 0–0.01)
WBC # BLD: 10.78 K/UL — HIGH (ref 3.8–10.5)
WBC # FLD AUTO: 10.78 K/UL — HIGH (ref 3.8–10.5)

## 2020-01-29 PROCEDURE — 99232 SBSQ HOSP IP/OBS MODERATE 35: CPT | Mod: GC

## 2020-01-29 PROCEDURE — 99232 SBSQ HOSP IP/OBS MODERATE 35: CPT

## 2020-01-29 PROCEDURE — 93306 TTE W/DOPPLER COMPLETE: CPT | Mod: 26

## 2020-01-29 RX ORDER — RANOLAZINE 500 MG/1
500 TABLET, FILM COATED, EXTENDED RELEASE ORAL
Refills: 0 | Status: DISCONTINUED | OUTPATIENT
Start: 2020-01-29 | End: 2020-02-04

## 2020-01-29 RX ORDER — HEPARIN SODIUM 5000 [USP'U]/ML
650 INJECTION INTRAVENOUS; SUBCUTANEOUS
Qty: 25000 | Refills: 0 | Status: DISCONTINUED | OUTPATIENT
Start: 2020-01-29 | End: 2020-01-29

## 2020-01-29 RX ADMIN — RANOLAZINE 500 MILLIGRAM(S): 500 TABLET, FILM COATED, EXTENDED RELEASE ORAL at 17:22

## 2020-01-29 RX ADMIN — Medication 2: at 22:00

## 2020-01-29 RX ADMIN — ISOSORBIDE MONONITRATE 120 MILLIGRAM(S): 60 TABLET, EXTENDED RELEASE ORAL at 12:13

## 2020-01-29 RX ADMIN — INSULIN GLARGINE 3 UNIT(S): 100 INJECTION, SOLUTION SUBCUTANEOUS at 22:00

## 2020-01-29 RX ADMIN — ATORVASTATIN CALCIUM 40 MILLIGRAM(S): 80 TABLET, FILM COATED ORAL at 21:58

## 2020-01-29 RX ADMIN — CARVEDILOL PHOSPHATE 25 MILLIGRAM(S): 80 CAPSULE, EXTENDED RELEASE ORAL at 21:59

## 2020-01-29 RX ADMIN — Medication 81 MILLIGRAM(S): at 14:18

## 2020-01-29 RX ADMIN — Medication 6: at 12:13

## 2020-01-29 RX ADMIN — Medication 60 MILLIGRAM(S): at 06:34

## 2020-01-29 RX ADMIN — SEVELAMER CARBONATE 800 MILLIGRAM(S): 2400 POWDER, FOR SUSPENSION ORAL at 08:59

## 2020-01-29 RX ADMIN — MIRTAZAPINE 15 MILLIGRAM(S): 45 TABLET, ORALLY DISINTEGRATING ORAL at 21:59

## 2020-01-29 RX ADMIN — TRAMADOL HYDROCHLORIDE 50 MILLIGRAM(S): 50 TABLET ORAL at 03:15

## 2020-01-29 RX ADMIN — Medication 650 MILLIGRAM(S): at 13:15

## 2020-01-29 RX ADMIN — LOSARTAN POTASSIUM 100 MILLIGRAM(S): 100 TABLET, FILM COATED ORAL at 17:22

## 2020-01-29 RX ADMIN — Medication 60 MILLIGRAM(S): at 18:57

## 2020-01-29 RX ADMIN — Medication 650 MILLIGRAM(S): at 12:13

## 2020-01-29 RX ADMIN — TRAMADOL HYDROCHLORIDE 50 MILLIGRAM(S): 50 TABLET ORAL at 22:40

## 2020-01-29 RX ADMIN — Medication 650 MILLIGRAM(S): at 18:20

## 2020-01-29 RX ADMIN — Medication 650 MILLIGRAM(S): at 01:05

## 2020-01-29 RX ADMIN — Medication 650 MILLIGRAM(S): at 07:10

## 2020-01-29 RX ADMIN — SEVELAMER CARBONATE 800 MILLIGRAM(S): 2400 POWDER, FOR SUSPENSION ORAL at 17:22

## 2020-01-29 RX ADMIN — LIDOCAINE 1 PATCH: 4 CREAM TOPICAL at 14:19

## 2020-01-29 RX ADMIN — Medication 0.2 MILLIGRAM(S): at 21:59

## 2020-01-29 RX ADMIN — Medication 650 MILLIGRAM(S): at 17:26

## 2020-01-29 RX ADMIN — CLOPIDOGREL BISULFATE 75 MILLIGRAM(S): 75 TABLET, FILM COATED ORAL at 12:13

## 2020-01-29 RX ADMIN — LIDOCAINE 1 PATCH: 4 CREAM TOPICAL at 18:58

## 2020-01-29 RX ADMIN — Medication 50 MICROGRAM(S): at 06:34

## 2020-01-29 RX ADMIN — LIDOCAINE 1 PATCH: 4 CREAM TOPICAL at 03:46

## 2020-01-29 RX ADMIN — SEVELAMER CARBONATE 800 MILLIGRAM(S): 2400 POWDER, FOR SUSPENSION ORAL at 12:15

## 2020-01-29 RX ADMIN — Medication 0.2 MILLIGRAM(S): at 08:59

## 2020-01-29 RX ADMIN — SENNA PLUS 2 TABLET(S): 8.6 TABLET ORAL at 21:59

## 2020-01-29 RX ADMIN — ESCITALOPRAM OXALATE 5 MILLIGRAM(S): 10 TABLET, FILM COATED ORAL at 21:59

## 2020-01-29 RX ADMIN — TRAMADOL HYDROCHLORIDE 50 MILLIGRAM(S): 50 TABLET ORAL at 02:13

## 2020-01-29 RX ADMIN — Medication 4: at 16:52

## 2020-01-29 RX ADMIN — TRAMADOL HYDROCHLORIDE 50 MILLIGRAM(S): 50 TABLET ORAL at 23:48

## 2020-01-29 RX ADMIN — POLYETHYLENE GLYCOL 3350 17 GRAM(S): 17 POWDER, FOR SOLUTION ORAL at 22:00

## 2020-01-29 RX ADMIN — GABAPENTIN 100 MILLIGRAM(S): 400 CAPSULE ORAL at 17:22

## 2020-01-29 RX ADMIN — Medication 650 MILLIGRAM(S): at 06:33

## 2020-01-29 RX ADMIN — CHLORHEXIDINE GLUCONATE 1 APPLICATION(S): 213 SOLUTION TOPICAL at 06:34

## 2020-01-29 RX ADMIN — Medication 5 MILLIGRAM(S): at 21:59

## 2020-01-29 RX ADMIN — Medication 75 MILLIGRAM(S): at 10:21

## 2020-01-29 RX ADMIN — PREGABALIN 100 MICROGRAM(S): 225 CAPSULE ORAL at 17:22

## 2020-01-29 NOTE — PROGRESS NOTE ADULT - ATTENDING COMMENTS
On Date 1/29/20  Assessment: Patient personally seen and examined myself, face-to-face, during rounds with the Resident     Note read, including vitals, physical findings, laboratory data, and radiological reports.   Agree with above.

## 2020-01-29 NOTE — PROGRESS NOTE ADULT - ASSESSMENT
69M PMH ESRD (HD T/TH/S), HTN, HLD, CAD, renal artery stenosis (s/p stent), DM (c/b peripheral neuropathy), hypothyroidism, CVA (R eye blind, L eye limited vision), carotid artery stenosis (s/p stent), peripheral artery disease (s/p BL stents) presented initially with syncopal episode in the setting of HTN emergency and admitted to CCU. Course c/b MRSA bacteremia without clear source and to complete 4 week course of vancomycin. Patient now with HTN optimization and vanco dosing optimization, awaiting return to long term nursing facility as medically ready for discharge.

## 2020-01-29 NOTE — PROGRESS NOTE ADULT - ATTENDING COMMENTS
I independently performed the key portions of the evaluation and management service provided. I agree with the above history, physical, and plan which I have reviewed and edited where appropriate. I find events reviewed. Tolerated dialysis but developed CP yesterday. HD tomorrow. See full note. (Patient seen earlier in day.)

## 2020-01-29 NOTE — PROGRESS NOTE ADULT - SUBJECTIVE AND OBJECTIVE BOX
OVERNIGHT EVENTS: Patient with chest pain yesterday evening. EKG showing new t wave inversions in leads V2 and V3. Troponins elevated relative to patient's baseline. Heparin drip started. Patient already on DAPT. Troponins peaked.     SUBJECTIVE / INTERVAL HPI: Patient seen and examined at bedside. States that his chest pain has resolved since yesterday. He also reports that he feels a little short of breath, though states he is significantly less dyspneic than yesterday. Patient also complaining of his chronic neck and back pain, remains unchanged. Patient reports he has been eating more. Denies fevers/chills, headache, chest pain, cough, palpitations, abdominal pain, nausea/vomiting.      VITAL SIGNS:  Vital Signs Last 24 Hrs  T(C): 36.6 (29 Jan 2020 10:11), Max: 36.6 (28 Jan 2020 22:10)  T(F): 97.9 (29 Jan 2020 10:11), Max: 97.9 (29 Jan 2020 10:11)  HR: 59 (29 Jan 2020 12:16) (59 - 75)  BP: 107/50 (29 Jan 2020 12:16) (101/50 - 146/56)  BP(mean): 72 (29 Jan 2020 12:16) (71 - 87)  RR: 16 (29 Jan 2020 12:16) (12 - 29)  SpO2: 100% (29 Jan 2020 12:16) (99% - 100%)    PHYSICAL EXAM:    General: Elderly male resting comfortably in bed, not in any acute distress  HEENT: anicteric sclera; MMM  Neck: supple, no jvd  Cardiovascular: +S1/S2; RRR. 3/6 holosystolic murmur   Respiratory: CTA B/L; no W/R/R  Gastrointestinal: soft, NT/ND; +BSx4  Extremities: WWP; no edema, clubbing or cyanosis. Left AV fistula with bruit. LLE foot with partial amputation of third toe  Vascular: 2+ radial, DP/PT pulses B/L  Neurological: AAOx3; no focal deficits     MEDICATIONS:  MEDICATIONS  (STANDING):  acetaminophen   Tablet .. 650 milliGRAM(s) Oral every 6 hours  aspirin  chewable 81 milliGRAM(s) Oral every 24 hours  atorvastatin 40 milliGRAM(s) Oral at bedtime  carvedilol 25 milliGRAM(s) Oral every 12 hours  chlorhexidine 2% Cloths 1 Application(s) Topical <User Schedule>  cloNIDine 0.2 milliGRAM(s) Oral every 12 hours  clopidogrel Tablet 75 milliGRAM(s) Oral daily  cyanocobalamin Injectable 100 MICROGram(s) IntraMuscular every 24 hours  dextrose 5%. 1000 milliLiter(s) (50 mL/Hr) IV Continuous <Continuous>  dextrose 50% Injectable 12.5 Gram(s) IV Push once  dextrose 50% Injectable 25 Gram(s) IV Push once  dextrose 50% Injectable 25 Gram(s) IV Push once  escitalopram 5 milliGRAM(s) Oral every 24 hours  gabapentin 100 milliGRAM(s) Oral <User Schedule>  heparin  Infusion 650 Unit(s)/Hr (6.5 mL/Hr) IV Continuous <Continuous>  hydrALAZINE 75 milliGRAM(s) Oral every 8 hours  insulin glargine Injectable (LANTUS) 3 Unit(s) SubCutaneous at bedtime  insulin lispro (HumaLOG) corrective regimen sliding scale   SubCutaneous Before meals and at bedtime  isosorbide   mononitrate ER Tablet (IMDUR) 120 milliGRAM(s) Oral every 24 hours  levothyroxine 50 MICROGram(s) Oral daily  lidocaine   Patch 1 Patch Transdermal every 24 hours  losartan 100 milliGRAM(s) Oral every 24 hours  melatonin 5 milliGRAM(s) Oral at bedtime  mirtazapine 15 milliGRAM(s) Oral at bedtime  NIFEdipine XL 60 milliGRAM(s) Oral every 12 hours  polyethylene glycol 3350 17 Gram(s) Oral at bedtime  ranolazine 500 milliGRAM(s) Oral two times a day  senna 2 Tablet(s) Oral at bedtime  sevelamer carbonate 800 milliGRAM(s) Oral three times a day with meals    MEDICATIONS  (PRN):  dextrose 40% Gel 15 Gram(s) Oral once PRN Blood Glucose LESS THAN 70 milliGRAM(s)/deciliter  glucagon  Injectable 1 milliGRAM(s) IntraMuscular once PRN Glucose LESS THAN 70 milligrams/deciliter  traMADol 50 milliGRAM(s) Oral every 6 hours PRN Moderate Pain (4 - 6)      ALLERGIES:  Allergies    No Known Allergies    Intolerances        LABS:                        12.9   10.78 )-----------( 210      ( 29 Jan 2020 07:52 )             41.1     01-29    139  |  93<L>  |  59<H>  ----------------------------<  173<H>  3.9   |  25  |  5.70<H>    Ca    9.9      29 Jan 2020 07:52  Phos  4.9     01-29  Mg     2.7     01-29      PTT - ( 29 Jan 2020 10:44 )  PTT:70.6 sec    CAPILLARY BLOOD GLUCOSE      POCT Blood Glucose.: 275 mg/dL (29 Jan 2020 12:04)      RADIOLOGY & ADDITIONAL TESTS: Reviewed.

## 2020-01-29 NOTE — PROGRESS NOTE ADULT - PROBLEM SELECTOR PLAN 3
Noted to have multiple sources of chronic pain likely also contributing to HTN  - RLE pain 2/2 recent R hip replacement - lidocaine patch q24h  - Chronic back pain  - MR thoracolumbar spine c/w degenerative changes T10-11, L3-4, L5-S1; L paracentral disc herniation at C6-7 that may affect C7 nerve root, and disc herniations at several levels without compression of the thoracic spinal cord  - Pain management consulted previously during course with the following regimen: tylenol 650mg q6h mild pain, tramadol 50mg q6h moderate pain, and dilaudid 0.25mg IV q4h for severe pain  - Peripheral artery disease and DM related neuropathy likely contributing to pain as well  - Gabapentin 100mg MWF evenings (ESRD on HD dose adjustment)  -Continuing to encourage pt to work with PT, [facility]    #Depression  - Psych following with recs for lexapro 5mg daily  - c/w mirtazapine for poor appetite

## 2020-01-29 NOTE — PROGRESS NOTE ADULT - SUBJECTIVE AND OBJECTIVE BOX
O/N Events: CP in the evening with elevated Vidal, started on Heparin for NSTEMI  Subjective:  CP free in am, last HD 1/28 with 500 cc UF/volume status and lytes noted and acceptable  FB -300 for the past 24 hours      VITALS  Vital Signs Last 24 Hrs  T(C): 36.2 (29 Jan 2020 17:45), Max: 36.6 (28 Jan 2020 22:10)  T(F): 97.1 (29 Jan 2020 17:45), Max: 97.9 (29 Jan 2020 10:11)  HR: 63 (29 Jan 2020 17:08) (59 - 75)  BP: 131/60 (29 Jan 2020 17:08) (101/50 - 136/60)  BP(mean): 87 (29 Jan 2020 17:08) (71 - 87)  RR: 16 (29 Jan 2020 17:08) (14 - 29)  SpO2: 98% (29 Jan 2020 17:08) (98% - 100%)    PHYSICAL EXAM  Gen: NAD  Respiratory: CTA B/L  Cardiac: +S1/S2; RRR; systolic murmur present   Gastrointestinal: soft, NT/ND   Extremities: WWP, no peripheral edema  Neurologic: AAOx3; non focal  access:  Left arm AVF with bruit    MEDICATIONS  (STANDING):  acetaminophen   Tablet .. 650 milliGRAM(s) Oral every 6 hours  aspirin  chewable 81 milliGRAM(s) Oral every 24 hours  atorvastatin 40 milliGRAM(s) Oral at bedtime  carvedilol 25 milliGRAM(s) Oral every 12 hours  chlorhexidine 2% Cloths 1 Application(s) Topical <User Schedule>  cloNIDine 0.2 milliGRAM(s) Oral every 12 hours  clopidogrel Tablet 75 milliGRAM(s) Oral daily  cyanocobalamin Injectable 100 MICROGram(s) IntraMuscular every 24 hours  dextrose 5%. 1000 milliLiter(s) (50 mL/Hr) IV Continuous <Continuous>  dextrose 50% Injectable 12.5 Gram(s) IV Push once  dextrose 50% Injectable 25 Gram(s) IV Push once  dextrose 50% Injectable 25 Gram(s) IV Push once  escitalopram 5 milliGRAM(s) Oral every 24 hours  gabapentin 100 milliGRAM(s) Oral <User Schedule>  heparin  Infusion 650 Unit(s)/Hr (6.5 mL/Hr) IV Continuous <Continuous>  hydrALAZINE 75 milliGRAM(s) Oral every 8 hours  insulin glargine Injectable (LANTUS) 3 Unit(s) SubCutaneous at bedtime  insulin lispro (HumaLOG) corrective regimen sliding scale   SubCutaneous Before meals and at bedtime  isosorbide   mononitrate ER Tablet (IMDUR) 120 milliGRAM(s) Oral every 24 hours  levothyroxine 50 MICROGram(s) Oral daily  lidocaine   Patch 1 Patch Transdermal every 24 hours  losartan 100 milliGRAM(s) Oral every 24 hours  melatonin 5 milliGRAM(s) Oral at bedtime  mirtazapine 15 milliGRAM(s) Oral at bedtime  NIFEdipine XL 60 milliGRAM(s) Oral every 12 hours  polyethylene glycol 3350 17 Gram(s) Oral at bedtime  ranolazine 500 milliGRAM(s) Oral two times a day  senna 2 Tablet(s) Oral at bedtime  sevelamer carbonate 800 milliGRAM(s) Oral three times a day with meals    MEDICATIONS  (PRN):  dextrose 40% Gel 15 Gram(s) Oral once PRN Blood Glucose LESS THAN 70 milliGRAM(s)/deciliter  glucagon  Injectable 1 milliGRAM(s) IntraMuscular once PRN Glucose LESS THAN 70 milligrams/deciliter  traMADol 50 milliGRAM(s) Oral every 6 hours PRN Moderate Pain (4 - 6)      LABS                        12.9   10.78 )-----------( 210      ( 29 Jan 2020 07:52 )             41.1     01-29    139  |  93<L>  |  59<H>  ----------------------------<  173<H>  3.9   |  25  |  5.70<H>    Ca    9.9      29 Jan 2020 07:52  Phos  4.9     01-29  Mg     2.7     01-29        PTT - ( 29 Jan 2020 17:04 )  PTT:62.0 sec    CARDIAC MARKERS ( 29 Jan 2020 07:52 )  x     / 0.25 ng/mL / 24 U/L / x     / 1.7 ng/mL  CARDIAC MARKERS ( 29 Jan 2020 00:02 )  x     / 0.28 ng/mL / 26 U/L / x     / 1.8 ng/mL  CARDIAC MARKERS ( 28 Jan 2020 20:18 )  x     / 0.27 ng/mL / 27 U/L / x     / 2.0 ng/mL  CARDIAC MARKERS ( 28 Jan 2020 11:28 )  x     / 0.25 ng/mL / 26 U/L / x     / 2.0 ng/mL

## 2020-01-29 NOTE — PROGRESS NOTE ADULT - PROBLEM SELECTOR PLAN 6
Hx of AS: ECHO with LVEF 60-65%, severe AS, mild AR, mild MR, moderate TR, pulmonary HTN 70mmHg  - Structural heart consulted: TAVR/ BAV will not be done this admission in setting of MRSA bacteremia- (high risk of infection of prosthetic valve) and deconditioned state (pt non-ambulatory at present)  -Patient to follow up outpatient, has appt with Dr. Mcfarlane

## 2020-01-29 NOTE — PROGRESS NOTE ADULT - PROBLEM SELECTOR PLAN 1
# ESRD on HD TTS via LUE AVF  last HD 1/28 with 0.5 L UF  - HD tmr per reg schedule, will assess for UF based on hemodynamics   - continue Renvela 800 mg po tid w/meals   - H&H at goal   Will follow

## 2020-01-29 NOTE — PROGRESS NOTE ADULT - PROBLEM SELECTOR PLAN 5
Course complicated by fevers with BCX growing MRSA,EBONI neg for vegetations, Gallium scan negative, no clear source. ID has recommended 4 week course  - c/w vanc dosed by level with level check post-HD and re-dosing vanc after  - Pt to receive 500mg of vanc on 1/30, 2/4, and 2/8 with once weekly vanc level check outpt  - BCx NGTD since 1/15, Remains afebrile    #RUE superficial thrombus  - RUE swelling and erythema at IV site 1/16  - RUE doppler with superficial thrombus in cephalic vein  - Continue to monitor, no longer swollen/erythematous

## 2020-01-29 NOTE — PROGRESS NOTE ADULT - PROBLEM SELECTOR PLAN 1
Patient with chest pain, ekg changes (new v2 and v3 inversions) and troponins elevated relative to his baseline yesterday evening (already peaked)  -Suspect that this is likely demand ischemia. Similar chest pain last time dialysis was performed, with this event occurring after dialysis session as well  -Patient already on DAPT. Started on heparin gtt, will get echo to look for any ischemic changes. If not present, will likely stop heparin gtt

## 2020-01-29 NOTE — PROGRESS NOTE ADULT - PROBLEM SELECTOR PLAN 4
Hx of ESRD, L AVF with HD T/Th/S, last HD 1/28  - Nephro following, f/u  recs  - Renvela 800mg TID  - c/w Daily weights, strict I&O, renal diet  - Avoid nephrotoxic agents, renally dose meds    #Carotid artery stenosis  - s/p stenting  - Management as above    #Pulmonary HTN  - ECHO with findings as above

## 2020-01-29 NOTE — PROGRESS NOTE ADULT - PROBLEM SELECTOR PLAN 2
Admitted with HTN emergency s/p nicardipine gtt and HD, now with occasional low bps (100s/60s)  Current regimen: hydralazine 75 mg TID (lowered from 100mg TID), losartan 100mg daily, coreg 25mg BID, clonidine 0.2mg BID (lowered from 0.3 BID), nifedipine 60mg BID, imdur 120mg daily  - Renal US with doppler showing poor visualization of renal arteries, unable to r/o BRYNN  -Nephro following, f/u recs  -Continuing to monitor    #CAD  - Continue home meds  - ASA 81mg, plavix 75mg, atorvastatin 40mg daily    #PAD  - s/p stenting  - Management as above    #Renal artery stenosis  - s/p stenting  - Renal US with doppler with poor visualization of renal arteries, unable to r/o BRYNN although pt's htn better controlled over last few days

## 2020-01-29 NOTE — PROGRESS NOTE ADULT - PROBLEM SELECTOR PLAN 10
- F: none  - E: replete cautiously in ESRD  - N: DASH/TLC  - D: heparin 5000U sq q8h    Code: full  Dispo: 5L, pending discharge to [facility] with HD

## 2020-01-29 NOTE — PROGRESS NOTE ADULT - ASSESSMENT
A/p  68 y/o m with PMHx of ESRD on dialysis via AVF TTS, HTN, CAD, BRYNN (s/p stent), IDDM (with peripheral nueropathy), hypothyroidism and CVA cb right eye blindness- patient is s/p carotid artery stent), PAD (s/p bilateral stents) who was BIBEMS to [facility] for syncopal episode and found to have elevated blood pressure. Admitted to CCU for emergent HD and management of hypertensive emergency.  found to have severe AS most likely contributing to Syncopal episode, currently undergoing pre-op TAVR, hospital course complicated by MRSA bacteremia without clear source

## 2020-01-30 LAB
ANION GAP SERPL CALC-SCNC: 20 MMOL/L — HIGH (ref 5–17)
BUN SERPL-MCNC: 77 MG/DL — HIGH (ref 7–23)
CALCIUM SERPL-MCNC: 10.1 MG/DL — SIGNIFICANT CHANGE UP (ref 8.4–10.5)
CHLORIDE SERPL-SCNC: 93 MMOL/L — LOW (ref 96–108)
CO2 SERPL-SCNC: 24 MMOL/L — SIGNIFICANT CHANGE UP (ref 22–31)
CREAT SERPL-MCNC: 7.28 MG/DL — HIGH (ref 0.5–1.3)
GLUCOSE BLDC GLUCOMTR-MCNC: 125 MG/DL — HIGH (ref 70–99)
GLUCOSE BLDC GLUCOMTR-MCNC: 153 MG/DL — HIGH (ref 70–99)
GLUCOSE BLDC GLUCOMTR-MCNC: 230 MG/DL — HIGH (ref 70–99)
GLUCOSE BLDC GLUCOMTR-MCNC: 75 MG/DL — SIGNIFICANT CHANGE UP (ref 70–99)
GLUCOSE SERPL-MCNC: 262 MG/DL — HIGH (ref 70–99)
HCT VFR BLD CALC: 37.7 % — LOW (ref 39–50)
HGB BLD-MCNC: 12.4 G/DL — LOW (ref 13–17)
MAGNESIUM SERPL-MCNC: 2.6 MG/DL — SIGNIFICANT CHANGE UP (ref 1.6–2.6)
MCHC RBC-ENTMCNC: 29 PG — SIGNIFICANT CHANGE UP (ref 27–34)
MCHC RBC-ENTMCNC: 32.9 GM/DL — SIGNIFICANT CHANGE UP (ref 32–36)
MCV RBC AUTO: 88.3 FL — SIGNIFICANT CHANGE UP (ref 80–100)
NRBC # BLD: 0 /100 WBCS — SIGNIFICANT CHANGE UP (ref 0–0)
PHOSPHATE SERPL-MCNC: 5.6 MG/DL — HIGH (ref 2.5–4.5)
PLATELET # BLD AUTO: 221 K/UL — SIGNIFICANT CHANGE UP (ref 150–400)
POTASSIUM SERPL-MCNC: 4.1 MMOL/L — SIGNIFICANT CHANGE UP (ref 3.5–5.3)
POTASSIUM SERPL-SCNC: 4.1 MMOL/L — SIGNIFICANT CHANGE UP (ref 3.5–5.3)
RBC # BLD: 4.27 M/UL — SIGNIFICANT CHANGE UP (ref 4.2–5.8)
RBC # FLD: 15 % — HIGH (ref 10.3–14.5)
SODIUM SERPL-SCNC: 137 MMOL/L — SIGNIFICANT CHANGE UP (ref 135–145)
VANCOMYCIN FLD-MCNC: 18.4 UG/ML — SIGNIFICANT CHANGE UP
WBC # BLD: 7.49 K/UL — SIGNIFICANT CHANGE UP (ref 3.8–10.5)
WBC # FLD AUTO: 7.49 K/UL — SIGNIFICANT CHANGE UP (ref 3.8–10.5)

## 2020-01-30 PROCEDURE — 99232 SBSQ HOSP IP/OBS MODERATE 35: CPT

## 2020-01-30 PROCEDURE — 90935 HEMODIALYSIS ONE EVALUATION: CPT | Mod: GC

## 2020-01-30 RX ORDER — TRAMADOL HYDROCHLORIDE 50 MG/1
50 TABLET ORAL EVERY 6 HOURS
Refills: 0 | Status: DISCONTINUED | OUTPATIENT
Start: 2020-01-30 | End: 2020-02-04

## 2020-01-30 RX ORDER — HEPARIN SODIUM 5000 [USP'U]/ML
5000 INJECTION INTRAVENOUS; SUBCUTANEOUS EVERY 8 HOURS
Refills: 0 | Status: DISCONTINUED | OUTPATIENT
Start: 2020-01-30 | End: 2020-02-04

## 2020-01-30 RX ORDER — VANCOMYCIN HCL 1 G
500 VIAL (EA) INTRAVENOUS ONCE
Refills: 0 | Status: COMPLETED | OUTPATIENT
Start: 2020-01-30 | End: 2020-01-30

## 2020-01-30 RX ADMIN — MIRTAZAPINE 15 MILLIGRAM(S): 45 TABLET, ORALLY DISINTEGRATING ORAL at 23:13

## 2020-01-30 RX ADMIN — Medication 50 MICROGRAM(S): at 06:17

## 2020-01-30 RX ADMIN — Medication 650 MILLIGRAM(S): at 12:55

## 2020-01-30 RX ADMIN — Medication 650 MILLIGRAM(S): at 07:17

## 2020-01-30 RX ADMIN — INSULIN GLARGINE 3 UNIT(S): 100 INJECTION, SOLUTION SUBCUTANEOUS at 21:35

## 2020-01-30 RX ADMIN — Medication 5 MILLIGRAM(S): at 23:13

## 2020-01-30 RX ADMIN — Medication 2: at 12:54

## 2020-01-30 RX ADMIN — ISOSORBIDE MONONITRATE 120 MILLIGRAM(S): 60 TABLET, EXTENDED RELEASE ORAL at 12:55

## 2020-01-30 RX ADMIN — RANOLAZINE 500 MILLIGRAM(S): 500 TABLET, FILM COATED, EXTENDED RELEASE ORAL at 06:17

## 2020-01-30 RX ADMIN — TRAMADOL HYDROCHLORIDE 50 MILLIGRAM(S): 50 TABLET ORAL at 07:27

## 2020-01-30 RX ADMIN — RANOLAZINE 500 MILLIGRAM(S): 500 TABLET, FILM COATED, EXTENDED RELEASE ORAL at 17:43

## 2020-01-30 RX ADMIN — Medication 4: at 16:50

## 2020-01-30 RX ADMIN — Medication 650 MILLIGRAM(S): at 06:17

## 2020-01-30 RX ADMIN — Medication 650 MILLIGRAM(S): at 23:13

## 2020-01-30 RX ADMIN — SENNA PLUS 2 TABLET(S): 8.6 TABLET ORAL at 21:34

## 2020-01-30 RX ADMIN — ESCITALOPRAM OXALATE 5 MILLIGRAM(S): 10 TABLET, FILM COATED ORAL at 23:13

## 2020-01-30 RX ADMIN — SEVELAMER CARBONATE 800 MILLIGRAM(S): 2400 POWDER, FOR SUSPENSION ORAL at 08:15

## 2020-01-30 RX ADMIN — LIDOCAINE 1 PATCH: 4 CREAM TOPICAL at 14:15

## 2020-01-30 RX ADMIN — HEPARIN SODIUM 5000 UNIT(S): 5000 INJECTION INTRAVENOUS; SUBCUTANEOUS at 21:34

## 2020-01-30 RX ADMIN — Medication 0.1 MILLIGRAM(S): at 21:34

## 2020-01-30 RX ADMIN — ATORVASTATIN CALCIUM 40 MILLIGRAM(S): 80 TABLET, FILM COATED ORAL at 21:33

## 2020-01-30 RX ADMIN — CLOPIDOGREL BISULFATE 75 MILLIGRAM(S): 75 TABLET, FILM COATED ORAL at 12:55

## 2020-01-30 RX ADMIN — Medication 650 MILLIGRAM(S): at 00:08

## 2020-01-30 RX ADMIN — SEVELAMER CARBONATE 800 MILLIGRAM(S): 2400 POWDER, FOR SUSPENSION ORAL at 17:43

## 2020-01-30 RX ADMIN — SEVELAMER CARBONATE 800 MILLIGRAM(S): 2400 POWDER, FOR SUSPENSION ORAL at 12:55

## 2020-01-30 RX ADMIN — Medication 81 MILLIGRAM(S): at 12:55

## 2020-01-30 RX ADMIN — LIDOCAINE 1 PATCH: 4 CREAM TOPICAL at 20:04

## 2020-01-30 RX ADMIN — Medication 60 MILLIGRAM(S): at 06:17

## 2020-01-30 RX ADMIN — LIDOCAINE 1 PATCH: 4 CREAM TOPICAL at 02:18

## 2020-01-30 RX ADMIN — TRAMADOL HYDROCHLORIDE 50 MILLIGRAM(S): 50 TABLET ORAL at 21:33

## 2020-01-30 RX ADMIN — Medication 0.2 MILLIGRAM(S): at 06:17

## 2020-01-30 RX ADMIN — PREGABALIN 100 MICROGRAM(S): 225 CAPSULE ORAL at 17:43

## 2020-01-30 RX ADMIN — Medication 100 MILLIGRAM(S): at 17:33

## 2020-01-30 RX ADMIN — Medication 60 MILLIGRAM(S): at 17:45

## 2020-01-30 RX ADMIN — TRAMADOL HYDROCHLORIDE 50 MILLIGRAM(S): 50 TABLET ORAL at 11:26

## 2020-01-30 RX ADMIN — Medication 75 MILLIGRAM(S): at 06:17

## 2020-01-30 RX ADMIN — Medication 650 MILLIGRAM(S): at 01:18

## 2020-01-30 RX ADMIN — Medication 650 MILLIGRAM(S): at 18:00

## 2020-01-30 RX ADMIN — LOSARTAN POTASSIUM 100 MILLIGRAM(S): 100 TABLET, FILM COATED ORAL at 17:43

## 2020-01-30 RX ADMIN — POLYETHYLENE GLYCOL 3350 17 GRAM(S): 17 POWDER, FOR SOLUTION ORAL at 21:34

## 2020-01-30 RX ADMIN — HEPARIN SODIUM 5000 UNIT(S): 5000 INJECTION INTRAVENOUS; SUBCUTANEOUS at 13:55

## 2020-01-30 RX ADMIN — TRAMADOL HYDROCHLORIDE 50 MILLIGRAM(S): 50 TABLET ORAL at 22:19

## 2020-01-30 RX ADMIN — TRAMADOL HYDROCHLORIDE 50 MILLIGRAM(S): 50 TABLET ORAL at 06:27

## 2020-01-30 RX ADMIN — Medication 650 MILLIGRAM(S): at 19:00

## 2020-01-30 RX ADMIN — CARVEDILOL PHOSPHATE 25 MILLIGRAM(S): 80 CAPSULE, EXTENDED RELEASE ORAL at 21:33

## 2020-01-30 RX ADMIN — CHLORHEXIDINE GLUCONATE 1 APPLICATION(S): 213 SOLUTION TOPICAL at 06:16

## 2020-01-30 RX ADMIN — CARVEDILOL PHOSPHATE 25 MILLIGRAM(S): 80 CAPSULE, EXTENDED RELEASE ORAL at 12:55

## 2020-01-30 RX ADMIN — Medication 650 MILLIGRAM(S): at 13:40

## 2020-01-30 RX ADMIN — TRAMADOL HYDROCHLORIDE 50 MILLIGRAM(S): 50 TABLET ORAL at 12:49

## 2020-01-30 RX ADMIN — Medication 75 MILLIGRAM(S): at 13:55

## 2020-01-30 RX ADMIN — Medication 75 MILLIGRAM(S): at 23:14

## 2020-01-30 NOTE — PROGRESS NOTE ADULT - PROBLEM SELECTOR PLAN 1
Patient with chest pain, ekg changes (new v2 and v3 inversions) and troponins elevated relative to his baseline two days ago  -Likely demand ischemia. Similar chest pain last time dialysis was performed, with this event occurring after dialysis session as well  -Echo w/o any evidence of ischemic changes (EF 65%, no regional wall abnormalities, grade 1 diastolic dysfunction, moderate to severe aortic stenosis)

## 2020-01-30 NOTE — PROGRESS NOTE ADULT - SUBJECTIVE AND OBJECTIVE BOX
Patient was seen and evaluated on dialysis.   HR: 64 (01-30-20 @ 12:45)  BP: 135/60 (01-30-20 @ 12:45)  Wt(kg): 55.2 Kg  01-30    137  |  93<L>  |  77<H>  ----------------------------<  262<H>  4.1   |  24  |  7.28<H>    Ca    10.1      30 Jan 2020 09:30  Phos  5.6     01-30  Mg     2.6     01-30      Continue dialysis:   Dialyzer:   Optiflux 180     QB: 400 ml/min  K bath: 3  Goal UF: 1L  Duration: 180 min    Patient is tolerating the procedure well.   Continue full hemodialysis treatment as prescribed.

## 2020-01-30 NOTE — PROGRESS NOTE ADULT - ATTENDING COMMENTS
On Date 1/30/20  Assessment: Patient personally seen and examined myself, face-to-face, during rounds with the Resident     Note read, including vitals, physical findings, laboratory data, and radiological reports.   Agree with above.

## 2020-01-30 NOTE — PROGRESS NOTE ADULT - PROBLEM SELECTOR PLAN 5
Course complicated by fevers with BCX growing MRSA, EBONI neg for vegetations, Gallium scan negative, no clear source. ID has recommended 4 week course  - c/w vanc dosed by level with level check post-HD and re-dosing vanc after  - Pt to receive 500mg of vanc on 1/30 (today), 2/4, and 2/8 with once weekly vanc level check outpt  - BCx NGTD since 1/15, Remains afebrile    #RUE superficial thrombus  - RUE swelling and erythema at IV site 1/16  - RUE doppler with superficial thrombus in cephalic vein  - Continue to monitor, no longer swollen/erythematous

## 2020-01-30 NOTE — PROGRESS NOTE ADULT - PROBLEM SELECTOR PLAN 7
Hx DM2 with peripheral neuropathy and ESRD, A1c at 6.3  - Home med lantus 4U at bedtime, decreased to 3U lantus in hospital  - Mod ISS    #CVA history  - Pt endorses hx CVA with residual R eye blindness, limited vision L eye  -Stroke code initially called on admission for lethargy/change in mental status in setting of hypertensive emergency  - CTH with microangiopathic ischemic disease, lacunar infarcts in the basal ganglia BL, and external carotid artery branch calcifications c/w HD pts  - Daily neuro exam, has remained unremarkable though limited by patient's unwillingness to cooperate

## 2020-01-30 NOTE — PROGRESS NOTE ADULT - SUBJECTIVE AND OBJECTIVE BOX
OVERNIGHT EVENTS: No acute events overnight.     SUBJECTIVE / INTERVAL HPI: Patient seen and examined at bedside. States that he currently feels 5/10 chest pain, similar to pain in the past but less severe in intensity. Patient denying any dyspnea today. On review of systems, endorsing chronic neck and back pain. Patient denying headache, cough, abdominal pain, nausea/vomiting.     VITAL SIGNS:  Vital Signs Last 24 Hrs  T(C): 36.1 (30 Jan 2020 09:10), Max: 36.6 (29 Jan 2020 10:11)  T(F): 97 (30 Jan 2020 09:10), Max: 97.9 (29 Jan 2020 10:11)  HR: 61 (30 Jan 2020 09:10) (55 - 66)  BP: 130/64 (30 Jan 2020 09:10) (100/54 - 158/69)  BP(mean): 76 (30 Jan 2020 09:10) (72 - 99)  RR: 18 (30 Jan 2020 09:10) (11 - 18)  SpO2: 96% (30 Jan 2020 09:10) (94% - 100%)    PHYSICAL EXAM:    General: Elderly male resting comfortably in bed, not in any acute distress  HEENT: anicteric sclera; MMM  Neck: supple, no jvd  Cardiovascular: +S1/S2; RRR. 3/6 holosystolic murmur   Respiratory: CTA B/L; no W/R/R  Gastrointestinal: soft, NT/ND; +BSx4  Extremities: WWP; no edema, clubbing or cyanosis. Left AV fistula with bruit. LLE foot with partial amputation of third toe  Vascular: 2+ radial, DP/PT pulses B/L  Neurological: AAOx3; no focal deficits     MEDICATIONS:  MEDICATIONS  (STANDING):  acetaminophen   Tablet .. 650 milliGRAM(s) Oral every 6 hours  aspirin  chewable 81 milliGRAM(s) Oral every 24 hours  atorvastatin 40 milliGRAM(s) Oral at bedtime  carvedilol 25 milliGRAM(s) Oral every 12 hours  chlorhexidine 2% Cloths 1 Application(s) Topical <User Schedule>  cloNIDine 0.1 milliGRAM(s) Oral at bedtime  cloNIDine 0.2 milliGRAM(s) Oral <User Schedule>  clopidogrel Tablet 75 milliGRAM(s) Oral daily  cyanocobalamin Injectable 100 MICROGram(s) IntraMuscular every 24 hours  dextrose 5%. 1000 milliLiter(s) (50 mL/Hr) IV Continuous <Continuous>  dextrose 50% Injectable 12.5 Gram(s) IV Push once  dextrose 50% Injectable 25 Gram(s) IV Push once  dextrose 50% Injectable 25 Gram(s) IV Push once  escitalopram 5 milliGRAM(s) Oral every 24 hours  gabapentin 100 milliGRAM(s) Oral <User Schedule>  heparin  Injectable 5000 Unit(s) SubCutaneous every 8 hours  hydrALAZINE 75 milliGRAM(s) Oral every 8 hours  insulin glargine Injectable (LANTUS) 3 Unit(s) SubCutaneous at bedtime  insulin lispro (HumaLOG) corrective regimen sliding scale   SubCutaneous Before meals and at bedtime  isosorbide   mononitrate ER Tablet (IMDUR) 120 milliGRAM(s) Oral every 24 hours  levothyroxine 50 MICROGram(s) Oral daily  lidocaine   Patch 1 Patch Transdermal every 24 hours  losartan 100 milliGRAM(s) Oral every 24 hours  melatonin 5 milliGRAM(s) Oral at bedtime  mirtazapine 15 milliGRAM(s) Oral at bedtime  NIFEdipine XL 60 milliGRAM(s) Oral every 12 hours  polyethylene glycol 3350 17 Gram(s) Oral at bedtime  ranolazine 500 milliGRAM(s) Oral two times a day  senna 2 Tablet(s) Oral at bedtime  sevelamer carbonate 800 milliGRAM(s) Oral three times a day with meals    MEDICATIONS  (PRN):  dextrose 40% Gel 15 Gram(s) Oral once PRN Blood Glucose LESS THAN 70 milliGRAM(s)/deciliter  glucagon  Injectable 1 milliGRAM(s) IntraMuscular once PRN Glucose LESS THAN 70 milligrams/deciliter  traMADol 50 milliGRAM(s) Oral every 6 hours PRN Moderate Pain (4 - 6)      ALLERGIES:  Allergies    No Known Allergies    Intolerances        LABS:                        12.4   7.49  )-----------( 221      ( 30 Jan 2020 09:30 )             37.7     01-29    139  |  93<L>  |  59<H>  ----------------------------<  173<H>  3.9   |  25  |  5.70<H>    Ca    9.9      29 Jan 2020 07:52  Phos  4.9     01-29  Mg     2.7     01-29      PTT - ( 29 Jan 2020 17:04 )  PTT:62.0 sec    CAPILLARY BLOOD GLUCOSE      POCT Blood Glucose.: 125 mg/dL (30 Jan 2020 06:30)      RADIOLOGY & ADDITIONAL TESTS: Reviewed.

## 2020-01-30 NOTE — PROGRESS NOTE ADULT - PROBLEM SELECTOR PLAN 2
Admitted with HTN emergency s/p nicardipine gtt and HD, now with occasional low bps (100s/60s)  Current regimen: hydralazine 75 mg TID (lowered from 100mg TID), losartan 100mg daily, coreg 25mg BID, clonidine 0.2mg in am and .1 in pm (lowered from 0.2 BID), nifedipine 60mg BID, imdur 120mg daily  - Renal US with doppler showing poor visualization of renal arteries, unable to r/o BRYNN  -Nephro following, f/u recs  -Continuing to monitor    #CAD  - Continue home meds  - ASA 81mg, plavix 75mg, atorvastatin 40mg daily    #PAD  - s/p stenting  - Management as above    #Renal artery stenosis  - s/p stenting  - Renal US with doppler with poor visualization of renal arteries, unable to r/o BRYNN although pt's htn better controlled over last few days

## 2020-01-31 LAB
ANION GAP SERPL CALC-SCNC: 18 MMOL/L — HIGH (ref 5–17)
BUN SERPL-MCNC: 46 MG/DL — HIGH (ref 7–23)
CALCIUM SERPL-MCNC: 10.2 MG/DL — SIGNIFICANT CHANGE UP (ref 8.4–10.5)
CHLORIDE SERPL-SCNC: 95 MMOL/L — LOW (ref 96–108)
CO2 SERPL-SCNC: 22 MMOL/L — SIGNIFICANT CHANGE UP (ref 22–31)
CREAT SERPL-MCNC: 4.8 MG/DL — HIGH (ref 0.5–1.3)
GLUCOSE BLDC GLUCOMTR-MCNC: 122 MG/DL — HIGH (ref 70–99)
GLUCOSE BLDC GLUCOMTR-MCNC: 172 MG/DL — HIGH (ref 70–99)
GLUCOSE BLDC GLUCOMTR-MCNC: 201 MG/DL — HIGH (ref 70–99)
GLUCOSE BLDC GLUCOMTR-MCNC: 202 MG/DL — HIGH (ref 70–99)
GLUCOSE SERPL-MCNC: 139 MG/DL — HIGH (ref 70–99)
HCT VFR BLD CALC: 41.4 % — SIGNIFICANT CHANGE UP (ref 39–50)
HGB BLD-MCNC: 12.9 G/DL — LOW (ref 13–17)
MAGNESIUM SERPL-MCNC: 2.4 MG/DL — SIGNIFICANT CHANGE UP (ref 1.6–2.6)
MCHC RBC-ENTMCNC: 28.2 PG — SIGNIFICANT CHANGE UP (ref 27–34)
MCHC RBC-ENTMCNC: 31.2 GM/DL — LOW (ref 32–36)
MCV RBC AUTO: 90.4 FL — SIGNIFICANT CHANGE UP (ref 80–100)
NRBC # BLD: 0 /100 WBCS — SIGNIFICANT CHANGE UP (ref 0–0)
PHOSPHATE SERPL-MCNC: 4.6 MG/DL — HIGH (ref 2.5–4.5)
PLATELET # BLD AUTO: 189 K/UL — SIGNIFICANT CHANGE UP (ref 150–400)
POTASSIUM SERPL-MCNC: 4.3 MMOL/L — SIGNIFICANT CHANGE UP (ref 3.5–5.3)
POTASSIUM SERPL-SCNC: 4.3 MMOL/L — SIGNIFICANT CHANGE UP (ref 3.5–5.3)
RBC # BLD: 4.58 M/UL — SIGNIFICANT CHANGE UP (ref 4.2–5.8)
RBC # FLD: 14.7 % — HIGH (ref 10.3–14.5)
SODIUM SERPL-SCNC: 135 MMOL/L — SIGNIFICANT CHANGE UP (ref 135–145)
WBC # BLD: 9.32 K/UL — SIGNIFICANT CHANGE UP (ref 3.8–10.5)
WBC # FLD AUTO: 9.32 K/UL — SIGNIFICANT CHANGE UP (ref 3.8–10.5)

## 2020-01-31 PROCEDURE — 99232 SBSQ HOSP IP/OBS MODERATE 35: CPT

## 2020-01-31 RX ADMIN — HEPARIN SODIUM 5000 UNIT(S): 5000 INJECTION INTRAVENOUS; SUBCUTANEOUS at 21:21

## 2020-01-31 RX ADMIN — PREGABALIN 100 MICROGRAM(S): 225 CAPSULE ORAL at 18:33

## 2020-01-31 RX ADMIN — Medication 650 MILLIGRAM(S): at 13:16

## 2020-01-31 RX ADMIN — SENNA PLUS 2 TABLET(S): 8.6 TABLET ORAL at 21:20

## 2020-01-31 RX ADMIN — CARVEDILOL PHOSPHATE 25 MILLIGRAM(S): 80 CAPSULE, EXTENDED RELEASE ORAL at 10:17

## 2020-01-31 RX ADMIN — Medication 650 MILLIGRAM(S): at 05:53

## 2020-01-31 RX ADMIN — Medication 650 MILLIGRAM(S): at 18:34

## 2020-01-31 RX ADMIN — RANOLAZINE 500 MILLIGRAM(S): 500 TABLET, FILM COATED, EXTENDED RELEASE ORAL at 18:33

## 2020-01-31 RX ADMIN — Medication 650 MILLIGRAM(S): at 23:40

## 2020-01-31 RX ADMIN — HEPARIN SODIUM 5000 UNIT(S): 5000 INJECTION INTRAVENOUS; SUBCUTANEOUS at 05:53

## 2020-01-31 RX ADMIN — INSULIN GLARGINE 3 UNIT(S): 100 INJECTION, SOLUTION SUBCUTANEOUS at 21:21

## 2020-01-31 RX ADMIN — TRAMADOL HYDROCHLORIDE 50 MILLIGRAM(S): 50 TABLET ORAL at 07:34

## 2020-01-31 RX ADMIN — Medication 0.2 MILLIGRAM(S): at 05:53

## 2020-01-31 RX ADMIN — Medication 4: at 21:20

## 2020-01-31 RX ADMIN — RANOLAZINE 500 MILLIGRAM(S): 500 TABLET, FILM COATED, EXTENDED RELEASE ORAL at 05:53

## 2020-01-31 RX ADMIN — Medication 60 MILLIGRAM(S): at 05:53

## 2020-01-31 RX ADMIN — TRAMADOL HYDROCHLORIDE 50 MILLIGRAM(S): 50 TABLET ORAL at 19:00

## 2020-01-31 RX ADMIN — Medication 5 MILLIGRAM(S): at 23:40

## 2020-01-31 RX ADMIN — ATORVASTATIN CALCIUM 40 MILLIGRAM(S): 80 TABLET, FILM COATED ORAL at 21:20

## 2020-01-31 RX ADMIN — LIDOCAINE 1 PATCH: 4 CREAM TOPICAL at 02:00

## 2020-01-31 RX ADMIN — Medication 75 MILLIGRAM(S): at 07:01

## 2020-01-31 RX ADMIN — MIRTAZAPINE 15 MILLIGRAM(S): 45 TABLET, ORALLY DISINTEGRATING ORAL at 23:41

## 2020-01-31 RX ADMIN — Medication 50 MICROGRAM(S): at 05:53

## 2020-01-31 RX ADMIN — Medication 650 MILLIGRAM(S): at 12:03

## 2020-01-31 RX ADMIN — CARVEDILOL PHOSPHATE 25 MILLIGRAM(S): 80 CAPSULE, EXTENDED RELEASE ORAL at 21:20

## 2020-01-31 RX ADMIN — Medication 650 MILLIGRAM(S): at 00:15

## 2020-01-31 RX ADMIN — Medication 0.1 MILLIGRAM(S): at 21:20

## 2020-01-31 RX ADMIN — Medication 75 MILLIGRAM(S): at 23:40

## 2020-01-31 RX ADMIN — HEPARIN SODIUM 5000 UNIT(S): 5000 INJECTION INTRAVENOUS; SUBCUTANEOUS at 14:17

## 2020-01-31 RX ADMIN — ESCITALOPRAM OXALATE 5 MILLIGRAM(S): 10 TABLET, FILM COATED ORAL at 23:41

## 2020-01-31 RX ADMIN — Medication 2: at 17:21

## 2020-01-31 RX ADMIN — ISOSORBIDE MONONITRATE 120 MILLIGRAM(S): 60 TABLET, EXTENDED RELEASE ORAL at 12:03

## 2020-01-31 RX ADMIN — LIDOCAINE 1 PATCH: 4 CREAM TOPICAL at 14:18

## 2020-01-31 RX ADMIN — Medication 650 MILLIGRAM(S): at 07:01

## 2020-01-31 RX ADMIN — TRAMADOL HYDROCHLORIDE 50 MILLIGRAM(S): 50 TABLET ORAL at 18:10

## 2020-01-31 RX ADMIN — CHLORHEXIDINE GLUCONATE 1 APPLICATION(S): 213 SOLUTION TOPICAL at 05:53

## 2020-01-31 RX ADMIN — POLYETHYLENE GLYCOL 3350 17 GRAM(S): 17 POWDER, FOR SOLUTION ORAL at 21:20

## 2020-01-31 RX ADMIN — Medication 81 MILLIGRAM(S): at 14:17

## 2020-01-31 RX ADMIN — SEVELAMER CARBONATE 800 MILLIGRAM(S): 2400 POWDER, FOR SUSPENSION ORAL at 08:32

## 2020-01-31 RX ADMIN — SEVELAMER CARBONATE 800 MILLIGRAM(S): 2400 POWDER, FOR SUSPENSION ORAL at 12:03

## 2020-01-31 RX ADMIN — Medication 4: at 12:02

## 2020-01-31 RX ADMIN — Medication 60 MILLIGRAM(S): at 18:33

## 2020-01-31 RX ADMIN — TRAMADOL HYDROCHLORIDE 50 MILLIGRAM(S): 50 TABLET ORAL at 08:05

## 2020-01-31 RX ADMIN — Medication 650 MILLIGRAM(S): at 19:04

## 2020-01-31 RX ADMIN — LOSARTAN POTASSIUM 100 MILLIGRAM(S): 100 TABLET, FILM COATED ORAL at 18:33

## 2020-01-31 RX ADMIN — Medication 75 MILLIGRAM(S): at 14:17

## 2020-01-31 RX ADMIN — CLOPIDOGREL BISULFATE 75 MILLIGRAM(S): 75 TABLET, FILM COATED ORAL at 12:03

## 2020-01-31 RX ADMIN — LIDOCAINE 1 PATCH: 4 CREAM TOPICAL at 19:51

## 2020-01-31 RX ADMIN — SEVELAMER CARBONATE 800 MILLIGRAM(S): 2400 POWDER, FOR SUSPENSION ORAL at 17:22

## 2020-01-31 RX ADMIN — GABAPENTIN 100 MILLIGRAM(S): 400 CAPSULE ORAL at 17:22

## 2020-01-31 NOTE — PROGRESS NOTE ADULT - ATTENDING COMMENTS
On Date 1/31/20  Assessment: Patient personally seen and examined myself, face-to-face, during rounds with the Resident     Note read, including vitals, physical findings, laboratory data, and radiological reports.   Agree with above.

## 2020-01-31 NOTE — PROGRESS NOTE ADULT - SUBJECTIVE AND OBJECTIVE BOX
OVERNIGHT EVENTS: No acute events overnight. Patient     SUBJECTIVE / INTERVAL HPI: Patient seen and examined at bedside. States that he feels generally weak and states he feels like he has lost a lot of weight over the last several months. States that his chronic neck and upper back pain is only mild this morning as he has just gotten his Tramadol. Patient also endorsing mild shortness of breath, similar to prior days. Otherwise, patient with no further complaints. Denies fevers/chills, chest pain, abdominal pain, nausea/vomiting.     VITAL SIGNS:  Vital Signs Last 24 Hrs  T(C): 36.1 (31 Jan 2020 10:49), Max: 36.6 (30 Jan 2020 16:56)  T(F): 97 (31 Jan 2020 10:49), Max: 97.9 (30 Jan 2020 16:56)  HR: 64 (31 Jan 2020 10:21) (58 - 71)  BP: 120/56 (31 Jan 2020 10:21) (100/55 - 172/74)  BP(mean): 80 (31 Jan 2020 10:21) (75 - 107)  RR: 13 (31 Jan 2020 10:21) (12 - 18)  SpO2: 99% (31 Jan 2020 10:21) (72% - 100%)    PHYSICAL EXAM:    General: Elderly male resting comfortably in bed, not in any acute distress  HEENT: anicteric sclera; MMM  Neck: supple, no jvd  Cardiovascular: +S1/S2; RRR. 3/6 holosystolic murmur   Respiratory: CTA B/L; no W/R/R  Gastrointestinal: soft, NT/ND; +BSx4  Extremities: WWP; no edema, clubbing or cyanosis. Left AV fistula with bruit. LLE foot with partial amputation of third toe  Vascular: 2+ radial, DP/PT pulses B/L  Neurological: AAOx3; no focal deficits     MEDICATIONS:  MEDICATIONS  (STANDING):  acetaminophen   Tablet .. 650 milliGRAM(s) Oral every 6 hours  aspirin  chewable 81 milliGRAM(s) Oral every 24 hours  atorvastatin 40 milliGRAM(s) Oral at bedtime  carvedilol 25 milliGRAM(s) Oral every 12 hours  chlorhexidine 2% Cloths 1 Application(s) Topical <User Schedule>  cloNIDine 0.1 milliGRAM(s) Oral at bedtime  cloNIDine 0.2 milliGRAM(s) Oral <User Schedule>  clopidogrel Tablet 75 milliGRAM(s) Oral daily  cyanocobalamin Injectable 100 MICROGram(s) IntraMuscular every 24 hours  dextrose 5%. 1000 milliLiter(s) (50 mL/Hr) IV Continuous <Continuous>  dextrose 50% Injectable 12.5 Gram(s) IV Push once  dextrose 50% Injectable 25 Gram(s) IV Push once  dextrose 50% Injectable 25 Gram(s) IV Push once  escitalopram 5 milliGRAM(s) Oral every 24 hours  gabapentin 100 milliGRAM(s) Oral <User Schedule>  heparin  Injectable 5000 Unit(s) SubCutaneous every 8 hours  hydrALAZINE 75 milliGRAM(s) Oral every 8 hours  insulin glargine Injectable (LANTUS) 3 Unit(s) SubCutaneous at bedtime  insulin lispro (HumaLOG) corrective regimen sliding scale   SubCutaneous Before meals and at bedtime  isosorbide   mononitrate ER Tablet (IMDUR) 120 milliGRAM(s) Oral every 24 hours  levothyroxine 50 MICROGram(s) Oral daily  lidocaine   Patch 1 Patch Transdermal every 24 hours  losartan 100 milliGRAM(s) Oral every 24 hours  melatonin 5 milliGRAM(s) Oral at bedtime  mirtazapine 15 milliGRAM(s) Oral at bedtime  NIFEdipine XL 60 milliGRAM(s) Oral every 12 hours  polyethylene glycol 3350 17 Gram(s) Oral at bedtime  ranolazine 500 milliGRAM(s) Oral two times a day  senna 2 Tablet(s) Oral at bedtime  sevelamer carbonate 800 milliGRAM(s) Oral three times a day with meals    MEDICATIONS  (PRN):  dextrose 40% Gel 15 Gram(s) Oral once PRN Blood Glucose LESS THAN 70 milliGRAM(s)/deciliter  glucagon  Injectable 1 milliGRAM(s) IntraMuscular once PRN Glucose LESS THAN 70 milligrams/deciliter  traMADol 50 milliGRAM(s) Oral every 6 hours PRN Moderate Pain (4 - 6)      ALLERGIES:  Allergies    No Known Allergies    Intolerances        LABS:                        12.9   9.32  )-----------( 189      ( 31 Jan 2020 07:18 )             41.4     01-31    135  |  95<L>  |  46<H>  ----------------------------<  139<H>  4.3   |  22  |  4.80<H>    Ca    10.2      31 Jan 2020 07:18  Phos  4.6     01-31  Mg     2.4     01-31      PTT - ( 29 Jan 2020 17:04 )  PTT:62.0 sec    CAPILLARY BLOOD GLUCOSE      POCT Blood Glucose.: 122 mg/dL (31 Jan 2020 07:13)      RADIOLOGY & ADDITIONAL TESTS: Reviewed.

## 2020-01-31 NOTE — PROGRESS NOTE ADULT - PROBLEM SELECTOR PLAN 2
Admitted with HTN emergency s/p nicardipine gtt and HD, now with occasional low bps (100s/60s)  Current regimen: hydralazine 75 mg TID (lowered from 100mg TID), losartan 100mg daily, coreg 25mg BID, clonidine 0.2mg in am and .1 in pm (lowered from 0.2 BID), nifedipine 60mg BID, imdur 120mg daily  - Renal US with doppler showing poor visualization of renal arteries, unable to r/o BRYNN  -Continuing to monitor, can titrate down clonidine as appropriate    #CAD  - Continue home meds  - ASA 81mg, plavix 75mg, atorvastatin 40mg daily    #PAD  - s/p stenting  - Management as above    #Renal artery stenosis  - s/p stenting  - Renal US with doppler with poor visualization of renal arteries, unable to r/o BRYNN although pt's htn better controlled over last few days

## 2020-01-31 NOTE — PROGRESS NOTE ADULT - PROBLEM SELECTOR PLAN 1
Hospital course complicated by fevers with BCX growing MRSA, EBONI neg for vegetations, Gallium scan negative, no clear source. ID has recommended 4 week course  -c/w vanc level check on dialysis days  -Given patient's frequent spratherapeutic vanc levels, following regimen instated: Pt to receive 500mg of vanc on 2/4 and 2/8 (with once weekly vanc level check if outpt to ensure we are not underdosing)  - BCx NGTD since 1/15, Remains afebrile    #RUE superficial thrombus  - RUE swelling and erythema at IV site 1/16  - RUE doppler with superficial thrombus in cephalic vein  - Continue to monitor, no longer swollen/erythematous

## 2020-01-31 NOTE — PROGRESS NOTE ADULT - SUBJECTIVE AND OBJECTIVE BOX
CC: SYNCOPE    INTERVAL HISTORY:    Dialyzed yesterday. BP labile. AF on abx.     ROS: No chest pain. No shortness of breath. No nausea.    PAST MEDICAL & SURGICAL HISTORY:  Peripheral neuropathy  Peripheral artery disease: s/p bilateral stents  Anemia of renal disease  End-stage renal disease (ESRD)  Type II diabetes mellitus  Retinal artery occlusion  Hypothyroidism  Low back pain  CAD (coronary artery disease)  H/O renal artery stenosis: s/p L renal stent  Hyperlipidemia  Hypertension  No significant past surgical history    PHYSICAL EXAM:  T(C): 36.2 (2020 21:42), Max: 36.5 (2020 06:00)  HR: 71 (2020 21:29)  BP: 165/71 (2020 21:29) (116/54 - 172/74)  RR: 20 (2020 21:29)  SpO2: 99% (2020 21:29)      2020 07:01  -  2020 22:29  --------------------------------------------------------  IN:    Oral Fluid: 327 mL  Total IN: 327 mL    OUT:  Total OUT: 0 mL    Total NET: 327 mL        Weight     Appearance: alert, pleasant, INAD.  ENT: oral mucosa moist, no pallor/cyanosis.  Neck: no JVD visible.  Cardiac: no rubs.SALAZAR Extremities: NO EDEMA.  Skin: no rashes.  Extremities (digits): no clubbing or cyanosis.  Respratory effort: no access muscle use. Lungs: CLEAR TO AUSCULTATION.  Abdomen: soft. nontender. no masses.  Psych affect: depressed    MEDICATIONS  (STANDING):  acetaminophen   Tablet .. 650 milliGRAM(s) Oral every 6 hours  aspirin  chewable 81 milliGRAM(s) Oral every 24 hours  atorvastatin 40 milliGRAM(s) Oral at bedtime  carvedilol 25 milliGRAM(s) Oral every 12 hours  chlorhexidine 2% Cloths 1 Application(s) Topical <User Schedule>  cloNIDine 0.1 milliGRAM(s) Oral at bedtime  cloNIDine 0.2 milliGRAM(s) Oral <User Schedule>  clopidogrel Tablet 75 milliGRAM(s) Oral daily  cyanocobalamin Injectable 100 MICROGram(s) IntraMuscular every 24 hours  dextrose 5%. 1000 milliLiter(s) (50 mL/Hr) IV Continuous <Continuous>  dextrose 50% Injectable 12.5 Gram(s) IV Push once  dextrose 50% Injectable 25 Gram(s) IV Push once  dextrose 50% Injectable 25 Gram(s) IV Push once  escitalopram 5 milliGRAM(s) Oral every 24 hours  gabapentin 100 milliGRAM(s) Oral <User Schedule>  heparin  Injectable 5000 Unit(s) SubCutaneous every 8 hours  hydrALAZINE 75 milliGRAM(s) Oral every 8 hours  insulin glargine Injectable (LANTUS) 3 Unit(s) SubCutaneous at bedtime  insulin lispro (HumaLOG) corrective regimen sliding scale   SubCutaneous Before meals and at bedtime  isosorbide   mononitrate ER Tablet (IMDUR) 120 milliGRAM(s) Oral every 24 hours  levothyroxine 50 MICROGram(s) Oral daily  lidocaine   Patch 1 Patch Transdermal every 24 hours  losartan 100 milliGRAM(s) Oral every 24 hours  melatonin 5 milliGRAM(s) Oral at bedtime  mirtazapine 15 milliGRAM(s) Oral at bedtime  NIFEdipine XL 60 milliGRAM(s) Oral every 12 hours  polyethylene glycol 3350 17 Gram(s) Oral at bedtime  ranolazine 500 milliGRAM(s) Oral two times a day  senna 2 Tablet(s) Oral at bedtime  sevelamer carbonate 800 milliGRAM(s) Oral three times a day with meals    MEDICATIONS  (PRN):  dextrose 40% Gel 15 Gram(s) Oral once PRN Blood Glucose LESS THAN 70 milliGRAM(s)/deciliter  glucagon  Injectable 1 milliGRAM(s) IntraMuscular once PRN Glucose LESS THAN 70 milligrams/deciliter  traMADol 50 milliGRAM(s) Oral every 6 hours PRN Moderate Pain (4 - 6)    DATA:  135    |  95<L>  |  46<H>  ----------------------------<  139<H>  Ca:10.2  (2020 07:18)  4.3     |  22     |  4.80<H>      eGFR if Non : 11 <L>  eGFR if : 13 <L>        SCr 4.80 [2020 07:18]  SCr 7.28 [2020 09:30]  SCr 5.70 [2020 07:52]  SCr 8.40 [2020 11:28]  SCr 6.82 [2020 11:02]                          12.9<L>  9.32  )-----------( 189      ( 2020 07:18 )             41.4     Phos:4.6 mg/dL<H> M.4 mg/dL PTH:-- Uric acid:-- Serum Osm:--  Ferritin:-- Iron:-- TIBC:-- Tsat:--  B12:-- TSH:-- (2020 07:18)

## 2020-01-31 NOTE — PROGRESS NOTE ADULT - ASSESSMENT
ESRD, labile BP, AS, bacteremia.    Suggest:    1. HD tomorrow.  2. Follow vanco levels.  3. Continue BP meds.    Please call with any questions.    Zachery Llamas MD, FACP, FASN | kidney.[facility]  Nephrology, Hypertension, and Internal Medicine  Mobile: [phone] (Daytime Hours Only)  Office/Answering Service: [phone]  Asst. Prof. of Medicine, [employer] at [facility] - Nephrology at [address]  [address], [address], [place], [place]

## 2020-01-31 NOTE — CHART NOTE - NSCHARTNOTEFT_GEN_A_CORE
At 9:20 PM, I was informed that patient Julian Londono took off his leads and requested to be off telemetry. I again spoke with the patient regarding the need for him to be on telemetry, given his history of coronary artery disease, severe aortic stenosis, ESRD, MRSA bacteremia, hypertensive emergency, etc. He has reasons to be on telemetry given that just 3days ago, he had chest pain with EKG changes depicting T-wave inversions, concerning for an NSTEMI. This conversation was no different than the one that took place last night. I reiterated that the wires on his chest were important as they allowed his nurses and doctors to better monitor his health condition at all times. If they were to be off, and something were to happen, we would not be able to see on the cardiac monitor on telemetry, which would lead to a delay and impede his care on a cardiac telemetry unit. The patient stated he understood, and that "Yes I know you told me yesterday. I don't care anymore, I just want to rest. I will put it on the morning again, no more wires tonight." The patient is aware and denoted understanding. Patient is off telemetry as of 9:30 PM 1/31/2020.

## 2020-01-31 NOTE — CHART NOTE - NSCHARTNOTEFT_GEN_A_CORE
At 1:50 AM, I was informed that patient Julian Londono took off his leads and requested to be off telemetry. I spoke with the patient regarding the need for him to be on telemetry, given his history of coronary artery disease, severe aortic stenosis, ESRD, MRSA bacteremia, hypertensive emergency, etc. He has many reasons to be on telemetry given that just 2 days ago, he had chest pain with EKG changes depicting T-wave inversions, concerning for an NSTEMI. I discussed with the patient that the wires on his chest were important as they allowed his nurses and doctors to better monitor his health condition at all times. The patient stated he understood, and stated "I've been here in this hospital too long. I am tired of everything. Just let me be. Let me sleep. and do not put the wires back on me. You can put it back on tomorrow in the daytime." I warned the patient that in the event something were to happen to his heart while he sleeps, should his heart stop at any time or for any reason, there may be a delay in the staff noticing, as the patient would be off the monitor. The patient is aware and denoted understanding. Patient is off telemetry as of 2:00 AM 1/31/2020.

## 2020-01-31 NOTE — PROGRESS NOTE ADULT - PROBLEM SELECTOR PLAN 6
Patient with chest pain, ekg changes (new v2 and v3 inversions) and troponins elevated relative to his baseline earlier this week  -Likely demand ischemia. Similar chest pain last time dialysis was performed, with this past event occurring after dialysis session as well  -Echo w/o any evidence of ischemic changes (EF 65%, no regional wall abnormalities, grade 1 diastolic dysfunction, moderate to severe aortic stenosis)

## 2020-01-31 NOTE — PROGRESS NOTE ADULT - PROBLEM SELECTOR PLAN 4
Hx of ESRD, L AVF with HD T/Th/S, last HD 1/30  - Nephro following, f/u recs  - Renvela 800mg TID  - c/w Daily weights, strict I&O, renal diet  - Avoid nephrotoxic agents, renally dose meds    #Carotid artery stenosis  - s/p stenting  - Management as above    #Pulmonary HTN  - ECHO with findings as above

## 2020-01-31 NOTE — PROGRESS NOTE ADULT - PROBLEM SELECTOR PLAN 10
- F: none  - E: replete cautiously in ESRD  - N: DASH/TLC  - D: heparin 5000U sq q8h    Code: full  Dispo: 5L, pending discharge to [facility] with HD

## 2020-02-01 LAB
ANION GAP SERPL CALC-SCNC: 20 MMOL/L — HIGH (ref 5–17)
BUN SERPL-MCNC: 69 MG/DL — HIGH (ref 7–23)
CALCIUM SERPL-MCNC: 10.1 MG/DL — SIGNIFICANT CHANGE UP (ref 8.4–10.5)
CHLORIDE SERPL-SCNC: 94 MMOL/L — LOW (ref 96–108)
CO2 SERPL-SCNC: 23 MMOL/L — SIGNIFICANT CHANGE UP (ref 22–31)
CREAT SERPL-MCNC: 6.58 MG/DL — HIGH (ref 0.5–1.3)
GLUCOSE BLDC GLUCOMTR-MCNC: 137 MG/DL — HIGH (ref 70–99)
GLUCOSE BLDC GLUCOMTR-MCNC: 139 MG/DL — HIGH (ref 70–99)
GLUCOSE BLDC GLUCOMTR-MCNC: 172 MG/DL — HIGH (ref 70–99)
GLUCOSE BLDC GLUCOMTR-MCNC: 95 MG/DL — SIGNIFICANT CHANGE UP (ref 70–99)
GLUCOSE SERPL-MCNC: 147 MG/DL — HIGH (ref 70–99)
HCT VFR BLD CALC: 38.9 % — LOW (ref 39–50)
HGB BLD-MCNC: 12.6 G/DL — LOW (ref 13–17)
MAGNESIUM SERPL-MCNC: 2.7 MG/DL — HIGH (ref 1.6–2.6)
MCHC RBC-ENTMCNC: 28.8 PG — SIGNIFICANT CHANGE UP (ref 27–34)
MCHC RBC-ENTMCNC: 32.4 GM/DL — SIGNIFICANT CHANGE UP (ref 32–36)
MCV RBC AUTO: 88.8 FL — SIGNIFICANT CHANGE UP (ref 80–100)
NRBC # BLD: 0 /100 WBCS — SIGNIFICANT CHANGE UP (ref 0–0)
PHOSPHATE SERPL-MCNC: 5.2 MG/DL — HIGH (ref 2.5–4.5)
PLATELET # BLD AUTO: 191 K/UL — SIGNIFICANT CHANGE UP (ref 150–400)
POTASSIUM SERPL-MCNC: 4.9 MMOL/L — SIGNIFICANT CHANGE UP (ref 3.5–5.3)
POTASSIUM SERPL-SCNC: 4.9 MMOL/L — SIGNIFICANT CHANGE UP (ref 3.5–5.3)
RBC # BLD: 4.38 M/UL — SIGNIFICANT CHANGE UP (ref 4.2–5.8)
RBC # FLD: 14.8 % — HIGH (ref 10.3–14.5)
SODIUM SERPL-SCNC: 137 MMOL/L — SIGNIFICANT CHANGE UP (ref 135–145)
VANCOMYCIN FLD-MCNC: 19.8 UG/ML — SIGNIFICANT CHANGE UP
WBC # BLD: 8.09 K/UL — SIGNIFICANT CHANGE UP (ref 3.8–10.5)
WBC # FLD AUTO: 8.09 K/UL — SIGNIFICANT CHANGE UP (ref 3.8–10.5)

## 2020-02-01 PROCEDURE — 90935 HEMODIALYSIS ONE EVALUATION: CPT | Mod: GC

## 2020-02-01 PROCEDURE — 99232 SBSQ HOSP IP/OBS MODERATE 35: CPT

## 2020-02-01 RX ORDER — VANCOMYCIN HCL 1 G
500 VIAL (EA) INTRAVENOUS ONCE
Refills: 0 | Status: COMPLETED | OUTPATIENT
Start: 2020-02-01 | End: 2020-02-01

## 2020-02-01 RX ADMIN — LIDOCAINE 1 PATCH: 4 CREAM TOPICAL at 02:00

## 2020-02-01 RX ADMIN — Medication 60 MILLIGRAM(S): at 18:07

## 2020-02-01 RX ADMIN — Medication 81 MILLIGRAM(S): at 13:42

## 2020-02-01 RX ADMIN — ATORVASTATIN CALCIUM 40 MILLIGRAM(S): 80 TABLET, FILM COATED ORAL at 20:45

## 2020-02-01 RX ADMIN — HEPARIN SODIUM 5000 UNIT(S): 5000 INJECTION INTRAVENOUS; SUBCUTANEOUS at 06:56

## 2020-02-01 RX ADMIN — PREGABALIN 100 MICROGRAM(S): 225 CAPSULE ORAL at 18:07

## 2020-02-01 RX ADMIN — TRAMADOL HYDROCHLORIDE 50 MILLIGRAM(S): 50 TABLET ORAL at 01:21

## 2020-02-01 RX ADMIN — RANOLAZINE 500 MILLIGRAM(S): 500 TABLET, FILM COATED, EXTENDED RELEASE ORAL at 18:07

## 2020-02-01 RX ADMIN — CHLORHEXIDINE GLUCONATE 1 APPLICATION(S): 213 SOLUTION TOPICAL at 06:54

## 2020-02-01 RX ADMIN — Medication 0.2 MILLIGRAM(S): at 06:55

## 2020-02-01 RX ADMIN — LIDOCAINE 1 PATCH: 4 CREAM TOPICAL at 14:00

## 2020-02-01 RX ADMIN — Medication 0.1 MILLIGRAM(S): at 20:45

## 2020-02-01 RX ADMIN — HEPARIN SODIUM 5000 UNIT(S): 5000 INJECTION INTRAVENOUS; SUBCUTANEOUS at 13:53

## 2020-02-01 RX ADMIN — ISOSORBIDE MONONITRATE 120 MILLIGRAM(S): 60 TABLET, EXTENDED RELEASE ORAL at 13:41

## 2020-02-01 RX ADMIN — LIDOCAINE 1 PATCH: 4 CREAM TOPICAL at 18:10

## 2020-02-01 RX ADMIN — Medication 60 MILLIGRAM(S): at 06:55

## 2020-02-01 RX ADMIN — MIRTAZAPINE 15 MILLIGRAM(S): 45 TABLET, ORALLY DISINTEGRATING ORAL at 23:54

## 2020-02-01 RX ADMIN — CARVEDILOL PHOSPHATE 25 MILLIGRAM(S): 80 CAPSULE, EXTENDED RELEASE ORAL at 20:45

## 2020-02-01 RX ADMIN — Medication 650 MILLIGRAM(S): at 23:54

## 2020-02-01 RX ADMIN — HEPARIN SODIUM 5000 UNIT(S): 5000 INJECTION INTRAVENOUS; SUBCUTANEOUS at 20:46

## 2020-02-01 RX ADMIN — Medication 75 MILLIGRAM(S): at 13:42

## 2020-02-01 RX ADMIN — Medication 650 MILLIGRAM(S): at 06:55

## 2020-02-01 RX ADMIN — TRAMADOL HYDROCHLORIDE 50 MILLIGRAM(S): 50 TABLET ORAL at 21:45

## 2020-02-01 RX ADMIN — SEVELAMER CARBONATE 800 MILLIGRAM(S): 2400 POWDER, FOR SUSPENSION ORAL at 13:41

## 2020-02-01 RX ADMIN — Medication 650 MILLIGRAM(S): at 12:00

## 2020-02-01 RX ADMIN — SENNA PLUS 2 TABLET(S): 8.6 TABLET ORAL at 20:45

## 2020-02-01 RX ADMIN — SEVELAMER CARBONATE 800 MILLIGRAM(S): 2400 POWDER, FOR SUSPENSION ORAL at 18:06

## 2020-02-01 RX ADMIN — Medication 650 MILLIGRAM(S): at 00:29

## 2020-02-01 RX ADMIN — LOSARTAN POTASSIUM 100 MILLIGRAM(S): 100 TABLET, FILM COATED ORAL at 18:07

## 2020-02-01 RX ADMIN — CARVEDILOL PHOSPHATE 25 MILLIGRAM(S): 80 CAPSULE, EXTENDED RELEASE ORAL at 13:43

## 2020-02-01 RX ADMIN — TRAMADOL HYDROCHLORIDE 50 MILLIGRAM(S): 50 TABLET ORAL at 14:37

## 2020-02-01 RX ADMIN — Medication 650 MILLIGRAM(S): at 13:44

## 2020-02-01 RX ADMIN — Medication 75 MILLIGRAM(S): at 06:55

## 2020-02-01 RX ADMIN — INSULIN GLARGINE 3 UNIT(S): 100 INJECTION, SOLUTION SUBCUTANEOUS at 21:49

## 2020-02-01 RX ADMIN — ESCITALOPRAM OXALATE 5 MILLIGRAM(S): 10 TABLET, FILM COATED ORAL at 23:54

## 2020-02-01 RX ADMIN — TRAMADOL HYDROCHLORIDE 50 MILLIGRAM(S): 50 TABLET ORAL at 20:45

## 2020-02-01 RX ADMIN — Medication 650 MILLIGRAM(S): at 07:29

## 2020-02-01 RX ADMIN — Medication 650 MILLIGRAM(S): at 18:07

## 2020-02-01 RX ADMIN — Medication 50 MICROGRAM(S): at 06:55

## 2020-02-01 RX ADMIN — TRAMADOL HYDROCHLORIDE 50 MILLIGRAM(S): 50 TABLET ORAL at 09:00

## 2020-02-01 RX ADMIN — RANOLAZINE 500 MILLIGRAM(S): 500 TABLET, FILM COATED, EXTENDED RELEASE ORAL at 06:55

## 2020-02-01 RX ADMIN — Medication 2: at 21:48

## 2020-02-01 RX ADMIN — Medication 650 MILLIGRAM(S): at 19:00

## 2020-02-01 RX ADMIN — POLYETHYLENE GLYCOL 3350 17 GRAM(S): 17 POWDER, FOR SOLUTION ORAL at 20:46

## 2020-02-01 RX ADMIN — Medication 100 MILLIGRAM(S): at 14:38

## 2020-02-01 RX ADMIN — Medication 5 MILLIGRAM(S): at 23:54

## 2020-02-01 RX ADMIN — TRAMADOL HYDROCHLORIDE 50 MILLIGRAM(S): 50 TABLET ORAL at 08:44

## 2020-02-01 RX ADMIN — CLOPIDOGREL BISULFATE 75 MILLIGRAM(S): 75 TABLET, FILM COATED ORAL at 13:43

## 2020-02-01 RX ADMIN — TRAMADOL HYDROCHLORIDE 50 MILLIGRAM(S): 50 TABLET ORAL at 02:10

## 2020-02-01 RX ADMIN — TRAMADOL HYDROCHLORIDE 50 MILLIGRAM(S): 50 TABLET ORAL at 15:02

## 2020-02-01 RX ADMIN — Medication 75 MILLIGRAM(S): at 23:54

## 2020-02-01 RX ADMIN — SEVELAMER CARBONATE 800 MILLIGRAM(S): 2400 POWDER, FOR SUSPENSION ORAL at 08:44

## 2020-02-01 NOTE — PROGRESS NOTE ADULT - SUBJECTIVE AND OBJECTIVE BOX
Patient is a 69y Male seen and evaluated at bedside.   pt seen and examined while on HD       acetaminophen   Tablet .. 650 every 6 hours  aspirin  chewable 81 every 24 hours  atorvastatin 40 at bedtime  carvedilol 25 every 12 hours  chlorhexidine 2% Cloths 1 <User Schedule>  cloNIDine 0.1 at bedtime  cloNIDine 0.2 <User Schedule>  clopidogrel Tablet 75 daily  cyanocobalamin Injectable 100 every 24 hours  dextrose 40% Gel 15 once PRN  dextrose 5%. 1000 <Continuous>  dextrose 50% Injectable 12.5 once  dextrose 50% Injectable 25 once  dextrose 50% Injectable 25 once  escitalopram 5 every 24 hours  gabapentin 100 <User Schedule>  glucagon  Injectable 1 once PRN  heparin  Injectable 5000 every 8 hours  hydrALAZINE 75 every 8 hours  insulin glargine Injectable (LANTUS) 3 at bedtime  insulin lispro (HumaLOG) corrective regimen sliding scale  Before meals and at bedtime  isosorbide   mononitrate ER Tablet (IMDUR) 120 every 24 hours  levothyroxine 50 daily  lidocaine   Patch 1 every 24 hours  losartan 100 every 24 hours  melatonin 5 at bedtime  mirtazapine 15 at bedtime  NIFEdipine XL 60 every 12 hours  polyethylene glycol 3350 17 at bedtime  ranolazine 500 two times a day  senna 2 at bedtime  sevelamer carbonate 800 three times a day with meals  traMADol 50 every 6 hours PRN  vancomycin  IVPB 500 once      Allergies    No Known Allergies    Intolerances        T(C): , Max: 36.4 (02-01-20 @ 06:32)  T(F): , Max: 97.5 (02-01-20 @ 06:32)  HR: 63 (02-01-20 @ 09:25)  BP: 139/54 (02-01-20 @ 09:25)  BP(mean): 97 (02-01-20 @ 09:05)  RR: 17 (02-01-20 @ 09:25)  SpO2: 100% (02-01-20 @ 09:25)  Wt(kg): --    01-31 @ 07:01  -  02-01 @ 07:00  --------------------------------------------------------  IN: 327 mL / OUT: 0 mL / NET: 327 mL          Review of Systems:  CONSTITUTIONAL: No fever or chills, No fatigue or tiredness.  EYES: No blurred or double vision.  RESPIRATORY: No shortness of breath, cough, hemoptysis  CARDIOVASCULAR: No Chest pain or shortness of breath  GASTROINTESTINAL: NO abdominal or flank pain, No nausea or vomiting, No diarrhea  GENITOURINARY: No dysuria or urinary burning, No difficulty passing urine, No hematuria  NEUROLOGICAL: No headaches or blurred vision  SKIN: No skin rashes   MUSCULOSKELETAL: No arthralgia, Joint pain, leg edema, No muscle pains      PHYSICAL EXAM:  GENERAL: NAD,  HEAD:  Atraumatic, Normocephalic,   EYES: Bilateral conjunctiva and sclera normal   Oral cavity: Oral mucosa dry and pink  NECK: Neck supple, No JVD  CHEST/LUNG: Clear to auscultation bilaterally; No wheeze, no rales, no crepitations  HEART: Regular rate and rhythm. SALAZAR II/VI at LPSB, No gallop, no rub   ABDOMEN: Soft, Nontender, BS+nt, No flank tenderness.   EXTREMITIES: No clubbing, cyanosis, or edema, LUE AVF with thrill and bruit   Neurology: AAOx3, no focal neurological deficit  SKIN: No rashes or lesions          LABS:                        12.6   8.09  )-----------( 191      ( 01 Feb 2020 10:53 )             38.9     02-01    137  |  94<L>  |  69<H>  ----------------------------<  147<H>  4.9   |  23  |  6.58<H>    Ca    10.1      01 Feb 2020 10:53  Phos  5.2     02-01  Mg     2.7     02-01                  RADIOLOGY & ADDITIONAL STUDIES: Patient is a 69y Male seen and evaluated at bedside.   pt seen and examined while on HD       acetaminophen   Tablet .. 650 every 6 hours  aspirin  chewable 81 every 24 hours  atorvastatin 40 at bedtime  carvedilol 25 every 12 hours  chlorhexidine 2% Cloths 1 <User Schedule>  cloNIDine 0.1 at bedtime  cloNIDine 0.2 <User Schedule>  clopidogrel Tablet 75 daily  cyanocobalamin Injectable 100 every 24 hours  dextrose 40% Gel 15 once PRN  dextrose 5%. 1000 <Continuous>  dextrose 50% Injectable 12.5 once  dextrose 50% Injectable 25 once  dextrose 50% Injectable 25 once  escitalopram 5 every 24 hours  gabapentin 100 <User Schedule>  glucagon  Injectable 1 once PRN  heparin  Injectable 5000 every 8 hours  hydrALAZINE 75 every 8 hours  insulin glargine Injectable (LANTUS) 3 at bedtime  insulin lispro (HumaLOG) corrective regimen sliding scale  Before meals and at bedtime  isosorbide   mononitrate ER Tablet (IMDUR) 120 every 24 hours  levothyroxine 50 daily  lidocaine   Patch 1 every 24 hours  losartan 100 every 24 hours  melatonin 5 at bedtime  mirtazapine 15 at bedtime  NIFEdipine XL 60 every 12 hours  polyethylene glycol 3350 17 at bedtime  ranolazine 500 two times a day  senna 2 at bedtime  sevelamer carbonate 800 three times a day with meals  traMADol 50 every 6 hours PRN  vancomycin  IVPB 500 once      Allergies    No Known Allergies    Intolerances        T(C): , Max: 36.4 (02-01-20 @ 06:32)  T(F): , Max: 97.5 (02-01-20 @ 06:32)  HR: 63 (02-01-20 @ 09:25)  BP: 139/54 (02-01-20 @ 09:25)  BP(mean): 97 (02-01-20 @ 09:05)  RR: 17 (02-01-20 @ 09:25)  SpO2: 100% (02-01-20 @ 09:25)  Wt(kg): --    01-31 @ 07:01  -  02-01 @ 07:00  --------------------------------------------------------  IN: 327 mL / OUT: 0 mL / NET: 327 mL          Review of Systems:  CONSTITUTIONAL: No fever or chills, No fatigue or tiredness.  EYES: No blurred or double vision.  RESPIRATORY: No shortness of breath, cough, hemoptysis  CARDIOVASCULAR: No Chest pain or shortness of breath  GASTROINTESTINAL: NO abdominal or flank pain, No nausea or vomiting, No diarrhea  GENITOURINARY: No dysuria or urinary burning, No difficulty passing urine, No hematuria  NEUROLOGICAL: No headaches or blurred vision  SKIN: No skin rashes   MUSCULOSKELETAL: No arthralgia, Joint pain, leg edema, No muscle pains      PHYSICAL EXAM:  GENERAL: NAD,  HEAD:  Atraumatic, Normocephalic,   EYES: Bilateral conjunctiva and sclera normal   Oral cavity: Oral mucosa dry and pink  NECK: Neck supple, No JVD  CHEST/LUNG: Clear to auscultation bilaterally; No wheeze, no rales, no crepitations  HEART: Regular rate and rhythm. SM,  no rub   ABDOMEN: Soft, Nontender, BS+nt, No flank tenderness.   EXTREMITIES: No clubbing, cyanosis, or edema, LUE AVF with thrill and bruit   Neurology: AAOx3, no focal neurological deficit  SKIN: No rashes or lesions          LABS:                        12.6   8.09  )-----------( 191      ( 01 Feb 2020 10:53 )             38.9     02-01    137  |  94<L>  |  69<H>  ----------------------------<  147<H>  4.9   |  23  |  6.58<H>    Ca    10.1      01 Feb 2020 10:53  Phos  5.2     02-01  Mg     2.7     02-01                  RADIOLOGY & ADDITIONAL STUDIES:

## 2020-02-01 NOTE — PROGRESS NOTE ADULT - SUBJECTIVE AND OBJECTIVE BOX
OVERNIGHT EVENTS: No acute events overnight. Patient again refused to be connected to telemonitoring leads overnight, stating that they disrupt his sleep. Leads placed back on early this morning.    SUBJECTIVE / INTERVAL HPI: Patient seen and examined at bedside. States that he continues to feel weak though states he has a good appetite this morning. Patient endorsing his chronic neck and upper back pain. Also states that he had some chest pain earlier this morning, now resolved. Patient denying fevers/chills, headaches, sob, abdominal pain, nausea/vomiting.    VITAL SIGNS:  Vital Signs Last 24 Hrs  T(C): 36.4 (01 Feb 2020 09:25), Max: 36.4 (01 Feb 2020 06:32)  T(F): 97.5 (01 Feb 2020 09:25), Max: 97.5 (01 Feb 2020 06:32)  HR: 63 (01 Feb 2020 09:25) (63 - 74)  BP: 139/54 (01 Feb 2020 09:25) (119/56 - 187/84)  BP(mean): 97 (01 Feb 2020 09:05) (80 - 120)  RR: 17 (01 Feb 2020 09:25) (11 - 25)  SpO2: 100% (01 Feb 2020 09:25) (98% - 100%)    PHYSICAL EXAM:    General: Elderly male resting comfortably in bed, not in any acute distress  HEENT: anicteric sclera; MMM  Neck: supple, no jvd  Cardiovascular: +S1/S2; RRR. 3/6 holosystolic murmur   Respiratory: CTA B/L; no W/R/R  Gastrointestinal: soft, NT/ND; +BSx4  Extremities: WWP; no edema, clubbing or cyanosis. Left AV fistula with bruit. 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 650 milliGRAM(s) Oral every 6 hours  aspirin  chewable 81 milliGRAM(s) Oral every 24 hours  atorvastatin 40 milliGRAM(s) Oral at bedtime  carvedilol 25 milliGRAM(s) Oral every 12 hours  chlorhexidine 2% Cloths 1 Application(s) Topical <User Schedule>  cloNIDine 0.1 milliGRAM(s) Oral at bedtime  cloNIDine 0.2 milliGRAM(s) Oral <User Schedule>  clopidogrel Tablet 75 milliGRAM(s) Oral daily  cyanocobalamin Injectable 100 MICROGram(s) IntraMuscular every 24 hours  dextrose 5%. 1000 milliLiter(s) (50 mL/Hr) IV Continuous <Continuous>  dextrose 50% Injectable 12.5 Gram(s) IV Push once  dextrose 50% Injectable 25 Gram(s) IV Push once  dextrose 50% Injectable 25 Gram(s) IV Push once  escitalopram 5 milliGRAM(s) Oral every 24 hours  gabapentin 100 milliGRAM(s) Oral <User Schedule>  heparin  Injectable 5000 Unit(s) SubCutaneous every 8 hours  hydrALAZINE 75 milliGRAM(s) Oral every 8 hours  insulin glargine Injectable (LANTUS) 3 Unit(s) SubCutaneous at bedtime  insulin lispro (HumaLOG) corrective regimen sliding scale   SubCutaneous Before meals and at bedtime  isosorbide   mononitrate ER Tablet (IMDUR) 120 milliGRAM(s) Oral every 24 hours  levothyroxine 50 MICROGram(s) Oral daily  lidocaine   Patch 1 Patch Transdermal every 24 hours  losartan 100 milliGRAM(s) Oral every 24 hours  melatonin 5 milliGRAM(s) Oral at bedtime  mirtazapine 15 milliGRAM(s) Oral at bedtime  NIFEdipine XL 60 milliGRAM(s) Oral every 12 hours  polyethylene glycol 3350 17 Gram(s) Oral at bedtime  ranolazine 500 milliGRAM(s) Oral two times a day  senna 2 Tablet(s) Oral at bedtime  sevelamer carbonate 800 milliGRAM(s) Oral three times a day with meals    MEDICATIONS  (PRN):  dextrose 40% Gel 15 Gram(s) Oral once PRN Blood Glucose LESS THAN 70 milliGRAM(s)/deciliter  glucagon  Injectable 1 milliGRAM(s) IntraMuscular once PRN Glucose LESS THAN 70 milligrams/deciliter  traMADol 50 milliGRAM(s) Oral every 6 hours PRN Moderate Pain (4 - 6)      ALLERGIES:  Allergies    No Known Allergies    Intolerances        LABS:                        12.9   9.32  )-----------( 189      ( 31 Jan 2020 07:18 )             41.4     01-31    135  |  95<L>  |  46<H>  ----------------------------<  139<H>  4.3   |  22  |  4.80<H>    Ca    10.2      31 Jan 2020 07:18  Phos  4.6     01-31  Mg     2.4     01-31          CAPILLARY BLOOD GLUCOSE      POCT Blood Glucose.: 95 mg/dL (01 Feb 2020 06:49)      RADIOLOGY & ADDITIONAL TESTS: Reviewed. OVERNIGHT EVENTS: No acute events overnight. Patient again refused to be connected to telemonitoring leads overnight, stating that they disrupt his sleep. Leads placed back on early this morning.    SUBJECTIVE / INTERVAL HPI: Patient seen and examined at bedside. States that he continues to feel weak though states he has a good appetite this morning. Patient endorsing his chronic neck and upper back pain. Also states that he had some chest pain earlier this morning, now resolved. Patient denying fevers/chills, headaches, sob, abdominal pain, nausea/vomiting.    VITAL SIGNS:  Vital Signs Last 24 Hrs  T(C): 36.4 (01 Feb 2020 09:25), Max: 36.4 (01 Feb 2020 06:32)  T(F): 97.5 (01 Feb 2020 09:25), Max: 97.5 (01 Feb 2020 06:32)  HR: 63 (01 Feb 2020 09:25) (63 - 74)  BP: 139/54 (01 Feb 2020 09:25) (119/56 - 187/84)  BP(mean): 97 (01 Feb 2020 09:05) (80 - 120)  RR: 17 (01 Feb 2020 09:25) (11 - 25)  SpO2: 100% (01 Feb 2020 09:25) (98% - 100%)    PHYSICAL EXAM:    General: Elderly male resting comfortably in bed, not in any acute distress, eating breakfast  HEENT: anicteric sclera; MMM  Neck: supple, no jvd  Cardiovascular: +S1/S2; RRR. 3/6 holosystolic murmur   Respiratory: CTA B/L; no W/R/R  Gastrointestinal: soft, NT/ND; +BSx4  Extremities: WWP; no edema, clubbing or cyanosis. Left AV fistula with bruit. 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 650 milliGRAM(s) Oral every 6 hours  aspirin  chewable 81 milliGRAM(s) Oral every 24 hours  atorvastatin 40 milliGRAM(s) Oral at bedtime  carvedilol 25 milliGRAM(s) Oral every 12 hours  chlorhexidine 2% Cloths 1 Application(s) Topical <User Schedule>  cloNIDine 0.1 milliGRAM(s) Oral at bedtime  cloNIDine 0.2 milliGRAM(s) Oral <User Schedule>  clopidogrel Tablet 75 milliGRAM(s) Oral daily  cyanocobalamin Injectable 100 MICROGram(s) IntraMuscular every 24 hours  dextrose 5%. 1000 milliLiter(s) (50 mL/Hr) IV Continuous <Continuous>  dextrose 50% Injectable 12.5 Gram(s) IV Push once  dextrose 50% Injectable 25 Gram(s) IV Push once  dextrose 50% Injectable 25 Gram(s) IV Push once  escitalopram 5 milliGRAM(s) Oral every 24 hours  gabapentin 100 milliGRAM(s) Oral <User Schedule>  heparin  Injectable 5000 Unit(s) SubCutaneous every 8 hours  hydrALAZINE 75 milliGRAM(s) Oral every 8 hours  insulin glargine Injectable (LANTUS) 3 Unit(s) SubCutaneous at bedtime  insulin lispro (HumaLOG) corrective regimen sliding scale   SubCutaneous Before meals and at bedtime  isosorbide   mononitrate ER Tablet (IMDUR) 120 milliGRAM(s) Oral every 24 hours  levothyroxine 50 MICROGram(s) Oral daily  lidocaine   Patch 1 Patch Transdermal every 24 hours  losartan 100 milliGRAM(s) Oral every 24 hours  melatonin 5 milliGRAM(s) Oral at bedtime  mirtazapine 15 milliGRAM(s) Oral at bedtime  NIFEdipine XL 60 milliGRAM(s) Oral every 12 hours  polyethylene glycol 3350 17 Gram(s) Oral at bedtime  ranolazine 500 milliGRAM(s) Oral two times a day  senna 2 Tablet(s) Oral at bedtime  sevelamer carbonate 800 milliGRAM(s) Oral three times a day with meals    MEDICATIONS  (PRN):  dextrose 40% Gel 15 Gram(s) Oral once PRN Blood Glucose LESS THAN 70 milliGRAM(s)/deciliter  glucagon  Injectable 1 milliGRAM(s) IntraMuscular once PRN Glucose LESS THAN 70 milligrams/deciliter  traMADol 50 milliGRAM(s) Oral every 6 hours PRN Moderate Pain (4 - 6)      ALLERGIES:  Allergies    No Known Allergies    Intolerances        LABS:                        12.9   9.32  )-----------( 189      ( 31 Jan 2020 07:18 )             41.4     01-31    135  |  95<L>  |  46<H>  ----------------------------<  139<H>  4.3   |  22  |  4.80<H>    Ca    10.2      31 Jan 2020 07:18  Phos  4.6     01-31  Mg     2.4     01-31          CAPILLARY BLOOD GLUCOSE      POCT Blood Glucose.: 95 mg/dL (01 Feb 2020 06:49)      RADIOLOGY & ADDITIONAL TESTS: Reviewed.

## 2020-02-01 NOTE — PROGRESS NOTE ADULT - ATTENDING COMMENTS
I have personally seen, examined, and participated in the care of this patient. I have reviewed all pertinent clinical information, including history, physical exam, plan and the resident's note and agree with the above.    -Without active complaints  -CAD status stable  -ESRD on HD, HD per renal  -Cont current meds  -Awaiting YARON

## 2020-02-01 NOTE — PROGRESS NOTE ADULT - PROBLEM SELECTOR PLAN 10
- F: none  - E: replete cautiously in ESRD  - N: DASH/TLC  - D: heparin 5000U sq q8h    Code: full  Dispo: 5L, pending discharge to [facility] with HD

## 2020-02-01 NOTE — PROGRESS NOTE ADULT - ASSESSMENT
68 y/o m with PMHx of ESRD on dialysis via AVF TTS, HTN, CAD, BRYNN (s/p stent), IDDM (with peripheral nueropathy), hypothyroidism and CVA cb right eye blindness- patient is s/p carotid artery stent), PAD (s/p bilateral stents) who was BIBEMS to [facility] for syncopal episode and found to have elevated blood pressure. Admitted to CCU for emergent HD and management of hypertensive emergency.  found to have severe AS most likely contributing to Syncopal episode, currently undergoing pre-op TAVR, hospital course complicated by MRSA bacteremia without clear source    ESRD on HD   TTS via LUE AVF  HD today just for clearance as pt was hypotensive + his h.o severe AS  next HD anticipated on 2/3  renal diet  renally dose abx      HTN  HD with UF   + losartan, clonidine, hydralazine, nifedipine, coreg , imdur  recommend holding anti htn meds prior to HD to optimize UF 68 y/o m with PMHx of ESRD on dialysis via AVF TTS, HTN, CAD, BRYNN (s/p stent), IDDM (with peripheral nueropathy), hypothyroidism and CVA cb right eye blindness- patient is s/p carotid artery stent), PAD (s/p bilateral stents) who was BIBEMS to [facility] for syncopal episode and found to have elevated blood pressure. Admitted to CCU for emergent HD and management of hypertensive emergency.  found to have severe AS most likely contributing to Syncopal episode, currently undergoing pre-op TAVR, hospital course complicated by MRSA bacteremia without clear source    ESRD on HD   TTS via LUE AVF  HD today just for clearance as pt was hypotensive + his h.o severe AS  next HD anticipated on 2/3  renal diet  renally dose abx      HTN  HD with UF   + losartan, clonidine, hydralazine, nifedipine, coreg , imdur  recommend holding anti htn meds prior to HD to optimize UF    renal bone disease  calcium, phos noted--< on renvela

## 2020-02-01 NOTE — PROGRESS NOTE ADULT - ATTENDING COMMENTS
seen on HD with Dr Aguilera, agree with above  not tolerating UF with HD --possibly related to AS. not grossly overloaded  hold BP meds before HD  may consider cutting back meds as well if BP controlled

## 2020-02-02 LAB
ANION GAP SERPL CALC-SCNC: 17 MMOL/L — SIGNIFICANT CHANGE UP (ref 5–17)
BUN SERPL-MCNC: 34 MG/DL — HIGH (ref 7–23)
CALCIUM SERPL-MCNC: 10.1 MG/DL — SIGNIFICANT CHANGE UP (ref 8.4–10.5)
CHLORIDE SERPL-SCNC: 98 MMOL/L — SIGNIFICANT CHANGE UP (ref 96–108)
CO2 SERPL-SCNC: 24 MMOL/L — SIGNIFICANT CHANGE UP (ref 22–31)
CREAT SERPL-MCNC: 4.14 MG/DL — HIGH (ref 0.5–1.3)
GLUCOSE BLDC GLUCOMTR-MCNC: 103 MG/DL — HIGH (ref 70–99)
GLUCOSE BLDC GLUCOMTR-MCNC: 200 MG/DL — HIGH (ref 70–99)
GLUCOSE BLDC GLUCOMTR-MCNC: 203 MG/DL — HIGH (ref 70–99)
GLUCOSE BLDC GLUCOMTR-MCNC: 49 MG/DL — LOW (ref 70–99)
GLUCOSE BLDC GLUCOMTR-MCNC: 93 MG/DL — SIGNIFICANT CHANGE UP (ref 70–99)
GLUCOSE SERPL-MCNC: 88 MG/DL — SIGNIFICANT CHANGE UP (ref 70–99)
HCT VFR BLD CALC: 40.8 % — SIGNIFICANT CHANGE UP (ref 39–50)
HGB BLD-MCNC: 13 G/DL — SIGNIFICANT CHANGE UP (ref 13–17)
MAGNESIUM SERPL-MCNC: 2.4 MG/DL — SIGNIFICANT CHANGE UP (ref 1.6–2.6)
MCHC RBC-ENTMCNC: 28.9 PG — SIGNIFICANT CHANGE UP (ref 27–34)
MCHC RBC-ENTMCNC: 31.9 GM/DL — LOW (ref 32–36)
MCV RBC AUTO: 90.7 FL — SIGNIFICANT CHANGE UP (ref 80–100)
NRBC # BLD: 0 /100 WBCS — SIGNIFICANT CHANGE UP (ref 0–0)
PHOSPHATE SERPL-MCNC: 4 MG/DL — SIGNIFICANT CHANGE UP (ref 2.5–4.5)
PLATELET # BLD AUTO: 109 K/UL — LOW (ref 150–400)
POTASSIUM SERPL-MCNC: 4.5 MMOL/L — SIGNIFICANT CHANGE UP (ref 3.5–5.3)
POTASSIUM SERPL-SCNC: 4.5 MMOL/L — SIGNIFICANT CHANGE UP (ref 3.5–5.3)
RBC # BLD: 4.5 M/UL — SIGNIFICANT CHANGE UP (ref 4.2–5.8)
RBC # FLD: 14.6 % — HIGH (ref 10.3–14.5)
SODIUM SERPL-SCNC: 139 MMOL/L — SIGNIFICANT CHANGE UP (ref 135–145)
WBC # BLD: 6.64 K/UL — SIGNIFICANT CHANGE UP (ref 3.8–10.5)
WBC # FLD AUTO: 6.64 K/UL — SIGNIFICANT CHANGE UP (ref 3.8–10.5)

## 2020-02-02 PROCEDURE — 99232 SBSQ HOSP IP/OBS MODERATE 35: CPT

## 2020-02-02 RX ORDER — HYDROMORPHONE HYDROCHLORIDE 2 MG/ML
0.25 INJECTION INTRAMUSCULAR; INTRAVENOUS; SUBCUTANEOUS ONCE
Refills: 0 | Status: DISCONTINUED | OUTPATIENT
Start: 2020-02-02 | End: 2020-02-02

## 2020-02-02 RX ORDER — HYDRALAZINE HCL 50 MG
100 TABLET ORAL EVERY 8 HOURS
Refills: 0 | Status: DISCONTINUED | OUTPATIENT
Start: 2020-02-02 | End: 2020-02-04

## 2020-02-02 RX ADMIN — TRAMADOL HYDROCHLORIDE 50 MILLIGRAM(S): 50 TABLET ORAL at 22:06

## 2020-02-02 RX ADMIN — ATORVASTATIN CALCIUM 40 MILLIGRAM(S): 80 TABLET, FILM COATED ORAL at 21:06

## 2020-02-02 RX ADMIN — Medication 60 MILLIGRAM(S): at 05:30

## 2020-02-02 RX ADMIN — Medication 50 MICROGRAM(S): at 05:31

## 2020-02-02 RX ADMIN — CARVEDILOL PHOSPHATE 25 MILLIGRAM(S): 80 CAPSULE, EXTENDED RELEASE ORAL at 09:38

## 2020-02-02 RX ADMIN — PREGABALIN 100 MICROGRAM(S): 225 CAPSULE ORAL at 17:41

## 2020-02-02 RX ADMIN — Medication 4: at 11:21

## 2020-02-02 RX ADMIN — LOSARTAN POTASSIUM 100 MILLIGRAM(S): 100 TABLET, FILM COATED ORAL at 17:43

## 2020-02-02 RX ADMIN — RANOLAZINE 500 MILLIGRAM(S): 500 TABLET, FILM COATED, EXTENDED RELEASE ORAL at 17:43

## 2020-02-02 RX ADMIN — Medication 100 MILLIGRAM(S): at 21:06

## 2020-02-02 RX ADMIN — HEPARIN SODIUM 5000 UNIT(S): 5000 INJECTION INTRAVENOUS; SUBCUTANEOUS at 14:18

## 2020-02-02 RX ADMIN — SEVELAMER CARBONATE 800 MILLIGRAM(S): 2400 POWDER, FOR SUSPENSION ORAL at 17:41

## 2020-02-02 RX ADMIN — Medication 650 MILLIGRAM(S): at 12:30

## 2020-02-02 RX ADMIN — SEVELAMER CARBONATE 800 MILLIGRAM(S): 2400 POWDER, FOR SUSPENSION ORAL at 11:23

## 2020-02-02 RX ADMIN — CLOPIDOGREL BISULFATE 75 MILLIGRAM(S): 75 TABLET, FILM COATED ORAL at 09:38

## 2020-02-02 RX ADMIN — Medication 650 MILLIGRAM(S): at 00:45

## 2020-02-02 RX ADMIN — Medication 100 MILLIGRAM(S): at 14:18

## 2020-02-02 RX ADMIN — RANOLAZINE 500 MILLIGRAM(S): 500 TABLET, FILM COATED, EXTENDED RELEASE ORAL at 05:30

## 2020-02-02 RX ADMIN — LIDOCAINE 1 PATCH: 4 CREAM TOPICAL at 14:18

## 2020-02-02 RX ADMIN — Medication 650 MILLIGRAM(S): at 18:30

## 2020-02-02 RX ADMIN — TRAMADOL HYDROCHLORIDE 50 MILLIGRAM(S): 50 TABLET ORAL at 12:30

## 2020-02-02 RX ADMIN — INSULIN GLARGINE 3 UNIT(S): 100 INJECTION, SOLUTION SUBCUTANEOUS at 21:38

## 2020-02-02 RX ADMIN — Medication 0.1 MILLIGRAM(S): at 17:41

## 2020-02-02 RX ADMIN — Medication 650 MILLIGRAM(S): at 06:29

## 2020-02-02 RX ADMIN — Medication 81 MILLIGRAM(S): at 09:40

## 2020-02-02 RX ADMIN — ISOSORBIDE MONONITRATE 120 MILLIGRAM(S): 60 TABLET, EXTENDED RELEASE ORAL at 11:21

## 2020-02-02 RX ADMIN — Medication 650 MILLIGRAM(S): at 05:30

## 2020-02-02 RX ADMIN — TRAMADOL HYDROCHLORIDE 50 MILLIGRAM(S): 50 TABLET ORAL at 06:29

## 2020-02-02 RX ADMIN — Medication 0.2 MILLIGRAM(S): at 05:30

## 2020-02-02 RX ADMIN — Medication 650 MILLIGRAM(S): at 11:22

## 2020-02-02 RX ADMIN — LIDOCAINE 1 PATCH: 4 CREAM TOPICAL at 02:37

## 2020-02-02 RX ADMIN — Medication 5 MILLIGRAM(S): at 21:07

## 2020-02-02 RX ADMIN — TRAMADOL HYDROCHLORIDE 50 MILLIGRAM(S): 50 TABLET ORAL at 21:07

## 2020-02-02 RX ADMIN — LIDOCAINE 1 PATCH: 4 CREAM TOPICAL at 18:30

## 2020-02-02 RX ADMIN — Medication 2: at 21:39

## 2020-02-02 RX ADMIN — CARVEDILOL PHOSPHATE 25 MILLIGRAM(S): 80 CAPSULE, EXTENDED RELEASE ORAL at 21:06

## 2020-02-02 RX ADMIN — HEPARIN SODIUM 5000 UNIT(S): 5000 INJECTION INTRAVENOUS; SUBCUTANEOUS at 05:30

## 2020-02-02 RX ADMIN — Medication 650 MILLIGRAM(S): at 17:40

## 2020-02-02 RX ADMIN — SEVELAMER CARBONATE 800 MILLIGRAM(S): 2400 POWDER, FOR SUSPENSION ORAL at 07:39

## 2020-02-02 RX ADMIN — Medication 60 MILLIGRAM(S): at 17:41

## 2020-02-02 RX ADMIN — MIRTAZAPINE 15 MILLIGRAM(S): 45 TABLET, ORALLY DISINTEGRATING ORAL at 21:07

## 2020-02-02 RX ADMIN — ESCITALOPRAM OXALATE 5 MILLIGRAM(S): 10 TABLET, FILM COATED ORAL at 21:06

## 2020-02-02 RX ADMIN — CHLORHEXIDINE GLUCONATE 1 APPLICATION(S): 213 SOLUTION TOPICAL at 05:30

## 2020-02-02 RX ADMIN — HEPARIN SODIUM 5000 UNIT(S): 5000 INJECTION INTRAVENOUS; SUBCUTANEOUS at 21:08

## 2020-02-02 RX ADMIN — TRAMADOL HYDROCHLORIDE 50 MILLIGRAM(S): 50 TABLET ORAL at 11:25

## 2020-02-02 RX ADMIN — Medication 100 MILLIGRAM(S): at 07:39

## 2020-02-02 RX ADMIN — TRAMADOL HYDROCHLORIDE 50 MILLIGRAM(S): 50 TABLET ORAL at 05:31

## 2020-02-02 NOTE — PROGRESS NOTE ADULT - ATTENDING COMMENTS
I have personally seen, examined, and participated in the care of this patient. I have reviewed all pertinent clinical information, including history, physical exam, plan and the resident's note and agree with the above.    -Without active complaints  -No active cardiac issues  -Anticipate DC 2/3  -Cont current meds

## 2020-02-02 NOTE — PROGRESS NOTE ADULT - PROBLEM SELECTOR PLAN 3
Noted to have multiple sources of chronic pain likely also contributing to HTN  - RLE pain 2/2 recent R hip replacement - lidocaine patch q24h  - Chronic back pain  - MR thoracolumbar spine c/w degenerative changes T10-11, L3-4, L5-S1; L paracentral disc herniation at C6-7 that may affect C7 nerve root, and disc herniations at several levels without compression of the thoracic spinal cord  - Pain management consulted previously during course with the following regimen: tylenol 650mg q6h mild pain, tramadol 50mg q6h moderate pain, and dilaudid 0.25mg IV q4h for severe pain  - Peripheral artery disease and DM related neuropathy likely contributing to pain as well  - Gabapentin 100mg MWF evenings (ESRD on HD dose adjustment)  -Continuing to encourage pt to work with PT, OBELATC    #Depression  - Psych following with recs for lexapro 5mg daily  - c/w mirtazapine for poor appetite Noted to have multiple sources of chronic pain likely also contributing to HTN  - RLE pain 2/2 recent R hip replacement - lidocaine patch q24h  - Chronic back pain  - MR thoracolumbar spine c/w degenerative changes T10-11, L3-4, L5-S1; L paracentral disc herniation at C6-7 that may affect C7 nerve root, and disc herniations at several levels without compression of the thoracic spinal cord  - Pain management consulted previously  - Remains on regimen: tylenol 650mg q6h mild pain, tramadol 50mg q6h moderate pain  - Peripheral artery disease and DM related neuropathy likely contributing to pain as well  - Gabapentin 100mg MWF evenings (ESRD on HD dose adjustment)  -Continuing to encourage pt to work with PT, OOBTC, dilaudid 0.25 mg IVP for working with PT    #Depression  - Psych following with recs for lexapro 5mg daily  - c/w mirtazapine for poor appetite

## 2020-02-02 NOTE — PROGRESS NOTE ADULT - PROBLEM SELECTOR PLAN 8
Hx of HLD  - Lipid panel WNL  - c/w Atorvastatin 40mg daily Hx of HLD  - c/w Atorvastatin 40mg daily

## 2020-02-02 NOTE — PROGRESS NOTE ADULT - PROBLEM SELECTOR PLAN 10
- F: none  - E: replete cautiously in ESRD  - N: DASH/TLC  - D: heparin 5000U sq q8h    Code: full  Dispo: 5L, pending discharge to [facility] with HD - F: none  - E: replete cautiously in ESRD  - N: DASH/TLC  - D: heparin 5000U sq q8h    FULL CODE    Dispo: 5L, pending discharge to long term facility vs [facility] with HD

## 2020-02-02 NOTE — PROGRESS NOTE ADULT - PROBLEM SELECTOR PLAN 1
Hospital course complicated by fevers with BCX growing MRSA, EBONI neg for vegetations, Gallium scan negative, no clear source. ID has recommended 4 week course  -c/w vanc level check on dialysis days, given dose yesterday 2/1/2020 500mg IV for level 19, will recheck level for pre-HD on tuesday 2/4 for planned dose of 500mg   - BCx NGTD since 1/15, Remains afebrile    -Patient had RUE superficial thrombus on doppler noted earlier in course (1/16), concern that may have had a thrombophlebitis that may have been potential source of infection, though no source found on work up. Has remained stable without swelling

## 2020-02-02 NOTE — PROGRESS NOTE ADULT - ASSESSMENT
69M PMH ESRD (HD T/TH/S), HTN, HLD, CAD, renal artery stenosis (s/p stent), DM (c/b peripheral neuropathy), hypothyroidism, CVA (R eye blind, L eye limited vision), carotid artery stenosis (s/p stent), peripheral artery disease (s/p BL stents) presented initially with syncopal episode in the setting of HTN emergency and admitted to CCU. Course c/b MRSA bacteremia without clear source and to complete 4 week course of vancomycin (ends 2/12). Continue to optimize hypertensive regimen and vanc regimen while awaiting discharge/dispo planning to long term facility vs [facility].

## 2020-02-02 NOTE — PROGRESS NOTE ADULT - PROBLEM SELECTOR PLAN 2
Admitted with HTN emergency s/p nicardipine gtt and HD, undergoing optimization of regimen in setting of some drops on BP  Current regimen: hydralazine 75 mg TID, losartan 100mg daily, coreg 25mg BID, clonidine 0.2mg in am and .1 in pm (lowered from 0.2 BID), nifedipine 60mg BID, imdur 120mg daily  - Decrease clonidine to 0.1 BID (from 0.2 in AM and 0.1mg in PM), titrate as tolerated  -Increase hydralazine to 100 TID in setting of attempt to decrease clonidine dose    #CAD  - Continue home meds  - ASA 81mg, plavix 75mg, atorvastatin 40mg daily  - beta blocker coreg 25 BID, ARB 100mg   #PAD  - s/p stenting  -Asa/plavix, lipitor    #Renal artery stenosis  - s/p stenting  - Renal US with doppler with poor visualization of renal arteries, unable to r/o BRYNN although pt's htn better controlled over last few days

## 2020-02-02 NOTE — PROGRESS NOTE ADULT - SUBJECTIVE AND OBJECTIVE BOX
PROGRESS NOTE    CC:  Overnight Events:  Interval History:   ROS:    OBJECTIVE  Vitals:  T(C): 36.2 (02-02-20 @ 05:37), Max: 36.6 (02-01-20 @ 12:25)  HR: 70 (02-02-20 @ 05:37) (62 - 72)  BP: 174/86 (02-02-20 @ 05:37) (108/53 - 195/81)  RR: 20 (02-02-20 @ 05:37) (14 - 24)  SpO2: 100% (02-02-20 @ 00:00) (100% - 100%)  Wt(kg): --    I/O:  I&O's Summary    01 Feb 2020 07:01  -  02 Feb 2020 07:00  --------------------------------------------------------  IN: 600 mL / OUT: 600 mL / NET: 0 mL        PHYSICAL EXAM:  Appearance: NAD. Speaking in full sentences.   HEENT: PERRL. No pallor noted.  Conjunctiva clear b/l. Moist oral mucosa.  Cardiovascular: RRR with no murmurs.  Respiratory: Lungs CTAB.   Gastrointestinal:  Soft, nontender. Non-distended. Non-rigid.	  Extremities: No edema b/l. No erythema b/l. LE WWP b/l.  Vascular: DP present.  Neurologic:  Alert and awake. Moving all extremities. Following commands. Making eye contact.  	  LABS:                        13.0   6.64  )-----------( 109      ( 02 Feb 2020 06:43 )             40.8     02-01    137  |  94<L>  |  69<H>  ----------------------------<  147<H>  4.9   |  23  |  6.58<H>    Ca    10.1      01 Feb 2020 10:53  Phos  5.2     02-01  Mg     2.7     02-01            RADIOLOGY & ADDITIONAL TESTS:  Reviewed .    MEDICATIONS  (STANDING):  acetaminophen   Tablet .. 650 milliGRAM(s) Oral every 6 hours  aspirin  chewable 81 milliGRAM(s) Oral every 24 hours  atorvastatin 40 milliGRAM(s) Oral at bedtime  carvedilol 25 milliGRAM(s) Oral every 12 hours  chlorhexidine 2% Cloths 1 Application(s) Topical <User Schedule>  cloNIDine 0.1 milliGRAM(s) Oral every 12 hours  clopidogrel Tablet 75 milliGRAM(s) Oral daily  cyanocobalamin Injectable 100 MICROGram(s) IntraMuscular every 24 hours  dextrose 5%. 1000 milliLiter(s) (50 mL/Hr) IV Continuous <Continuous>  dextrose 50% Injectable 12.5 Gram(s) IV Push once  dextrose 50% Injectable 25 Gram(s) IV Push once  dextrose 50% Injectable 25 Gram(s) IV Push once  escitalopram 5 milliGRAM(s) Oral every 24 hours  gabapentin 100 milliGRAM(s) Oral <User Schedule>  heparin  Injectable 5000 Unit(s) SubCutaneous every 8 hours  hydrALAZINE 75 milliGRAM(s) Oral every 8 hours  insulin glargine Injectable (LANTUS) 3 Unit(s) SubCutaneous at bedtime  insulin lispro (HumaLOG) corrective regimen sliding scale   SubCutaneous Before meals and at bedtime  isosorbide   mononitrate ER Tablet (IMDUR) 120 milliGRAM(s) Oral every 24 hours  levothyroxine 50 MICROGram(s) Oral daily  lidocaine   Patch 1 Patch Transdermal every 24 hours  losartan 100 milliGRAM(s) Oral every 24 hours  melatonin 5 milliGRAM(s) Oral at bedtime  mirtazapine 15 milliGRAM(s) Oral at bedtime  NIFEdipine XL 60 milliGRAM(s) Oral every 12 hours  polyethylene glycol 3350 17 Gram(s) Oral at bedtime  ranolazine 500 milliGRAM(s) Oral two times a day  senna 2 Tablet(s) Oral at bedtime  sevelamer carbonate 800 milliGRAM(s) Oral three times a day with meals    MEDICATIONS  (PRN):  dextrose 40% Gel 15 Gram(s) Oral once PRN Blood Glucose LESS THAN 70 milliGRAM(s)/deciliter  glucagon  Injectable 1 milliGRAM(s) IntraMuscular once PRN Glucose LESS THAN 70 milligrams/deciliter  traMADol 50 milliGRAM(s) Oral every 6 hours PRN Moderate Pain (4 - 6) PROGRESS NOTE    CC: syncope, hypertensive emergency  Overnight Events: KAT, episode of diarrhea this AM  Interval History: OOBTC, episode of diarrhea with epigastric pain. No blood. Denies chest pain or shortness of breath  ROS: As above.    OBJECTIVE  Vitals:  T(C): 36.2 (02-02-20 @ 05:37), Max: 36.6 (02-01-20 @ 12:25)  HR: 70 (02-02-20 @ 05:37) (62 - 72)  BP: 174/86 (02-02-20 @ 05:37) (108/53 - 195/81)  RR: 20 (02-02-20 @ 05:37) (14 - 24)  SpO2: 100% (02-02-20 @ 00:00) (100% - 100%)  Wt(kg): --    I/O:  I&O's Summary    01 Feb 2020 07:01  -  02 Feb 2020 07:00  --------------------------------------------------------  IN: 600 mL / OUT: 600 mL / NET: 0 mL        PHYSICAL EXAM:  Appearance: NAD. Speaking in full sentences.   HEENT: PERRL. No pallor noted.  Conjunctiva clear b/l. Moist oral mucosa.  Cardiovascular: RRR, 3/6 systolic murmur along upper right and left sternal border  Respiratory: Lungs CTAB.   Gastrointestinal:  Soft, nontender. Non-distended. Non-rigid.	  Extremities: No edema b/l. No erythema b/l. LE WWP b/l.  Vascular: DP present.  Neurologic:  Alert and awake. Moving all extremities. Following commands. Making eye contact.  	  LABS:                        13.0   6.64  )-----------( 109      ( 02 Feb 2020 06:43 )             40.8     02-01    137  |  94<L>  |  69<H>  ----------------------------<  147<H>  4.9   |  23  |  6.58<H>    Ca    10.1      01 Feb 2020 10:53  Phos  5.2     02-01  Mg     2.7     02-01            RADIOLOGY & ADDITIONAL TESTS:  Reviewed .    MEDICATIONS  (STANDING):  acetaminophen   Tablet .. 650 milliGRAM(s) Oral every 6 hours  aspirin  chewable 81 milliGRAM(s) Oral every 24 hours  atorvastatin 40 milliGRAM(s) Oral at bedtime  carvedilol 25 milliGRAM(s) Oral every 12 hours  chlorhexidine 2% Cloths 1 Application(s) Topical <User Schedule>  cloNIDine 0.1 milliGRAM(s) Oral every 12 hours  clopidogrel Tablet 75 milliGRAM(s) Oral daily  cyanocobalamin Injectable 100 MICROGram(s) IntraMuscular every 24 hours  dextrose 5%. 1000 milliLiter(s) (50 mL/Hr) IV Continuous <Continuous>  dextrose 50% Injectable 12.5 Gram(s) IV Push once  dextrose 50% Injectable 25 Gram(s) IV Push once  dextrose 50% Injectable 25 Gram(s) IV Push once  escitalopram 5 milliGRAM(s) Oral every 24 hours  gabapentin 100 milliGRAM(s) Oral <User Schedule>  heparin  Injectable 5000 Unit(s) SubCutaneous every 8 hours  hydrALAZINE 75 milliGRAM(s) Oral every 8 hours  insulin glargine Injectable (LANTUS) 3 Unit(s) SubCutaneous at bedtime  insulin lispro (HumaLOG) corrective regimen sliding scale   SubCutaneous Before meals and at bedtime  isosorbide   mononitrate ER Tablet (IMDUR) 120 milliGRAM(s) Oral every 24 hours  levothyroxine 50 MICROGram(s) Oral daily  lidocaine   Patch 1 Patch Transdermal every 24 hours  losartan 100 milliGRAM(s) Oral every 24 hours  melatonin 5 milliGRAM(s) Oral at bedtime  mirtazapine 15 milliGRAM(s) Oral at bedtime  NIFEdipine XL 60 milliGRAM(s) Oral every 12 hours  polyethylene glycol 3350 17 Gram(s) Oral at bedtime  ranolazine 500 milliGRAM(s) Oral two times a day  senna 2 Tablet(s) Oral at bedtime  sevelamer carbonate 800 milliGRAM(s) Oral three times a day with meals    MEDICATIONS  (PRN):  dextrose 40% Gel 15 Gram(s) Oral once PRN Blood Glucose LESS THAN 70 milliGRAM(s)/deciliter  glucagon  Injectable 1 milliGRAM(s) IntraMuscular once PRN Glucose LESS THAN 70 milligrams/deciliter  traMADol 50 milliGRAM(s) Oral every 6 hours PRN Moderate Pain (4 - 6)

## 2020-02-02 NOTE — PROGRESS NOTE ADULT - PROBLEM SELECTOR PLAN 6
Patient with chest pain, ekg changes (new v2 and v3 inversions) and troponins elevated relative to his baseline earlier this week  -Likely demand ischemia. Similar chest pain last time dialysis was performed, with this past event occurring after dialysis session as well  -Echo w/o any evidence of ischemic changes (EF 65%, no regional wall abnormalities, grade 1 diastolic dysfunction, moderate to severe aortic stenosis) Course previously complicated by acute chest pain after HD with noted ekg changes (new v2 and v3 inversions) and troponins elevated from previous troponin- troponin stabilized and EKG changes resolved.    -Echo w/o any evidence of ischemic changes (EF 65%, no regional wall abnormalities, grade 1 diastolic dysfunction, moderate to severe aortic stenosis)  - Therefore likely demand given fluid shifts  - continue with DAPT (asa/plavix), statin (lipitor), beta blocker (coreg)

## 2020-02-02 NOTE — PROGRESS NOTE ADULT - PROBLEM SELECTOR PLAN 7
Hx DM2 with peripheral neuropathy and ESRD, A1c at 6.3  - Home med lantus 4U at bedtime, decreased to 3U lantus in hospital  - Mod ISS    #CVA history  - Pt endorses hx CVA with residual R eye blindness, limited vision L eye  -Stroke code initially called on admission for lethargy/change in mental status in setting of hypertensive emergency  - CTH with microangiopathic ischemic disease, lacunar infarcts in the basal ganglia BL, and external carotid artery branch calcifications c/w HD pts  - Daily neuro exam, has remained unremarkable though limited by patient's unwillingness to cooperate Hx DM2 with peripheral neuropathy and ESRD, A1c at 6.3  - Home med lantus 4U at bedtime, decreased to 3U lantus in hospital  - Mod ISS    #CVA history  - Pt endorses hx CVA with residual R eye blindness, limited vision L eye  -Stroke code initially called on admission for lethargy/change in mental status in setting of hypertensive emergency  - CTH with microangiopathic ischemic disease, lacunar infarcts in the basal ganglia BL, and external carotid artery branch calcifications c/w HD pts

## 2020-02-03 LAB
ANION GAP SERPL CALC-SCNC: 15 MMOL/L — SIGNIFICANT CHANGE UP (ref 5–17)
BUN SERPL-MCNC: 51 MG/DL — HIGH (ref 7–23)
CALCIUM SERPL-MCNC: 9.9 MG/DL — SIGNIFICANT CHANGE UP (ref 8.4–10.5)
CHLORIDE SERPL-SCNC: 94 MMOL/L — LOW (ref 96–108)
CO2 SERPL-SCNC: 25 MMOL/L — SIGNIFICANT CHANGE UP (ref 22–31)
CREAT SERPL-MCNC: 5.26 MG/DL — HIGH (ref 0.5–1.3)
GLUCOSE BLDC GLUCOMTR-MCNC: 115 MG/DL — HIGH (ref 70–99)
GLUCOSE BLDC GLUCOMTR-MCNC: 170 MG/DL — HIGH (ref 70–99)
GLUCOSE BLDC GLUCOMTR-MCNC: 193 MG/DL — HIGH (ref 70–99)
GLUCOSE BLDC GLUCOMTR-MCNC: 90 MG/DL — SIGNIFICANT CHANGE UP (ref 70–99)
GLUCOSE SERPL-MCNC: 93 MG/DL — SIGNIFICANT CHANGE UP (ref 70–99)
HCT VFR BLD CALC: 38.2 % — LOW (ref 39–50)
HGB BLD-MCNC: 12.6 G/DL — LOW (ref 13–17)
MAGNESIUM SERPL-MCNC: 2.6 MG/DL — SIGNIFICANT CHANGE UP (ref 1.6–2.6)
MCHC RBC-ENTMCNC: 29.2 PG — SIGNIFICANT CHANGE UP (ref 27–34)
MCHC RBC-ENTMCNC: 33 GM/DL — SIGNIFICANT CHANGE UP (ref 32–36)
MCV RBC AUTO: 88.6 FL — SIGNIFICANT CHANGE UP (ref 80–100)
NRBC # BLD: 0 /100 WBCS — SIGNIFICANT CHANGE UP (ref 0–0)
PLATELET # BLD AUTO: 144 K/UL — LOW (ref 150–400)
POTASSIUM SERPL-MCNC: 4.8 MMOL/L — SIGNIFICANT CHANGE UP (ref 3.5–5.3)
POTASSIUM SERPL-SCNC: 4.8 MMOL/L — SIGNIFICANT CHANGE UP (ref 3.5–5.3)
RBC # BLD: 4.31 M/UL — SIGNIFICANT CHANGE UP (ref 4.2–5.8)
RBC # FLD: 14.6 % — HIGH (ref 10.3–14.5)
SODIUM SERPL-SCNC: 134 MMOL/L — LOW (ref 135–145)
WBC # BLD: 6.87 K/UL — SIGNIFICANT CHANGE UP (ref 3.8–10.5)
WBC # FLD AUTO: 6.87 K/UL — SIGNIFICANT CHANGE UP (ref 3.8–10.5)

## 2020-02-03 PROCEDURE — 99232 SBSQ HOSP IP/OBS MODERATE 35: CPT

## 2020-02-03 PROCEDURE — 74018 RADEX ABDOMEN 1 VIEW: CPT | Mod: 26

## 2020-02-03 RX ORDER — LACTOBACILLUS ACIDOPHILUS 100MM CELL
1 CAPSULE ORAL DAILY
Refills: 0 | Status: DISCONTINUED | OUTPATIENT
Start: 2020-02-03 | End: 2020-02-04

## 2020-02-03 RX ORDER — ONDANSETRON 8 MG/1
4 TABLET, FILM COATED ORAL ONCE
Refills: 0 | Status: COMPLETED | OUTPATIENT
Start: 2020-02-03 | End: 2020-02-03

## 2020-02-03 RX ORDER — INSULIN GLARGINE 100 [IU]/ML
2 INJECTION, SOLUTION SUBCUTANEOUS AT BEDTIME
Refills: 0 | Status: DISCONTINUED | OUTPATIENT
Start: 2020-02-03 | End: 2020-02-04

## 2020-02-03 RX ORDER — SIMETHICONE 80 MG/1
80 TABLET, CHEWABLE ORAL EVERY 6 HOURS
Refills: 0 | Status: DISCONTINUED | OUTPATIENT
Start: 2020-02-03 | End: 2020-02-04

## 2020-02-03 RX ADMIN — Medication 650 MILLIGRAM(S): at 12:00

## 2020-02-03 RX ADMIN — LIDOCAINE 1 PATCH: 4 CREAM TOPICAL at 14:36

## 2020-02-03 RX ADMIN — ISOSORBIDE MONONITRATE 120 MILLIGRAM(S): 60 TABLET, EXTENDED RELEASE ORAL at 12:00

## 2020-02-03 RX ADMIN — Medication 100 MILLIGRAM(S): at 05:53

## 2020-02-03 RX ADMIN — Medication 650 MILLIGRAM(S): at 17:46

## 2020-02-03 RX ADMIN — Medication 60 MILLIGRAM(S): at 05:53

## 2020-02-03 RX ADMIN — GABAPENTIN 100 MILLIGRAM(S): 400 CAPSULE ORAL at 17:44

## 2020-02-03 RX ADMIN — Medication 650 MILLIGRAM(S): at 05:53

## 2020-02-03 RX ADMIN — LIDOCAINE 1 PATCH: 4 CREAM TOPICAL at 03:18

## 2020-02-03 RX ADMIN — CARVEDILOL PHOSPHATE 25 MILLIGRAM(S): 80 CAPSULE, EXTENDED RELEASE ORAL at 10:43

## 2020-02-03 RX ADMIN — HEPARIN SODIUM 5000 UNIT(S): 5000 INJECTION INTRAVENOUS; SUBCUTANEOUS at 05:54

## 2020-02-03 RX ADMIN — SEVELAMER CARBONATE 800 MILLIGRAM(S): 2400 POWDER, FOR SUSPENSION ORAL at 07:40

## 2020-02-03 RX ADMIN — ATORVASTATIN CALCIUM 40 MILLIGRAM(S): 80 TABLET, FILM COATED ORAL at 22:27

## 2020-02-03 RX ADMIN — Medication 2: at 22:26

## 2020-02-03 RX ADMIN — HEPARIN SODIUM 5000 UNIT(S): 5000 INJECTION INTRAVENOUS; SUBCUTANEOUS at 22:27

## 2020-02-03 RX ADMIN — Medication 0.1 MILLIGRAM(S): at 17:46

## 2020-02-03 RX ADMIN — Medication 650 MILLIGRAM(S): at 07:20

## 2020-02-03 RX ADMIN — Medication 1 TABLET(S): at 12:00

## 2020-02-03 RX ADMIN — Medication 0.1 MILLIGRAM(S): at 05:54

## 2020-02-03 RX ADMIN — Medication 81 MILLIGRAM(S): at 14:34

## 2020-02-03 RX ADMIN — Medication 5 MILLIGRAM(S): at 22:28

## 2020-02-03 RX ADMIN — TRAMADOL HYDROCHLORIDE 50 MILLIGRAM(S): 50 TABLET ORAL at 17:44

## 2020-02-03 RX ADMIN — RANOLAZINE 500 MILLIGRAM(S): 500 TABLET, FILM COATED, EXTENDED RELEASE ORAL at 05:53

## 2020-02-03 RX ADMIN — Medication 2: at 12:19

## 2020-02-03 RX ADMIN — CARVEDILOL PHOSPHATE 25 MILLIGRAM(S): 80 CAPSULE, EXTENDED RELEASE ORAL at 22:27

## 2020-02-03 RX ADMIN — Medication 650 MILLIGRAM(S): at 12:50

## 2020-02-03 RX ADMIN — Medication 60 MILLIGRAM(S): at 17:46

## 2020-02-03 RX ADMIN — ESCITALOPRAM OXALATE 5 MILLIGRAM(S): 10 TABLET, FILM COATED ORAL at 22:27

## 2020-02-03 RX ADMIN — MIRTAZAPINE 15 MILLIGRAM(S): 45 TABLET, ORALLY DISINTEGRATING ORAL at 22:27

## 2020-02-03 RX ADMIN — Medication 100 MILLIGRAM(S): at 14:34

## 2020-02-03 RX ADMIN — Medication 30 MILLILITER(S): at 12:18

## 2020-02-03 RX ADMIN — Medication 100 MILLIGRAM(S): at 22:27

## 2020-02-03 RX ADMIN — CLOPIDOGREL BISULFATE 75 MILLIGRAM(S): 75 TABLET, FILM COATED ORAL at 12:00

## 2020-02-03 RX ADMIN — Medication 50 MICROGRAM(S): at 05:54

## 2020-02-03 RX ADMIN — TRAMADOL HYDROCHLORIDE 50 MILLIGRAM(S): 50 TABLET ORAL at 09:00

## 2020-02-03 RX ADMIN — LOSARTAN POTASSIUM 100 MILLIGRAM(S): 100 TABLET, FILM COATED ORAL at 17:46

## 2020-02-03 RX ADMIN — RANOLAZINE 500 MILLIGRAM(S): 500 TABLET, FILM COATED, EXTENDED RELEASE ORAL at 17:46

## 2020-02-03 RX ADMIN — SEVELAMER CARBONATE 800 MILLIGRAM(S): 2400 POWDER, FOR SUSPENSION ORAL at 12:00

## 2020-02-03 RX ADMIN — TRAMADOL HYDROCHLORIDE 50 MILLIGRAM(S): 50 TABLET ORAL at 09:50

## 2020-02-03 RX ADMIN — ONDANSETRON 4 MILLIGRAM(S): 8 TABLET, FILM COATED ORAL at 17:46

## 2020-02-03 RX ADMIN — HEPARIN SODIUM 5000 UNIT(S): 5000 INJECTION INTRAVENOUS; SUBCUTANEOUS at 14:34

## 2020-02-03 RX ADMIN — SIMETHICONE 80 MILLIGRAM(S): 80 TABLET, CHEWABLE ORAL at 17:44

## 2020-02-03 RX ADMIN — CHLORHEXIDINE GLUCONATE 1 APPLICATION(S): 213 SOLUTION TOPICAL at 05:55

## 2020-02-03 RX ADMIN — SEVELAMER CARBONATE 800 MILLIGRAM(S): 2400 POWDER, FOR SUSPENSION ORAL at 17:46

## 2020-02-03 RX ADMIN — INSULIN GLARGINE 2 UNIT(S): 100 INJECTION, SOLUTION SUBCUTANEOUS at 22:26

## 2020-02-03 NOTE — PROGRESS NOTE ADULT - PROBLEM SELECTOR PLAN 10
- F: none  - E: replete cautiously in ESRD  - N: DASH/TLC, soft  - D: heparin 5000U sq q8h    FULL CODE    Dispo: 5L, pending discharge to long term facility vs [facility] with HD

## 2020-02-03 NOTE — PROGRESS NOTE ADULT - PROBLEM SELECTOR PROBLEM 4
Bacteremia due to Staphylococcus aureus
End-stage renal disease (ESRD)
Bacteremia due to Staphylococcus aureus
Bacteremia due to Staphylococcus aureus
End-stage renal disease (ESRD)
End-stage renal disease (ESRD)

## 2020-02-03 NOTE — PROGRESS NOTE ADULT - PROBLEM SELECTOR PROBLEM 7
Hyperlipidemia
Type II diabetes mellitus
Hyperlipidemia
Hyperlipidemia
Type II diabetes mellitus
Type II diabetes mellitus

## 2020-02-03 NOTE — PROGRESS NOTE ADULT - PROBLEM SELECTOR PROBLEM 3
End-stage renal disease (ESRD)
Pain
End-stage renal disease (ESRD)
End-stage renal disease (ESRD)
Pain
Pain

## 2020-02-03 NOTE — PROGRESS NOTE ADULT - PROBLEM SELECTOR PLAN 1
Hospital course complicated by fevers with BCX growing MRSA, EBONI neg for vegetations, Gallium scan negative, no clear source. ID has recommended 4 week course  -c/w vanc level check on dialysis days, given dose 2/1/2020 500mg IV for level 19, will recheck level for pre-HD on tuesday 2/4 for planned dose of 500mg   - BCx NGTD since 1/15, Remains afebrile    -Patient had RUE superficial thrombus on doppler noted earlier in course (1/16), concern that may have had a thrombophlebitis that may have been potential source of infection, though no source found on work up. Has remained stable without swelling    #Diarrhea- patient with 2 episodes of non-bloody diarrhea, last this am, with cramping abdominal pain. Discontinued senna and miralax  -If pt continues to have diarrhea despite discontinuing stool softeners, will send stool studies

## 2020-02-03 NOTE — PROGRESS NOTE ADULT - ATTENDING COMMENTS
Next HD planned for tomorrow, will review outpt records to see how far he is off from his EDW as he's been hypotensive on HD here.

## 2020-02-03 NOTE — PROGRESS NOTE ADULT - ASSESSMENT
70yo M PMH ESRD (HD T/TH/S), HTN, HLD, CAD, renal artery stenosis (s/p stent), DM (c/b peripheral neuropathy), hypothyroidism, CVA (R eye blind, L eye limited vision), carotid artery stenosis (s/p stent), peripheral artery disease (s/p BL stents) presented initially with syncopal episode in the setting of HTN emergency and admitted to CCU. Course c/b MRSA bacteremia without clear source and to complete 4 week course of vancomycin (ends 2/12). Continuing to optimize hypertensive regimen and vanc regimen, as well as observing for further diarrhea, while awaiting discharge/dispo planning to long term facility vs [facility].

## 2020-02-03 NOTE — PROGRESS NOTE ADULT - ASSESSMENT
70 y/o m with PMHx of ESRD on dialysis via AVF TTS, HTN, CAD, BRYNN (s/p stent), IDDM (with peripheral nueropathy), hypothyroidism and CVA cb right eye blindness- patient is s/p carotid artery stent), PAD (s/p bilateral stents) who was BIBEMS to [facility] for syncopal episode and found to have elevated blood pressure. Admitted to CCU for emergent HD and management of hypertensive emergency.  found to have severe AS most likely contributing to Syncopal episode, currently undergoing pre-op TAVR, hospital course complicated by MRSA bacteremia without clear source.    # ESRD on HD TTS via LUE AVF  - last HD on 2/1 w clearance only, not able to tolerate UF due to hypotension w SBP down to 80's associated w cramps   - volume status and electrolytes acceptable  - plan for HD on 2/4 as per regular schedule   - renal diet  - renally dosed ABx    # HTN  - HD with UF   - please hold antihypertensives on HD day to optimized UF   - on losartan, clonidine, hydralazine, nifedipine, coreg, imdur    # Renal bone disease  - calcium, phos noted  - cw renvela

## 2020-02-03 NOTE — PROGRESS NOTE ADULT - SUBJECTIVE AND OBJECTIVE BOX
OVERNIGHT EVENTS: Patient with another episode of non-bloody diarrhea this morning (had one episode yesterday.) Miralax and senna discontinued.     SUBJECTIVE / INTERVAL HPI: Patient seen and examined at bedside. States that he "feels the same", and endorses mild chest pain and shortness of breath. Denies abdominal pain at present, though did endorse severe, cramping abdominal pain during his episode of diarrhea. Patient with no new complaints this morning. Denies fevers/chills, headache, nausea/vomiting.     VITAL SIGNS:  Vital Signs Last 24 Hrs  T(C): 36.3 (03 Feb 2020 09:55), Max: 37 (03 Feb 2020 04:53)  T(F): 97.4 (03 Feb 2020 09:55), Max: 98.6 (03 Feb 2020 04:53)  HR: 65 (03 Feb 2020 04:50) (64 - 74)  BP: 155/69 (03 Feb 2020 04:50) (135/63 - 169/74)  BP(mean): 99 (03 Feb 2020 04:50) (90 - 107)  RR: 16 (03 Feb 2020 04:50) (16 - 20)  SpO2: 98% (03 Feb 2020 04:50) (98% - 100%)    PHYSICAL EXAM:    General: Elderly male resting comfortably in bed, not in any acute distress, eating breakfast  HEENT: anicteric sclera; MMM  Neck: supple, no jvd  Cardiovascular: +S1/S2; RRR. 3/6 holosystolic murmur   Respiratory: CTA B/L; no W/R/R  Gastrointestinal: soft, NT/ND; +BSx4  Extremities: WWP; no edema, clubbing or cyanosis. Left AV fistula with bruit. 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 650 milliGRAM(s) Oral every 6 hours  aspirin  chewable 81 milliGRAM(s) Oral every 24 hours  atorvastatin 40 milliGRAM(s) Oral at bedtime  carvedilol 25 milliGRAM(s) Oral every 12 hours  chlorhexidine 2% Cloths 1 Application(s) Topical <User Schedule>  cloNIDine 0.1 milliGRAM(s) Oral every 12 hours  clopidogrel Tablet 75 milliGRAM(s) Oral daily  dextrose 5%. 1000 milliLiter(s) (50 mL/Hr) IV Continuous <Continuous>  dextrose 50% Injectable 12.5 Gram(s) IV Push once  dextrose 50% Injectable 25 Gram(s) IV Push once  dextrose 50% Injectable 25 Gram(s) IV Push once  escitalopram 5 milliGRAM(s) Oral every 24 hours  gabapentin 100 milliGRAM(s) Oral <User Schedule>  heparin  Injectable 5000 Unit(s) SubCutaneous every 8 hours  hydrALAZINE 100 milliGRAM(s) Oral every 8 hours  insulin glargine Injectable (LANTUS) 2 Unit(s) SubCutaneous at bedtime  insulin lispro (HumaLOG) corrective regimen sliding scale   SubCutaneous Before meals and at bedtime  isosorbide   mononitrate ER Tablet (IMDUR) 120 milliGRAM(s) Oral every 24 hours  levothyroxine 50 MICROGram(s) Oral daily  lidocaine   Patch 1 Patch Transdermal every 24 hours  losartan 100 milliGRAM(s) Oral every 24 hours  melatonin 5 milliGRAM(s) Oral at bedtime  mirtazapine 15 milliGRAM(s) Oral at bedtime  NIFEdipine XL 60 milliGRAM(s) Oral every 12 hours  ranolazine 500 milliGRAM(s) Oral two times a day  sevelamer carbonate 800 milliGRAM(s) Oral three times a day with meals    MEDICATIONS  (PRN):  dextrose 40% Gel 15 Gram(s) Oral once PRN Blood Glucose LESS THAN 70 milliGRAM(s)/deciliter  glucagon  Injectable 1 milliGRAM(s) IntraMuscular once PRN Glucose LESS THAN 70 milligrams/deciliter  traMADol 50 milliGRAM(s) Oral every 6 hours PRN Moderate Pain (4 - 6)      ALLERGIES:  Allergies    No Known Allergies    Intolerances        LABS:                        12.6   6.87  )-----------( 144      ( 03 Feb 2020 06:58 )             38.2     02-03    134<L>  |  94<L>  |  51<H>  ----------------------------<  93  4.8   |  25  |  5.26<H>    Ca    9.9      03 Feb 2020 06:58  Phos  4.0     02-02  Mg     2.6     02-03          CAPILLARY BLOOD GLUCOSE      POCT Blood Glucose.: 90 mg/dL (03 Feb 2020 07:12)      RADIOLOGY & ADDITIONAL TESTS: Reviewed.

## 2020-02-03 NOTE — PROGRESS NOTE ADULT - PROBLEM SELECTOR PLAN 9
- F: none  - E: replete cautiously in ESRD  - N: DASH/TLC  - D: heparin 5000U sq q8h    Code: full  Dispo: 5L
- F: none  - E: replete cautiously in ESRD  - N: DASH/TLC  - D: heparin 5000U sq q8h    Code: full  Dispo: 5L, pending discharge to [facility] with HD
- F: none  - E: replete cautiously in ESRD  - N: DASH/TLC  - D: heparin 5000U sq q8h    Code: full  Dispo: 5L, pending discharge to [facility] with HD
Hx of hypothyroidism, TSH at 1.624  - Continue home med Synthroid 50mcg daily
- F: none  - E: replete cautiously in ESRD  - N: DASH/TLC  - D: heparin 5000U sq q8h    Code: full  Dispo: 5L
- F: none  - E: replete cautiously in ESRD  - N: DASH/TLC  - D: heparin 5000U sq q8h    Code: full  Dispo: 5L, pending discharge to [facility] with HD
Hx of hypothyroidism, TSH at 1.624  - Continue home med Synthroid 50mcg daily
Hx of hypothyroidism, TSH at 1.624  - Continue home med Synthroid 50mcg daily
- F: none  - E: replete cautiously in ESRD  - N: DASH/TLC  - D: heparin 5000U sq q8h    Code: full  Dispo: 5L

## 2020-02-03 NOTE — PROGRESS NOTE ADULT - PROBLEM SELECTOR PLAN 3
Noted to have multiple sources of chronic pain likely also contributing to HTN  - RLE pain 2/2 recent R hip replacement - lidocaine patch q24h  - Chronic back pain  - MR thoracolumbar spine c/w degenerative changes T10-11, L3-4, L5-S1; L paracentral disc herniation at C6-7 that may affect C7 nerve root, and disc herniations at several levels without compression of the thoracic spinal cord  - Pain management consulted previously  - Remains on regimen: tylenol 650mg q6h mild pain, tramadol 50mg q6h moderate pain  - Peripheral artery disease and DM related neuropathy likely contributing to pain as well  - Gabapentin 100mg MWF evenings (ESRD on HD dose adjustment)  -Continuing to encourage pt to work with PT, OOBTC, dilaudid 0.25 mg IVP for working with PT    #Depression  - Psych following with recs for lexapro 5mg daily  - c/w mirtazapine for poor appetite

## 2020-02-03 NOTE — PROGRESS NOTE ADULT - PROBLEM SELECTOR PROBLEM 2
Hypertension
Pain
Hypertension
Hypertension
Pain
Pain

## 2020-02-03 NOTE — PROGRESS NOTE ADULT - PROBLEM SELECTOR PROBLEM 1
Bacteremia due to Staphylococcus aureus
End-stage renal disease (ESRD)
Hypertension
R/O NSTEMI (non-ST elevated myocardial infarction)
R/O NSTEMI (non-ST elevated myocardial infarction)
Hypertension
Bacteremia due to Staphylococcus aureus
Hypertension
Hypertension

## 2020-02-03 NOTE — PROGRESS NOTE ADULT - PROBLEM SELECTOR PLAN 2
Admitted with HTN emergency s/p nicardipine gtt and HD, undergoing optimization of regimen in setting of some drops on BP  Current regimen: hydralazine 100 mg TID, losartan 100mg daily, coreg 25mg BID, clonidine 0.1mg BID, nifedipine 60mg BID, imdur 120mg daily  -BPs have been in stable range    #CAD  - Continue home meds  - ASA 81mg, plavix 75mg, atorvastatin 40mg daily  - beta blocker coreg 25 BID, ARB 100mg     #PAD  - s/p stenting  -Asa/plavix, lipitor    #Renal artery stenosis  - s/p stenting  - Renal US with doppler with poor visualization of renal arteries, unable to r/o BRYNN although pt's htn better controlled over last few days

## 2020-02-03 NOTE — PROGRESS NOTE ADULT - PROBLEM SELECTOR PLAN 6
Course previously complicated by acute chest pain after HD with noted ekg changes (new v2 and v3 inversions) and troponins elevated from previous troponin- troponin stabilized and EKG changes resolved.    -Echo w/o any evidence of ischemic changes (EF 65%, no regional wall abnormalities, grade 1 diastolic dysfunction, moderate to severe aortic stenosis)  - Therefore likely demand given fluid shifts  - continue with DAPT (asa/plavix), statin (lipitor), beta blocker (coreg)

## 2020-02-03 NOTE — PROGRESS NOTE ADULT - SUBJECTIVE AND OBJECTIVE BOX
no acute events overnight  last HD on 2/1 w clearance only, not able to tolerate UF due to hypotension w SBP down to 80's associated w cramps   NAD, breathing comfortably on RA, sat 99%  labs reviewed       Meds:  acetaminophen   Tablet .. 650 every 6 hours  aspirin  chewable 81 every 24 hours  atorvastatin 40 at bedtime  carvedilol 25 every 12 hours  chlorhexidine 2% Cloths 1 <User Schedule>  cloNIDine 0.1 every 12 hours  clopidogrel Tablet 75 daily  dextrose 40% Gel 15 once PRN  dextrose 5%. 1000 <Continuous>  dextrose 50% Injectable 12.5 once  dextrose 50% Injectable 25 once  dextrose 50% Injectable 25 once  escitalopram 5 every 24 hours  gabapentin 100 <User Schedule>  glucagon  Injectable 1 once PRN  heparin  Injectable 5000 every 8 hours  hydrALAZINE 100 every 8 hours  insulin glargine Injectable (LANTUS) 2 at bedtime  insulin lispro (HumaLOG) corrective regimen sliding scale  Before meals and at bedtime  isosorbide   mononitrate ER Tablet (IMDUR) 120 every 24 hours  lactobacillus acidophilus 1 daily  levothyroxine 50 daily  lidocaine   Patch 1 every 24 hours  losartan 100 every 24 hours  melatonin 5 at bedtime  mirtazapine 15 at bedtime  NIFEdipine XL 60 every 12 hours  ranolazine 500 two times a day  sevelamer carbonate 800 three times a day with meals  traMADol 50 every 6 hours PRN      T(C): , Max: 37 (02-03-20 @ 04:53)  T(F): , Max: 98.6 (02-03-20 @ 04:53)  HR: 69 (02-03-20 @ 10:48)  BP: 177/81 (02-03-20 @ 10:48)  BP(mean): 116 (02-03-20 @ 10:48)  RR: 16 (02-03-20 @ 10:48)  SpO2: 99% (02-03-20 @ 10:48)  Wt(kg): --    02-02 @ 07:01  -  02-03 @ 07:00  --------------------------------------------------------  IN: 580 mL / OUT: 0 mL / NET: 580 mL    02-03 @ 07:01 - 02-03 @ 11:23  --------------------------------------------------------  IN: 180 mL / OUT: 0 mL / NET: 180 mL        Review of Systems:  CONSTITUTIONAL: No fever or chills, No fatigue or tiredness  EYES: No blurred or double vision  RESPIRATORY: No shortness of breath, cough, hemoptysis  CARDIOVASCULAR: No Chest pain or shortness of breath  GASTROINTESTINAL: NO abdominal or flank pain, No nausea or vomiting, No diarrhea  GENITOURINARY: No dysuria or urinary burning, No difficulty passing urine, No hematuria  NEUROLOGICAL: No headaches or blurred vision  SKIN: No skin rashes       PHYSICAL EXAM:  GENERAL: NAD, alert  HEAD:  Atraumatic, Normocephalic  EYES: Bilateral conjunctiva and sclera normal   Oral cavity: Oral mucosa dry and pink  NECK: Neck supple, No JVD  CHEST/LUNG: Clear to auscultation bilaterally; No wheeze, no rales, no crepitations  HEART: Regular rate and rhythm. SM,  no rub   ABDOMEN: Soft, Nontender, BS+nt, No flank tenderness.   EXTREMITIES: No clubbing, cyanosis, or edema  Neurology: AAOx3, no focal neurological deficit  SKIN: No rashes or lesions  Access: LUE AVF with thrill and bruit       LABS:                        12.6   6.87  )-----------( 144      ( 03 Feb 2020 06:58 )             38.2     02-03    134<L>  |  94<L>  |  51<H>  ----------------------------<  93  4.8   |  25  |  5.26<H>    Ca    9.9      03 Feb 2020 06:58  Phos  4.0     02-02  Mg     2.6     02-03

## 2020-02-03 NOTE — PROGRESS NOTE ADULT - PROBLEM SELECTOR PLAN 7
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

## 2020-02-04 VITALS
OXYGEN SATURATION: 100 % | HEART RATE: 74 BPM | DIASTOLIC BLOOD PRESSURE: 61 MMHG | SYSTOLIC BLOOD PRESSURE: 151 MMHG | RESPIRATION RATE: 19 BRPM

## 2020-02-04 LAB
ANION GAP SERPL CALC-SCNC: 17 MMOL/L — SIGNIFICANT CHANGE UP (ref 5–17)
BUN SERPL-MCNC: 65 MG/DL — HIGH (ref 7–23)
CALCIUM SERPL-MCNC: 9.8 MG/DL — SIGNIFICANT CHANGE UP (ref 8.4–10.5)
CHLORIDE SERPL-SCNC: 95 MMOL/L — LOW (ref 96–108)
CO2 SERPL-SCNC: 24 MMOL/L — SIGNIFICANT CHANGE UP (ref 22–31)
CREAT SERPL-MCNC: 6.37 MG/DL — HIGH (ref 0.5–1.3)
GLUCOSE BLDC GLUCOMTR-MCNC: 112 MG/DL — HIGH (ref 70–99)
GLUCOSE BLDC GLUCOMTR-MCNC: 116 MG/DL — HIGH (ref 70–99)
GLUCOSE BLDC GLUCOMTR-MCNC: 132 MG/DL — HIGH (ref 70–99)
GLUCOSE SERPL-MCNC: 147 MG/DL — HIGH (ref 70–99)
HCT VFR BLD CALC: 37.8 % — LOW (ref 39–50)
HGB BLD-MCNC: 12.3 G/DL — LOW (ref 13–17)
MAGNESIUM SERPL-MCNC: 3 MG/DL — HIGH (ref 1.6–2.6)
MCHC RBC-ENTMCNC: 29 PG — SIGNIFICANT CHANGE UP (ref 27–34)
MCHC RBC-ENTMCNC: 32.5 GM/DL — SIGNIFICANT CHANGE UP (ref 32–36)
MCV RBC AUTO: 89.2 FL — SIGNIFICANT CHANGE UP (ref 80–100)
NRBC # BLD: 0 /100 WBCS — SIGNIFICANT CHANGE UP (ref 0–0)
PHOSPHATE SERPL-MCNC: 3.9 MG/DL — SIGNIFICANT CHANGE UP (ref 2.5–4.5)
PLATELET # BLD AUTO: 139 K/UL — LOW (ref 150–400)
POTASSIUM SERPL-MCNC: 5 MMOL/L — SIGNIFICANT CHANGE UP (ref 3.5–5.3)
POTASSIUM SERPL-SCNC: 5 MMOL/L — SIGNIFICANT CHANGE UP (ref 3.5–5.3)
RBC # BLD: 4.24 M/UL — SIGNIFICANT CHANGE UP (ref 4.2–5.8)
RBC # FLD: 14.6 % — HIGH (ref 10.3–14.5)
SODIUM SERPL-SCNC: 136 MMOL/L — SIGNIFICANT CHANGE UP (ref 135–145)
VANCOMYCIN FLD-MCNC: 17.9 UG/ML — SIGNIFICANT CHANGE UP
WBC # BLD: 6.93 K/UL — SIGNIFICANT CHANGE UP (ref 3.8–10.5)
WBC # FLD AUTO: 6.93 K/UL — SIGNIFICANT CHANGE UP (ref 3.8–10.5)

## 2020-02-04 PROCEDURE — 99285 EMERGENCY DEPT VISIT HI MDM: CPT

## 2020-02-04 PROCEDURE — 80202 ASSAY OF VANCOMYCIN: CPT

## 2020-02-04 PROCEDURE — 84145 PROCALCITONIN (PCT): CPT

## 2020-02-04 PROCEDURE — 70450 CT HEAD/BRAIN W/O DYE: CPT

## 2020-02-04 PROCEDURE — 71045 X-RAY EXAM CHEST 1 VIEW: CPT

## 2020-02-04 PROCEDURE — 87186 SC STD MICRODIL/AGAR DIL: CPT

## 2020-02-04 PROCEDURE — 84100 ASSAY OF PHOSPHORUS: CPT

## 2020-02-04 PROCEDURE — 82553 CREATINE MB FRACTION: CPT

## 2020-02-04 PROCEDURE — 73501 X-RAY EXAM HIP UNI 1 VIEW: CPT

## 2020-02-04 PROCEDURE — 75573 CT HRT C+ STRUX CGEN HRT DS: CPT

## 2020-02-04 PROCEDURE — 74174 CTA ABD&PLVS W/CONTRAST: CPT

## 2020-02-04 PROCEDURE — 83605 ASSAY OF LACTIC ACID: CPT

## 2020-02-04 PROCEDURE — 90935 HEMODIALYSIS ONE EVALUATION: CPT

## 2020-02-04 PROCEDURE — 36415 COLL VENOUS BLD VENIPUNCTURE: CPT

## 2020-02-04 PROCEDURE — 85025 COMPLETE CBC W/AUTO DIFF WBC: CPT

## 2020-02-04 PROCEDURE — 93971 EXTREMITY STUDY: CPT

## 2020-02-04 PROCEDURE — 72146 MRI CHEST SPINE W/O DYE: CPT

## 2020-02-04 PROCEDURE — 87486 CHLMYD PNEUM DNA AMP PROBE: CPT

## 2020-02-04 PROCEDURE — 97530 THERAPEUTIC ACTIVITIES: CPT

## 2020-02-04 PROCEDURE — 85652 RBC SED RATE AUTOMATED: CPT

## 2020-02-04 PROCEDURE — 85027 COMPLETE CBC AUTOMATED: CPT

## 2020-02-04 PROCEDURE — 93308 TTE F-UP OR LMTD: CPT

## 2020-02-04 PROCEDURE — 85610 PROTHROMBIN TIME: CPT

## 2020-02-04 PROCEDURE — 74018 RADEX ABDOMEN 1 VIEW: CPT

## 2020-02-04 PROCEDURE — 87581 M.PNEUMON DNA AMP PROBE: CPT

## 2020-02-04 PROCEDURE — A9556: CPT

## 2020-02-04 PROCEDURE — 78802 RP LOCLZJ TUM WHBDY 1 D IMG: CPT

## 2020-02-04 PROCEDURE — 80048 BASIC METABOLIC PNL TOTAL CA: CPT

## 2020-02-04 PROCEDURE — 80053 COMPREHEN METABOLIC PANEL: CPT

## 2020-02-04 PROCEDURE — 93005 ELECTROCARDIOGRAM TRACING: CPT

## 2020-02-04 PROCEDURE — 82550 ASSAY OF CK (CPK): CPT

## 2020-02-04 PROCEDURE — 84484 ASSAY OF TROPONIN QUANT: CPT

## 2020-02-04 PROCEDURE — 83735 ASSAY OF MAGNESIUM: CPT

## 2020-02-04 PROCEDURE — 93306 TTE W/DOPPLER COMPLETE: CPT

## 2020-02-04 PROCEDURE — 87798 DETECT AGENT NOS DNA AMP: CPT

## 2020-02-04 PROCEDURE — 93312 ECHO TRANSESOPHAGEAL: CPT

## 2020-02-04 PROCEDURE — 93976 VASCULAR STUDY: CPT

## 2020-02-04 PROCEDURE — 72148 MRI LUMBAR SPINE W/O DYE: CPT

## 2020-02-04 PROCEDURE — 80061 LIPID PANEL: CPT

## 2020-02-04 PROCEDURE — 82803 BLOOD GASES ANY COMBINATION: CPT

## 2020-02-04 PROCEDURE — 87641 MR-STAPH DNA AMP PROBE: CPT

## 2020-02-04 PROCEDURE — 97110 THERAPEUTIC EXERCISES: CPT

## 2020-02-04 PROCEDURE — 97116 GAIT TRAINING THERAPY: CPT

## 2020-02-04 PROCEDURE — 83036 HEMOGLOBIN GLYCOSYLATED A1C: CPT

## 2020-02-04 PROCEDURE — ZZZZZ: CPT

## 2020-02-04 PROCEDURE — 81001 URINALYSIS AUTO W/SCOPE: CPT

## 2020-02-04 PROCEDURE — 87040 BLOOD CULTURE FOR BACTERIA: CPT

## 2020-02-04 PROCEDURE — 86140 C-REACTIVE PROTEIN: CPT

## 2020-02-04 PROCEDURE — 97162 PT EVAL MOD COMPLEX 30 MIN: CPT

## 2020-02-04 PROCEDURE — 85730 THROMBOPLASTIN TIME PARTIAL: CPT

## 2020-02-04 PROCEDURE — 76775 US EXAM ABDO BACK WALL LIM: CPT

## 2020-02-04 PROCEDURE — 87633 RESP VIRUS 12-25 TARGETS: CPT

## 2020-02-04 PROCEDURE — 87150 DNA/RNA AMPLIFIED PROBE: CPT

## 2020-02-04 PROCEDURE — 82962 GLUCOSE BLOOD TEST: CPT

## 2020-02-04 PROCEDURE — 84443 ASSAY THYROID STIM HORMONE: CPT

## 2020-02-04 RX ORDER — VANCOMYCIN HCL 1 G
500 VIAL (EA) INTRAVENOUS ONCE
Refills: 0 | Status: COMPLETED | OUTPATIENT
Start: 2020-02-04 | End: 2020-02-04

## 2020-02-04 RX ORDER — VANCOMYCIN HCL 1 G
500 VIAL (EA) INTRAVENOUS
Qty: 1500 | Refills: 0
Start: 2020-02-04 | End: 2020-02-06

## 2020-02-04 RX ORDER — RANOLAZINE 500 MG/1
1 TABLET, FILM COATED, EXTENDED RELEASE ORAL
Qty: 0 | Refills: 0 | DISCHARGE
Start: 2020-02-04

## 2020-02-04 RX ORDER — SIMETHICONE 80 MG/1
1 TABLET, CHEWABLE ORAL
Qty: 0 | Refills: 0 | DISCHARGE
Start: 2020-02-04

## 2020-02-04 RX ORDER — INSULIN GLARGINE 100 [IU]/ML
2 INJECTION, SOLUTION SUBCUTANEOUS
Qty: 0 | Refills: 0 | DISCHARGE
Start: 2020-02-04

## 2020-02-04 RX ORDER — HYDRALAZINE HCL 50 MG
1 TABLET ORAL
Qty: 0 | Refills: 0 | DISCHARGE
Start: 2020-02-04

## 2020-02-04 RX ADMIN — Medication 81 MILLIGRAM(S): at 15:35

## 2020-02-04 RX ADMIN — Medication 100 MILLIGRAM(S): at 15:34

## 2020-02-04 RX ADMIN — Medication 650 MILLIGRAM(S): at 13:45

## 2020-02-04 RX ADMIN — SEVELAMER CARBONATE 800 MILLIGRAM(S): 2400 POWDER, FOR SUSPENSION ORAL at 08:24

## 2020-02-04 RX ADMIN — CHLORHEXIDINE GLUCONATE 1 APPLICATION(S): 213 SOLUTION TOPICAL at 06:23

## 2020-02-04 RX ADMIN — Medication 650 MILLIGRAM(S): at 14:45

## 2020-02-04 RX ADMIN — LIDOCAINE 1 PATCH: 4 CREAM TOPICAL at 15:35

## 2020-02-04 RX ADMIN — Medication 0.1 MILLIGRAM(S): at 06:22

## 2020-02-04 RX ADMIN — TRAMADOL HYDROCHLORIDE 50 MILLIGRAM(S): 50 TABLET ORAL at 06:42

## 2020-02-04 RX ADMIN — RANOLAZINE 500 MILLIGRAM(S): 500 TABLET, FILM COATED, EXTENDED RELEASE ORAL at 06:22

## 2020-02-04 RX ADMIN — HEPARIN SODIUM 5000 UNIT(S): 5000 INJECTION INTRAVENOUS; SUBCUTANEOUS at 06:22

## 2020-02-04 RX ADMIN — Medication 1 TABLET(S): at 15:36

## 2020-02-04 RX ADMIN — ISOSORBIDE MONONITRATE 120 MILLIGRAM(S): 60 TABLET, EXTENDED RELEASE ORAL at 15:36

## 2020-02-04 RX ADMIN — TRAMADOL HYDROCHLORIDE 50 MILLIGRAM(S): 50 TABLET ORAL at 00:25

## 2020-02-04 RX ADMIN — TRAMADOL HYDROCHLORIDE 50 MILLIGRAM(S): 50 TABLET ORAL at 16:55

## 2020-02-04 RX ADMIN — Medication 100 MILLIGRAM(S): at 15:36

## 2020-02-04 RX ADMIN — Medication 50 MICROGRAM(S): at 06:22

## 2020-02-04 RX ADMIN — TRAMADOL HYDROCHLORIDE 50 MILLIGRAM(S): 50 TABLET ORAL at 06:22

## 2020-02-04 RX ADMIN — CLOPIDOGREL BISULFATE 75 MILLIGRAM(S): 75 TABLET, FILM COATED ORAL at 15:36

## 2020-02-04 RX ADMIN — SEVELAMER CARBONATE 800 MILLIGRAM(S): 2400 POWDER, FOR SUSPENSION ORAL at 16:55

## 2020-02-04 RX ADMIN — CARVEDILOL PHOSPHATE 25 MILLIGRAM(S): 80 CAPSULE, EXTENDED RELEASE ORAL at 12:05

## 2020-02-04 NOTE — PROGRESS NOTE ADULT - SUBJECTIVE AND OBJECTIVE BOX
Patient was seen and evaluated on dialysis  no acute events overnight  planning for discharge    Vitals  HR: 66 (02-04-20 @ 11:20)  BP: 197/84 (02-04-20 @ 11:20)  Wt(kg): 56.4    PHYSICAL EXAM:  GENERAL: NAD, alert  Oral cavity: Oral mucosa dry and pink  NECK: supple, No JVD  CHEST/LUNG: Clear to auscultation bilaterally  HEART: Regular rate and rhythm. SM, no rub   ABDOMEN: Soft, Nontender, BS+nt, No flank tenderness  EXTREMITIES: No clubbing, cyanosis, or edema  Neurology: AAOx3, no focal neurological deficit  SKIN: No rashes or lesions  Access: LUE AVF with thrill and bruit     LABS  02-04    136  |  95<L>  |  65<H>  ----------------------------<  147<H>  5.0   |  24  |  6.37<H>    Ca    9.8      04 Feb 2020 12:18  Phos  3.9     02-04  Mg     3.0     02-04      Continue dialysis:   Dialyzer:   180    QB: 400  K bath: 2  Goal UF:   2.0   L   over       210        min  Patient is tolerating the procedure well, elevated BP, will increase UF up to 2.5 L   Continue full treatment as prescribed.

## 2020-02-04 NOTE — PROGRESS NOTE ADULT - ASSESSMENT
70 y/o m with PMHx of ESRD on dialysis via AVF TTS, HTN, CAD, BRYNN (s/p stent), IDDM (with peripheral nueropathy), hypothyroidism and CVA cb right eye blindness- patient is s/p carotid artery stent), PAD (s/p bilateral stents) who was BIBEMS to [facility] for syncopal episode and found to have elevated blood pressure. Admitted to CCU for emergent HD and management of hypertensive emergency.  found to have severe AS most likely contributing to Syncopal episode, currently undergoing pre-op TAVR, hospital course complicated by MRSA bacteremia without clear source.    # ESRD on HD TTS via LUE AVF  - last EDW 61.5 kg as per outpatient unit which is higher than inpatient current weight   - HD today as per regular schedule   - renal diet  - renally dosed ABx  - planning for discharge    # HTN  - HD with UF   - please hold antihypertensives on HD day to optimized UF   - on losartan, clonidine, hydralazine, nifedipine, coreg, imdur    # Renal bone disease  - calcium, phos noted  - cw renvela

## 2020-02-04 NOTE — PROGRESS NOTE ADULT - NSHPATTENDINGPLANDISCUSS_GEN_ALL_CORE
medical staff
medical team
team
medical staff
medicine
housestaff
housestaff

## 2020-02-04 NOTE — PROGRESS NOTE ADULT - REASON FOR ADMISSION
syncope

## 2020-02-04 NOTE — CHART NOTE - NSCHARTNOTEFT_GEN_A_CORE
Admitting Diagnosis:   Patient is a 69y old  Male who presents with a chief complaint of syncope (03 Feb 2020 10:18)      PAST MEDICAL & SURGICAL HISTORY:  Peripheral neuropathy  Peripheral artery disease: s/p bilateral stents  Anemia of renal disease  End-stage renal disease (ESRD)  Type II diabetes mellitus  Retinal artery occlusion  Hypothyroidism  Low back pain  CAD (coronary artery disease)  H/O renal artery stenosis: s/p L renal stent  Hyperlipidemia  Hypertension  No significant past surgical history      Current Nutrition Order: for [facility] Soft Renal ConsCHO evening snack diet + Neprox3  PO Intake: Good (%) [   ]  Fair (50-75%) [   ] Poor (<25%) [   ]  GI Issues: non-bloody diarrhea, noetd antibiotic use during admission: Miralax and senna discontinued - noted plan to send stool studies should diarrhea presist despite d/c of bowel regieme; +Lactobacillus ordered 2/3   Pain: Noted to have multiple sources of chronic pain:  RLE pain 2/2 recent R hip replacement / Chronic back pain / MR thoracolumbar spine c/w degenerative changes T10-11, L3-4, L5-S1; Pain management consulted previously  Skin: no edema/pressure ulcers noted at this time; L Thumb Wound, RLE Scabs LLE wound - resolving     Labs:   02-03    134<L>  |  94<L>  |  51<H>  ----------------------------<  93  4.8   |  25  |  5.26<H>    Ca    9.9      03 Feb 2020 06:58  Mg     2.6     02-03      CAPILLARY BLOOD GLUCOSE      POCT Blood Glucose.: 116 mg/dL (04 Feb 2020 07:25)  POCT Blood Glucose.: 193 mg/dL (03 Feb 2020 21:30)  POCT Blood Glucose.: 115 mg/dL (03 Feb 2020 17:20)  POCT Blood Glucose.: 170 mg/dL (03 Feb 2020 12:14)      Medications:  MEDICATIONS  (STANDING):  acetaminophen   Tablet .. 650 milliGRAM(s) Oral every 6 hours  aspirin  chewable 81 milliGRAM(s) Oral every 24 hours  atorvastatin 40 milliGRAM(s) Oral at bedtime  carvedilol 25 milliGRAM(s) Oral every 12 hours  chlorhexidine 2% Cloths 1 Application(s) Topical <User Schedule>  cloNIDine 0.1 milliGRAM(s) Oral every 12 hours  clopidogrel Tablet 75 milliGRAM(s) Oral daily  dextrose 5%. 1000 milliLiter(s) (50 mL/Hr) IV Continuous <Continuous>  dextrose 50% Injectable 12.5 Gram(s) IV Push once  dextrose 50% Injectable 25 Gram(s) IV Push once  dextrose 50% Injectable 25 Gram(s) IV Push once  escitalopram 5 milliGRAM(s) Oral every 24 hours  gabapentin 100 milliGRAM(s) Oral <User Schedule>  heparin  Injectable 5000 Unit(s) SubCutaneous every 8 hours  hydrALAZINE 100 milliGRAM(s) Oral every 8 hours  insulin glargine Injectable (LANTUS) 2 Unit(s) SubCutaneous at bedtime  insulin lispro (HumaLOG) corrective regimen sliding scale   SubCutaneous Before meals and at bedtime  isosorbide   mononitrate ER Tablet (IMDUR) 120 milliGRAM(s) Oral every 24 hours  lactobacillus acidophilus 1 Tablet(s) Oral daily  levothyroxine 50 MICROGram(s) Oral daily  lidocaine   Patch 1 Patch Transdermal every 24 hours  losartan 100 milliGRAM(s) Oral every 24 hours  melatonin 5 milliGRAM(s) Oral at bedtime  mirtazapine 15 milliGRAM(s) Oral at bedtime  NIFEdipine XL 60 milliGRAM(s) Oral every 12 hours  ranolazine 500 milliGRAM(s) Oral two times a day  sevelamer carbonate 800 milliGRAM(s) Oral three times a day with meals    MEDICATIONS  (PRN):  aluminum hydroxide/magnesium hydroxide/simethicone Suspension 30 milliLiter(s) Oral every 6 hours PRN Dyspepsia  dextrose 40% Gel 15 Gram(s) Oral once PRN Blood Glucose LESS THAN 70 milliGRAM(s)/deciliter  glucagon  Injectable 1 milliGRAM(s) IntraMuscular once PRN Glucose LESS THAN 70 milligrams/deciliter  simethicone 80 milliGRAM(s) Chew every 6 hours PRN Upset Stomach  traMADol 50 milliGRAM(s) Oral every 6 hours PRN Moderate Pain (4 - 6)        (2/3) 123.4 pounds  (2/2) 124.1 pounds  (1/28) 124.3 pounds   (1/27) 128.9 pounds   (1/21) 118.6 pounds  (1/19) 119.9 pounds  (1/16) 123 pounds  (1/14) 132.9 pounds  (1/13) 134.9 pounds / 128.5 pounds  (1/12) 136.6 pounds  (1/11) 131 pounds  5'9''  pounds, %IBW 77% BMI 19.85 -- Based on most recent EMR wt   Wt had Trended down since admission - Question fluids / PO intake / Noted on different units     Per physical assessment: Muscle Wasting- Temporal [ Mild ]  Clavicle/Pectoral [ Mild ]  Shoulder/Deltoid [ Mild ]  Scapula [ Mild ]    Estimated energy needs:   IBW used for EER; Adjusted for HD, fluids per team vs 1000ml + UOP  2190-2555kcal/day 30-35kcal/kg   gm prot/day 1.2-1.5gm/kg       Subjective:  70yo M, poor unreliable historian, PMHx of ESRD on dialysis via AVF (tues/thurs/sat), HTN, HLD, CAD, BRYNN (s/p stent), IDDM A1c 8/19: 7.2% (with peripheral nueropathy), hypothyroidism, ?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 for syncopal episode (with possible aspiration of food) and found to have elevated blood pressure BP on arrival 209/78 - Followed by episode of ladarius down (+Rapid response, Heart rate spontaneously started to increase). Now pending retroperitoneal US to evaluate for cause of fluctuant BPs. Pt with EF 60-65%, severe AS on TTE, possible TAVR pending BP and MRSA bacteremia - R/o vertebral osteomyelitis (negative EBONI and Gallium scan, source unclear); will not be done this admission. Pt is still pending d/c to [facility] vs LTC.    Pt ordered for [facility] Soft Renal ConsCHO evening snack diet + Neprox3, ASP Precautions order noted 1/11 – prior team had reported episode of aspiration PTA was d/t falling asleep while eating not d/t true dysphagia. Noted order for remeron. Issues with feeding had been reported prior – reported pt had not wanted to fed himself and needed much assistance, RN reports pt sat up to consume breakfast today and fed himself.        Previous Nutrition Diagnosis: Inadequate Oral Intake RT decreased ability to consume meals AEB intake of 25%.   Active [  x ]  Resolved [   ]  Goal: Pt will meet at least 75% of nutrient needs     Recommendations:  1. Renal, Consistent Carbohydrate (evening snack) Diet + Mech soft to provide ease during meals, Nepro x3 per day as oral nutrition supplements (425kcal/20gm prot per 1 can); Fluids per team. Honor pt food preferences as able. Appreciate assistance during meals PRN.   2. Monitor PO intake/appetite; Monitor need for increasing use of oral nutrition supplements. Pt with zero intake of breakfast however reports d/t not liking meal, pt did however consume Nepro x2 during breakfast - should pt continue to refuse PO however be willing to drink nepro would consider to increase use to 4 per day, will provide in total 1700kcal/80gm prot to meet 78% lower end protein needs / 91% lower end prot needs.   3. CLOSELY monitor for diet tolerance- Should pt be noted with s/s of issues swallowing, recommend NPO/FEESST.   4. Nephrocaps daily.   5. Monitor GI distress, Skin, GOC labs, weights.  6. RD to remain available for additional nutrition interventions as needed.     Education: Encouraged PO intake during meals & reviewed importance.     Risk Level: High [   ] Moderate [ x ] Low [   ]. Admitting Diagnosis:   Patient is a 69y old  Male who presents with a chief complaint of syncope (03 Feb 2020 10:18)      PAST MEDICAL & SURGICAL HISTORY:  Peripheral neuropathy  Peripheral artery disease: s/p bilateral stents  Anemia of renal disease  End-stage renal disease (ESRD)  Type II diabetes mellitus  Retinal artery occlusion  Hypothyroidism  Low back pain  CAD (coronary artery disease)  H/O renal artery stenosis: s/p L renal stent  Hyperlipidemia  Hypertension  No significant past surgical history      Current Nutrition Order: for [facility] Soft Renal ConsCHO evening snack diet + Neprox3  PO Intake: Good (%) [   ]  Fair (50-75%) [   ] Poor (<25%) [ X ]  GI Issues: non-bloody diarrhea, noted antibiotic use during admission: Miralax and senna discontinued - noted plan to send stool studies should diarrhea persist despite d/c of bowel regime; +Lactobacillus ordered 2/3; Spoke with RN who denies episode of diarrhea thus far today   Pain: Noted to have multiple sources of chronic pain:  RLE pain 2/2 recent R hip replacement / Chronic back pain / MR thoracolumbar spine c/w degenerative changes T10-11, L3-4, L5-S1; Pain management consulted previously  Skin: no edema/pressure ulcers noted at this time; L Thumb Wound, RLE Scabs LLE wound - resolving     Labs:   02-03    134<L>  |  94<L>  |  51<H>  ----------------------------<  93  4.8   |  25  |  5.26<H>    Ca    9.9      03 Feb 2020 06:58  Mg     2.6     02-03      CAPILLARY BLOOD GLUCOSE      POCT Blood Glucose.: 116 mg/dL (04 Feb 2020 07:25)  POCT Blood Glucose.: 193 mg/dL (03 Feb 2020 21:30)  POCT Blood Glucose.: 115 mg/dL (03 Feb 2020 17:20)  POCT Blood Glucose.: 170 mg/dL (03 Feb 2020 12:14)      Medications:  MEDICATIONS  (STANDING):  acetaminophen   Tablet .. 650 milliGRAM(s) Oral every 6 hours  aspirin  chewable 81 milliGRAM(s) Oral every 24 hours  atorvastatin 40 milliGRAM(s) Oral at bedtime  carvedilol 25 milliGRAM(s) Oral every 12 hours  chlorhexidine 2% Cloths 1 Application(s) Topical <User Schedule>  cloNIDine 0.1 milliGRAM(s) Oral every 12 hours  clopidogrel Tablet 75 milliGRAM(s) Oral daily  dextrose 5%. 1000 milliLiter(s) (50 mL/Hr) IV Continuous <Continuous>  dextrose 50% Injectable 12.5 Gram(s) IV Push once  dextrose 50% Injectable 25 Gram(s) IV Push once  dextrose 50% Injectable 25 Gram(s) IV Push once  escitalopram 5 milliGRAM(s) Oral every 24 hours  gabapentin 100 milliGRAM(s) Oral <User Schedule>  heparin  Injectable 5000 Unit(s) SubCutaneous every 8 hours  hydrALAZINE 100 milliGRAM(s) Oral every 8 hours  insulin glargine Injectable (LANTUS) 2 Unit(s) SubCutaneous at bedtime  insulin lispro (HumaLOG) corrective regimen sliding scale   SubCutaneous Before meals and at bedtime  isosorbide   mononitrate ER Tablet (IMDUR) 120 milliGRAM(s) Oral every 24 hours  lactobacillus acidophilus 1 Tablet(s) Oral daily  levothyroxine 50 MICROGram(s) Oral daily  lidocaine   Patch 1 Patch Transdermal every 24 hours  losartan 100 milliGRAM(s) Oral every 24 hours  melatonin 5 milliGRAM(s) Oral at bedtime  mirtazapine 15 milliGRAM(s) Oral at bedtime  NIFEdipine XL 60 milliGRAM(s) Oral every 12 hours  ranolazine 500 milliGRAM(s) Oral two times a day  sevelamer carbonate 800 milliGRAM(s) Oral three times a day with meals    MEDICATIONS  (PRN):  aluminum hydroxide/magnesium hydroxide/simethicone Suspension 30 milliLiter(s) Oral every 6 hours PRN Dyspepsia  dextrose 40% Gel 15 Gram(s) Oral once PRN Blood Glucose LESS THAN 70 milliGRAM(s)/deciliter  glucagon  Injectable 1 milliGRAM(s) IntraMuscular once PRN Glucose LESS THAN 70 milligrams/deciliter  simethicone 80 milliGRAM(s) Chew every 6 hours PRN Upset Stomach  traMADol 50 milliGRAM(s) Oral every 6 hours PRN Moderate Pain (4 - 6)        (2/3) 123.4 pounds  (2/2) 124.1 pounds  (1/28) 124.3 pounds   (1/27) 128.9 pounds   (1/21) 118.6 pounds  (1/19) 119.9 pounds  (1/16) 123 pounds  (1/14) 132.9 pounds  (1/13) 134.9 pounds / 128.5 pounds  (1/12) 136.6 pounds  (1/11) 131 pounds  5'9''  pounds, %IBW 77% BMI 19.85 -- Based on most recent EMR wt   Wt had Trended down since admission - Question fluids / PO intake / Noted on different units     Per physical assessment: Muscle Wasting- Temporal [ Mild ]  Clavicle/Pectoral [ Mild ]  Shoulder/Deltoid [ Mild ]  Scapula [ Mild ]    Estimated energy needs:   IBW used for EER; Adjusted for HD, fluids per team vs 1000ml + UOP  2190-2555kcal/day 30-35kcal/kg   gm prot/day 1.2-1.5gm/kg       Subjective:  68yo M, poor unreliable historian, PMHx of ESRD on dialysis via AVF (tues/thurs/sat), HTN, HLD, CAD, BRYNN (s/p stent), IDDM A1c 8/19: 7.2% (with peripheral nueropathy), hypothyroidism, ?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 Now pending retroperitoneal US to evaluate for cause of fluctuant BPs. Pt with EF 60-65%, severe AS on TTE, possible TAVR pending BP and MRSA bacteremia - R/o vertebral osteomyelitis (negative EBONI and Gallium scan, source unclear); will not be done this admission. Pt is still pending d/c to [facility] vs LTC.    Pt ordered for [facility] Soft Renal ConsCHO evening snack diet + Neprox3, ASP Precautions order noted 1/11 – prior team had reported episode of aspiration PTA was d/t falling asleep while eating not d/t true dysphagia. Noted order for Remeron. Issues with feeding had been reported prior – reported pt had not wanted to fed himself and needed much assistance. Pt reports limited intake at breakfast today, consumed ~25% of meal - mostly fruit and milk, RN confirms and also reported limited intake 2/3 d/t GI distress. Pt reports consuming 1-2 nepro shakes/day.  Varying POCT noted, last 3 116 192 115, Reading of 200 2/2 with prior elevated readings noted, low reading 49 2/2, A1c 6.3%.  Please see below for nutritions recommendations. Pending order placed.       Previous Nutrition Diagnosis: Inadequate Oral Intake RT decreased ability to consume meals AEB intake of 25%.   Active [  x ]  Resolved [   ]  Goal: Pt will meet at least 75% of nutrient needs     Recommendations:  1. Renal; Mech Soft; Nepro x3 per day as oral nutrition supplements (425kcal/20gm prot per 1 can) - Pt is likely to benefit from intake of 4 nepro/day given limited intake however only consuming 1-2 at this time, should pt be agreeable and willing to consume consider increased use as feasible with Fluids per team.  2. Pending GI distress, Consider adding low fiber to diet order.  3. Honor pt food preferences as able. Appreciate assistance during meals PRN.   4. CLOSELY monitor for diet tolerance- Should pt be noted with s/s of issues swallowing, recommend NPO/FEESST.   5. Monitor GI distress; lactobacillus PRN (+EMR order noted), Skin, GOC labs, weights.  6. RD to remain available for additional nutrition interventions as needed.     Education: Encouraged PO intake during meals & reviewed importance- Focused on proteins.     Risk Level: High [   ] Moderate [ x ] Low [   ].

## 2020-02-05 RX ORDER — VANCOMYCIN HCL 1 G
500 VIAL (EA) INTRAVENOUS
Qty: 1500 | Refills: 0
Start: 2020-02-05 | End: 2020-02-07

## 2020-02-06 DIAGNOSIS — E11.42 TYPE 2 DIABETES MELLITUS WITH DIABETIC POLYNEUROPATHY: ICD-10-CM

## 2020-02-06 DIAGNOSIS — E03.9 HYPOTHYROIDISM, UNSPECIFIED: ICD-10-CM

## 2020-02-06 DIAGNOSIS — I12.0 HYPERTENSIVE CHRONIC KIDNEY DISEASE WITH STAGE 5 CHRONIC KIDNEY DISEASE OR END STAGE RENAL DISEASE: ICD-10-CM

## 2020-02-06 DIAGNOSIS — N18.6 END STAGE RENAL DISEASE: ICD-10-CM

## 2020-02-06 DIAGNOSIS — M50.223 OTHER CERVICAL DISC DISPLACEMENT AT C6-C7 LEVEL: ICD-10-CM

## 2020-02-06 DIAGNOSIS — I95.9 HYPOTENSION, UNSPECIFIED: ICD-10-CM

## 2020-02-06 DIAGNOSIS — I16.1 HYPERTENSIVE EMERGENCY: ICD-10-CM

## 2020-02-06 DIAGNOSIS — I82.611 ACUTE EMBOLISM AND THROMBOSIS OF SUPERFICIAL VEINS OF RIGHT UPPER EXTREMITY: ICD-10-CM

## 2020-02-06 DIAGNOSIS — F43.23 ADJUSTMENT DISORDER WITH MIXED ANXIETY AND DEPRESSED MOOD: ICD-10-CM

## 2020-02-06 DIAGNOSIS — R42 DIZZINESS AND GIDDINESS: ICD-10-CM

## 2020-02-06 DIAGNOSIS — N25.0 RENAL OSTEODYSTROPHY: ICD-10-CM

## 2020-02-06 DIAGNOSIS — B95.62 METHICILLIN RESISTANT STAPHYLOCOCCUS AUREUS INFECTION AS THE CAUSE OF DISEASES CLASSIFIED ELSEWHERE: ICD-10-CM

## 2020-02-06 DIAGNOSIS — I08.2 RHEUMATIC DISORDERS OF BOTH AORTIC AND TRICUSPID VALVES: ICD-10-CM

## 2020-02-06 DIAGNOSIS — I27.20 PULMONARY HYPERTENSION, UNSPECIFIED: ICD-10-CM

## 2020-02-06 DIAGNOSIS — Z99.2 DEPENDENCE ON RENAL DIALYSIS: ICD-10-CM

## 2020-02-06 DIAGNOSIS — R00.1 BRADYCARDIA, UNSPECIFIED: ICD-10-CM

## 2020-02-06 DIAGNOSIS — D63.1 ANEMIA IN CHRONIC KIDNEY DISEASE: ICD-10-CM

## 2020-02-06 DIAGNOSIS — E11.51 TYPE 2 DIABETES MELLITUS WITH DIABETIC PERIPHERAL ANGIOPATHY WITHOUT GANGRENE: ICD-10-CM

## 2020-02-06 DIAGNOSIS — E78.5 HYPERLIPIDEMIA, UNSPECIFIED: ICD-10-CM

## 2020-02-06 DIAGNOSIS — I25.10 ATHEROSCLEROTIC HEART DISEASE OF NATIVE CORONARY ARTERY WITHOUT ANGINA PECTORIS: ICD-10-CM

## 2020-02-06 DIAGNOSIS — R78.81 BACTEREMIA: ICD-10-CM

## 2020-02-06 DIAGNOSIS — H54.1131: ICD-10-CM

## 2020-02-06 DIAGNOSIS — I69.398 OTHER SEQUELAE OF CEREBRAL INFARCTION: ICD-10-CM

## 2020-02-27 PROBLEM — Z86.79 HISTORY OF CORONARY ARTERY DISEASE: Status: RESOLVED | Noted: 2020-02-27 | Resolved: 2020-02-27

## 2020-02-27 PROBLEM — Z86.79 HISTORY OF CAROTID ARTERY STENOSIS: Status: RESOLVED | Noted: 2020-02-27 | Resolved: 2020-02-27

## 2020-02-27 PROBLEM — Z86.39 HISTORY OF HYPERLIPIDEMIA: Status: RESOLVED | Noted: 2020-02-27 | Resolved: 2020-02-27

## 2020-02-27 PROBLEM — N18.6 END STAGE RENAL DISEASE ON DIALYSIS: Status: RESOLVED | Noted: 2020-02-27 | Resolved: 2020-02-27

## 2020-02-27 PROBLEM — Z86.79 HISTORY OF STENOSIS OF RENAL ARTERY: Status: RESOLVED | Noted: 2020-02-27 | Resolved: 2020-02-27

## 2020-02-27 PROBLEM — Z86.79 HISTORY OF HYPERTENSION: Status: RESOLVED | Noted: 2020-02-27 | Resolved: 2020-02-27

## 2020-02-27 PROBLEM — Z86.39 HISTORY OF DIABETES MELLITUS: Status: RESOLVED | Noted: 2020-02-27 | Resolved: 2020-02-27

## 2020-02-27 PROBLEM — Z86.79 HISTORY OF PERIPHERAL ARTERIAL DISEASE: Status: RESOLVED | Noted: 2020-02-27 | Resolved: 2020-02-27

## 2020-02-27 PROBLEM — Z86.73 HISTORY OF CEREBROVASCULAR ACCIDENT: Status: RESOLVED | Noted: 2020-02-27 | Resolved: 2020-02-27

## 2020-02-27 PROBLEM — Z86.39 HISTORY OF HYPOTHYROIDISM: Status: RESOLVED | Noted: 2020-02-27 | Resolved: 2020-02-27

## 2020-02-27 RX ORDER — LEVOTHYROXINE SODIUM 0.05 MG/1
50 TABLET ORAL DAILY
Refills: 0 | Status: ACTIVE | COMMUNITY

## 2020-02-27 RX ORDER — ASPIRIN 81 MG
81 TABLET, DELAYED RELEASE (ENTERIC COATED) ORAL DAILY
Refills: 0 | Status: ACTIVE | COMMUNITY

## 2020-02-27 RX ORDER — MIRTAZAPINE 15 MG/1
15 TABLET, FILM COATED ORAL
Refills: 0 | Status: ACTIVE | COMMUNITY

## 2020-02-27 RX ORDER — GABAPENTIN 100 MG/1
100 CAPSULE ORAL
Refills: 0 | Status: ACTIVE | COMMUNITY

## 2020-02-27 RX ORDER — NIFEDIPINE 60 MG
60 TABLET, EXTENDED RELEASE ORAL
Refills: 0 | Status: ACTIVE | COMMUNITY

## 2020-02-27 RX ORDER — HEPARIN SODIUM 5000 [USP'U]/ML
5000 INJECTION, SOLUTION INTRAVENOUS; SUBCUTANEOUS
Refills: 0 | Status: ACTIVE | COMMUNITY

## 2020-02-27 RX ORDER — ESCITALOPRAM OXALATE 5 MG/1
5 TABLET ORAL DAILY
Refills: 0 | Status: ACTIVE | COMMUNITY

## 2020-02-27 RX ORDER — HYDRALAZINE HYDROCHLORIDE 100 MG/1
100 TABLET ORAL 3 TIMES DAILY
Refills: 0 | Status: ACTIVE | COMMUNITY

## 2020-02-27 RX ORDER — CLONIDINE HYDROCHLORIDE 0.1 MG/1
0.1 TABLET ORAL TWICE DAILY
Refills: 0 | Status: ACTIVE | COMMUNITY

## 2020-02-27 RX ORDER — RANOLAZINE 500 MG/1
500 TABLET, EXTENDED RELEASE ORAL
Refills: 0 | Status: ACTIVE | COMMUNITY

## 2020-02-27 RX ORDER — ISOSORBIDE MONONITRATE 120 MG
120 TABLET, EXTENDED RELEASE 24 HR ORAL DAILY
Refills: 0 | Status: ACTIVE | COMMUNITY

## 2020-02-27 RX ORDER — SEVELAMER HYDROCHLORIDE 800 MG/1
800 TABLET, FILM COATED ORAL 3 TIMES DAILY
Refills: 0 | Status: ACTIVE | COMMUNITY

## 2020-02-27 RX ORDER — ATORVASTATIN CALCIUM 40 MG/1
40 TABLET, FILM COATED ORAL
Refills: 0 | Status: ACTIVE | COMMUNITY

## 2020-02-27 RX ORDER — CYCLOBENZAPRINE HCL 5 MG
5 TABLET ORAL
Refills: 0 | Status: ACTIVE | COMMUNITY

## 2020-02-27 RX ORDER — SENNOSIDES 8.6 MG TABLETS 8.6 MG/1
8.6 TABLET ORAL
Refills: 0 | Status: ACTIVE | COMMUNITY

## 2020-02-27 RX ORDER — CLOPIDOGREL 75 MG/1
75 TABLET, FILM COATED ORAL DAILY
Qty: 30 | Refills: 1 | Status: ACTIVE | COMMUNITY

## 2020-02-27 RX ORDER — CARVEDILOL 25 MG/1
25 TABLET, FILM COATED ORAL TWICE DAILY
Refills: 0 | Status: ACTIVE | COMMUNITY

## 2020-02-27 RX ORDER — LORATADINE 10 MG
17 TABLET,DISINTEGRATING ORAL
Refills: 0 | Status: ACTIVE | COMMUNITY

## 2020-02-27 RX ORDER — LOSARTAN POTASSIUM 100 MG/1
100 TABLET, FILM COATED ORAL DAILY
Refills: 0 | Status: ACTIVE | COMMUNITY

## 2020-02-27 NOTE — HISTORY OF PRESENT ILLNESS
[FreeTextEntry1] : 69 year old male with a history of HTN, HLD, CAD, renal artery stenosis (s/p stent), DM (on insulin) c/b peripheral neuropathy, hypothyroidism, CVA (R eye blind, L eye limited vision), carotid artery stenosis (s/p stent), peripheral artery disease (s/p BL stents), ESRD (HD via left AV Fistula on T/Th/Sat), with severe aortic stenosis who presents for follow up after discharge.  \par \par The patient was brought to the hospital by EMS on 1/11/2020 for a syncopal episode and elevated blood pressure.  The patient became febrile and was diagnosed with MRSA via blood cultures on 1/14/2020.  The blood cultures cleared and the patient remained afebrile prior to administration of vancomycin.  EBONI on 1/17/2020 showed no vegetation.  The patient was discharged for his nursing home on 2/4/2020.  \par \par The ECHO done on 1/29/2020 showed a peak velocity of 4.11 m/s, a mean gradient of 37 mmHg, and ADOLFO of 0.8 cm2.  \par \par Since discharge, the patient states

## 2020-03-02 ENCOUNTER — APPOINTMENT (OUTPATIENT)
Dept: CARDIOTHORACIC SURGERY | Facility: CLINIC | Age: 70
End: 2020-03-02

## 2020-03-02 DIAGNOSIS — Z86.79 PERSONAL HISTORY OF OTHER DISEASES OF THE CIRCULATORY SYSTEM: ICD-10-CM

## 2020-03-02 DIAGNOSIS — Z86.39 PERSONAL HISTORY OF OTHER ENDOCRINE, NUTRITIONAL AND METABOLIC DISEASE: ICD-10-CM

## 2020-03-02 DIAGNOSIS — Z86.73 PERSONAL HISTORY OF TRANSIENT ISCHEMIC ATTACK (TIA), AND CEREBRAL INFARCTION W/OUT RESIDUAL DEFICITS: ICD-10-CM

## 2020-03-02 DIAGNOSIS — Z99.2 END STAGE RENAL DISEASE: ICD-10-CM

## 2020-03-02 DIAGNOSIS — N18.6 END STAGE RENAL DISEASE: ICD-10-CM

## 2020-03-24 NOTE — PROGRESS NOTE ADULT - SUBJECTIVE AND OBJECTIVE BOX
Patient was seen and evaluated on dialysis.   HR: 60 (01-17-20 @ 15:45)  BP: 128/60 (01-17-20 @ 15:45)  Wt(kg): 57.9 Kg  01-17    140  |  99  |  57<H>  ----------------------------<  83  4.6   |  23  |  5.53<H>    Ca    9.5      17 Jan 2020 05:37  Phos  7.0     01-17  Mg     2.4     01-17      Continue dialysis:   Dialyzer:     Optiflux 180    QB: 400 ml/min  K bath: 2  Goal UF: 2L  Duration: 180 min    Patient is tolerating the procedure well.   Continue full hemodialysis treatment as prescribed. Detail Level: Simple Other (Free Text): Standing order given today Note Text (......Xxx Chief Complaint.): This diagnosis correlates with the

## 2020-08-23 NOTE — PROGRESS NOTE ADULT - PROBLEM/PLAN-1
DISPLAY PLAN FREE TEXT
No
DISPLAY PLAN FREE TEXT

## 2021-10-01 NOTE — PATIENT PROFILE ADULT - DO YOU FEEL UNSAFE AT WORK?
[facility] Medication Refills:         metoprolol succinate (TOPROL XL) 25 MG extended release tablet  Take 1 tablet by mouth daily, Disp-30 tablet, R-5    clopidogrel (PLAVIX) 75 MG tablet  Take 1 tablet by mouth daily, Disp-30 tablet, R-5  Normal Last Dose: Not Recorded      [facility] [address] [address] [phone]      Last Office Visit: 04/23/21    Next Office Visit: 10/29/21    Last Refill: 03/25/21    Last Labs: 03/24/21
not applicable

## 2022-08-17 NOTE — DIETITIAN INITIAL EVALUATION ADULT. - PERTINENT MEDS FT
asprin plavix heparin humalog correction coreg Catapres lexapro Apresoline Procardia Miarlax senna tyneol tramdol lipitor synrthoid cozaar renvela aspirin Plavix heparin Humalog correction coreg Catapres Lexapro Apresoline Procardia Miralax senna Tylenol tramadol Lipitor synthroid cozaar Renvela No Exposure

## 2022-08-22 NOTE — ED ADULT TRIAGE NOTE - RESPIRATORY RATE (BREATHS/MIN)
1. I was told the name of the physician that took care of my child while in the hospital.    2. I have been told about any important findings on my child's physical exam and my child's plan of care.    3. The doctor clearly explained my child's diagnosis and other possible diagnoses that were considered.    4. My child's doctor explained all the tests that were done and their results (if available). I understand that some of the test results may not be ready before we go home and I was told how I can get these results. I understand that a summary of my child's hospitalization and important test results will be shared with my child's outpatient doctor.    5. My child's doctor talked to me about what I need to do when we go home.    6. I understand what signs and symptoms to watch for. I understand what symptoms I would need to call my doctor for and/or return to the hospital.    7. I have the phone number to call the hospital for results and/or questions after I leave the hospital.
24

## 2022-11-03 NOTE — ED PROVIDER NOTE - NSCAREINITIATED _GEN_ER
Anthony Mckeon) Consent: The patient's consent was obtained including but not limited to risks of crusting, scabbing, blistering, scarring, darker or lighter pigmentary change, recurrence, and incomplete removal. Show Topical Anesthesia Variable?: Yes Detail Level: Detailed Medical Necessity Information: It is in your best interest to select a reason for this procedure from the list below. All of these items fulfill various CMS LCD requirements except the new and changing color options. Post-Care Instructions: I reviewed with the patient in detail post-care instructions. Patient is to wear sunprotection, and avoid picking at any of the treated lesions. Pt may apply Vaseline to crusted or scabbing areas. Render Post-Care Instructions In Note?: no Number Of Freeze-Thaw Cycles: 1 freeze-thaw cycle Spray Paint Text: The liquid nitrogen was applied to the skin utilizing a spray paint frosting technique. Medical Necessity Clause: This procedure was medically necessary because the lesions that were treated were:

## 2023-06-13 NOTE — STROKE CODE NOTE - NIH STROKE SCALE: 3. VISUAL, QM
Returned phone call to the patient who reports feeling fluttering in the chest   Has known history of atrial fibrillation and has been on flecainide 100 mg twice daily  Had recent nuclear stress test with no evidence of ischemic heart disease  Denies alcohol intake recently  Patient requesting increase in dose of flecainide  I am okay with with that  Patient wants to take additional flecainide 50 mg twice daily as recommended though patient prefers to take half of this dose twice daily as a trial   I have sent a prescription to his pharmacy  We will request of follow-up visit for an ECG next week in office  If he remains in atrial fibrillation then will plan for cardioversion  He has been on anticoagulation consistently  Advised patient that he should call us or go to the emergency room if he has any passing out or near passing out experience or sustained palpitations      Dana Ag MD (0) No visual loss

## 2024-06-28 NOTE — PROGRESS NOTE ADULT - SUBJECTIVE AND OBJECTIVE BOX
Dear Parent,    Ace recently had some blood tests done at our office.  The results were within the range of normal for Ace's age. You may note some deviations from the normal ranges noted by the laboratory, but your child's results do not require any additional evaluation at this time.    You will notice that we checked a full lipid panel which includes cholesterol, LDL, non-HDL, and triglycerides (all types of cholesterol considered 'bad' if levels are too high), as well as the HDL (which is considered 'good' cholesterol, and which we don't want to be too low).  But because Ace was not fasting at the time we obtained these labs, we only consider two of these values as accurate.     First, we look at the Non-HDL (non-high-density-lipoprotein). We want the number to be below 145 mg/dL in children.     Ace's Non-HDL level was:     Non-HDL Cholesterol (mg/dL)       Date                     Value                 06/24/2024               91               ----------      Second, we look at the HDL (high-density-lipoprotein, or 'good cholesterol'.) We want the number above 40 mg/dL in children    Ace's level was:    HDL (mg/dL)       Date                     Value                 06/24/2024               60               ----------        Sincerely,    Robert Fowler      Guideline reference:  The American Academy of Pediatrics (AAP) recommends all children between 9 and 11 years old are screened for high blood cholesterol levels due to the growing epidemic of obesity in children. (Expert Panel on Integrated Guidelines for Cardiovascular Health and Risk Reduction in Children and Adolescents; National Heart, Lung, and Blood [place]. Expert panel on integrated guidelines for cardiovascular health and risk reduction in children and adolescents: summary report. Pediatrics. 2011;128(suppl 5):I666-H671. doi: 10.1542/peds.2009-2107C)    ?In addition, the AAP recommends cholesterol testing for the following  Patient was seen and evaluated on dialysis.   HR: 61 (01-23-20 @ 11:00)  BP: 157/70 (01-23-20 @ 11:00)  Wt(kg): 56.9 Kg  01-23    137  |  92<L>  |  54<H>  ----------------------------<  218<H>  4.3   |  25  |  5.99<H>    Ca    10.1      23 Jan 2020 11:46  Phos  5.3     01-23  Mg     2.4     01-23    TPro  6.8  /  Alb  4.1  /  TBili  0.2  /  DBili  x   /  AST  16  /  ALT  10  /  AlkPhos  110  01-21    Continue dialysis:   Dialyzer:  Optiflux 180       QB: 400 ml/min  K bath: 2  Goal UF: 2L  Duration: 180 min    Patient is tolerating the procedure well.   Continue full hemodialysis treatment as prescribed. groups of children:?  Those whose parents or grandparents have had heart attacks or have been diagnosed with blocked arteries or disease affecting the blood vessels, such as stroke, at age 55 or earlier in men, or 65 or earlier in women  Those whose parents or grandparents have total blood cholesterol levels of 240 mg/dL or higher  Those whose family health background is not known (eg, many adopted children), or those who have characteristics associated with heart disease, such as high blood pressure, diabetes, smoking, or obesity

## 2024-07-30 NOTE — PROVIDER CONTACT NOTE (CRITICAL VALUE NOTIFICATION) - PERSON GIVING RESULT:
Situation: Call returned from patient in response to routine ACM outreach.     Key Assessments:   Patient reports he feels well and denies physical symptoms.     Patient shared his chief concern is pause in transportation support for next 2 weeks as her normal caregiver will be on vacation. Patient reports alerting his behavior health provider of this concern and awaiting reply. Patient has not outreached to his . John typically travels Wednesday-Friday to [employer] in [place], where he is employed.       Actions Taken:   Call placed to Nazanin Acevedo . Alerted to transportation concern and patient's typical transportation support schedule. Nazanin agrees to call John directly in follow-up to above need.     Case review completed. No new utilization.     Program plan:  Next routine outreach in 3-4 weeks. Will assess if ongoing ACM support needed at that time.       See hyperlinks within encounter for full documentation.   Laboratory

## 2024-08-24 NOTE — PATIENT PROFILE ADULT - [id number]_GEN_A_NUR
Monitor summary: SR , ND -0.15, QRS -0.08, QT -0.38, with rare PVCs, couplet PVCs and bigeminal PVCs per strip from the monitor room.           no

## 2024-09-07 NOTE — DISCHARGE NOTE PROVIDER - DISCHARGE DATE
Encounter Date:09/11/2024        Patient ID: Thi Allen is a 80 y.o. female.      Chief Complaint:      History of Present Illness  Thi Allen is a 79 y.o. female with multi valvular disease nonischemic cardiomyopathy status post mitral valve repair in May 2023, known moderate aortic and tricuspid regurgitation, similar nonobstructive coronary disease, hypertension, hypertriglyceridemia, sick sinus syndrome status post permanent pacemaker placement, hypothyroidism and DVT.  She was admitted to the hospital after running out of Lasix with heart failure exacerbation and was noted to have elevated proBNP of 32,000 and chest x-ray consistent with pulmonary edema.  She also had a supratherapeutic INR of 8  Of note she had an echocardiogram in June 2023 that showed EF of 20%, diastolic dysfunction, moderate aortic regurgitation, mild to moderate mitral stenosis, moderate tricuspid regurgitation and elevated RVSP.  History of COVID.  Her symptoms have resolved.  Jardiance was discontinued due to dizziness.  She also stopped lisinopril because she felt unwell and her voice was hoarse.    Current cardiac medications include aspirin, Lasix, Toprol-XL, Aldactone, warfarin.    The following portions of the patient's history were reviewed and updated as appropriate: allergies, current medications, past family history, past medical history, past social history, past surgical history, and problem list.    ROS      Current Outpatient Medications:   •  aspirin 81 MG EC tablet, Take 1 tablet by mouth Every Night., Disp: , Rfl:   •  famotidine (PEPCID) 20 MG tablet, Take 1 tablet by mouth 2 (Two) Times a Day As Needed., Disp: , Rfl:   •  fenofibrate 160 MG tablet, Take 1 tablet by mouth Every Night., Disp: , Rfl:   •  furosemide (LASIX) 40 MG tablet, TAKE 1 TABLET BY MOUTH ONCE DAILY AS NEEDED (LEG  SWELLING,  WEIGHT  GAIN  OR  SHORTNESS  OF  BREATH), Disp: 90 tablet, Rfl: 0  •  latanoprost (XALATAN) 0.005 % ophthalmic  "solution, Administer 1 drop to both eyes Every Night., Disp: , Rfl:   •  levothyroxine (SYNTHROID, LEVOTHROID) 50 MCG tablet, Take 1 tablet by mouth Every Night., Disp: , Rfl:   •  metoprolol succinate XL (TOPROL-XL) 25 MG 24 hr tablet, Take 1 tablet by mouth Daily., Disp: , Rfl:   •  spironolactone (ALDACTONE) 25 MG tablet, Take 1 tablet by mouth once daily, Disp: 90 tablet, Rfl: 0  •  warfarin (COUMADIN) 2.5 MG tablet, Take 1 tablet by mouth Daily. Monday and Friday evenings, Disp: , Rfl:   No current facility-administered medications for this visit.    Facility-Administered Medications Ordered in Other Visits:   •  Chlorhexidine Gluconate Cloth 2 % pads 1 application, 1 application , Topical, Q12H PRN, Yulissa Herrera APRN    Current outpatient and discharge medications have been reconciled for the patient.  Reviewed by: Trace Marcus MD       Allergies   Allergen Reactions   • Atorvastatin Myalgia   • Colesevelam Hcl Myalgia     Made legs weak    • Colestipol Hcl Other (See Comments)     MADE PATIENT FEEL BAD   • Ezetimibe Hallucinations     Loss of energy - worn out - legs weak - feel like they way a \"ton \"    • Pitavastatin Other (See Comments)     Elevated liver function tests    • Rosuvastatin Calcium Myalgia     MADE LEGS WEAK   • Simvastatin Myalgia   • Statins Myalgia     Made legs weak      • Jardiance [Empagliflozin] Dizziness   • Keflex [Cephalexin] Hives       Family History   Problem Relation Age of Onset   • No Known Problems Mother    • No Known Problems Father    • No Known Problems Sister    • No Known Problems Brother    • No Known Problems Maternal Aunt    • No Known Problems Maternal Uncle    • No Known Problems Paternal Aunt    • No Known Problems Paternal Uncle    • No Known Problems Maternal Grandmother    • No Known Problems Maternal Grandfather    • No Known Problems Paternal Grandmother    • No Known Problems Paternal Grandfather    • No Known Problems Other    • Anemia Neg " Hx    • Arrhythmia Neg Hx    • Asthma Neg Hx    • Clotting disorder Neg Hx    • Fainting Neg Hx    • Heart attack Neg Hx    • Heart disease Neg Hx    • Heart failure Neg Hx    • Hyperlipidemia Neg Hx    • Hypertension Neg Hx        Past Surgical History:   Procedure Laterality Date   • CARDIAC CATHETERIZATION N/A 2/25/2023    Procedure: Left Heart Cath and coronary angiogram;  Surgeon: Trace Marcus MD;  Location: [facility] CATH INVASIVE LOCATION;  Service: Cardiovascular;  Laterality: N/A;   • CARDIAC CATHETERIZATION Bilateral 2/25/2023    Procedure: Right and Left Heart Cath;  Surgeon: Trace Marcus MD;  Location: [facility] CATH INVASIVE LOCATION;  Service: Cardiovascular;  Laterality: Bilateral;   • HYSTERECTOMY     • MITRAL VALVE REPAIR/REPLACEMENT N/A 5/15/2023    Procedure: MITRAL VALVE REPAIR/REPLACEMENT;  Surgeon: Veronica Orozco MD;  Location: [facility];  Service: Cardiothoracic;  Laterality: N/A;  repaired with 26mm physio II annuloplasty ring   • PACEMAKER IMPLANTATION     • THYROID SURGERY     • TRANSESOPHAGEAL ECHOCARDIOGRAM (CARMITA) N/A 5/15/2023    Procedure: TRANSESOPHAGEAL ECHOCARDIOGRAM WITH ANESTHESIA;  Surgeon: Veronica Orozco MD;  Location: [facility];  Service: Cardiothoracic;  Laterality: N/A;       Past Medical History:   Diagnosis Date   • Aortic insufficiency 02/24/2023   • AV block 02/22/2021   • Bilateral renal cysts 01/01/2020   • Carotid stenosis, bilateral 06/01/2019    Overview:  <50% bilateral    • Carpal tunnel syndrome, bilateral 06/15/2018   • Chronic combined systolic (congestive) and diastolic (congestive) heart failure    • DVT (deep venous thrombosis) (CMS/[facility]) [I82.409]     recurrent   • Hordeolum externum 08/05/2015   • Hyperlipidemia    • Hypertension    • Hypothyroidism due to acquired atrophy of thyroid 10/21/2019   • Long term (current) use of anticoagulants 09/29/2022   • Mitral regurgitation 05/17/2016   • Osteopenia    • Pacemaker 12/28/2021   • S/P MVR (mitral valve  "repair) 05/15/2023   • Tricuspid regurgitation 02/24/2023       Family History   Problem Relation Age of Onset   • No Known Problems Mother    • No Known Problems Father    • No Known Problems Sister    • No Known Problems Brother    • No Known Problems Maternal Aunt    • No Known Problems Maternal Uncle    • No Known Problems Paternal Aunt    • No Known Problems Paternal Uncle    • No Known Problems Maternal Grandmother    • No Known Problems Maternal Grandfather    • No Known Problems Paternal Grandmother    • No Known Problems Paternal Grandfather    • No Known Problems Other    • Anemia Neg Hx    • Arrhythmia Neg Hx    • Asthma Neg Hx    • Clotting disorder Neg Hx    • Fainting Neg Hx    • Heart attack Neg Hx    • Heart disease Neg Hx    • Heart failure Neg Hx    • Hyperlipidemia Neg Hx    • Hypertension Neg Hx        Social History     Socioeconomic History   • Marital status:    Tobacco Use   • Smoking status: Former     Passive exposure: Past   • Smokeless tobacco: Never   • Tobacco comments:     quit 35 yrs ago   Vaping Use   • Vaping status: Never Used   Substance and Sexual Activity   • Alcohol use: Not Currently     Comment: rare   • Drug use: No   • Sexual activity: Defer               Objective:       Physical Exam    /56 (BP Location: Left arm, Patient Position: Sitting)   Pulse 82   Ht 149.9 cm (59\")   Wt 49.9 kg (110 lb)   SpO2 97%   BMI 22.22 kg/m²   The patient is alert, oriented and in no distress.    Vital signs as noted above.    Head and neck revealed no carotid bruits or jugular venous distension.  No thyromegaly or lymphadenopathy is present.    Lungs clear.  No wheezing.  Breath sounds are normal bilaterally.    Heart normal first and second heart sounds.  No murmur..  No pericardial rub is present.  No gallop is present.    Abdomen soft and nontender.  No organomegaly is present.    Extremities revealed good peripheral pulses without any pedal edema.    Skin warm and " dry.    Musculoskeletal system is grossly normal.    CNS grossly normal.           Diagnosis Plan   1. Chronic HFrEF (heart failure with reduced ejection fraction)        2. Essential hypertension with goal blood pressure less than 130/80        3. S/P mitral valve repair        4. SSS (sick sinus syndrome)        5. Paroxysmal atrial fibrillation        6. Nonrheumatic aortic valve insufficiency        7. Mitral valve insufficiency, unspecified etiology        8. Hypothyroidism due to acquired atrophy of thyroid        9. Acute deep vein thrombosis (DVT) of lower extremity, unspecified laterality, unspecified vein        10. History of DVT (deep vein thrombosis)        11. Long term (current) use of anticoagulants        12. Deep vein thrombosis (DVT) of proximal lower extremity, unspecified chronicity, unspecified laterality        13. Nonrheumatic tricuspid valve regurgitation        14. Primary hypertension        15. Hyperlipidemia, unspecified hyperlipidemia type        16. AV block, complete        17. Pacemaker        18. PAD (peripheral artery disease)        LAB RESULTS (LAST 7 DAYS)    CBC        BMP        CMP         BNP        TROPONIN        CoAg  Results from last 7 days   Lab Units 09/11/24  1445   INR  2.40       Creatinine Clearance  CrCl cannot be calculated (Patient's most recent lab result is older than the maximum 30 days allowed.).    ABG        Radiology  No radiology results for the last day    EKG    ECG 12 Lead    Date/Time: 9/11/2024 3:40 PM  Performed by: Trace Marcus MD    Authorized by: Trace Marcus MD  Comparison: compared with previous ECG   Similar to previous ECG  Comments: ECG shows AV paced rhythm.  Heart rate 82, VT interval 274,  and QTc 491 ms.      Stress test      Echocardiogram  Results for orders placed in visit on 02/14/24    Adult Transthoracic Echo Complete W/ Cont if Necessary Per Protocol    Interpretation Summary  •  Left ventricular systolic function is  moderately decreased. Calculated left ventricular EF = 33% Left ventricular ejection fraction appears to be 31 - 35%.  •  Left ventricular diastolic dysfunction is noted.  •  The left atrial cavity is dilated.  •  Estimated right ventricular systolic pressure from tricuspid regurgitation is normal (<35 mmHg).  •  Status post surgical mitral valve repair.  There is mild mitral regurgitation.  Mean gradient is 5.9 mmHg.  •  There is mild to moderate aortic valve regurgitation.      Cardiac catheterization  Results for orders placed during the hospital encounter of 02/24/23    Cardiac Catheterization/Vascular Study    Conclusion  OPERATORS  Trace Marcus M.D. (Attending Cardiologist)      PROCEDURE PERFORMED  Ultrasound guided vascular access  Right heart catheterization  Coronary Angiogram  Left Heart Catheterization 26816  Moderate Sedation 35 minutes    INDICATIONS FOR PROCEDURE  78-year-old woman with valvular heart disease including mitral and tricuspid regurgitation, pulmonary hypertension presented with heart failure exacerbation.  After IV diuresis, discussion of risk and benefit she was brought into the cardiac Cath Lab for right and left heart cath.    PROCEDURE IN DETAIL  Informed consent was obtained from the patient after explaining the risks, benefits, and alternative options of the procedure. After obtaining informed consent, the patient was brought to the cath lab and was prepped in a sterile fashion. Lidocaine 2% was used for local anesthesia into the right femoral venous access site. Right femoral vein was accessed using the micropuncture needle under ultrasound guidance and micropuncture wire advanced under flouroscopy. A 7 Egyptian vascular sheath was put into place percutaneously over guide-wire. Guide wires were removed. A 6Fr swan aneta catheter was advanced to wedge position. RA, RV and PA and wedge pressures were recorded.  PA sat and arterial sats recorded.  The patient tolerated the procedure  well without any complications.    Lidocaine 2% was used for local anesthesia into the right femoral arterial access site. The right femoral artery was accessed with a micropuncture needle via modified Seldinger technique under ultrasound guidance. A 6F was inserted successfully.  Afterwards, 6F JR4 and JL4 diagnostic catheters were advanced over a wire into the ascending aorta and were used to engage the ostia of the left main and RCA respectively. JR4 used to cross the AV and obtain LV pressures and gradient across the AV measured via pullback technique. Images of the right and left coronary systems were obtained. All the catheters were exchanged over a wire and subsequently removed. Angiogram of the femoral access site was obtained and did not show complications. The patient tolerated the procedure well without any complications. The pictures were reviewed at the end of the procedure. A Mynx closure device was applied.    HEMODYNAMICS    RHC  RA 5/3, 3 mmHg  RV 26/2, 4 mmHg  PA 27/8, 16 mmHg  PCW 7/7, 6 mmHg  AO Sat 96%  PA Sat 74%    Rob CO 4.73    Rob CI 3.42    LHC  LV: 108/5, 7 mmHg  AO: 116/46, 72 mmHg    No significant gradient across the aortic valve during pullback of JR4 catheter.  LV gram was not performed due to recent echocardiogram.    FINDINGS  Coronary Angiogram  Left dominant circulation  Left main: Left main is a large caliber vessel which gives rise to the Left Anterior Descending and the Left circumflex.  Left main is angiographically free from any significant disease  Left Anterior Descending Artery: LAD is a medium caliber vessel which gives rise to several septal perforators and several diagonal branches.  LAD has diffuse luminal irregularities with 20 to 30% lesion in the midsegment.  Left Circumflex: Left circumflex artery is a dominant vessel which gives rise to obtuse marginal.  Left circumflex has luminal irregularities only.  Right Coronary Artery: RCA is a small nondominant  vessel.    ESTIMATED BLOOD LOSS:  10 ml    COMPLICATIONS:  None    PROCEDURE DATA:  Contrast Used: 25 cc  Sedation Time: 35 minutes    IMPRESSIONS  Non obstructive CAD  Normal filling pressure, no evidence of pulmonary hypertension.  Normal wedge pressure    RECOMMENDATIONS  -Switch IV to p.o. diuresis.  -Proceed with transesophageal echocardiogram to evaluate mitral and tricuspid regurgitation.  -Uptitrate GDMT for HFrEF          Assessment and Plan       Diagnoses and all orders for this visit:    1. Chronic HFrEF (heart failure with reduced ejection fraction) (Primary)    2. Essential hypertension with goal blood pressure less than 130/80    3. S/P mitral valve repair    4. SSS (sick sinus syndrome)    5. Paroxysmal atrial fibrillation    6. Nonrheumatic aortic valve insufficiency    7. Mitral valve insufficiency, unspecified etiology    8. Hypothyroidism due to acquired atrophy of thyroid    9. Acute deep vein thrombosis (DVT) of lower extremity, unspecified laterality, unspecified vein    10. History of DVT (deep vein thrombosis)    11. Long term (current) use of anticoagulants    12. Deep vein thrombosis (DVT) of proximal lower extremity, unspecified chronicity, unspecified laterality    13. Nonrheumatic tricuspid valve regurgitation    14. Primary hypertension    15. Hyperlipidemia, unspecified hyperlipidemia type    16. AV block, complete    17. Pacemaker    18. PAD (peripheral artery disease)         Acute on chronic combined systolic and diastolic congestive heart failure with valvular heart disease    she received 1 dose of IV diuretics during recent hospital presentation and became euvolemic  She will continue to use diuretics as needed as she is very sensitive to Lasix.  Repeat echocardiogram shows improvement in LV function from 20% to 30 to 35%.  Unable to tolerate ACE inhibitor due to hoarseness of voice and cough.  Continue beta-blocker and Aldactone  Unable to tolerate Jardiance or Farxiga due to  dizziness.  Previous echo showed EF of less than 20%  Blood pressure has now recovered to normal.  Pacemaker was recently interrogated and working well  May need to be upgraded to ICD however benefit is limited in the setting of nonischemic cardiomyopathy     Supratherapeutic INR  Likely secondary to hepatic congestion  LFTs normal  Back on warfarin with goal INR of 2-3  INR today is 2.4     Severe mitral regurgitation, S/P MVR (mitral valve repair)  Status post repair with 25 mm physio 2 ring on 5/15/2023  Repeat echocardiogram shows minimal MR but moderate aortic and tricuspid regurgitation  Repeat echocardiogram shows only mild mitral regurgitation.  Patient is currently asymptomatic     Aortic insufficiency  Moderate AI per most recent echocardiogram.  Continue diuretics as needed.  Repeat echocardiogram shows mild to moderate mitral valve regurgitation.     Tricuspid regurgitation  Moderate tricuspid regurgitation per the last echocardiogram.  Continue diuretics as needed     Coronary artery disease involving native coronary artery of native heart without angina pectoris  Essential hypertension with goal blood pressure less than 130/80  Hypertriglyceridemia  Preop cardiac catheterization showed nonobstructive coronary artery disease.  Continue aspirin, fenofibrate, Toprol-XL and lisinopril.  Patient is intolerant to statins     Hypothyroidism due to acquired atrophy of thyroid  Continue Synthroid.  TSH is 6.1 and free T4 is 1.8 both elevated     Pacemaker, h/o SSS  Currently AV paced  Pacemaker was recently interrogated and working well.  0% atrial fibrillation  May need to be upgraded to ICD however benefit is limited in the setting of nonischemic cardiomyopathy     History of DVT (deep vein thrombosis)  Continue warfarin  Goal INR is 2-3   13-Aug-2019

## 2024-12-19 NOTE — PROGRESS NOTE BEHAVIORAL HEALTH - NSBHCONSULTMEDNEW_PSY_A_CORE
[facility] Hospitalist DAILY PROGRESS NOTE    ADMISSION DATE:  12/18/2024  DATE:  12/19/2024  CURRENT HOSPITAL DAY:  Hospital Day: 2  ATTENDING PHYSICIAN:  Yesenia Duarte DO  CODE STATUS:  Full Resuscitation    Subjective:   Examined at bedside  Complaint of severe BL LE cramping pain  States his prescribed baclofen is not helping  Denies fever, chills, HA, dizziness, CP, SOB, abd pain, nvd, dysuria    Reviewed prior keppra level which was high at 56, repeat lab ordered  Neuro and psych consults added in case medication side effect     Bladder scan with >350cc retained  Increased flomax, straight cath, continue bladder scans    ACTIVE PROBLEMS:    Active Hospital Problems    Diagnosis     Body aches     Physical deconditioning     Severe protein-calorie malnutrition  (CMD)     Functional quadriplegia  (CMD)     Late effect of cerebrovascular accident (CVA)     Type 2 diabetes mellitus with hyperglycemia, with long-term current use of insulin (CMD)     Acute kidney injury (CMD)        MEDICATIONS/INFUSIONS:    Reviewed today.       OBJECTIVE:    VITAL SIGNS:     Vital Last Value 24 Hour Range   Temperature 97.3 °F (36.3 °C) (12/19/24 0338) Temp  Min: 97.3 °F (36.3 °C)  Max: 97.8 °F (36.6 °C)   Pulse 82 (12/19/24 0338) Pulse  Min: 82  Max: 91   Respiratory 18 (12/19/24 0338) Resp  Min: 11  Max: 18   Non-Invasive  Blood Pressure (!) 152/82 (12/19/24 0338) BP  Min: 127/79  Max: 155/77   Pulse Oximetry 100 % (12/19/24 0338) SpO2  Min: 96 %  Max: 100 %     Vital Today Admitted   Weight 53.5 kg (117 lb 15.1 oz) (12/19/24 0600) Weight: 56.2 kg (123 lb 14.4 oz) (12/18/24 1915)   Height N/A Height: 5' 10\" (177.8 cm) (12/19/24 0600)   BMI N/A BMI (Calculated): 16.92 (12/19/24 0600)       INTAKE/OUTPUT:    No intake or output data in the 24 hours ending 12/19/24 0827      PHYSICAL EXAM:    Physical Exam  Vitals and nursing note reviewed.   Constitutional:       General: He is not in acute distress.     Appearance: He is ill-appearing.  He is not toxic-appearing or diaphoretic.      Comments: cachectic   HENT:      Head: Normocephalic and atraumatic.      Mouth/Throat:      Mouth: Mucous membranes are moist.   Eyes:      General: No scleral icterus.  Cardiovascular:      Rate and Rhythm: Normal rate and regular rhythm.      Heart sounds: No murmur heard.     No friction rub. No gallop.   Pulmonary:      Breath sounds: No wheezing, rhonchi or rales.   Abdominal:      General: Abdomen is flat. There is no distension.      Palpations: Abdomen is soft.      Tenderness: There is no abdominal tenderness. There is no guarding or rebound.   Musculoskeletal:      Right lower leg: No edema.      Left lower leg: No edema.   Skin:     General: Skin is warm and dry.   Neurological:      General: No focal deficit present.      Mental Status: He is alert and oriented to person, place, and time.   Psychiatric:         Mood and Affect: Mood normal.         Behavior: Behavior normal.          LABORATORY DATA:    Recent Labs   Lab 12/18/24  1852   SODIUM 141   POTASSIUM 4.8   CHLORIDE 106   CO2 29   BUN 37*   CREATININE 1.72*   GLUCOSE 107*   ALBUMIN 3.3*   PHOS 3.7   AST 25   BILIRUBIN 0.6      TSH 1.990        ECHOCARDIOGRAM RESULTS:  No valid procedures specified.     IMAGING STUDIES:    XR HIP 2 VIEWS LEFT AND PELVIS   Final Result      Degenerative changes in the bilateral right greater than left hip joint.         FOR PHYSICIAN USE ONLY - Please note that this report was generated using   voice recognition software.  If you require clarification or feel that   there has been an error in this report please contact me through [employer].  Thank you very much for allowing me to participate in the care of   your patient.      Electronically Signed by: NAN PALAFOX    Signed on: 12/18/2024 9:32 PM    Workstation ID: [id number]      XR KNEES 1 OR 2 VIEWS BILATERAL   Final Result      Degenerative changes in bilateral knee joints.         FOR  PHYSICIAN USE ONLY - Please note that this report was generated using   voice recognition software.  If you require clarification or feel that   there has been an error in this report please contact me through [employer].  Thank you very much for allowing me to participate in the care of   your patient.      Electronically Signed by: NAN PALAFOX    Signed on: 12/18/2024 9:31 PM    Workstation ID: [id number]                                 ASSESSMENT & PLAN:    Patient is a 56-year-old male with a past medical history significant for hypertension, type 2 diabetes mellitus, chronic seizure disorder, chronic HFpEF, pulmonary embolism currently on anticoagulation with Eliquis and CVA with residual left-sided hemiplegia and chronic functional quadriplegia currently bedbound at baseline who was brought to the ED by the EMS with complaint of generalized body aches particularly in the lower extremity bilaterally.    DISPO: Pending PT/OT recs. Neuro, psych consulted     Generalized body aches due to chronic primary osteoarthritis  Patient's clinical presentation on admission including generalized body aches is likely secondary to polyarthralgia from primary osteoarthritis.  Other etiologies also considered including diabetic neuropathy.  Imaging on admission noted significant for severe arthritic changes of both knees as well as moderate arthritic changes of the right hip and mild degenerative changes of the left hip.  No evidence of fracture.    --increased baclofen, added flexeril  --requiring morphine PRN  PT/OT to evaluate, significant other requesting placement however this seems unlikely given degree of pt's chronic debility and contracture    Chronic seizure disorder  Recent breakthrough seizure seen in ED earlier this month  No seizure-like activity reported following his discharge from the hospital 1 week ago.    --recent keppra level supratherapeutic at 56  --repeat level ordered  --neuro consulted  in case contributing to symptoms    Failure to thrive  C/f depression per significant other  --On paroxetine hesitant to increase dose as pt already complaining of spasticity and cramping pain  --psych consulted, appreciate recs    Acute kidney injury  Creatinine elevated on admission at 1.72 from her previous baseline of 0.98.  Suspect prerenal LUCIAN.  Will encourage oral intake and continue to monitor kidney function.  Avoid nephrotoxins including NSAIDs.    --check bladder scan & Renal US     Chronic elevated troponin  Sensitive troponin elevated on admission at 255.  Known history of chronic elevated troponin with similar levels noted during previous hospitalization.  Patient is currently clinically asymptomatic with no chest pain or dyspnea reported.    No further workup      Anemia of chronic disease  Globin 11.1 on admission which is at baseline as compared to previous hemoglobin levels.  No evidence of active bleeding.  Will continue to monitor hemoglobin.     Type 2 diabetes mellitus with hyperglycemia  Blood glucose currently at 107 which is within goal range.  Continue management with insulin sliding scale and monitor with Accu-Cheks.     Chronic hypertensive heart disease with CHF  BP is currently within normal range.  Will continue to monitor BP and resume home antihypertensive regimen including carvedilol if patient remains hemodynamically stable.     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  Will resume home medical management including atorvastatin.  Will continue supportive care including pressure ulcer prophylaxis with frequent turning.     Chronic HFpEF  Patient seen clinically compensated and euvolemic.   Last 2D echo was significant for EF 75% and G1DD.    Cont GDMT and continue to monitor clinically.     Adult failure to thrive with severe protein calorie malnutrition  BMI 17.78.  Nutritionist  on consult for recommendations regarding dietary supplementations.     Chronic pulmonary embolism  No acute issues reported at this time.    Cont home Eliquis     Chronic BPH  Will resume home medical management with tamsulosin.    Hospital bed ordered:   The patient has a history of CVA, functional quadriplegia. The patient requires positioning of the body in ways not feasible with an ordinary bed due to their diagnosis. The patient also requires frequent and immediate changes in body position and therefore a semi-electric bed is medically necessary.   AND  The patient has a history of CVA, functional quadriplegia. The patient requires positioning of the body in ways not feasible with an ordinary bed in order to alleviate pain. The patient also requires frequent and immediate changes in body position and therefore a semi-electric bed is medically necessary.           Checklist:   DVT ppx: home eliquis for chronic PE  GI ppx: none indicated  Fluids: LR 100cc/hr  Electrolytes: Replace PRN  Diet: cardiac diabetic     CODE/SDM: full code, decisional, significant other would be SDM    Discussed with patient, RN    PCP: Juhi Pereira, CNP     Dr. Yesenia ORELLANA Hospitalist    Please reach via Epic Chat or [employer]    MORE than 54 MINS WERE SPENT ON THIS PATIENTS CARE TODAY, more than 50% of which was spent coordinating patient care. This includes endering the following: Reviewed all vitals, medications, new orders, I/O, labs, micro, radiology, nurses notes, pertinent consultant notes which are reflected in assessment and plan.This does not include time spent on other items of care such as smoking cessation counseling, prolonged care time, and or advanced care planning if applicable.           yes

## 2025-01-28 NOTE — PROGRESS NOTE BEHAVIORAL HEALTH - NSBHFUPVIOL_PSY_A_CORE
Health Maintenance       Pneumococcal Vaccine 50+ (1 of 2 - PCV)  Never done    Respiratory Syncytial Virus (RSV) Vaccine 60+ (1 - Risk 60-74 years 1-dose series)  Never done    COVID-19 Vaccine (4 - 2024-25 season)  Overdue since 9/1/2024           Following review of the above:  Patient wishes to discuss with clinician: COVID-19, Pneumococcal, and Respiratory Syncytial Virus (RSV)    Note: Refer to final orders and clinician documentation.         none known

## 2025-01-29 NOTE — PROGRESS NOTE ADULT - PROVIDER SPECIALTY LIST ADULT
CCU Detail Level: Detailed Depth Of Biopsy: dermis Was A Bandage Applied: Yes Size Of Lesion In Cm: 0.8 X Size Of Lesion In Cm: 0 Biopsy Type: H and E Biopsy Method: double edge Personna blade Anesthesia Type: 1% lidocaine with epinephrine Anesthesia Volume In Cc: 0.5 Hemostasis: Aluminum Chloride Wound Care: Petrolatum Dressing: bandage Destruction After The Procedure: No Type Of Destruction Used: Curettage Curettage Text: The wound bed was treated with curettage after the biopsy was performed. Cryotherapy Text: The wound bed was treated with cryotherapy after the biopsy was performed. Electrodesiccation Text: The wound bed was treated with electrodesiccation after the biopsy was performed. Electrodesiccation And Curettage Text: The wound bed was treated with electrodesiccation and curettage after the biopsy was performed. Silver Nitrate Text: The wound bed was treated with silver nitrate after the biopsy was performed. Lab: -0545 Medical Necessity Information: It is in your best interest to select a reason for this procedure from the list below. All of these items fulfill various CMS LCD requirements except the new and changing color options. Consent: Written consent was obtained and risks were reviewed including but not limited to scarring, infection, bleeding, scabbing, incomplete removal, nerve damage and allergy to anesthesia. Post-Care Instructions: I reviewed with the patient in detail post-care instructions. Patient is to keep the biopsy site dry overnight, and then apply bacitracin twice daily until healed. Patient may apply hydrogen peroxide soaks to remove any crusting. Notification Instructions: Patient will be notified of biopsy results. However, patient instructed to call the office if not contacted within 2 weeks. Billing Type: Third-Party Bill Information: Selecting Yes will display possible errors in your note based on the variables you have selected. This validation is only offered as a suggestion for you. PLEASE NOTE THAT THE VALIDATION TEXT WILL BE REMOVED WHEN YOU FINALIZE YOUR NOTE. IF YOU WANT TO FAX A PRELIMINARY NOTE YOU WILL NEED TO TOGGLE THIS TO 'NO' IF YOU DO NOT WANT IT IN YOUR FAXED NOTE.

## 2025-01-30 NOTE — PATIENT PROFILE ADULT - INFORMATION PROVIDED TO:
Orders:    levothyroxine (SYNTHROID) 50 MCG tablet; Take 1 tablet by mouth Daily    TSH; Future     patient

## 2025-01-31 NOTE — PROGRESS NOTE ADULT - PROBLEM SELECTOR PLAN 1
Physical Therapy Visit    Visit Type: Daily Treatment Note  Visit: 2  Referring Provider: Sheldon Garduno,*  Medical Diagnosis (from order): Z96.652 - Status post total left knee replacement     SUBJECTIVE                                                                                                               Pt reports that he feels that every day is a little better.       OBJECTIVE                                                                                                                     Range of Motion (ROM)   (degrees unless noted; active unless noted; norms in ( ); negative=lacking to 0, positive=beyond 0)  Knee:   - Extension (0-10):       Left:  -3                      Treatment     Therapeutic Exercise  Nu step, level 1, x6 minutes  Standing knee flexion stretch in true stretch  Standing heel raises  Standing marching with LLE  Standign hip abduction  Long arch quads   Short arch quads  Supine quad sets, tactile and verbal cuing to complete without glut set  Long sitting quad sets for visual cuing  Supine heel slides with strap for over pressures    Skilled input: verbal instruction/cues    Writer verbally educated and received verbal consent for hand placement, positioning of patient, and techniques to be performed today from patient   Home Exercise Program  Access Code: [email or web address]  URL: [email or web address]  Date: 01/29/2025  Prepared by: Soni DELONG'Logan    Exercises  - Ankle Pumps  - 1 x daily - 7 x weekly - 3 sets - 10 reps  - Quad Sets  - 1 x daily - 7 x weekly - 3 sets - 10 reps  - Heel Slides  - 1 x daily - 7 x weekly - 3 sets - 10 reps  - Straight Leg Raise  - 1 x daily - 7 x weekly - 3 sets - 10 reps  - Terminal Knee Extension  - 1 x daily - 7 x weekly - 3 sets - 10 reps  - Supine Hanging Knee Extension  - 1 x daily - 7 x weekly - 3 sets - 10 reps  - Seated Knee Flexion Stretch  - 1 x daily - 7 x weekly - 3 sets - 10 reps  - Standing Heel Raise with  Support  - 2 x daily - 7 x weekly - 3 sets - 10 reps  - Standing March with Counter Support  - 2 x daily - 7 x weekly - 1 sets - 10 reps  - Standing Knee Flexion AROM with Chair Support  - 2 x daily - 7 x weekly - 1 sets - 10 reps      ASSESSMENT                                                                                                            Pt's knee ROM improves to -3-105 degrees. He has end range pain both is flexion and extension. He has better quad activation with long sitting quad sets compared to supine. Pt does well with standing exercises. Pt will continue to benefit from skilled PT for ROM, strength and soft tissue length to allow pt to return to prior level of function.    PLAN                                                                                                                           Suggestions for next session as indicated: Progress per plan of care       Therapy procedure time and total treatment time can be found documented on the Time Entry flowsheet     Patient with PMH of HTN, HLD, CAD, BRYNN s/p stent, ESRD on dialysis presented with /70, w/ associated HA, palpitations, SOB which were found to be elevated during HD session. BNP elevated to18k, troponin 0.22 -> 0.18 -> 0.18 likely from heart strain. At presentation, EKG NSR, NL Axis, LVH w/ strain, no ST changes. CT head negative for acute intracranial hemorrhage.  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
